# Patient Record
Sex: MALE | Race: WHITE | NOT HISPANIC OR LATINO | URBAN - METROPOLITAN AREA
[De-identification: names, ages, dates, MRNs, and addresses within clinical notes are randomized per-mention and may not be internally consistent; named-entity substitution may affect disease eponyms.]

---

## 2019-01-26 ENCOUNTER — INPATIENT (INPATIENT)
Facility: HOSPITAL | Age: 64
LOS: 5 days | Discharge: NURSING, REHAB OR LONG-TERM CARE | End: 2019-02-01
Attending: HOSPITALIST | Admitting: HOSPITALIST
Payer: MEDICAID

## 2019-01-26 VITALS
HEART RATE: 110 BPM | DIASTOLIC BLOOD PRESSURE: 123 MMHG | SYSTOLIC BLOOD PRESSURE: 247 MMHG | TEMPERATURE: 97 F | OXYGEN SATURATION: 97 % | RESPIRATION RATE: 24 BRPM

## 2019-01-26 DIAGNOSIS — I63.9 CEREBRAL INFARCTION, UNSPECIFIED: ICD-10-CM

## 2019-01-26 LAB
ALBUMIN SERPL ELPH-MCNC: 4.4 G/DL — SIGNIFICANT CHANGE UP (ref 3.3–5)
ALP SERPL-CCNC: 83 U/L — SIGNIFICANT CHANGE UP (ref 40–120)
ALT FLD-CCNC: 26 U/L — SIGNIFICANT CHANGE UP (ref 4–41)
ANION GAP SERPL CALC-SCNC: 16 MMO/L — HIGH (ref 7–14)
APTT BLD: 32.9 SEC — SIGNIFICANT CHANGE UP (ref 27.5–36.3)
AST SERPL-CCNC: 26 U/L — SIGNIFICANT CHANGE UP (ref 4–40)
BASE EXCESS BLDV CALC-SCNC: 3.2 MMOL/L — SIGNIFICANT CHANGE UP
BASOPHILS # BLD AUTO: 0.03 K/UL — SIGNIFICANT CHANGE UP (ref 0–0.2)
BASOPHILS NFR BLD AUTO: 0.5 % — SIGNIFICANT CHANGE UP (ref 0–2)
BILIRUB SERPL-MCNC: 0.8 MG/DL — SIGNIFICANT CHANGE UP (ref 0.2–1.2)
BLOOD GAS VENOUS - CREATININE: 0.86 MG/DL — SIGNIFICANT CHANGE UP (ref 0.5–1.3)
BUN SERPL-MCNC: 12 MG/DL — SIGNIFICANT CHANGE UP (ref 7–23)
CALCIUM SERPL-MCNC: 8.9 MG/DL — SIGNIFICANT CHANGE UP (ref 8.4–10.5)
CHLORIDE BLDV-SCNC: 108 MMOL/L — SIGNIFICANT CHANGE UP (ref 96–108)
CHLORIDE SERPL-SCNC: 99 MMOL/L — SIGNIFICANT CHANGE UP (ref 98–107)
CO2 SERPL-SCNC: 23 MMOL/L — SIGNIFICANT CHANGE UP (ref 22–31)
CREAT SERPL-MCNC: 1.13 MG/DL — SIGNIFICANT CHANGE UP (ref 0.5–1.3)
EOSINOPHIL # BLD AUTO: 0.07 K/UL — SIGNIFICANT CHANGE UP (ref 0–0.5)
EOSINOPHIL NFR BLD AUTO: 1.2 % — SIGNIFICANT CHANGE UP (ref 0–6)
GAS PNL BLDV: 134 MMOL/L — LOW (ref 136–146)
GLUCOSE BLDV-MCNC: 125 — HIGH (ref 70–99)
GLUCOSE SERPL-MCNC: 110 MG/DL — HIGH (ref 70–99)
HCO3 BLDV-SCNC: 26 MMOL/L — SIGNIFICANT CHANGE UP (ref 20–27)
HCT VFR BLD CALC: 48.7 % — SIGNIFICANT CHANGE UP (ref 39–50)
HCT VFR BLDV CALC: 48.7 % — SIGNIFICANT CHANGE UP (ref 39–51)
HGB BLD-MCNC: 16.6 G/DL — SIGNIFICANT CHANGE UP (ref 13–17)
HGB BLDV-MCNC: 15.9 G/DL — SIGNIFICANT CHANGE UP (ref 13–17)
IMM GRANULOCYTES NFR BLD AUTO: 0.5 % — SIGNIFICANT CHANGE UP (ref 0–1.5)
INR BLD: 1.11 — SIGNIFICANT CHANGE UP (ref 0.88–1.17)
LACTATE BLDV-MCNC: 2 MMOL/L — SIGNIFICANT CHANGE UP (ref 0.5–2)
LYMPHOCYTES # BLD AUTO: 1.84 K/UL — SIGNIFICANT CHANGE UP (ref 1–3.3)
LYMPHOCYTES # BLD AUTO: 30.7 % — SIGNIFICANT CHANGE UP (ref 13–44)
MCHC RBC-ENTMCNC: 29.1 PG — SIGNIFICANT CHANGE UP (ref 27–34)
MCHC RBC-ENTMCNC: 34.1 % — SIGNIFICANT CHANGE UP (ref 32–36)
MCV RBC AUTO: 85.4 FL — SIGNIFICANT CHANGE UP (ref 80–100)
MONOCYTES # BLD AUTO: 0.35 K/UL — SIGNIFICANT CHANGE UP (ref 0–0.9)
MONOCYTES NFR BLD AUTO: 5.8 % — SIGNIFICANT CHANGE UP (ref 2–14)
NEUTROPHILS # BLD AUTO: 3.68 K/UL — SIGNIFICANT CHANGE UP (ref 1.8–7.4)
NEUTROPHILS NFR BLD AUTO: 61.3 % — SIGNIFICANT CHANGE UP (ref 43–77)
NRBC # FLD: 0 K/UL — LOW (ref 25–125)
PCO2 BLDV: 43 MMHG — SIGNIFICANT CHANGE UP (ref 41–51)
PH BLDV: 7.42 PH — SIGNIFICANT CHANGE UP (ref 7.32–7.43)
PLATELET # BLD AUTO: 241 K/UL — SIGNIFICANT CHANGE UP (ref 150–400)
PMV BLD: 8.6 FL — SIGNIFICANT CHANGE UP (ref 7–13)
PO2 BLDV: 35 MMHG — SIGNIFICANT CHANGE UP (ref 35–40)
POTASSIUM BLDV-SCNC: 2.9 MMOL/L — CRITICAL LOW (ref 3.4–4.5)
POTASSIUM SERPL-MCNC: 3.1 MMOL/L — LOW (ref 3.5–5.3)
POTASSIUM SERPL-SCNC: 3.1 MMOL/L — LOW (ref 3.5–5.3)
PROT SERPL-MCNC: 7.9 G/DL — SIGNIFICANT CHANGE UP (ref 6–8.3)
PROTHROM AB SERPL-ACNC: 12.4 SEC — SIGNIFICANT CHANGE UP (ref 9.8–13.1)
RBC # BLD: 5.7 M/UL — SIGNIFICANT CHANGE UP (ref 4.2–5.8)
RBC # FLD: 13.2 % — SIGNIFICANT CHANGE UP (ref 10.3–14.5)
SAO2 % BLDV: 63 % — SIGNIFICANT CHANGE UP (ref 60–85)
SODIUM SERPL-SCNC: 138 MMOL/L — SIGNIFICANT CHANGE UP (ref 135–145)
WBC # BLD: 6 K/UL — SIGNIFICANT CHANGE UP (ref 3.8–10.5)
WBC # FLD AUTO: 6 K/UL — SIGNIFICANT CHANGE UP (ref 3.8–10.5)

## 2019-01-26 PROCEDURE — 70496 CT ANGIOGRAPHY HEAD: CPT | Mod: 26

## 2019-01-26 PROCEDURE — 99291 CRITICAL CARE FIRST HOUR: CPT

## 2019-01-26 PROCEDURE — 70450 CT HEAD/BRAIN W/O DYE: CPT | Mod: 26,59

## 2019-01-26 PROCEDURE — 70498 CT ANGIOGRAPHY NECK: CPT | Mod: 26

## 2019-01-26 RX ORDER — POTASSIUM CHLORIDE 20 MEQ
10 PACKET (EA) ORAL ONCE
Qty: 0 | Refills: 0 | Status: COMPLETED | OUTPATIENT
Start: 2019-01-26 | End: 2019-01-26

## 2019-01-26 RX ORDER — NICARDIPINE HYDROCHLORIDE 30 MG/1
2.5 CAPSULE, EXTENDED RELEASE ORAL
Qty: 40 | Refills: 0 | Status: DISCONTINUED | OUTPATIENT
Start: 2019-01-26 | End: 2019-01-27

## 2019-01-26 RX ORDER — LABETALOL HCL 100 MG
20 TABLET ORAL ONCE
Qty: 0 | Refills: 0 | Status: COMPLETED | OUTPATIENT
Start: 2019-01-26 | End: 2019-01-26

## 2019-01-26 RX ORDER — ASPIRIN/CALCIUM CARB/MAGNESIUM 324 MG
81 TABLET ORAL ONCE
Qty: 0 | Refills: 0 | Status: COMPLETED | OUTPATIENT
Start: 2019-01-26 | End: 2019-01-26

## 2019-01-26 RX ORDER — ALTEPLASE 100 MG
9 KIT INTRAVENOUS ONCE
Qty: 0 | Refills: 0 | Status: COMPLETED | OUTPATIENT
Start: 2019-01-26 | End: 2019-01-26

## 2019-01-26 RX ORDER — ALTEPLASE 100 MG
81 KIT INTRAVENOUS ONCE
Qty: 0 | Refills: 0 | Status: COMPLETED | OUTPATIENT
Start: 2019-01-26 | End: 2019-01-26

## 2019-01-26 RX ADMIN — Medication 100 MILLIEQUIVALENT(S): at 23:00

## 2019-01-26 RX ADMIN — Medication 20 MILLIGRAM(S): at 20:21

## 2019-01-26 RX ADMIN — Medication 20 MILLIGRAM(S): at 19:54

## 2019-01-26 RX ADMIN — ALTEPLASE 81 MILLIGRAM(S): KIT at 20:55

## 2019-01-26 RX ADMIN — NICARDIPINE HYDROCHLORIDE 12.5 MG/HR: 30 CAPSULE, EXTENDED RELEASE ORAL at 21:42

## 2019-01-26 RX ADMIN — ALTEPLASE 81 MILLIGRAM(S): KIT at 21:56

## 2019-01-26 RX ADMIN — Medication 20 MILLIGRAM(S): at 20:11

## 2019-01-26 RX ADMIN — NICARDIPINE HYDROCHLORIDE 25 MG/HR: 30 CAPSULE, EXTENDED RELEASE ORAL at 20:41

## 2019-01-26 RX ADMIN — ALTEPLASE 540 MILLIGRAM(S): KIT at 20:54

## 2019-01-26 RX ADMIN — Medication 20 MILLIGRAM(S): at 20:12

## 2019-01-26 RX ADMIN — Medication 81 MILLIGRAM(S): at 21:05

## 2019-01-26 NOTE — H&P ADULT - NSHPREVIEWOFSYSTEMS_GEN_ALL_CORE
Constitutional: denies fevers, chills, night sweats, weight loss  HEENT: denies visual changes, hearing changes, rhinitis, odynophagia, or dysphagia  Cardiovascular: denies palpitations, chest pain, edema  Respiratory: denies SOB, wheezing  Gastrointestinal: denies N/V/D, abdominal pain, hematochezia, melena  : denies dysuria, hematuria  MSK: denies joint pain  Skin: denies new rashes or masses  Heme: denies bleeding  Neuro: denies headache

## 2019-01-26 NOTE — STROKE CODE NOTE - NIH STROKE SCALE: 5A. MOTOR ARM, LEFT, QM
(0) No drift; limb holds 90 (or 45) degrees for full 10 secs (3) No effort against gravity; limb falls

## 2019-01-26 NOTE — ED PROVIDER NOTE - MEDICAL DECISION MAKING DETAILS
62 yo M presenting with sx concerning for TIA. CT-H no evidence of acute bleed, will obtain basic labs, CT angio H/N, tba for MRI. HTN emergency tx

## 2019-01-26 NOTE — H&P ADULT - HISTORY OF PRESENT ILLNESS
The patient is a 64 yo M with a PMH of HTN (not on meds) and HLD (not on meds) who presents to hospital with The patient is a 64 yo M with a PMH of HTN (not on meds), CAYDEN (on CPAP, compliant), obesity, and HLD (not on meds) who presents to hospital with acute onset of slurred speech and LUE paresis. Per chart reviwe: "Patient states he woke up on 1/26 at 5pm at his baseline and began to change his clothes utilizing both hands and was speaking normally at the time. After about 10 minutes of changing, patient fell over while tying his shoelaces. When patient got up, he tried to reach for a tissue with his left arm and was unable to lift it. Patient states that his left arm felt weak for a "few minutes" before returning back to normal however, he noticed that he was slurring his words."  Upon arrival, code stroke was called. The pts symptoms rapidly improved without intervention, however at approx 1945-800 the pts neuro status changed acutely and he developed acute LUE and LLE paresis as well as slurred speech and L sided facial droop. The patient The patient is a 62 yo M with a PMH of HTN (not on meds), CAYDEN (on CPAP, compliant), obesity, and HLD (not on meds) who presents to hospital with acute onset of slurred speech and LUE paresis. Per chart reviwe: "Patient states he woke up on 1/26 at 5pm at his baseline and began to change his clothes utilizing both hands and was speaking normally at the time. After about 10 minutes of changing, patient fell over while tying his shoelaces. When patient got up, he tried to reach for a tissue with his left arm and was unable to lift it. Patient states that his left arm felt weak for a "few minutes" before returning back to normal however, he noticed that he was slurring his words."  Upon arrival, code stroke was called. The pts symptoms rapidly improved without intervention, however at approx 1945-800 the pts neuro status changed acutely and he developed acute LUE and LLE paresis as well as slurred speech and L sided facial droop. The patient was re-evaluated by the neuro team and per chart review: "19:46pm: Interval examination demonstrates +Moderate dysarthria. LUE 1/5 strength. LLE 3/5 strength. New NIHSS 8. tPA discussed with patient and family as patient within extended window for tPA and patient amenable. Aggressive blood pressure control initiated. tPA given at 8:54pm. BP at time of tPA administration 177/95."    I arrived at the bedside at approximately 2100. The patient was fully alert and oriented. His wife and two sons were at the bedside. He had visible left sided facial droop and was slurring his speech. In addition, the was unable to move his LUE, and his LLE was 1/5-2/5. The patient expressed detailed understanding of what was going on, and I confirmed with him that he was full code in the event he required chest compressions and/or intubation. The patient had already been started on a nicardipine drip by the time I arrived and his BP was in the 160s.

## 2019-01-26 NOTE — STROKE CODE NOTE - NIH STROKE SCALE: 4. FACIAL PALSY, QM
Post-Care Instructions: I reviewed with the patient in detail post-care instructions. Patient is to wear sunprotection, and avoid picking at any of the treated lesions. Pt may apply Vaseline to crusted or scabbing areas. Number Of Freeze-Thaw Cycles: 1 freeze-thaw cycle Render Post-Care Instructions In Note?: no Duration Of Freeze Thaw-Cycle (Seconds): 3 Detail Level: Detailed Consent: The patient's consent was obtained including but not limited to risks of crusting, scabbing, blistering, scarring, darker or lighter pigmentary change, recurrence, incomplete removal and infection. (0) Normal symmetrical movements (2) Partial paralysis (total or near-total paralysis of lower face)

## 2019-01-26 NOTE — H&P ADULT - PMH
CAD (Coronary Artery Disease)    Hypertension    CAYDEN (Obstructive Sleep Apnea)    Peripheral Vascular Disease

## 2019-01-26 NOTE — H&P ADULT - NSHPLABSRESULTS_GEN_ALL_CORE
CBC Full  -  ( 26 Jan 2019 19:50 )  WBC Count : 6.00 K/uL  Hemoglobin : 16.6 g/dL  Hematocrit : 48.7 %  Platelet Count - Automated : 241 K/uL  Mean Cell Volume : 85.4 fL  Mean Cell Hemoglobin : 29.1 pg  Mean Cell Hemoglobin Concentration : 34.1 %  Auto Neutrophil # : 3.68 K/uL  Auto Lymphocyte # : 1.84 K/uL  Auto Monocyte # : 0.35 K/uL  Auto Eosinophil # : 0.07 K/uL  Auto Basophil # : 0.03 K/uL  Auto Neutrophil % : 61.3 %  Auto Lymphocyte % : 30.7 %  Auto Monocyte % : 5.8 %  Auto Eosinophil % : 1.2 %  Auto Basophil % : 0.5 %    01-26    138  |  99  |  12  ----------------------------<  110<H>  3.1<L>   |  23  |  1.13    Ca    8.9      26 Jan 2019 19:50    LIVER FUNCTIONS - ( 26 Jan 2019 19:50 )  Alb: 4.4 g/dL / Pro: 7.9 g/dL / ALK PHOS: 83 u/L / ALT: 26 u/L / AST: 26 u/L / GGT: x           PT/INR - ( 26 Jan 2019 19:50 )   PT: 12.4 SEC;   INR: 1.11          PTT - ( 26 Jan 2019 19:50 )  PTT:32.9 SEC  20:20 - VBG - pH: 7.42  | pCO2: 43    | pO2: 35    | Lactate: 2.0    CT brain protocol unremarkable    CT angio head and neck:  Areas of severe stenosis with poststenotic dilatation involving the   distal right and left vertebral arteries as well as the proximal basilar   artery. Differential considerations include vasculitis as well as   vertebral artery dissections. MRI/MRA suggested for further evaluation.    No significant stenosis or occlusion of the major proximal branches of   the [place].

## 2019-01-26 NOTE — CONSULT NOTE ADULT - SUBJECTIVE AND OBJECTIVE BOX
LION [id number] is a 63y old  Male who presents with a chief complaint of     HPI: 62 y/o obese Male with past medical history of HTN (non-compliant), HLD, CAYDEN on BIPAP presents to the ED with complaints of transient Left arm weakness and slurred speech. Code Stroke called in the ED for these symptoms. Patient states he woke up on 1/26 at 5pm at his baseline and began to change his clothes utilizing both hands and was speaking normally at the time. After about 10 minutes of changing, patient fell over while tying his shoelaces. When patient got up, he tried to reach for a tissue with his left arm and was unable to lift it. Patient states that his left arm felt weak for a "few minutes" before returning back to normal however, he noticed that he was slurring his words. Denies any changes in vision, weakness in unilateral legs, problems with coordination.   At the time of my interview, patient states he is back to baseline. No complaints at this time.     MEDICATIONS  (STANDING):    MEDICATIONS  (PRN):    PAST MEDICAL & SURGICAL HISTORY:  Peripheral Vascular Disease  CAYDEN (Obstructive Sleep Apnea)  CAD (Coronary Artery Disease)    FAMILY HISTORY:  Father with multiple TIA's and strokes  Grandfather with MI at age 40    Allergies    No Known Allergies    Intolerances        SHx - No smoking, No ETOH, No drug abuse      Review of Systems:  As per HPI, otherwise negative.     Vital Signs Last 24 Hrs  T(C): 36.1 (26 Jan 2019 18:36), Max: 36.1 (26 Jan 2019 18:36)  T(F): 97 (26 Jan 2019 18:36), Max: 97 (26 Jan 2019 18:36)  HR: 110 (26 Jan 2019 18:36) (110 - 110)  BP: 247/123 (26 Jan 2019 18:36) (247/123 - 247/123)  BP(mean): --  RR: 24 (26 Jan 2019 18:36) (24 - 24)  SpO2: 97% (26 Jan 2019 18:36) (97% - 97%)    General Exam:   General appearance: No acute distress                 Neurological Exam:  Mental Status: Orientated to self, date and place.  Attention intact.  No dysarthria. Speech fluent.  Cranial Nerves:   PERRL, EOMI, VFF, no nystagmus. CN V1-3 intact to light touch.  No facial asymmetry. Tongue, uvula and palate midline.  Sternocleidomastoid and Trapezius intact bilaterally.    Motor:   Tone: normal.                  Strength:     [] Upper extremity                                                                  R        No drift                                               L          No drift  [] Lower extremity                                                                      R       No drift                                               L        No drift  Pronator drift: none                 Dysmetria: None to finger-nose-finger    Tremor: No resting, postural or action tremor.  No myoclonus.    Sensation: intact to light touch.    Gait: Deferred    Other:                Radiology    CT:   MRI  EKG:  tele:  TTE:  EEG: LION [id number] is a 63y old  Male who presents with a chief complaint of     HPI: 64 y/o obese Male with past medical history of HTN (non-compliant), HLD, CAYDEN on BIPAP presents to the ED with complaints of transient Left arm weakness and slurred speech. Code Stroke called in the ED for these symptoms. Patient states he woke up on 1/26 at 5pm at his baseline and began to change his clothes utilizing both hands and was speaking normally at the time. After about 10 minutes of changing, patient fell over while tying his shoelaces. When patient got up, he tried to reach for a tissue with his left arm and was unable to lift it. Patient states that his left arm felt weak for a "few minutes" before returning back to normal however, he noticed that he was slurring his words. Denies any changes in vision, weakness in unilateral legs, problems with coordination. At the time of my interview, patient states he is back to baseline. No complaints at this time.     Notified by ED Team at 19:46pm that patient's neurologic status has changed. As per ED Team, patient now with slurred speech and left arm weakness.   Neurology resident at bedside.     Patient states his slurred speech is waxing and waning along with his left arm weakness.   Interval exam demonstrates initial vitals in the 240's systolics / 130's diastolic.       MEDICATIONS  (STANDING):    MEDICATIONS  (PRN):    PAST MEDICAL & SURGICAL HISTORY:  Peripheral Vascular Disease  CAYDEN (Obstructive Sleep Apnea)  CAD (Coronary Artery Disease)    FAMILY HISTORY:  Father with multiple TIA's and strokes  Grandfather with MI at age 40    Allergies    No Known Allergies    Intolerances        SHx - No smoking, No ETOH, No drug abuse      Review of Systems:  As per HPI, otherwise negative.     Vital Signs Last 24 Hrs  T(C): 36.1 (26 Jan 2019 18:36), Max: 36.1 (26 Jan 2019 18:36)  T(F): 97 (26 Jan 2019 18:36), Max: 97 (26 Jan 2019 18:36)  HR: 110 (26 Jan 2019 18:36) (110 - 110)  BP: 247/123 (26 Jan 2019 18:36) (247/123 - 247/123)  BP(mean): --  RR: 24 (26 Jan 2019 18:36) (24 - 24)  SpO2: 97% (26 Jan 2019 18:36) (97% - 97%)    General Exam:   General appearance: No acute distress                 Neurological Exam:  Mental Status: Orientated to self, date and place.  Attention intact.  No dysarthria. Speech fluent.  Cranial Nerves:   PERRL, EOMI, VFF, no nystagmus. CN V1-3 intact to light touch.  No facial asymmetry. Tongue, uvula and palate midline.  Sternocleidomastoid and Trapezius intact bilaterally.    Motor:   Tone: normal.                  Strength:     [] Upper extremity                                                                  R        No drift                                               L          No drift  [] Lower extremity                                                                      R       No drift                                               L        No drift  Pronator drift: none                 Dysmetria: None to finger-nose-finger    Tremor: No resting, postural or action tremor.  No myoclonus.    Sensation: intact to light touch.    Gait: Deferred    Other:      Radiology    CT: < from: CT Brain Stroke Protocol (01.26.19 @ 18:58) >  IMPRESSION:     No acute intracranial hemorrhage, brain edema, midline shift, mass   effect, or hydrocephalus.     < end of copied text >    MRI  EKG:  tele:  TTE:  EEG: LION [id number] is a 63y old  Male who presents with a chief complaint of     HPI: 64 y/o obese Male with past medical history of HTN (non-compliant), HLD, CAYDEN on BIPAP presents to the ED with complaints of transient Left arm weakness and slurred speech. Code Stroke called in the ED for these symptoms. Patient states he woke up on 1/26 at 5pm at his baseline and began to change his clothes utilizing both hands and was speaking normally at the time. After about 10 minutes of changing, patient fell over while tying his shoelaces. When patient got up, he tried to reach for a tissue with his left arm and was unable to lift it. Patient states that his left arm felt weak for a "few minutes" before returning back to normal however, he noticed that he was slurring his words. Denies any changes in vision, weakness in unilateral legs, problems with coordination. At the time of my interview, patient states he is back to baseline. No complaints at this time.     Notified by ED Team at 19:46pm that patient's neurologic status has changed. As per ED Team, patient now with slurred speech and left arm weakness.   Neurology resident at bedside.     Patient states his slurred speech is waxing and waning along with his left arm weakness.   Interval exam demonstrates initial vitals in the 240's systolics / 130's diastolic.       MEDICATIONS  (STANDING):    MEDICATIONS  (PRN):    PAST MEDICAL & SURGICAL HISTORY:  Peripheral Vascular Disease  CAYDEN (Obstructive Sleep Apnea)  CAD (Coronary Artery Disease)    FAMILY HISTORY:  Father with multiple TIA's and strokes  Grandfather with MI at age 40    Allergies    No Known Allergies    Intolerances        SHx - No smoking, No ETOH, No drug abuse      Review of Systems:  As per HPI, otherwise negative.     INITIAL NEUROLOGIC EXAM  Vital Signs Last 24 Hrs  T(C): 36.1 (26 Jan 2019 18:36), Max: 36.1 (26 Jan 2019 18:36)  T(F): 97 (26 Jan 2019 18:36), Max: 97 (26 Jan 2019 18:36)  HR: 110 (26 Jan 2019 18:36) (110 - 110)  BP: 247/123 (26 Jan 2019 18:36) (247/123 - 247/123)  BP(mean): --  RR: 24 (26 Jan 2019 18:36) (24 - 24)  SpO2: 97% (26 Jan 2019 18:36) (97% - 97%)    General Exam:   General appearance: No acute distress                 Neurological Exam:  Mental Status: Orientated to self, date and place.  Attention intact.  No dysarthria. Speech fluent.  Cranial Nerves:   PERRL, EOMI, VFF, no nystagmus. CN V1-3 intact to light touch.  No facial asymmetry. Tongue, uvula and palate midline.  Sternocleidomastoid and Trapezius intact bilaterally.    Motor:   Tone: normal.                  Strength:     [] Upper extremity                                                                  R        No drift                                               L          No drift  [] Lower extremity                                                                      R       No drift                                               L        No drift  Pronator drift: none                 Dysmetria: None to finger-nose-finger    Tremor: No resting, postural or action tremor.  No myoclonus.    Sensation: intact to light touch.    Gait: Deferred      INTERVAL NEUROLOGIC EXAM  Neurological Exam:  Mental Status: Orientated to self, date and place.  Attention intact.  +Moderate dysarthria. Speech fluent.  Cranial Nerves:   PERRL, EOMI, VFF, no nystagmus.    CN V1-3 intact to light touch . +Left Facial Droop.  Tongue, uvula and palate midline.      Motor:   Tone: LUE flaccid                 Strength:     [] Upper extremity                                                                     R         5/5                                               L         1/5 (No effort against gravity)  [] Lower extremity                                                                     R        5/5                                                 L        3/5    Dysmetria: None to FNF on the Right  Tremor: No resting, postural or action tremor.  No myoclonus.  Sensation: intact to light touch,  Gait: Deferred          Radiology  CT: < from: CT Brain Stroke Protocol (01.26.19 @ 18:58) >  IMPRESSION:     No acute intracranial hemorrhage, brain edema, midline shift, mass   effect, or hydrocephalus.     < end of copied text >    MRI  EKG:  tele:  TTE:  EEG:

## 2019-01-26 NOTE — STROKE CODE NOTE - NIH STROKE SCALE: 10. DYSARTHRIA, QM
(0) Normal (1) Mild-to-moderate dysarthria; patient slurs at least some words and, at worst, can be understood with some difficulty

## 2019-01-26 NOTE — ED ADULT NURSE NOTE - OBJECTIVE STATEMENT
Pt received in spot 27 as a code stroke from CT scan.  Pt A&OX4, NAD.  c/o 1 episode of L arm weakness approx 5PM with slurred speech, which has resolved now.  No facial droop noted.  No neuro deficits noted.  Denies any HA/dizziness/vision changes.  Denies any chest pain/SOB.  NSR on monitor.  Pt appears comfortable.  Labs sent.  MD at bedside.  Will continue to monitor.

## 2019-01-26 NOTE — ED PROVIDER NOTE - ATTENDING CONTRIBUTION TO CARE
Attending note:   After face to face evaluation of this patient, I concur with above noted hx, pe, and care plan for this patient.  64 y/o M with dary, htn, non-compliant with medication with minutes of left arm weakness and slurred speech after awakening today around 5:30pm.    All symptoms resolved;  BP significantly elevated; will treat with labetalol.

## 2019-01-26 NOTE — CHART NOTE - NSCHARTNOTEFT_GEN_A_CORE
Notified by ED Team at 19:46pm that patient's neurologic status has changed. As per ED Team, patient now with slurred speech and left arm weakness.   Neurology resident at bedside.     Patient states his slurred speech is waxing and waning along with his left arm weakness.   Interval exam demonstrates initial vitals in the 240's systolics / 130's diastolic.            Neurological Exam:  Mental Status: Orientated to self, date and place.  Attention intact.  +Moderate dysarthria. Speech fluent.  Cranial Nerves:   PERRL, EOMI, VFF, no nystagmus.    CN V1-3 intact to light touch . +Left Facial Droop.  Tongue, uvula and palate midline.      Motor:   Tone: LUE flaccid                 Strength:     [] Upper extremity                                                                     R         5/5                                               L         1/5 (No effort against gravity)  [] Lower extremity                                                                     R        5/5                                                 L        3/5    Dysmetria: None to FNF on the Right  Tremor: No resting, postural or action tremor.  No myoclonus.  Sensation: intact to light touch,  Gait: Deferred    62 y/o obese Male with past medical history of HTN (non-compliant), HLD, CAYDEN on BIPAP presented initially to [facility] ED as Code Stroke for Slurred speech and LUE paresis which rapidly improved. LKN: 5pm. Initial NIHSS 0. Interval examination demonstrates +Moderate dysarthria. LUE 1/5 strength. LLE 3/5 strength. New NIHSS 8. tPA discussed with patient and family as patient within extended window for tPA and patient amenable. Aggressive blood pressure control initiated. tPA given at 8:54pm. BP at time of tPA administration 177/95.    Impression: Left hemiparesis w/ dysarthria likely 2/2 R brain dysfunction; likely R MCA infarct (possibly involving R internal capsule) 2/2 unknown etiology.     Recommendations:   Urgent CTA head/neck with contrast to evaluate for possible vessel occlusion  Permissive HTN for 24 hours up to 180/105  Repeat CT head in 24 hours  If repeat CT head without hemorrhagic conversion, start on heparin/lovenox for DVT prophylaxis and ASA 81 mg QD  MRI brain without contrast  TTE with bubble study for possible valvular heart disease  NPO for 12 hours following tPA administration  Lipitor 80 mg PO QHS  HbA1c, LDL and continue with aggressive vascular risk factors modifications   Tele monitor  PT/OT/S&S eval Notified by ED Team at 19:46pm that patient's neurologic status has changed. As per ED Team, patient now with slurred speech and left arm weakness.   Neurology resident at bedside.     Patient states his slurred speech is waxing and waning along with his left arm weakness.   Interval exam demonstrates initial vitals in the 240's systolics / 130's diastolic.            Neurological Exam:  Mental Status: Orientated to self, date and place.  Attention intact.  +Moderate dysarthria. Speech fluent.  Cranial Nerves:   PERRL, EOMI, VFF, no nystagmus.    CN V1-3 intact to light touch . +Left Facial Droop.  Tongue, uvula and palate midline.      Motor:   Tone: LUE flaccid                 Strength:     [] Upper extremity                                                                     R         5/5                                               L         1/5 (No effort against gravity)  [] Lower extremity                                                                     R        5/5                                                 L        3/5    Dysmetria: None to FNF on the Right  Tremor: No resting, postural or action tremor.  No myoclonus.  Sensation: intact to light touch,  Gait: Deferred    64 y/o obese Male with past medical history of HTN (non-compliant), HLD, CAYDEN on BIPAP presented initially to [facility] ED as Code Stroke for Slurred speech and LUE paresis which rapidly improved. LKN: 5pm. Initial NIHSS 0. Interval examination demonstrates +Moderate dysarthria. LUE 1/5 strength. LLE 3/5 strength. New NIHSS 8. tPA discussed with patient and family as patient within extended window for tPA and patient amenable. Aggressive blood pressure control initiated. tPA given at 8:54pm. BP at time of tPA administration 177/95.    Impression: Left hemiparesis w/ dysarthria likely 2/2 R brain dysfunction; likely R MCA infarct (possibly involving R internal capsule) 2/2 unknown etiology.     Recommendations:   Urgent CTA head/neck with contrast to evaluate for possible vessel occlusion  MICU Consult  Permissive HTN for 24 hours up to 180/105  Repeat CT head in 24 hours  If repeat CT head without hemorrhagic conversion, start on heparin/lovenox for DVT prophylaxis and ASA 81 mg QD  MRI brain without contrast  TTE with bubble study for possible valvular heart disease  NPO for 12 hours following tPA administration  Lipitor 80 mg PO QHS  HbA1c, LDL and continue with aggressive vascular risk factors modifications   Tele monitor  PT/OT/S&S eval

## 2019-01-26 NOTE — ED PROVIDER NOTE - OBJECTIVE STATEMENT
62 yo morbidly obese M hx HTN, HLD, non compliant with meds, CAYDEN on CPAP, presenting with complaints of LUE weakness lasting 5 minutes around 5:15 pm while getting dressed, episode occurred about 15 minutes, and reported slurred speech on and off for 30 minutes. No numbness/tingling.  Patient states he woke up from a nap at 5 pm and got up to clean up with full ability to use bilateral arms before symptoms began. Family history of stroke. Denies chest pain. Reports chronic labored breathing.

## 2019-01-26 NOTE — CONSULT NOTE ADULT - ATTENDING COMMENTS
Seen and examined on rounds with housestaff and discussed with stroke team.  This is a 62 yo gentleman with a history of poorly controlled HTN, CAYDEN, obesity presenting with an acute onset of L hemiparesis.  Symptoms fluctuated in the ER to the extent of complete L hemiplegia in association with SBP ~250.  He is s/p IV tpa at 8:54 pm as advised by the stroke neurology team.  On exam today, there is a R end gaze horizontal nystagmus, L UMN facial weakness, he is unable to lift his left arm off the bed, though able to bend his elbow with support.  He is able to lift his left off the bed with mild drift.  CT angiogram concerning for proximal basilar stenosis and distal bilateral vertebral arteries.  If repeat CT head does not reveal a significant acute hemorrhage, would start ASA/Plavix.  Plan for MRI brain, MRA head and neck.  C/w permissive HTN.  Stroke education provided.

## 2019-01-26 NOTE — ED PROVIDER NOTE - PHYSICAL EXAMINATION
VITALS: reviewed  GEN: NAD, A & O x 4  HEAD/EYES: NCAT, PERRL, EOMI  ENT: mucus membranes moist, oropharynx WNL, trachea midline  RESP: lungs CTA with equal breath sounds bilaterally, chest wall nontender and atraumatic  CV: heart with reg rhythm S1, S2; distal pulses intact and symmetric bilaterally  ABDOMEN: normoactive bowel sounds, soft, nondistended, nontender, no palpable masses  : no CVAT  MSK: extremities atraumatic and nontender, no edema, no asymmetry.   SKIN: warm, dry, no rash, no bruising, no cyanosis. color appropriate for ethnicity  NEURO: alert, mentating appropriately, no facial asymmetry. gross sensation, motor, coordination are intact, no pronator drift.  PSYCH: Affect appropriate

## 2019-01-26 NOTE — H&P ADULT - ASSESSMENT
NEUROLOGIC:  Problem: ischemic stroke  Assessment: Ischemic stroke in setting of untreated HTN and HLD. Fam hx of stroke in father. No alteration of mental status present. However, LUE/LLE/, left facial droop, and slurred speech present. Likely due to structural ischemic stroke stroke. Initial CTH negative. CTA of head and neck showing severe stenosis with poststenotic dilatation involving the distal right and left vertebral arteries as well as the proximal basilar artery. Pt received TPA at 854PM on 1/26. He has had improvement in his motor function since then however waxes/wanes.   Plan:   Permissive HTN for 24 hours up to 180/105. Titrate nicardipine drip as indicated.  Neuro checks q1 hours  Repeat CT head ordered for 1900 on 1/27  MRI brain without contrast ordered  TTE with bubble study ordered   Atorvastatin 80 mg once passes dysphagia screen  HbA1c, LDL ordered for AM 1/27  He is eligible for early mobilization and immediate physical therapy/OT/SS. All are ordered    SKIN:  Lines: low risk PIVs  Decubiti: None    GI:   Diet: NPO for 12 hours following tPA administration. Bedside dysphagia screen at 9am. If passes can start diet  Bowel regiment: senna when passes dyphagia screen    Urinary tract:  UO: Will monitor  Rivera: Not indicated      METABOLIC:  BMP showing evidence of hypokalemia. Repleted w IV. F/u repeat BMP  Liver functions tests show no abnormalities  Endocrine: no abnormalities present  01-26    138  |  99  |  12  ----------------------------<  110<H>  3.1<L>   |  23  |  1.13    Ca    8.9      26 Jan 2019 19:50    TPro  7.9  /  Alb  4.4  /  TBili  0.8  /  DBili  x   /  AST  26  /  ALT  26  /  AlkPhos  83  01-26      VOLUME ASSESSMENT:  No peripheral edema  No evidence of disturbance of effective circulating volume    HEMATOLOGIC:  CBC results shows no abnormalities  Coag panel shows no abnormalities  DVT prophylaxis with SCDs. If repeat CTH at 1900 on 1/27 shows no ischemic conversion-->HSQ or enox                        16.6   6.00  )-----------( 241      ( 26 Jan 2019 19:50 )             48.7     PT/INR - ( 26 Jan 2019 19:50 )   PT: 12.4 SEC;   INR: 1.11          PTT - ( 26 Jan 2019 19:50 )  PTT:32.9 SEC    INFECTIOUS DISEASE:  Pt without strong objective or clinical evidence of infection. Will observe off antibiotics    HEMODYNAMICS:  Patient is not on pressors     CARDIOVASCULAR:    RESPIRATORY: The patient is a 64 yo M with a PMH of HTN (not on meds), CAYDEN (on CPAP, compliant), obesity, and HLD (not on meds) who presents to hospital with acute onset of slurred speech and LUE paresis found to have ischemic stroke s/p tPA now in MICU for further observation/management.     NEUROLOGIC:  Problem: ischemic stroke  Assessment: Ischemic stroke in setting of untreated HTN and HLD. Fam hx of stroke in father. No alteration of mental status present. However, LUE/LLE/, left facial droop, and slurred speech present. Likely due to structural ischemic stroke stroke. Initial CTH negative. CTA of head and neck showing severe stenosis with poststenotic dilatation involving the distal right and left vertebral arteries as well as the proximal basilar artery. Pt received tPA at 854PM on 1/26. He has had improvement in his motor function since then however waxes/wanes.   Plan:   Permissive HTN for 24 hours up to 180/105. Titrate nicardipine drip as indicated.  Neuro checks q1 hours  Repeat CT head ordered for 1900 on 1/27  MRI brain without contrast ordered  TTE with bubble study ordered   Atorvastatin 80 mg once passes dysphagia screen  HbA1c, LDL ordered for AM 1/27  He is eligible for early mobilization and immediate physical therapy/OT/SS. All are ordered    SKIN:  Lines: low risk PIVs  Decubiti: None    GI:   Diet: NPO for 12 hours following tPA administration. Bedside dysphagia screen at 9am. If passes can start diet  Bowel regiment: senna when passes dyphagia screen    Urinary tract:  UO: Will monitor  Rivera: Not indicated      METABOLIC:  BMP showing evidence of hypokalemia. Repleted w IV. F/u repeat BMP  Liver functions tests show no abnormalities  Endocrine: no abnormalities present  01-26    138  |  99  |  12  ----------------------------<  110<H>  3.1<L>   |  23  |  1.13    Ca    8.9      26 Jan 2019 19:50    TPro  7.9  /  Alb  4.4  /  TBili  0.8  /  DBili  x   /  AST  26  /  ALT  26  /  AlkPhos  83  01-26      VOLUME ASSESSMENT:  No peripheral edema  No evidence of disturbance of effective circulating volume    HEMATOLOGIC:  CBC results shows no abnormalities  Coag panel shows no abnormalities  DVT prophylaxis with SCDs. If repeat CTH at 1900 on 1/27 shows no ischemic conversion-->HSQ or enox                        16.6   6.00  )-----------( 241      ( 26 Jan 2019 19:50 )             48.7     PT/INR - ( 26 Jan 2019 19:50 )   PT: 12.4 SEC;   INR: 1.11          PTT - ( 26 Jan 2019 19:50 )  PTT:32.9 SEC    INFECTIOUS DISEASE:  Pt without strong objective or clinical evidence of infection. Will observe off antibiotics    HEMODYNAMICS:  Patient is not on pressors     CARDIOVASCULAR:  BP goal from 150-180 systolic / under 105 diastolic  Titrate nicardipine drip as indicated  Oral agent after 24 hours from tPA    RESPIRATORY:  CPAP QHS

## 2019-01-26 NOTE — STROKE CODE NOTE - NIH STROKE SCALE: 6A. MOTOR LEG, LEFT, QM
(0) No drift; leg holds 30 degree position for full 5 secs (2) Some effort against gravity; leg falls to bed by 5 secs, but has some effort against gravity

## 2019-01-26 NOTE — ED PROVIDER NOTE - NS ED ROS FT
CONST: no fevers, no chills, no trauma  EYES: no pain, no visual disturbances  ENT: no sore throat, no epistaxis, no rhinorrhea, no hearing changes  CV: no chest pain, no palpitations, no orthopnea, no extremity pain or swelling  RESP: no shortness of breath, no cough, no sputum, no pleurisy, no wheezing  ABD: no abdominal pain, no nausea, no vomiting, no diarrhea, no black or bloody stool  : no dysuria, no hematuria, no frequency, no urgency  MSK: no back pain, no neck pain, no extremity pain  NEURO: no headache, no sensory disturbances, + focal weakness, no dizziness  HEME: no easy bleeding or bruising  SKIN: no diaphoresis, no rash

## 2019-01-26 NOTE — H&P ADULT - ATTENDING COMMENTS
CAYDEN on CPAP, obesity, HTN, DM p/w HTN emergency and acute CVA s/p TPA  bp control on cardene   neuro checks q1h  bipap support  neuro f/u

## 2019-01-26 NOTE — ED ADULT NURSE NOTE - NSIMPLEMENTINTERV_GEN_ALL_ED
Implemented All Fall Risk Interventions:  [place] to call system. Call bell, personal items and telephone within reach. Instruct patient to call for assistance. Room bathroom lighting operational. Non-slip footwear when patient is off stretcher. Physically safe environment: no spills, clutter or unnecessary equipment. Stretcher in lowest position, wheels locked, appropriate side rails in place. Provide visual cue, wrist band, yellow gown, etc. Monitor gait and stability. Monitor for mental status changes and reorient to person, place, and time. Review medications for side effects contributing to fall risk. Reinforce activity limits and safety measures with patient and family.

## 2019-01-26 NOTE — ED PROVIDER NOTE - PROGRESS NOTE DETAILS
Uriel att; 64 yo obese M with HTN, HLP, CAYDEN on CPAP at night BIB family for episode of left hand weakness (pt was unable to raise his left arm fully) which has since resolved and slurred speech which has improved but has not completely resolved. No focal weakness-CNs, motor, sensory and cerebellar intact. Questionable slurred speech as I do not know the pt's baseline. Stroke code called and discussed with neuro resident. Signed out to Blue side. Dennis PGY2: called into room by nurse for patient c/o slurred speech. Family at bedside endorsing speech sounds different, neurology resident notified. Patient BP elevated 265, received 20 labetalol without much improvement, will give second dose. As per neurology resident, target  Dennis PGY2: called to patient bedside, SBP dropped to 140 , HR 47 on cardine drip @ 5, drip was stopped, palpable pulse, patient diaphoretic, bagged @ bedside for increased pressure. Started IV fluids to increase pressure, BP then jumped to 244 systolic, fluids stopped and restarted cardine @ 2.5 mg at which point pressure dropped again to systolic 130's. ICU was called to notify about change. Drip was paused, started bipap for sx relief of difficulty breathing, sbp continued to increase now 's fluids off, no cardine drip at time, on BIPAP with reported improved sx.

## 2019-01-26 NOTE — H&P ADULT - NSHPPHYSICALEXAM_GEN_ALL_CORE
PHYSICAL EXAM:   GEN: Age appropriate, resting comfortably in bed, no acute distress, non toxic appearing, speaking in complete sentences.   HEENT: Conjunctiva and sclera normal  PULM: Lungs CTAB, no wheezes, rales, rhonchi  CV: RRR, S1S2, no MRG  Abdominal: Soft, nontender to palpation, non-distended, +BS  Extremities: No edema or cyanosis  NEURO: AAOx4. 0/5 LUE. 1-2/5 strength LLL. RUE and RLE 5/5. L facial droop. Slurred speech.   Skin: No rashes, lesions

## 2019-01-26 NOTE — STROKE CODE NOTE - NS AS STROKE RELATED FACTORS
In-hospital time delay/Other/Patient initially with rapidly improving Neurologic deficits with progression of clinical symptoms occurring later in the ED course requiring re-evaluation for Thrombolytics.

## 2019-01-26 NOTE — ED ADULT TRIAGE NOTE - CHIEF COMPLAINT QUOTE
pt c/o waking up and feeling week and had episode of arm weakness/ and later had some slurred speech as per pt after putting on pants pt fell and realized he had some slurred speech and some weakness in the arm/  fit doc cold to eval uate for possible stroke pt suffers from sleep apnea  code stroke called

## 2019-01-26 NOTE — CONSULT NOTE ADULT - ASSESSMENT
62 y/o obese Male with past medical history of HTN (non-compliant), HLD, CAYDEN on BIPAP presents to the ED with complaints of transient Left arm weakness and slurred speech. Code Stroke called in the ED for these symptoms. Patient states he woke up on 1/26 at 5pm at his baseline and began to change his clothes utilizing both hands and was speaking normally at the time. After about 10 minutes of changing, patient fell over while tying his shoelaces. When patient got up, he tried to reach for a tissue with his left arm and was unable to lift it. Patient states that his left arm felt weak for a "few minutes" before returning back to normal however, he noticed that he was slurring his words. Denies any changes in vision, weakness in unilateral legs, problems with coordination.   At the time of my interview, patient states he is back to baseline. No complaints at this time. LKN: 5pm 1/26/2019. Neurologic exam non-focal. NIHSS 0. Pre-MRS 3. ABCD2 score 4. tPA not given as neurologic deficits rapidly improved. CT head demonstrates no acute pathology but possible chronic bilateral basal ganglia infarcts. CTA h/n pending.     Impression: Transient LUE hemiparesis a/w dysarthria possibly 2/2 R brain dysfunction; possibly R MCA distribution TIA 2/2 unknown etiology    Recommendations:  Permissive HTN up to 180/105 mmHg for first 24 hours after the symptom onset followed by gradual normotension  MRI brain without contrast  CTA h/n with contrast  Start ASA 81 mg PO QD once patient passes swallow evaluation, if not,  HI  Load with Plavix 300mg followed by 75mg PO QD for 3 weeks  Lipitor 80 mg PO QHS  Dysphagia screen 64 y/o obese Male with past medical history of HTN (non-compliant), HLD, CAYDEN on BIPAP presents to the ED with complaints of transient Left arm weakness and slurred speech. Code Stroke called in the ED for these symptoms. Patient states he woke up on 1/26 at 5pm at his baseline and began to change his clothes utilizing both hands and was speaking normally at the time. After about 10 minutes of changing, patient fell over while tying his shoelaces. When patient got up, he tried to reach for a tissue with his left arm and was unable to lift it. Patient states that his left arm felt weak for a "few minutes" before returning back to normal however, he noticed that he was slurring his words. Denies any changes in vision, weakness in unilateral legs, problems with coordination.   At the time of my interview, patient states he is back to baseline. No complaints at this time. LKN: 5pm 1/26/2019. Neurologic exam non-focal. NIHSS 0. Pre-MRS 3. ABCD2 score 4. tPA not given as neurologic deficits rapidly improved. CT head demonstrates no acute pathology but possible chronic bilateral basal ganglia infarcts.     19:46pm: Interval examination demonstrates +Moderate dysarthria. LUE 1/5 strength. LLE 3/5 strength. New NIHSS 8. tPA discussed with patient and family as patient within extended window for tPA and patient amenable. Aggressive blood pressure control initiated. tPA given at 8:54pm. BP at time of tPA administration 177/95.    Impression: Left hemiparesis w/ dysarthria likely 2/2 R brain dysfunction; likely R MCA infarct (possibly involving R internal capsule) 2/2 unknown etiology.     Recommendations:   Urgent CTA head/neck with contrast to evaluate for possible vessel occlusion  Permissive HTN for 24 hours up to 180/105  Repeat CT head in 24 hours  If repeat CT head without hemorrhagic conversion, start on heparin/lovenox for DVT prophylaxis and ASA 81 mg QD  MRI brain without contrast  TTE with bubble study for possible valvular heart disease  NPO for 12 hours following tPA administration  Lipitor 80 mg PO QHS  HbA1c, LDL and continue with aggressive vascular risk factors modifications   Tele monitor  PT/OT/S&S eval. 64 y/o obese Male with past medical history of HTN (non-compliant), HLD, CAYDEN on BIPAP presents to the ED with complaints of transient Left arm weakness and slurred speech. Code Stroke called in the ED for these symptoms. Patient states he woke up on 1/26 at 5pm at his baseline and began to change his clothes utilizing both hands and was speaking normally at the time. After about 10 minutes of changing, patient fell over while tying his shoelaces. When patient got up, he tried to reach for a tissue with his left arm and was unable to lift it. Patient states that his left arm felt weak for a "few minutes" before returning back to normal however, he noticed that he was slurring his words. Denies any changes in vision, weakness in unilateral legs, problems with coordination.   At the time of my interview, patient states he is back to baseline. No complaints at this time. LKN: 5pm 1/26/2019. Neurologic exam non-focal. NIHSS 0. Pre-MRS 3. ABCD2 score 4. tPA not given as neurologic deficits rapidly improved. CT head demonstrates no acute pathology but possible chronic bilateral basal ganglia infarcts.     19:46pm: Interval examination demonstrates +Moderate dysarthria. LUE 1/5 strength. LLE 3/5 strength. New NIHSS 8. tPA discussed with patient and family as patient within extended window for tPA and patient amenable. Aggressive blood pressure control initiated. tPA given at 8:54pm. BP at time of tPA administration 177/95.    Impression: Left hemiparesis w/ dysarthria likely 2/2 R brain dysfunction; likely R MCA infarct (possibly involving R internal capsule) 2/2 unknown etiology.     Recommendations:   Urgent CTA head/neck with contrast to evaluate for possible vessel occlusion  MICU Consult  Permissive HTN for 24 hours up to 180/105  Repeat CT head in 24 hours  If repeat CT head without hemorrhagic conversion, start on heparin/lovenox for DVT prophylaxis and ASA 81 mg QD  MRI brain without contrast  TTE with bubble study for possible valvular heart disease  NPO for 12 hours following tPA administration  Lipitor 80 mg PO QHS  HbA1c, LDL and continue with aggressive vascular risk factors modifications   Tele monitor  PT/OT/S&S eval.

## 2019-01-26 NOTE — ED PROVIDER NOTE - CHIEF COMPLAINT
The patient is a 63y Male complaining of The patient is a 63y Male complaining of slurred speech and weakness

## 2019-01-27 LAB
ALBUMIN SERPL ELPH-MCNC: 4.3 G/DL — SIGNIFICANT CHANGE UP (ref 3.3–5)
ALP SERPL-CCNC: 80 U/L — SIGNIFICANT CHANGE UP (ref 40–120)
ALT FLD-CCNC: 24 U/L — SIGNIFICANT CHANGE UP (ref 4–41)
ANION GAP SERPL CALC-SCNC: 16 MMO/L — HIGH (ref 7–14)
ANION GAP SERPL CALC-SCNC: 18 MMO/L — HIGH (ref 7–14)
AST SERPL-CCNC: 21 U/L — SIGNIFICANT CHANGE UP (ref 4–40)
BASOPHILS # BLD AUTO: 0.04 K/UL — SIGNIFICANT CHANGE UP (ref 0–0.2)
BASOPHILS NFR BLD AUTO: 0.5 % — SIGNIFICANT CHANGE UP (ref 0–2)
BILIRUB SERPL-MCNC: 0.9 MG/DL — SIGNIFICANT CHANGE UP (ref 0.2–1.2)
BUN SERPL-MCNC: 12 MG/DL — SIGNIFICANT CHANGE UP (ref 7–23)
BUN SERPL-MCNC: 12 MG/DL — SIGNIFICANT CHANGE UP (ref 7–23)
CALCIUM SERPL-MCNC: 8.6 MG/DL — SIGNIFICANT CHANGE UP (ref 8.4–10.5)
CALCIUM SERPL-MCNC: 8.7 MG/DL — SIGNIFICANT CHANGE UP (ref 8.4–10.5)
CHLORIDE SERPL-SCNC: 100 MMOL/L — SIGNIFICANT CHANGE UP (ref 98–107)
CHLORIDE SERPL-SCNC: 100 MMOL/L — SIGNIFICANT CHANGE UP (ref 98–107)
CHOLEST SERPL-MCNC: 305 MG/DL — HIGH (ref 120–199)
CO2 SERPL-SCNC: 21 MMOL/L — LOW (ref 22–31)
CO2 SERPL-SCNC: 23 MMOL/L — SIGNIFICANT CHANGE UP (ref 22–31)
CREAT SERPL-MCNC: 1.02 MG/DL — SIGNIFICANT CHANGE UP (ref 0.5–1.3)
CREAT SERPL-MCNC: 1.09 MG/DL — SIGNIFICANT CHANGE UP (ref 0.5–1.3)
EOSINOPHIL # BLD AUTO: 0.04 K/UL — SIGNIFICANT CHANGE UP (ref 0–0.5)
EOSINOPHIL NFR BLD AUTO: 0.5 % — SIGNIFICANT CHANGE UP (ref 0–6)
GLUCOSE SERPL-MCNC: 111 MG/DL — HIGH (ref 70–99)
GLUCOSE SERPL-MCNC: 120 MG/DL — HIGH (ref 70–99)
HBA1C BLD-MCNC: 5.6 % — SIGNIFICANT CHANGE UP (ref 4–5.6)
HCT VFR BLD CALC: 46.7 % — SIGNIFICANT CHANGE UP (ref 39–50)
HDLC SERPL-MCNC: 31 MG/DL — LOW (ref 35–55)
HGB BLD-MCNC: 16 G/DL — SIGNIFICANT CHANGE UP (ref 13–17)
IMM GRANULOCYTES NFR BLD AUTO: 0.5 % — SIGNIFICANT CHANGE UP (ref 0–1.5)
LIPID PNL WITH DIRECT LDL SERPL: 261 MG/DL — SIGNIFICANT CHANGE UP
LYMPHOCYTES # BLD AUTO: 2.24 K/UL — SIGNIFICANT CHANGE UP (ref 1–3.3)
LYMPHOCYTES # BLD AUTO: 25.5 % — SIGNIFICANT CHANGE UP (ref 13–44)
MAGNESIUM SERPL-MCNC: 1.7 MG/DL — SIGNIFICANT CHANGE UP (ref 1.6–2.6)
MCHC RBC-ENTMCNC: 29.4 PG — SIGNIFICANT CHANGE UP (ref 27–34)
MCHC RBC-ENTMCNC: 34.3 % — SIGNIFICANT CHANGE UP (ref 32–36)
MCV RBC AUTO: 85.8 FL — SIGNIFICANT CHANGE UP (ref 80–100)
MONOCYTES # BLD AUTO: 0.43 K/UL — SIGNIFICANT CHANGE UP (ref 0–0.9)
MONOCYTES NFR BLD AUTO: 4.9 % — SIGNIFICANT CHANGE UP (ref 2–14)
NEUTROPHILS # BLD AUTO: 6.01 K/UL — SIGNIFICANT CHANGE UP (ref 1.8–7.4)
NEUTROPHILS NFR BLD AUTO: 68.1 % — SIGNIFICANT CHANGE UP (ref 43–77)
NRBC # FLD: 0 K/UL — LOW (ref 25–125)
PHOSPHATE SERPL-MCNC: 2.8 MG/DL — SIGNIFICANT CHANGE UP (ref 2.5–4.5)
PLATELET # BLD AUTO: 239 K/UL — SIGNIFICANT CHANGE UP (ref 150–400)
PMV BLD: 8.8 FL — SIGNIFICANT CHANGE UP (ref 7–13)
POTASSIUM SERPL-MCNC: 3.1 MMOL/L — LOW (ref 3.5–5.3)
POTASSIUM SERPL-MCNC: 3.3 MMOL/L — LOW (ref 3.5–5.3)
POTASSIUM SERPL-SCNC: 3.1 MMOL/L — LOW (ref 3.5–5.3)
POTASSIUM SERPL-SCNC: 3.3 MMOL/L — LOW (ref 3.5–5.3)
PROT SERPL-MCNC: 7.5 G/DL — SIGNIFICANT CHANGE UP (ref 6–8.3)
RBC # BLD: 5.44 M/UL — SIGNIFICANT CHANGE UP (ref 4.2–5.8)
RBC # FLD: 13.2 % — SIGNIFICANT CHANGE UP (ref 10.3–14.5)
SODIUM SERPL-SCNC: 139 MMOL/L — SIGNIFICANT CHANGE UP (ref 135–145)
SODIUM SERPL-SCNC: 139 MMOL/L — SIGNIFICANT CHANGE UP (ref 135–145)
TRIGL SERPL-MCNC: 214 MG/DL — HIGH (ref 10–149)
WBC # BLD: 8.8 K/UL — SIGNIFICANT CHANGE UP (ref 3.8–10.5)
WBC # FLD AUTO: 8.8 K/UL — SIGNIFICANT CHANGE UP (ref 3.8–10.5)

## 2019-01-27 PROCEDURE — 70450 CT HEAD/BRAIN W/O DYE: CPT | Mod: 26

## 2019-01-27 PROCEDURE — 99291 CRITICAL CARE FIRST HOUR: CPT

## 2019-01-27 PROCEDURE — 93306 TTE W/DOPPLER COMPLETE: CPT | Mod: 26

## 2019-01-27 RX ORDER — INFLUENZA VIRUS VACCINE 15; 15; 15; 15 UG/.5ML; UG/.5ML; UG/.5ML; UG/.5ML
0.5 SUSPENSION INTRAMUSCULAR ONCE
Qty: 0 | Refills: 0 | Status: DISCONTINUED | OUTPATIENT
Start: 2019-01-27 | End: 2019-02-01

## 2019-01-27 RX ORDER — PANTOPRAZOLE SODIUM 20 MG/1
40 TABLET, DELAYED RELEASE ORAL ONCE
Qty: 0 | Refills: 0 | Status: COMPLETED | OUTPATIENT
Start: 2019-01-27 | End: 2019-01-27

## 2019-01-27 RX ORDER — POTASSIUM CHLORIDE 20 MEQ
10 PACKET (EA) ORAL
Qty: 0 | Refills: 0 | Status: DISCONTINUED | OUTPATIENT
Start: 2019-01-27 | End: 2019-01-27

## 2019-01-27 RX ORDER — POTASSIUM CHLORIDE 20 MEQ
10 PACKET (EA) ORAL
Qty: 0 | Refills: 0 | Status: COMPLETED | OUTPATIENT
Start: 2019-01-27 | End: 2019-01-27

## 2019-01-27 RX ORDER — CHLORHEXIDINE GLUCONATE 213 G/1000ML
1 SOLUTION TOPICAL
Qty: 0 | Refills: 0 | Status: DISCONTINUED | OUTPATIENT
Start: 2019-01-27 | End: 2019-01-28

## 2019-01-27 RX ORDER — ATORVASTATIN CALCIUM 80 MG/1
80 TABLET, FILM COATED ORAL AT BEDTIME
Qty: 0 | Refills: 0 | Status: DISCONTINUED | OUTPATIENT
Start: 2019-01-27 | End: 2019-02-01

## 2019-01-27 RX ORDER — FAMOTIDINE 10 MG/ML
20 INJECTION INTRAVENOUS ONCE
Qty: 0 | Refills: 0 | Status: COMPLETED | OUTPATIENT
Start: 2019-01-27 | End: 2019-01-27

## 2019-01-27 RX ORDER — HEPARIN SODIUM 5000 [USP'U]/ML
5000 INJECTION INTRAVENOUS; SUBCUTANEOUS EVERY 8 HOURS
Qty: 0 | Refills: 0 | Status: DISCONTINUED | OUTPATIENT
Start: 2019-01-27 | End: 2019-02-01

## 2019-01-27 RX ORDER — SODIUM CHLORIDE 9 MG/ML
1000 INJECTION, SOLUTION INTRAVENOUS
Qty: 0 | Refills: 0 | Status: DISCONTINUED | OUTPATIENT
Start: 2019-01-27 | End: 2019-01-28

## 2019-01-27 RX ORDER — NICARDIPINE HYDROCHLORIDE 30 MG/1
2.5 CAPSULE, EXTENDED RELEASE ORAL
Qty: 40 | Refills: 0 | Status: DISCONTINUED | OUTPATIENT
Start: 2019-01-27 | End: 2019-01-27

## 2019-01-27 RX ORDER — ASPIRIN/CALCIUM CARB/MAGNESIUM 324 MG
81 TABLET ORAL DAILY
Qty: 0 | Refills: 0 | Status: DISCONTINUED | OUTPATIENT
Start: 2019-01-27 | End: 2019-02-01

## 2019-01-27 RX ADMIN — Medication 100 MILLIEQUIVALENT(S): at 21:14

## 2019-01-27 RX ADMIN — Medication 100 MILLIEQUIVALENT(S): at 11:09

## 2019-01-27 RX ADMIN — PANTOPRAZOLE SODIUM 40 MILLIGRAM(S): 20 TABLET, DELAYED RELEASE ORAL at 03:12

## 2019-01-27 RX ADMIN — HEPARIN SODIUM 5000 UNIT(S): 5000 INJECTION INTRAVENOUS; SUBCUTANEOUS at 23:00

## 2019-01-27 RX ADMIN — SODIUM CHLORIDE 75 MILLILITER(S): 9 INJECTION, SOLUTION INTRAVENOUS at 03:15

## 2019-01-27 RX ADMIN — ATORVASTATIN CALCIUM 80 MILLIGRAM(S): 80 TABLET, FILM COATED ORAL at 23:00

## 2019-01-27 RX ADMIN — Medication 100 MILLIEQUIVALENT(S): at 19:04

## 2019-01-27 RX ADMIN — Medication 81 MILLIGRAM(S): at 23:00

## 2019-01-27 RX ADMIN — CHLORHEXIDINE GLUCONATE 1 APPLICATION(S): 213 SOLUTION TOPICAL at 17:44

## 2019-01-27 RX ADMIN — Medication 100 MILLIEQUIVALENT(S): at 17:43

## 2019-01-27 RX ADMIN — Medication 100 MILLIEQUIVALENT(S): at 13:48

## 2019-01-27 RX ADMIN — NICARDIPINE HYDROCHLORIDE 12.5 MG/HR: 30 CAPSULE, EXTENDED RELEASE ORAL at 03:16

## 2019-01-27 RX ADMIN — Medication 100 MILLIEQUIVALENT(S): at 12:15

## 2019-01-27 RX ADMIN — FAMOTIDINE 20 MILLIGRAM(S): 10 INJECTION INTRAVENOUS at 05:42

## 2019-01-27 NOTE — PROGRESS NOTE ADULT - SUBJECTIVE AND OBJECTIVE BOX
Makeda Frey MD  Internal Medicine PGY1  Pager: [phone]    SUBJECTIVE: Patient seen and examined at bedside.     Interval Events: No acute events overnight. Pt still dysarthric. Otherwise, without further complaints.    OBJECTIVE:    VITAL SIGNS:  ICU Vital Signs Last 24 Hrs  T(C): 36.3 (27 Jan 2019 02:26), Max: 36.4 (26 Jan 2019 20:53)  T(F): 97.4 (27 Jan 2019 02:26), Max: 97.6 (26 Jan 2019 21:20)  HR: 71 (27 Jan 2019 06:00) (45 - 110)  BP: 156/74 (27 Jan 2019 06:00) (100/80 - 254/212)  BP(mean): 92 (27 Jan 2019 06:00) (81 - 222)  ABP: --  ABP(mean): --  RR: 19 (27 Jan 2019 06:00) (13 - 24)  SpO2: 98% (27 Jan 2019 06:00) (93% - 100%)        01-26 @ 07:01  -  01-27 @ 07:00  --------------------------------------------------------  IN: 425 mL / OUT: 525 mL / NET: -100 mL      CAPILLARY BLOOD GLUCOSE      POCT Blood Glucose.: 110 mg/dL (26 Jan 2019 19:47)      PHYSICAL EXAM:    General: NAD  HEENT: PERRL, anicteric sclera  Respiratory: CTA; no wheezes, rales, or crackles  Cardiovascular: distant heart sounds  Abdomen: soft, non-tender, BS (+)  Extremities: no edema, cyanosis, or clubbing  Skin: no rashes or lesions  Neurological: AAOx3; pt lying in bed so could not appreciate facial droop - otherwise, rest of CN intact; 1/5 LUE, 3/5 LLE; sensation grossly intact    MEDICATIONS:  MEDICATIONS  (STANDING):  influenza   Vaccine 0.5 milliLiter(s) IntraMuscular once  lactated ringers. 1000 milliLiter(s) (75 mL/Hr) IV Continuous <Continuous>  niCARdipine Infusion 2.5 mG/Hr (12.5 mL/Hr) IV Continuous <Continuous>    MEDICATIONS  (PRN):      ALLERGIES:  Allergies    No Known Allergies    Intolerances        LABS:                        16.6   6.00  )-----------( 241      ( 26 Jan 2019 19:50 )             48.7     Hemoglobin: 16.6 g/dL (01-26 @ 19:50)    CBC Full  -  ( 26 Jan 2019 19:50 )  WBC Count : 6.00 K/uL  Hemoglobin : 16.6 g/dL  Hematocrit : 48.7 %  Platelet Count - Automated : 241 K/uL  Mean Cell Volume : 85.4 fL  Mean Cell Hemoglobin : 29.1 pg  Mean Cell Hemoglobin Concentration : 34.1 %  Auto Neutrophil # : 3.68 K/uL  Auto Lymphocyte # : 1.84 K/uL  Auto Monocyte # : 0.35 K/uL  Auto Eosinophil # : 0.07 K/uL  Auto Basophil # : 0.03 K/uL  Auto Neutrophil % : 61.3 %  Auto Lymphocyte % : 30.7 %  Auto Monocyte % : 5.8 %  Auto Eosinophil % : 1.2 %  Auto Basophil % : 0.5 %    01-26    138  |  99  |  12  ----------------------------<  110<H>  3.1<L>   |  23  |  1.13    Ca    8.9      26 Jan 2019 19:50    TPro  7.9  /  Alb  4.4  /  TBili  0.8  /  DBili  x   /  AST  26  /  ALT  26  /  AlkPhos  83  01-26    Creatinine Trend: 1.13<--  LIVER FUNCTIONS - ( 26 Jan 2019 19:50 )  Alb: 4.4 g/dL / Pro: 7.9 g/dL / ALK PHOS: 83 u/L / ALT: 26 u/L / AST: 26 u/L / GGT: x           PT/INR - ( 26 Jan 2019 19:50 )   PT: 12.4 SEC;   INR: 1.11          PTT - ( 26 Jan 2019 19:50 )  PTT:32.9 SEC          20:20 - VBG - pH: 7.42  | pCO2: 43    | pO2: 35    | Lactate: 2.0            EKG:   MICROBIOLOGY:    IMAGING:    RADIOLOGY & ADDITIONAL TESTS: Reviewed.

## 2019-01-27 NOTE — PHYSICAL THERAPY INITIAL EVALUATION ADULT - GENERAL OBSERVATIONS, REHAB EVAL
Pt encountered in MICU in semisupine position, no distress, AxOx4, with +IV, +tele, +O2 through BiPAP and wife @bedside

## 2019-01-27 NOTE — PHYSICAL THERAPY INITIAL EVALUATION ADULT - ACTIVE RANGE OF MOTION EXAMINATION, REHAB EVAL
Right UE Active ROM was WFL (within functional limits)/Right UE Active ROM was WNL (within normal limits)

## 2019-01-27 NOTE — CHART NOTE - NSCHARTNOTEFT_GEN_A_CORE
MICU Transfer Note    Transfer from: MICU    Transfer to: (  ) Medicine    (  ) Telemetry     (   ) RCU        (    ) Palliative         (  x ) Stroke Unit          (   ) __________________    Accepting Physician:  Signout given to:     MICU COURSE:  63M with a PMH of HTN (not on meds), CAYDEN (on CPAP, compliant), obesity, and HLD (not on meds) who presents to hospital with acute onset of slurred speech and LUE paresis found to have ischemic stroke s/p tPA now in MICU for further observation/management.  Ischemic stroke in setting of untreated HTN and HLD. Fam hx of stroke in father. No alteration of mental status present. However, LUE/LLE/, left facial droop, and slurred speech present. Likely due to structural ischemic stroke. Initial CTH negative. CTA of head and neck showing severe stenosis with poststenotic dilatation involving the distal right and left vertebral arteries as well as the proximal basilar artery. Pt received tPA at 854PM on 1/26. He has had improvement in his motor function since then however waxes/wanes. Repeat CTH performed 24 hours after initial which showed        ASSESSMENT & PLAN:     NEUROLOGIC:  Problem: ischemic stroke  Assessment: Ischemic stroke in setting of untreated HTN and HLD. Fam hx of stroke in father. No alteration of mental status present. However, LUE/LLE/, left facial droop, and slurred speech present. Likely due to structural ischemic stroke stroke. Initial CTH negative. CTA of head and neck showing severe stenosis with poststenotic dilatation involving the distal right and left vertebral arteries as well as the proximal basilar artery. Pt received tPA at 854PM on 1/26. He has had improvement in his motor function since then however waxes/wanes.  Repeat CTH performed 24 hours after initial which showed  Plan:   Neuro checks q4 hours  MRI brain without contrast ordered  TTE with bubble study ordered   Atorvastatin 80 mg once passes dysphagia screen (LDL elevated)  He is eligible for early mobilization and immediate physical therapy/OT/SS. All are ordered    SKIN:  Lines: low risk PIVs  Decubiti: None    GI:   Diet: Needs bedside dysphagia screen . If passes can start diet  Bowel regiment: senna when passes dyphagia screen    Urinary tract:  UO: Will monitor  Rivera: Not indicated      METABOLIC:  BMP showing evidence of hypokalemia. Repleted w IV.  BMP daily  Liver functions tests show no abnormalities  Endocrine: no abnormalities present. Hba1c 5.6  01-26    138  |  99  |  12  ----------------------------<  110<H>  3.1<L>   |  23  |  1.13    Ca    8.9      26 Jan 2019 19:50    TPro  7.9  /  Alb  4.4  /  TBili  0.8  /  DBili  x   /  AST  26  /  ALT  26  /  AlkPhos  83  01-26      VOLUME ASSESSMENT:  No peripheral edema  No evidence of disturbance of effective circulating volume    HEMATOLOGIC:  CBC results shows no abnormalities  Coag panel shows no abnormalities  DVT prophylaxis with SCDs. If repeat CTH at 1900 on 1/27 shows no ischemic conversion-->HSQ or enox             INFECTIOUS DISEASE:  Pt without strong objective or clinical evidence of infection. Will observe off antibiotics    HEMODYNAMICS:  Patient is not on pressors     CARDIOVASCULAR:  Off of nicardipine drip  Start amlodipine 5 after passes dysphagia screen    RESPIRATORY:  CPAP QHS          FOR FOLLOW UP:

## 2019-01-27 NOTE — PROGRESS NOTE ADULT - ASSESSMENT
63M with a PMH of HTN (not on meds), CAYDEN (on CPAP, compliant), obesity, and HLD (not on meds) who presents to hospital with acute onset of slurred speech and LUE paresis found to have ischemic stroke s/p tPA now in MICU for further observation/management.       # Neuro  -Ischemic stroke in setting of untreated HTN and HLD. Fam hx of stroke in father. No alteration of mental status present. However, LUE/LLE/, left facial droop, and slurred speech present. Likely due to structural ischemic stroke stroke. Initial CTH negative. CTA of head and neck showing severe stenosis with poststenotic dilatation involving the distal right and left vertebral arteries as well as the proximal basilar artery. Pt received tPA at 854PM on 1/26. He has had improvement in his motor function since then however waxes/wanes.  -Permissive HTN for 24 hours up to 180/105. Titrate nicardipine drip as indicated.  -Neuro checks q1 hours  -Repeat CT head ordered for 1900 on 1/27  -MRI brain without contrast ordered      #CV  -Permissive HTN for 24 hours up to 180/105. Titrate nicardipine drip as indicated.  -TTE with bubble study ordered   -Atorvastatin 80 mg once passes dysphagia screen  -HbA1c, LDL ordered for AM     #Pulm  -CPAP at bed for CAYDEN    #Renal  -BMP showed evidence of hypokalemia. Repleted w IV. F/u repeat BMP.    #GI  -NPO for 12 hours following tPA administration.   -Bedside dysphagia screen at 9am. If passes can start diet.  -S/S eval    #Endo  -f/u HbA1c    #Heme  -No active issues    #ID  -No active issues    #PPx  -SubQ hep or lovenox if repeat CT head (-)  -PT/OT 63M with a PMH of HTN (not on meds), CAYDEN (on CPAP, compliant), obesity, and HLD (not on meds) who presents to hospital with acute onset of slurred speech and LUE paresis found to have ischemic stroke s/p tPA now in MICU for further observation/management.       # Neuro  -Ischemic stroke in setting of untreated HTN and HLD. Fam hx of stroke in father. No alteration of mental status present. However, LUE/LLE/, left facial droop, and slurred speech present. Likely due to structural ischemic stroke. Initial CTH negative. CTA of head and neck showing severe stenosis with poststenotic dilatation involving the distal right and left vertebral arteries as well as the proximal basilar artery. Pt received tPA at 854PM on 1/26. He has had improvement in his motor function since then however waxes/wanes.  -Permissive HTN for 24 hours up to 180/105. Titrate nicardipine drip as indicated.  -Neuro checks q1 hours  -Repeat CT head ordered for 1900 on 1/27  -MRI brain without contrast ordered      #CV  -Permissive HTN for 24 hours up to 180/105. Titrate nicardipine drip as indicated.  -TTE with bubble study ordered   -Atorvastatin 80 mg once passes dysphagia screen  -HbA1c, LDL ordered for AM     #Pulm  -CPAP at bed for CAYDEN    #Renal  -BMP showed evidence of hypokalemia. Repleted w IV. F/u repeat BMP.    #GI  -NPO for 12 hours following tPA administration.   -Bedside dysphagia screen at 9am. If passes can start diet.  -S/S eval    #Endo  -f/u HbA1c    #Heme  -No active issues    #ID  -No active issues    #PPx  -SubQ hep or lovenox if repeat CT head (-)  -PT/OT 63M with a PMH of HTN (not on meds), CAYDEN (on CPAP, compliant), obesity, and HLD (not on meds) who presents to hospital with acute onset of slurred speech and LUE paresis found to have ischemic stroke s/p tPA now in MICU for further observation/management.       # Neuro  -Ischemic stroke in setting of untreated HTN and HLD. Fam hx of stroke in father. No alteration of mental status present. However, LUE/LLE/, left facial droop, and slurred speech present. Likely due to structural ischemic stroke. Initial CTH negative. CTA of head and neck showing severe stenosis with poststenotic dilatation involving the distal right and left vertebral arteries as well as the proximal basilar artery. Pt received tPA at 854PM on 1/26. He has had improvement in his motor function since then however waxes/wanes.  -Permissive HTN for 24 hours up to 180/105. Titrate nicardipine drip as indicated.  -Neuro checks q1 hours  -Repeat CT head ordered for 1900 on 1/27  -MRI brain without contrast ordered      #CV  -Permissive HTN for 24 hours up to 180/105. Titrate nicardipine drip as indicated.  -TTE with bubble study ordered   -Atorvastatin 80 mg once passes dysphagia screen  -ASA 81 if repeat CT head (-)  -HbA1c, LDL ordered for AM     #Pulm  -CPAP at bed for CAYDEN    #Renal  -BMP showed evidence of hypokalemia. Repleted w IV. F/u repeat BMP.    #GI  -NPO for 12 hours following tPA administration.   -Bedside dysphagia screen at 9am. If passes can start diet.  -S/S eval    #Endo  -f/u HbA1c    #Heme  -No active issues    #ID  -No active issues    #PPx  -SubQ hep or lovenox if repeat CT head (-)  -PT/OT 63M with a PMH of HTN (not on meds), CAYDEN (on CPAP, compliant), obesity, and HLD (not on meds) who presents to hospital with acute onset of slurred speech and LUE paresis found to have ischemic stroke s/p tPA now in MICU for further observation/management.       # Neuro  -Ischemic stroke in setting of untreated HTN and HLD. Fam hx of stroke in father. No alteration of mental status present. However, LUE/LLE/, left facial droop, and slurred speech present. Likely due to structural ischemic stroke. Initial CTH negative. CTA of head and neck showing severe stenosis with poststenotic dilatation involving the distal right and left vertebral arteries as well as the proximal basilar artery. Pt received tPA at 854PM on 1/26. He has had improvement in his motor function since then however waxes/wanes.  -Permissive HTN for 24 hours up to 180/105. Titrate nicardipine drip as indicated.  -Neuro checks q1 hours  -Repeat CT head ordered for 1900 on 1/27  -MRI brain without contrast ordered      #CV  -Permissive HTN for 24 hours up to 180/105. Titrate nicardipine drip as indicated.  -TTE with bubble study ordered   -Atorvastatin 80 mg once passes dysphagia screen  -ASA 81 if repeat CT head (-)  -HbA1c, lipid panel ordered for AM     #Pulm  -CPAP at bed for CAYDEN    #Renal  -BMP showed evidence of hypokalemia. Repleted w IV. F/u repeat BMP.    #GI  -NPO for 12 hours following tPA administration.   -Bedside dysphagia screen at 9am. If passes can start diet.  -S/S eval    #Endo  -f/u HbA1c    #Heme  -No active issues    #ID  -No active issues    #PPx  -SubQ hep or lovenox if repeat CT head (-)  -PT/OT

## 2019-01-27 NOTE — PATIENT PROFILE ADULT - DO YOU WANT TO COMPLETE THE HCP AND NAME A HEALTH CARE AGENT
Date: 12/22/2017   Matt Wilder  35 year old male  [id number]  Dangelo Simpson MD  FACILITY: [facility]    Plans:  Follow-up testosterone   Follow iron level    Reason for the visit:  -Suspected right hip infection  -Patient not cooperative, not accepting advice about constipation      Patient is a 35 year old male presents [facility] from facility is [facility] for continuing wound and nephrostomy cares.    Isolation:     Subjective: no complaints     Nursing concerns: none      Family concerns: none     ISOLATION:      CODE STATUS: Full code    Past Medical History:   Diagnosis Date   • Allergic rhinitis    • Anxiety    • Autonomic dysreflexia    • Depression    • Diabetes mellitus (CMS/[facility])    • Essential (primary) hypertension    • Frequent UTI    • Gastroesophageal reflux disease    • Head ache    • High cholesterol    • Muscle spasms of both lower extremities    • Neurogenic bladder    • Osteomyelitis of hip (CMS/[facility])     left   • Quadriplegia following spinal cord injury (CMS/[facility]) 2002   • Sleep apnea    • Suprapubic catheter (CMS/[facility])       Past Surgical History:   Procedure Laterality Date   • CERVICAL FUSION N/A     C5&6   • IVC FILTER PLACEMENT     • SKIN GRAFT     • TENDON TRANSFER     • TONSILLECTOMY          (Not in a hospital admission)  ALLERGIES:   Allergen Reactions   • Amoxicillin Other (See Comments)     Leg redness, \"I have had a medication with amoxicillin mixed in it and I did ok.\"   • Cefepime RASH   • Cephalexin ERYTHEMA   • Daptomycin PRURITUS   • Ketorolac DIARRHEA   • Vancomycin Other (See Comments)     \"red man syndrome\", can tolerate if infused slowly      Social History   Substance Use Topics   • Smoking status: Current Some Day Smoker   • Smokeless tobacco: Current User     Types: Chew      Comment: occasional chew   • Alcohol use No    Disabled with quadriplegia.  Long rehabilitation facility  resident    No family history on file.  hypertension    Review of Systems  NEGATIVE EXCEPT MENTIONED ABOVE    Objective:     VS REVIEWED, wnl    General Appearance:    Alert, cooperative, no distress, appears stated age, Overweight   Head:    Normocephalic, without obvious abnormality, atraumatic   Ears:    Normal external both ears,  mastoids not tender     Nose:   Nares normal, septum midline, mucosa normal, no drainage    or sinus tenderness   Throat:   Lips, mucosa, and tongue normal; teeth and gums normal   Neck:   Supple, symmetrical, trachea midline, no adenopathy;        thyroid:  No enlargement/tenderness/nodules; no carotid    bruit or JVD   Back:     Symmetric, no curvature, ROM normal, no CVA tenderness   Lungs:     Clear to auscultation bilaterally, respirations unlabored   Chest wall:    No tenderness or deformity   Heart:    Regular rate and rhythm, S1 and S2 normal, no murmur, rub   or gallop   Abdomen:     Soft, non-tender, bowel sounds active all four quadrants,     no masses, no organomegaly    Suprapubic catheter and nephrostomy tube sites okay   Extremities:   Extremities normal, atraumatic, no cyanosis or edema   Pulses:   2+ and symmetric DP and PT   Skin:   Skin color, texture, turgor normal, no rashes    Lymph nodes:   Cervical, supraclavicular, and axillary nodes normal   Neurologic:   CNII-XII intact. Quadriplegic     LABS reviewed    Imaging:   No new    Assessment and Plan:     12/22 right gluteal decubitus suspected to be connected to the hip joint.  Follow smelling drainage.  CBC pro-calcitonin sedimentation rate and CRP as well as MRI ordered  Noncompliance with regimen is a recurrent problem with this patient.  It is his anxiety and desire to control the situation that leads to paranoia on his side, avoidance of advice which showed reliably put him in trouble.  This time it is infection that led to need for digital stimulation manual disimpaction.  Suppository prescribed  Weakness:  PTOT  Suprapubic catheter and nephrostomy tube care per urology  Aspirin 81 mg for cardiovascular risk reduction well tolerated, continue  Lipitor 40 mg orally daily well tolerated, continue  Overactive bladder in remission with oxybutynin 5 mg daily  Insomnia on topiramate 25 mg at bedtime, intermission.  Continue  Chronic neuropathic pain well controlled with Lyrica 100 mg orally twice a day, oxycodone 5 mg every 4 hours as needed for breakthrough pain as well as gabapentin 600 mg orally 3 times daily, Tylenol 1 g every 6 hours as needed  Musculoskeletal pain on topical Voltaren 2 g every 6 hours as needed to affected areas  Anxiety in remission clonazepam 2 mg every 8 hours as needed standing by  Chronic depression in remission on double profile 150 twice a day and duloxetine 90 mg daily  Insomnia well controlled with Ambien 5 mg at night as needed.  Sleep study, taper if possible  Wound care per wound service, initially Dakin's solution  UTI prophylaxis with cranberry 3 times a day  C. difficile prophylaxis with probiotic twice a day  Abdominal distention well controlled with simethicone 88 CHS  Itching in remission Benadryl 25 mg capsules 2 capsules every 8 hours as needed standing by  Moderate protein malnutrition.  Multivitamin, 3 times daily with meals  Seasonal allergies in remission on loratidine 10 mg orally daily or substitute but formulary  Diabetes well controlled with Glucotrol XL 5 mg daily and metformin 1 g twice a day  DVT prophylaxis with heparin 5000 subcutaneous twice a day  Spasticity well controlled with baclofen 10 mg every 6 hours as needed  Constipation, narcotic related well controlled with Senokot 2 tablets daily, Dulcolax 10 mg suppository daily as needed, MiraLAX 17 g twice a day, Miracil 1 packet daily, milk of magnesia 30 mL daily as needed, Fleet enema daily as needed  GERD intermission on Protonix 40 mg orally daily, Maalox 30 mL every 4 hours as needed standing by  Iron deficiency  anemia on sulfate 325 twice a day  Systemic hypertension well controlled with metoprolol 50 mg twice a day  Chronic rhinitis in remission ipratropium nasal spray 2 sprays into each nostril every 4 hours as needed standing by  Coughing remission OMFS in 600 twice a day as needed standing by  Eczema in remission triamcinolone twice a day standing by  Hypogonadism supplemented with testosterone  mg every 14 days  Hypokalemia well supplemented with 10 mEq orally daily  Bone health on calcium with vitamin D 1 tablet twice a day     Patient is participating actively with the Rehabilitation Team if ordered and is making progress.   Rehab team's documentation reviewed with current status noted above. See team conference reports for specific discharge plans.    I have considered how current medical status and co-morbidities are impacting progress towards goals. Presently, the patient's medical co-morbidities are treated maximally and are not inhibiting therapy progress with the medical plan as noted. The patient has continued functional deficits requiring facility based rehabilitation. Continue intensive rehab, medical supervision , nursing cares and comprehensive discharge planning.    Joon Faye MD    Answering Service     Please disregard medication list below as medication history/medication reconciliation might have been done in different facilities in the meantime.   no

## 2019-01-27 NOTE — PHYSICAL THERAPY INITIAL EVALUATION ADULT - ADDITIONAL COMMENTS
Pt reports that he lives alone in a private house with ~4STE; (+) bilateral handrails; bedroom/bathroom on first floor. Prior to hospital admission pt was completely independent where he ambulated with no assistive device. Pt was dressing himself; however has not been showering 2/2 to issues with the furnish (does not have hot water) Pt denies any recent falls.    Pt left comfortable in bed, NAD, all lines intact, all precautions maintained with call bell in reach, wife @bedside, and RN aware of PT evaluation.

## 2019-01-27 NOTE — PHYSICAL THERAPY INITIAL EVALUATION ADULT - DIAGNOSIS, PT EVAL
Pt admitted for slurred speech and left sided weakness, s/p tPA on 01/26/2019 @8:54PM; pt presents with left sided weakness Pt admitted for slurred speech and left sided weakness, s/p tPA on 01/26/2019 @8:54PM; CT of Head showed No acute intracranial hemorrhage, brain edema, midline shift, mass effect or hydrocephalus; pt presents with left sided weakness

## 2019-01-27 NOTE — PHYSICAL THERAPY INITIAL EVALUATION ADULT - PATIENT PROFILE REVIEW, REHAB EVAL
ACTIVITY: BEDREST/ Increase as Tolerated/ OOB with Assistance after 24 hrs s/p TPA; Spoke with RN Mary Ann Paige prior to PT evaluation--> Pt OK for PT consult/ROM/MMT testing; OOB activity held 2/2 to not 24 hours post tPA/yes

## 2019-01-27 NOTE — PHYSICAL THERAPY INITIAL EVALUATION ADULT - CRITERIA FOR SKILLED THERAPEUTIC INTERVENTIONS
impairments found/rehab potential/risk reduction/prevention/functional limitations in following categories

## 2019-01-27 NOTE — PHYSICAL THERAPY INITIAL EVALUATION ADULT - PERTINENT HX OF CURRENT PROBLEM, REHAB EVAL
The patient is a 64 yo M with a PMH of HTN (not on meds), CAYDEN (on CPAP, compliant), obesity, and HLD (not on meds) who presents to hospital with acute onset of slurred speech and LUE paresis found to have ischemic stroke s/p tPA now in MICU for further observation/management

## 2019-01-27 NOTE — PHYSICAL THERAPY INITIAL EVALUATION ADULT - MANUAL MUSCLE TESTING RESULTS, REHAB EVAL
Right UE/LE 5/5, Left shoulder 2/5, Left bicep 3-/5, left tricep 3+/5, left wrist 3/5,Left hand 3-/5, Left LE 3-/5

## 2019-01-28 LAB
ALBUMIN SERPL ELPH-MCNC: 4.2 G/DL — SIGNIFICANT CHANGE UP (ref 3.3–5)
ALP SERPL-CCNC: 71 U/L — SIGNIFICANT CHANGE UP (ref 40–120)
ALT FLD-CCNC: 18 U/L — SIGNIFICANT CHANGE UP (ref 4–41)
ANION GAP SERPL CALC-SCNC: 15 MMO/L — HIGH (ref 7–14)
APTT BLD: 30.7 SEC — SIGNIFICANT CHANGE UP (ref 27.5–36.3)
AST SERPL-CCNC: 20 U/L — SIGNIFICANT CHANGE UP (ref 4–40)
BILIRUB SERPL-MCNC: 0.9 MG/DL — SIGNIFICANT CHANGE UP (ref 0.2–1.2)
BUN SERPL-MCNC: 12 MG/DL — SIGNIFICANT CHANGE UP (ref 7–23)
CALCIUM SERPL-MCNC: 8.9 MG/DL — SIGNIFICANT CHANGE UP (ref 8.4–10.5)
CHLORIDE SERPL-SCNC: 99 MMOL/L — SIGNIFICANT CHANGE UP (ref 98–107)
CO2 SERPL-SCNC: 22 MMOL/L — SIGNIFICANT CHANGE UP (ref 22–31)
CREAT SERPL-MCNC: 1.03 MG/DL — SIGNIFICANT CHANGE UP (ref 0.5–1.3)
GLUCOSE SERPL-MCNC: 96 MG/DL — SIGNIFICANT CHANGE UP (ref 70–99)
HCT VFR BLD CALC: 46 % — SIGNIFICANT CHANGE UP (ref 39–50)
HGB BLD-MCNC: 15.7 G/DL — SIGNIFICANT CHANGE UP (ref 13–17)
INR BLD: 1.26 — HIGH (ref 0.88–1.17)
MAGNESIUM SERPL-MCNC: 1.9 MG/DL — SIGNIFICANT CHANGE UP (ref 1.6–2.6)
MCHC RBC-ENTMCNC: 29.4 PG — SIGNIFICANT CHANGE UP (ref 27–34)
MCHC RBC-ENTMCNC: 34.1 % — SIGNIFICANT CHANGE UP (ref 32–36)
MCV RBC AUTO: 86.1 FL — SIGNIFICANT CHANGE UP (ref 80–100)
NRBC # FLD: 0 K/UL — LOW (ref 25–125)
PHOSPHATE SERPL-MCNC: 2.1 MG/DL — LOW (ref 2.5–4.5)
PLATELET # BLD AUTO: 223 K/UL — SIGNIFICANT CHANGE UP (ref 150–400)
PMV BLD: 8.7 FL — SIGNIFICANT CHANGE UP (ref 7–13)
POTASSIUM SERPL-MCNC: 3.5 MMOL/L — SIGNIFICANT CHANGE UP (ref 3.5–5.3)
POTASSIUM SERPL-SCNC: 3.5 MMOL/L — SIGNIFICANT CHANGE UP (ref 3.5–5.3)
PROT SERPL-MCNC: 7.2 G/DL — SIGNIFICANT CHANGE UP (ref 6–8.3)
PROTHROM AB SERPL-ACNC: 14.1 SEC — HIGH (ref 9.8–13.1)
RBC # BLD: 5.34 M/UL — SIGNIFICANT CHANGE UP (ref 4.2–5.8)
RBC # FLD: 13.4 % — SIGNIFICANT CHANGE UP (ref 10.3–14.5)
SODIUM SERPL-SCNC: 136 MMOL/L — SIGNIFICANT CHANGE UP (ref 135–145)
WBC # BLD: 7.13 K/UL — SIGNIFICANT CHANGE UP (ref 3.8–10.5)
WBC # FLD AUTO: 7.13 K/UL — SIGNIFICANT CHANGE UP (ref 3.8–10.5)

## 2019-01-28 PROCEDURE — 99233 SBSQ HOSP IP/OBS HIGH 50: CPT

## 2019-01-28 RX ORDER — AMLODIPINE BESYLATE 2.5 MG/1
5 TABLET ORAL ONCE
Qty: 0 | Refills: 0 | Status: COMPLETED | OUTPATIENT
Start: 2019-01-28 | End: 2019-01-28

## 2019-01-28 RX ORDER — AMLODIPINE BESYLATE 2.5 MG/1
5 TABLET ORAL DAILY
Qty: 0 | Refills: 0 | Status: DISCONTINUED | OUTPATIENT
Start: 2019-01-28 | End: 2019-02-01

## 2019-01-28 RX ORDER — SODIUM CHLORIDE 0.65 %
1 AEROSOL, SPRAY (ML) NASAL EVERY 6 HOURS
Qty: 0 | Refills: 0 | Status: DISCONTINUED | OUTPATIENT
Start: 2019-01-28 | End: 2019-01-30

## 2019-01-28 RX ORDER — SODIUM,POTASSIUM PHOSPHATES 278-250MG
1 POWDER IN PACKET (EA) ORAL
Qty: 0 | Refills: 0 | Status: COMPLETED | OUTPATIENT
Start: 2019-01-28 | End: 2019-01-28

## 2019-01-28 RX ADMIN — Medication 1 TABLET(S): at 11:13

## 2019-01-28 RX ADMIN — Medication 1 TABLET(S): at 21:44

## 2019-01-28 RX ADMIN — ATORVASTATIN CALCIUM 80 MILLIGRAM(S): 80 TABLET, FILM COATED ORAL at 21:44

## 2019-01-28 RX ADMIN — AMLODIPINE BESYLATE 5 MILLIGRAM(S): 2.5 TABLET ORAL at 21:43

## 2019-01-28 RX ADMIN — HEPARIN SODIUM 5000 UNIT(S): 5000 INJECTION INTRAVENOUS; SUBCUTANEOUS at 14:38

## 2019-01-28 RX ADMIN — Medication 1 TABLET(S): at 17:39

## 2019-01-28 RX ADMIN — Medication 81 MILLIGRAM(S): at 11:13

## 2019-01-28 RX ADMIN — Medication 1 TABLET(S): at 09:26

## 2019-01-28 RX ADMIN — HEPARIN SODIUM 5000 UNIT(S): 5000 INJECTION INTRAVENOUS; SUBCUTANEOUS at 21:44

## 2019-01-28 NOTE — OCCUPATIONAL THERAPY INITIAL EVALUATION ADULT - DIAGNOSIS, OT EVAL
r/o CVA; Decreased Left UE Active ROM; Left UE weakness; Decreased functional mobility; Decreased ADL management

## 2019-01-28 NOTE — OCCUPATIONAL THERAPY INITIAL EVALUATION ADULT - PERTINENT HX OF CURRENT PROBLEM, REHAB EVAL
Pt is a 63 year old male with hx of HTN, CAYDEN (on CPAP, compliant), obesity, and HLD, who presented to [facility] on 1/26/19 with acute onset of slurred speech and Left UE paresis. Upon arrival, code stroke was called and tPA given. CT Brain Scan on 1/26/19 displayed no acute intracranial hemorrhage, brain edema, midline shift, mass Pt is a 63 year old male with hx of HTN, CAYDEN (on CPAP, compliant), obesity, and HLD, who presented to [facility] on 1/26/19 with acute onset of slurred speech and Left UE paresis. Upon arrival, code stroke was called and tPA given. CT Brain Scan on 1/26/19 displayed no acute intracranial hemorrhage, brain edema, midline shift, mass.

## 2019-01-28 NOTE — PROGRESS NOTE ADULT - SUBJECTIVE AND OBJECTIVE BOX
Neurology  Progress Note  01-28-19    Name:  LION AUSTIN; 63y    Interval History:  Patient reports improvement of left arm strength.     HPI:  The patient is a 64 yo M with a PMH of HTN (not on meds), CAYDEN (on CPAP, compliant), obesity, and HLD (not on meds) who presents to hospital with acute onset of slurred speech and LUE paresis. Per chart reviwe: "Patient states he woke up on 1/26 at 5pm at his baseline and began to change his clothes utilizing both hands and was speaking normally at the time. After about 10 minutes of changing, patient fell over while tying his shoelaces. When patient got up, he tried to reach for a tissue with his left arm and was unable to lift it. Patient states that his left arm felt weak for a "few minutes" before returning back to normal however, he noticed that he was slurring his words."  Upon arrival, code stroke was called. The pts symptoms rapidly improved without intervention, however at approx 1945-800 the pts neuro status changed acutely and he developed acute LUE and LLE paresis as well as slurred speech and L sided facial droop. The patient was re-evaluated by the neuro team and per chart review: "19:46pm: Interval examination demonstrates +Moderate dysarthria. LUE 1/5 strength. LLE 3/5 strength. New NIHSS 8. tPA discussed with patient and family as patient within extended window for tPA and patient amenable. Aggressive blood pressure control initiated. tPA given at 8:54pm. BP at time of tPA administration 177/95."    I arrived at the bedside at approximately 2100. The patient was fully alert and oriented. His wife and two sons were at the bedside. He had visible left sided facial droop and was slurring his speech. In addition, the was unable to move his LUE, and his LLE was 1/5-2/5. The patient expressed detailed understanding of what was going on, and I confirmed with him that he was full code in the event he required chest compressions and/or intubation. The patient had already been started on a nicardipine drip by the time I arrived and his BP was in the 160s. (26 Jan 2019 22:14)    REVIEW OF SYSTEMS:  As states in HPI.    Medications:  alteplase    Bolus 9 milliGRAM(s) IV Bolus once  alteplase    IVPB 81 milliGRAM(s) IV Intermittent once  aluminum hydroxide/magnesium hydroxide/simethicone Suspension 30 milliLiter(s) Oral once  amLODIPine   Tablet 5 milliGRAM(s) Oral daily  aspirin  chewable 81 milliGRAM(s) Oral daily  aspirin  chewable 81 milliGRAM(s) Oral once  atorvastatin 80 milliGRAM(s) Oral at bedtime  famotidine Injectable 20 milliGRAM(s) IV Push once  heparin  Injectable 5000 Unit(s) SubCutaneous every 8 hours  influenza   Vaccine 0.5 milliLiter(s) IntraMuscular once  labetalol Injectable 20 milliGRAM(s) IV Push Once  labetalol Injectable 20 milliGRAM(s) IV Push once  labetalol Injectable 20 milliGRAM(s) IV Push Once  labetalol Injectable 20 milliGRAM(s) IV Push Once  pantoprazole  Injectable 40 milliGRAM(s) IV Push once  potassium acid phosphate/sodium acid phosphate tablet (K-PHOS No. 2) 1 Tablet(s) Oral four times a day with meals  potassium chloride  10 mEq/100 mL IVPB 10 milliEquivalent(s) IV Intermittent every 1 hour  potassium chloride  10 mEq/100 mL IVPB 10 milliEquivalent(s) IV Intermittent every 1 hour  potassium chloride  10 mEq/100 mL IVPB 10 milliEquivalent(s) IV Intermittent once    Vitals:  T(C): 36.4 (01-28-19 @ 14:00), Max: 37.2 (01-28-19 @ 04:00)  HR: 77 (01-28-19 @ 15:56) (60 - 87)  BP: 116/53 (01-28-19 @ 14:00) (116/53 - 164/90)  RR: 21 (01-28-19 @ 14:00) (17 - 22)  SpO2: 98% (01-28-19 @ 15:56) (98% - 100%)    Labs:                        15.7   7.13  )-----------( 223      ( 28 Jan 2019 03:20 )             46.0     01-28    136  |  99  |  12  ----------------------------<  96  3.5   |  22  |  1.03    Ca    8.9      28 Jan 2019 03:20  Phos  2.1     01-28  Mg     1.9     01-28    TPro  7.2  /  Alb  4.2  /  TBili  0.9  /  DBili  x   /  AST  20  /  ALT  18  /  AlkPhos  71  01-28    CAPILLARY BLOOD GLUCOSE      POCT Blood Glucose.: 110 mg/dL (26 Jan 2019 19:47)    LIVER FUNCTIONS - ( 28 Jan 2019 03:20 )  Alb: 4.2 g/dL / Pro: 7.2 g/dL / ALK PHOS: 71 u/L / ALT: 18 u/L / AST: 20 u/L / GGT: x             PT/INR - ( 28 Jan 2019 03:20 )   PT: 14.1 SEC;   INR: 1.26          PTT - ( 28 Jan 2019 03:20 )  PTT:30.7 SEC  CSF:             Neurological Exam:  Mental Status: Awake, talking, aware of deficits. Orientated to self, date and place.  Attention intact.  +Mild dysarthria. Speech fluent.  Cranial Nerves:   PERRL, EOMI, VFF, no nystagmus.    CN V1-3 intact to light touch . Very mild Left Facial Droop.  Tongue, uvula and palate midline.      Motor:   Tone: LUE flaccid                 Strength:     [] Upper extremity                                                                     R         5/5                                               L         Elbow Flexion Biceps 2/5, 3/5 , 1/5 deltoid  [] Lower extremity                                                                     R        5/5                                                 L        Iliopsoas slight movement not against gravity, Anterior tibialis 4+/5.      Dysmetria: None to FNF on the Right  Tremor: No resting, postural or action tremor.  No myoclonus.  Sensation: intact to light touch,  Gait: Deferred      Radiology:  < from: CT Head No Cont (01.27.19 @ 20:57) >  Impression: Old lacunar infarcts are again seen as described above.    VIVIAN HOWARD M.D., ATTENDING RADIOLOGIST  This document has been electronically signed. Jan 28 2019  8:17AM    < end of copied text > Neurology  Progress Note  01-28-19    Name:  LION AUSTIN; 63y    Interval History:  Patient reports improvement of left arm strength.     HPI:  The patient is a 64 yo M with a PMH of HTN (not on meds), CAYDEN (on CPAP, compliant), obesity, and HLD (not on meds) who presents to hospital with acute onset of slurred speech and LUE paresis. Per chart reviwe: "Patient states he woke up on 1/26 at 5pm at his baseline and began to change his clothes utilizing both hands and was speaking normally at the time. After about 10 minutes of changing, patient fell over while tying his shoelaces. When patient got up, he tried to reach for a tissue with his left arm and was unable to lift it. Patient states that his left arm felt weak for a "few minutes" before returning back to normal however, he noticed that he was slurring his words."  Upon arrival, code stroke was called. The pts symptoms rapidly improved without intervention, however at approx 1945-800 the pts neuro status changed acutely and he developed acute LUE and LLE paresis as well as slurred speech and L sided facial droop. The patient was re-evaluated by the neuro team and per chart review: "19:46pm: Interval examination demonstrates +Moderate dysarthria. LUE 1/5 strength. LLE 3/5 strength. New NIHSS 8. tPA discussed with patient and family as patient within extended window for tPA and patient amenable. Aggressive blood pressure control initiated. tPA given at 8:54pm. BP at time of tPA administration 177/95."     At approximately 2100. The patient was fully alert and oriented. His wife and two sons were at the bedside. He had visible left sided facial droop and was slurring his speech. In addition, he was unable to move his LUE, and his LLE was 1/5-2/5. The patient expressed detailed understanding of what was going on, and he confirmed that he was full code in the event he required chest compressions and/or intubation. The patient was started on a nicardipine drip and his BP was in the 160s. (26 Jan 2019 22:14)    REVIEW OF SYSTEMS:  As states in HPI.    Medications:  alteplase    Bolus 9 milliGRAM(s) IV Bolus once  alteplase    IVPB 81 milliGRAM(s) IV Intermittent once  aluminum hydroxide/magnesium hydroxide/simethicone Suspension 30 milliLiter(s) Oral once  amLODIPine   Tablet 5 milliGRAM(s) Oral daily  aspirin  chewable 81 milliGRAM(s) Oral daily  aspirin  chewable 81 milliGRAM(s) Oral once  atorvastatin 80 milliGRAM(s) Oral at bedtime  famotidine Injectable 20 milliGRAM(s) IV Push once  heparin  Injectable 5000 Unit(s) SubCutaneous every 8 hours  influenza   Vaccine 0.5 milliLiter(s) IntraMuscular once  labetalol Injectable 20 milliGRAM(s) IV Push Once  labetalol Injectable 20 milliGRAM(s) IV Push once  labetalol Injectable 20 milliGRAM(s) IV Push Once  labetalol Injectable 20 milliGRAM(s) IV Push Once  pantoprazole  Injectable 40 milliGRAM(s) IV Push once  potassium acid phosphate/sodium acid phosphate tablet (K-PHOS No. 2) 1 Tablet(s) Oral four times a day with meals  potassium chloride  10 mEq/100 mL IVPB 10 milliEquivalent(s) IV Intermittent every 1 hour  potassium chloride  10 mEq/100 mL IVPB 10 milliEquivalent(s) IV Intermittent every 1 hour  potassium chloride  10 mEq/100 mL IVPB 10 milliEquivalent(s) IV Intermittent once    Vitals:  T(C): 36.4 (01-28-19 @ 14:00), Max: 37.2 (01-28-19 @ 04:00)  HR: 77 (01-28-19 @ 15:56) (60 - 87)  BP: 116/53 (01-28-19 @ 14:00) (116/53 - 164/90)  RR: 21 (01-28-19 @ 14:00) (17 - 22)  SpO2: 98% (01-28-19 @ 15:56) (98% - 100%)    Labs:                        15.7   7.13  )-----------( 223      ( 28 Jan 2019 03:20 )             46.0     01-28    136  |  99  |  12  ----------------------------<  96  3.5   |  22  |  1.03    Ca    8.9      28 Jan 2019 03:20  Phos  2.1     01-28  Mg     1.9     01-28    TPro  7.2  /  Alb  4.2  /  TBili  0.9  /  DBili  x   /  AST  20  /  ALT  18  /  AlkPhos  71  01-28    CAPILLARY BLOOD GLUCOSE      POCT Blood Glucose.: 110 mg/dL (26 Jan 2019 19:47)    LIVER FUNCTIONS - ( 28 Jan 2019 03:20 )  Alb: 4.2 g/dL / Pro: 7.2 g/dL / ALK PHOS: 71 u/L / ALT: 18 u/L / AST: 20 u/L / GGT: x             PT/INR - ( 28 Jan 2019 03:20 )   PT: 14.1 SEC;   INR: 1.26          PTT - ( 28 Jan 2019 03:20 )  PTT:30.7 SEC  CSF:             Neurological Exam:  Mental Status: Awake, talking, aware of deficits. Orientated to self, date and place.  Attention intact.  +Mild dysarthria. Speech fluent.  Cranial Nerves:   PERRL, EOMI, VFF, no nystagmus.    CN V1-3 intact to light touch . Very mild Left Facial Droop.  Tongue, uvula and palate midline.      Motor:   Tone: LUE flaccid                 Strength:     [] Upper extremity                                                                     R         5/5                                               L         Elbow Flexion Biceps 2/5, 3/5 , 1/5 deltoid  [] Lower extremity                                                                     R        5/5                                                 L        Iliopsoas slight movement not against gravity, Anterior tibialis 4+/5.      Dysmetria: None to FNF on the Right  Tremor: No resting, postural or action tremor.  No myoclonus.  Sensation: intact to light touch,  Gait: Deferred      Radiology:  < from: CT Head No Cont (01.27.19 @ 20:57) >  Impression: Old lacunar infarcts are again seen as described above.    VIVIAN HOWARD M.D., ATTENDING RADIOLOGIST  This document has been electronically signed. Jan 28 2019  8:17AM    < end of copied text >

## 2019-01-28 NOTE — OCCUPATIONAL THERAPY INITIAL EVALUATION ADULT - RANGE OF MOTION EXAMINATION, UPPER EXTREMITY
Left UE: Shoulder Flexion Active Assistive ROM 0-90 degrees, Elbow/Wrist/Hand Flexion/Extension Active Assistive ROM WFL/Right UE Active ROM was WFL (within functional limits)

## 2019-01-28 NOTE — OCCUPATIONAL THERAPY INITIAL EVALUATION ADULT - LEVEL OF INDEPENDENCE: SUPINE/SIT, REHAB EVAL
Not assessed. Pt deferred at this time secondary to fatigue. Will continue to follow pt while inpatient and perform at later date/time if and when safe/appropriate and pt. willing.

## 2019-01-28 NOTE — OCCUPATIONAL THERAPY INITIAL EVALUATION ADULT - LUE MMT, REHAB EVAL
Shoulder Flexion Grossly 1/5, Elbow Flexion/Extension Grossly 2+/5, Wrist/Hand Flexion/Extension Grossly 2/5

## 2019-01-28 NOTE — CHART NOTE - NSCHARTNOTEFT_GEN_A_CORE
Called by nurse for /90    Repeat BP: 180/110  T(C): 36.8 (28 Jan 2019 20:17), Max: 37.6 (28 Jan 2019 18:00)  T(F): 98.3 (28 Jan 2019 20:17), Max: 99.7 (28 Jan 2019 18:00)  HR: 73 (28 Jan 2019 20:17) (60 - 77)  BP: 180/110 (28 Jan 2019 20:52) (116/53 - 180/110)  BP(mean): 70 (28 Jan 2019 18:00) (68 - 101)  RR: 20 (28 Jan 2019 20:17) (17 - 22)  SpO2: 100% (28 Jan 2019 20:17) (98% - 100%)    Pt is asymptomatic,   Norvasc 5 mg PO X 1 Ordered  Will Repeat BP in one hour   Will continue to monitor

## 2019-01-28 NOTE — PROGRESS NOTE ADULT - ASSESSMENT
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 Likely asymptomatic.     Left hemiparesis w/ dysarthria likely 2/2 right corona radiata infarct seen on repeat cth, 2/2     Recommendations:   MRI brain without contrast  Continue permissive HTN for 180/105  Lipitor 80 mg PO QHS  Continue with aggressive vascular risk factors modifications   Tele monitor  PT/OT/S&S eval. Left hemiparesis w/ dysarthria likely 2/2 right corona radiata infarct seen on repeat cth, 2/2 small vessel disease; Vertebrobasilar high grade stenosis (intracranial large artery stenosis) is asymptomatic, and unrelated to his acute stroke.     Recommendations:   MRI brain without contrast  TTE with bubble  Continue permissive HTN for now, with gradual normotension over several days starting 1/29/19  Lipitor 80 mg PO QHS  Continue with aggressive vascular risk factors modification   Tele monitoring  PT/OT/S&S eval.  PM&R consultation

## 2019-01-28 NOTE — SWALLOW BEDSIDE ASSESSMENT ADULT - ORAL PREPARATORY PHASE
Within functional limits slow chewing for solid with preference on the right side of the oral cavity

## 2019-01-28 NOTE — SWALLOW BEDSIDE ASSESSMENT ADULT - MODE OF PRESENTATION
spoon/fed by clinician fed by clinician straw/fed by clinician/Patient stated preference for straw use

## 2019-01-28 NOTE — OCCUPATIONAL THERAPY INITIAL EVALUATION ADULT - PLANNED THERAPY INTERVENTIONS, OT EVAL
ADL retraining/bed mobility training/fine motor coordination training/strengthening/balance training/ROM/neuromuscular re-education/transfer training

## 2019-01-28 NOTE — OCCUPATIONAL THERAPY INITIAL EVALUATION ADULT - MD ORDER
Occupational Therapy (OT) to evaluate and treat. Occupational Therapy (OT) to evaluate and treat. Increase as Tolerated. Out of bed with assistance.

## 2019-01-28 NOTE — CHART NOTE - NSCHARTNOTEFT_GEN_A_CORE
MAR ACCEPT NOTE:    pending patient getting a bed    Morales Becerra, PGY-3  MAR, pager [phone] or [phone] MAR ACCEPT NOTE:    Acceptance pending bed availability.    Pt is a 63yoM with a PMH of HTN (not on meds), CAYDEN (on CPAP, compliant), obesity, and HLD (not on meds) who presents to hospital with acute onset of slurred speech and LUE paresis. On arrival to hospital, code stroke was called. The pts symptoms rapidly improved without intervention, however at approx 1945-800 the pts neuro status changed acutely and he developed acute LUE and LLE paresis as well as slurred speech and L sided facial droop. The patient was re-evaluated by the neuro team and per chart review: "19:46pm: Interval examination demonstrates +Moderate dysarthria. LUE 1/5 strength. LLE 3/5 strength. New NIHSS 8. tPA discussed with patient and family as patient within extended window for tPA and patient amenable. Aggressive blood pressure control initiated. tPA given at 8:54pm. BP at time of tPA administration 177/95."    Patient transferred to MICU and started on Nicardipine gtt and weaned off evening of 1/27. Tolerated tPA without complication. Interval CTH scans were stable. Passed dysphagia screen. Continues to have residual LUE/LLE weakness; although improved from initial presentation.     Initially recommended by PT for Rehabilitation facility.     For Follow up:  [ ] MR Head & MRA Head/Neck   [ ] PT/OT  [ ] Titrate anti-hypertensives as necessary  [ ] Statin/ASA  [ ] Official S/S evaluation.     Morales Becerra, PGY-3   MAR, pager [phone] or [phone]

## 2019-01-28 NOTE — SWALLOW BEDSIDE ASSESSMENT ADULT - ADDITIONAL RECOMMENDATIONS
Monitor PO tolerance/intake. Monitor for any evolving neurological/mental status changes that may impact on oral intake.

## 2019-01-28 NOTE — OCCUPATIONAL THERAPY INITIAL EVALUATION ADULT - NS ASR OT EQUIP NEEDS DISCH
To be determined upon functional out of bed assessment; Please continue to follow progress notes closely

## 2019-01-28 NOTE — SWALLOW BEDSIDE ASSESSMENT ADULT - COMMENTS
63M with a PMH of HTN (not on meds), CAYDEN (on CPAP, compliant), obesity, and HLD (not on meds) who presents to hospital with acute onset of slurred speech and LUE paresis found to have ischemic stroke s/p tPA now in MICU for further observation/management.   # Neuro  -Ischemic stroke in setting of untreated HTN and HLD. Fam hx of stroke in father. No alteration of mental status present. However, LUE/LLE/, left facial droop, and slurred speech present. Likely due to structural ischemic stroke. Initial CTH negative. CTA of head and neck showing severe stenosis with poststenotic dilatation involving the distal right and left vertebral arteries as well as the proximal basilar artery. Pt received tPA at 854PM on 1/26. He has had improvement in his motor function since then however waxes/wanes.    Patient transferred from MICU to CTI for medical management.  Patient was on CPAP for CAYDEN. It was removed for the CLinical Swallow Evaluation and placed back on after the swallow evaluation. Baseline SPO2 99% prior to PO trials and remained ~95 to 99% during the swallow evaluation.     Patient seen at bedside, awaken to alert and oriented state. Patient is able to follow simple commands and express needs; dysarthric speech output. Patient's sister arrived and came into the room during the Clinical Swallow Evaluation.

## 2019-01-28 NOTE — OCCUPATIONAL THERAPY INITIAL EVALUATION ADULT - LIVES WITH, PROFILE
Pt. reports he lives alone in a house with 4 steps to enter. Once inside, pt. reports bedroom and bathroom are located on the main level. Per pt., he has a bathtub in his bathroom (however, pt. explains it is "not working").

## 2019-01-28 NOTE — PROGRESS NOTE ADULT - SUBJECTIVE AND OBJECTIVE BOX
Progress Note Adult-MICU Resident/Attending [Charted Location: [place] [address]] [Authored: 27-Jan-2019 07:26]- for Visit: [id number], Complete, Revised, Signed in Full, General    Progress Note:   · Provider Specialty	MICU    Reason for Admission:    Reason for Admission:  · Reason for Admission	left sided weakness      · Subjective and Objective:   Makeda Frey MD  Internal Medicine PGY1  Pager: [phone]    SUBJECTIVE: Patient seen and examined at bedside.     Interval Events: No acute events overnight. Pt still dysarthric. Otherwise, without further complaints.    OBJECTIVE:    VITAL SIGNS:  ICU Vital Signs Last 24 Hrs  T(C): 36.3 (27 Jan 2019 02:26), Max: 36.4 (26 Jan 2019 20:53)  T(F): 97.4 (27 Jan 2019 02:26), Max: 97.6 (26 Jan 2019 21:20)  HR: 71 (27 Jan 2019 06:00) (45 - 110)  BP: 156/74 (27 Jan 2019 06:00) (100/80 - 254/212)  BP(mean): 92 (27 Jan 2019 06:00) (81 - 222)  ABP: --  ABP(mean): --  RR: 19 (27 Jan 2019 06:00) (13 - 24)  SpO2: 98% (27 Jan 2019 06:00) (93% - 100%)        01-26 @ 07:01  -  01-27 @ 07:00  --------------------------------------------------------  IN: 425 mL / OUT: 525 mL / NET: -100 mL      CAPILLARY BLOOD GLUCOSE      POCT Blood Glucose.: 110 mg/dL (26 Jan 2019 19:47)      PHYSICAL EXAM:    General: NAD  HEENT: PERRL, anicteric sclera  Respiratory: CTA; no wheezes, rales, or crackles  Cardiovascular: distant heart sounds  Abdomen: soft, non-tender, BS (+)  Extremities: no edema, cyanosis, or clubbing  Skin: no rashes or lesions  Neurological: AAOx3; pt lying in bed so could not appreciate facial droop - otherwise, rest of CN intact; 1/5 LUE, 3/5 LLE; sensation grossly intact    MEDICATIONS:  MEDICATIONS  (STANDING):  influenza   Vaccine 0.5 milliLiter(s) IntraMuscular once  lactated ringers. 1000 milliLiter(s) (75 mL/Hr) IV Continuous <Continuous>  niCARdipine Infusion 2.5 mG/Hr (12.5 mL/Hr) IV Continuous <Continuous>    MEDICATIONS  (PRN):      ALLERGIES:  Allergies    No Known Allergies    Intolerances        LABS:                        16.6   6.00  )-----------( 241      ( 26 Jan 2019 19:50 )             48.7     Hemoglobin: 16.6 g/dL (01-26 @ 19:50)    CBC Full  -  ( 26 Jan 2019 19:50 )  WBC Count : 6.00 K/uL  Hemoglobin : 16.6 g/dL  Hematocrit : 48.7 %  Platelet Count - Automated : 241 K/uL  Mean Cell Volume : 85.4 fL  Mean Cell Hemoglobin : 29.1 pg  Mean Cell Hemoglobin Concentration : 34.1 %  Auto Neutrophil # : 3.68 K/uL  Auto Lymphocyte # : 1.84 K/uL  Auto Monocyte # : 0.35 K/uL  Auto Eosinophil # : 0.07 K/uL  Auto Basophil # : 0.03 K/uL  Auto Neutrophil % : 61.3 %  Auto Lymphocyte % : 30.7 %  Auto Monocyte % : 5.8 %  Auto Eosinophil % : 1.2 %  Auto Basophil % : 0.5 %    01-26    138  |  99  |  12  ----------------------------<  110<H>  3.1<L>   |  23  |  1.13    Ca    8.9      26 Jan 2019 19:50    TPro  7.9  /  Alb  4.4  /  TBili  0.8  /  DBili  x   /  AST  26  /  ALT  26  /  AlkPhos  83  01-26    Creatinine Trend: 1.13<--  LIVER FUNCTIONS - ( 26 Jan 2019 19:50 )  Alb: 4.4 g/dL / Pro: 7.9 g/dL / ALK PHOS: 83 u/L / ALT: 26 u/L / AST: 26 u/L / GGT: x           PT/INR - ( 26 Jan 2019 19:50 )   PT: 12.4 SEC;   INR: 1.11          PTT - ( 26 Jan 2019 19:50 )  PTT:32.9 SEC          20:20 - VBG - pH: 7.42  | pCO2: 43    | pO2: 35    | Lactate: 2.0            EKG:   MICROBIOLOGY:    IMAGING:    RADIOLOGY & ADDITIONAL TESTS: Reviewed.    Assessment and Plan:   · Assessment	  63M with a PMH of HTN (not on meds), CAYDEN (on CPAP, compliant), obesity, and HLD (not on meds) who presents to hospital with acute onset of slurred speech and LUE paresis found to have ischemic stroke s/p tPA now in MICU for further observation/management.       # Neuro  -Ischemic stroke in setting of untreated HTN and HLD. Fam hx of stroke in father. No alteration of mental status present. However, LUE/LLE/, left facial droop, and slurred speech present. Likely due to structural ischemic stroke. Initial CTH negative. CTA of head and neck showing severe stenosis with poststenotic dilatation involving the distal right and left vertebral arteries as well as the proximal basilar artery. Pt received tPA at 854PM on 1/26. He has had improvement in his motor function since then however waxes/wanes.  -Permissive HTN for 24 hours up to 180/105. Titrate nicardipine drip as indicated.  -Neuro checks q1 hours  -Repeat CT head ordered for 1900 on 1/27  -MRI brain without contrast ordered      #CV  -Permissive HTN for 24 hours up to 180/105. Titrate nicardipine drip as indicated.  -TTE with bubble study ordered   -c/w statin & high dose ASA    #Pulm  -CPAP at bed for CAYDEN    #Renal  -Replete electrolytes PRN    #GI  -Passed bedside dysphagia screen; soft diet as tolerated.   -S/S eval    #Endo  -A1c wnl    #Heme  -No active issues    #ID  -No active issues    #PPx  -SubQ hep or lovenox if repeat CT head (-)  -PT/OT MICU Progress NOte    SUBJECTIVE: Patient seen and examined at bedside.     Interval Events: No acute events overnight. Pt still dysarthric. Otherwise, without further complaints.    OBJECTIVE:    VITAL SIGNS:  ICU Vital Signs Last 24 Hrs  T(C): 36.3 (27 Jan 2019 02:26), Max: 36.4 (26 Jan 2019 20:53)  T(F): 97.4 (27 Jan 2019 02:26), Max: 97.6 (26 Jan 2019 21:20)  HR: 71 (27 Jan 2019 06:00) (45 - 110)  BP: 156/74 (27 Jan 2019 06:00) (100/80 - 254/212)  BP(mean): 92 (27 Jan 2019 06:00) (81 - 222)  ABP: --  ABP(mean): --  RR: 19 (27 Jan 2019 06:00) (13 - 24)  SpO2: 98% (27 Jan 2019 06:00) (93% - 100%)        01-26 @ 07:01  -  01-27 @ 07:00  --------------------------------------------------------  IN: 425 mL / OUT: 525 mL / NET: -100 mL      CAPILLARY BLOOD GLUCOSE      POCT Blood Glucose.: 110 mg/dL (26 Jan 2019 19:47)      PHYSICAL EXAM:    General: NAD  HEENT: PERRL, anicteric sclera  Respiratory: CTA; no wheezes, rales, or crackles  Cardiovascular: distant heart sounds  Abdomen: soft, non-tender, BS (+)  Extremities: no edema, cyanosis, or clubbing  Skin: no rashes or lesions  Neurological: AAOx3; pt lying in bed so could not appreciate facial droop - otherwise, rest of CN intact; 1/5 LUE, 3/5 LLE; sensation grossly intact    MEDICATIONS:  MEDICATIONS  (STANDING):  influenza   Vaccine 0.5 milliLiter(s) IntraMuscular once  lactated ringers. 1000 milliLiter(s) (75 mL/Hr) IV Continuous <Continuous>  niCARdipine Infusion 2.5 mG/Hr (12.5 mL/Hr) IV Continuous <Continuous>    MEDICATIONS  (PRN):      ALLERGIES:  Allergies    No Known Allergies    Intolerances        LABS:                        16.6   6.00  )-----------( 241      ( 26 Jan 2019 19:50 )             48.7     Hemoglobin: 16.6 g/dL (01-26 @ 19:50)    CBC Full  -  ( 26 Jan 2019 19:50 )  WBC Count : 6.00 K/uL  Hemoglobin : 16.6 g/dL  Hematocrit : 48.7 %  Platelet Count - Automated : 241 K/uL  Mean Cell Volume : 85.4 fL  Mean Cell Hemoglobin : 29.1 pg  Mean Cell Hemoglobin Concentration : 34.1 %  Auto Neutrophil # : 3.68 K/uL  Auto Lymphocyte # : 1.84 K/uL  Auto Monocyte # : 0.35 K/uL  Auto Eosinophil # : 0.07 K/uL  Auto Basophil # : 0.03 K/uL  Auto Neutrophil % : 61.3 %  Auto Lymphocyte % : 30.7 %  Auto Monocyte % : 5.8 %  Auto Eosinophil % : 1.2 %  Auto Basophil % : 0.5 %    01-26    138  |  99  |  12  ----------------------------<  110<H>  3.1<L>   |  23  |  1.13    Ca    8.9      26 Jan 2019 19:50    TPro  7.9  /  Alb  4.4  /  TBili  0.8  /  DBili  x   /  AST  26  /  ALT  26  /  AlkPhos  83  01-26    Creatinine Trend: 1.13<--  LIVER FUNCTIONS - ( 26 Jan 2019 19:50 )  Alb: 4.4 g/dL / Pro: 7.9 g/dL / ALK PHOS: 83 u/L / ALT: 26 u/L / AST: 26 u/L / GGT: x           PT/INR - ( 26 Jan 2019 19:50 )   PT: 12.4 SEC;   INR: 1.11          PTT - ( 26 Jan 2019 19:50 )  PTT:32.9 SEC          20:20 - VBG - pH: 7.42  | pCO2: 43    | pO2: 35    | Lactate: 2.0            EKG:   MICROBIOLOGY:    IMAGING:    RADIOLOGY & ADDITIONAL TESTS: Reviewed.    Assessment and Plan:   · Assessment	  63M with a PMH of HTN (not on meds), CAYDEN (on CPAP, compliant), obesity, and HLD (not on meds) who presents to hospital with acute onset of slurred speech and LUE paresis found to have ischemic stroke s/p tPA now in MICU for further observation/management.       # Neuro  -Ischemic stroke in setting of untreated HTN and HLD. Fam hx of stroke in father. No alteration of mental status present. However, LUE/LLE/, left facial droop, and slurred speech present. Likely due to structural ischemic stroke. Initial CTH negative. CTA of head and neck showing severe stenosis with poststenotic dilatation involving the distal right and left vertebral arteries as well as the proximal basilar artery. Pt received tPA at 854PM on 1/26. He has had improvement in his motor function since then however waxes/wanes.  -Permissive HTN for 24 hours up to 180/105; start Norvasc as weaned of nicardipine gtt.   -Neuro checks q1 hours  -Repeat CTH stable.   -MR Head & MRA Head/Neck  -c/w ASA & Statin  -PT/OT    #CV  -Permissive HTN for 24 hours up to 180/105. Titrate nicardipine drip as indicated.  -TTE with bubble study ordered   -c/w statin & high dose ASA    #Pulm  -CPAP at bed for CAYDEN    #Renal  -Replete electrolytes PRN    #GI  -Passed bedside dysphagia screen; soft diet as tolerated.   -S/S eval    #Endo  -A1c wnl    #Heme  -No active issues    #ID  -No active issues    #PPx  -SubQ hep or lovenox if repeat CT head (-)  -PT/OT MICU Progress NOte    SUBJECTIVE: Patient seen and examined at bedside.     Interval Events: No acute events overnight. Pt still dysarthric. Otherwise, without further complaints.    OBJECTIVE:    VITAL SIGNS:  ICU Vital Signs Last 24 Hrs  T(C): 36.3 (27 Jan 2019 02:26), Max: 36.4 (26 Jan 2019 20:53)  T(F): 97.4 (27 Jan 2019 02:26), Max: 97.6 (26 Jan 2019 21:20)  HR: 71 (27 Jan 2019 06:00) (45 - 110)  BP: 156/74 (27 Jan 2019 06:00) (100/80 - 254/212)  BP(mean): 92 (27 Jan 2019 06:00) (81 - 222)  ABP: --  ABP(mean): --  RR: 19 (27 Jan 2019 06:00) (13 - 24)  SpO2: 98% (27 Jan 2019 06:00) (93% - 100%)        01-26 @ 07:01  -  01-27 @ 07:00  --------------------------------------------------------  IN: 425 mL / OUT: 525 mL / NET: -100 mL      CAPILLARY BLOOD GLUCOSE      POCT Blood Glucose.: 110 mg/dL (26 Jan 2019 19:47)      PHYSICAL EXAM:    General: NAD  HEENT: PERRL, anicteric sclera  Respiratory: CTA; no wheezes, rales, or crackles  Cardiovascular: distant heart sounds  Abdomen: soft, non-tender, BS (+)  Extremities: no edema, cyanosis, or clubbing  Skin: no rashes or lesions  Neurological: AAOx3; pt lying in bed so could not appreciate facial droop - otherwise, rest of CN intact; 1/5 LUE, 3/5 LLE; sensation grossly intact    MEDICATIONS:  MEDICATIONS  (STANDING):  influenza   Vaccine 0.5 milliLiter(s) IntraMuscular once  lactated ringers. 1000 milliLiter(s) (75 mL/Hr) IV Continuous <Continuous>  niCARdipine Infusion 2.5 mG/Hr (12.5 mL/Hr) IV Continuous <Continuous>    MEDICATIONS  (PRN):      ALLERGIES:  Allergies    No Known Allergies    Intolerances        LABS:                        16.6   6.00  )-----------( 241      ( 26 Jan 2019 19:50 )             48.7     Hemoglobin: 16.6 g/dL (01-26 @ 19:50)    CBC Full  -  ( 26 Jan 2019 19:50 )  WBC Count : 6.00 K/uL  Hemoglobin : 16.6 g/dL  Hematocrit : 48.7 %  Platelet Count - Automated : 241 K/uL  Mean Cell Volume : 85.4 fL  Mean Cell Hemoglobin : 29.1 pg  Mean Cell Hemoglobin Concentration : 34.1 %  Auto Neutrophil # : 3.68 K/uL  Auto Lymphocyte # : 1.84 K/uL  Auto Monocyte # : 0.35 K/uL  Auto Eosinophil # : 0.07 K/uL  Auto Basophil # : 0.03 K/uL  Auto Neutrophil % : 61.3 %  Auto Lymphocyte % : 30.7 %  Auto Monocyte % : 5.8 %  Auto Eosinophil % : 1.2 %  Auto Basophil % : 0.5 %    01-26    138  |  99  |  12  ----------------------------<  110<H>  3.1<L>   |  23  |  1.13    Ca    8.9      26 Jan 2019 19:50    TPro  7.9  /  Alb  4.4  /  TBili  0.8  /  DBili  x   /  AST  26  /  ALT  26  /  AlkPhos  83  01-26    Creatinine Trend: 1.13<--  LIVER FUNCTIONS - ( 26 Jan 2019 19:50 )  Alb: 4.4 g/dL / Pro: 7.9 g/dL / ALK PHOS: 83 u/L / ALT: 26 u/L / AST: 26 u/L / GGT: x           PT/INR - ( 26 Jan 2019 19:50 )   PT: 12.4 SEC;   INR: 1.11          PTT - ( 26 Jan 2019 19:50 )  PTT:32.9 SEC          20:20 - VBG - pH: 7.42  | pCO2: 43    | pO2: 35    | Lactate: 2.0            EKG:   MICROBIOLOGY:    IMAGING:    RADIOLOGY & ADDITIONAL TESTS: Reviewed.    Assessment and Plan:   · Assessment	  63M with a PMH of HTN (not on meds), CAYDEN (on CPAP, compliant), obesity, and HLD (not on meds) who presents to hospital with acute onset of slurred speech and LUE paresis found to have ischemic stroke s/p tPA now in MICU for further observation/management.       # Neuro  -Ischemic stroke in setting of untreated HTN and HLD. Fam hx of stroke in father. No alteration of mental status present. However, LUE/LLE/, left facial droop, and slurred speech present. Likely due to structural ischemic stroke. Initial CTH negative. CTA of head and neck showing severe stenosis with poststenotic dilatation involving the distal right and left vertebral arteries as well as the proximal basilar artery. Pt received tPA at 854PM on 1/26. He has had improvement in his motor function since then however waxes/wanes.  -Permissive HTN for 24 hours up to 180/105; start Norvasc as weaned of nicardipine gtt.   -Neuro checks q1 hours  -Repeat CTH stable.   -MR Head & MRA Head/Neck  -c/w ASA & Statin  -PT/OT    #CV  -Permissive HTN for 24 hours up to 180/105. Titrate nicardipine drip as indicated.  -TTE with bubble study ordered   -c/w statin & high dose ASA    #Pulm  -CPAP at bed for CAYDEN    #Renal  -Replete electrolytes PRN    #GI  -Passed bedside dysphagia screen; soft diet as tolerated.   -S/S eval    #Endo  -A1c wnl    #Heme  -No active issues    #ID  -No active issues    #PPx  -HSQ  -PT/OT

## 2019-01-28 NOTE — CHART NOTE - NSCHARTNOTEFT_GEN_A_CORE
MICU Transfer Note    62 yo M with a PMH of HTN (not on meds), CAYDEN (on CPAP, compliant), obesity, and HLD (not on meds) who presents to hospital with acute onset of slurred speech and LUE paresis. Patient states he woke up on 1/26 at 5P at his baseline and began to change his clothes utilizing both hands and was speaking normally at the time. After about 10 minutes of changing, patient fell over while tying his shoelaces. When patient got up, he tried to reach for a tissue with his left arm and was unable to lift it. Patient states that his left arm felt weak for a "few minutes" before returning back to normal however, he noticed that he was slurring his words."  Upon arrival, code stroke was called. The pts symptoms rapidly improved without intervention, however at approx 1945-800 the pts neuro status changed acutely and he developed acute LUE and LLE paresis as well as slurred speech and L sided facial droop. The patient was re-evaluated by the neuro team and per chart review: "19:46pm: Interval examination demonstrates +Moderate dysarthria. LUE 1/5 strength. LLE 3/5 strength. New NIHSS 8. tPA discussed with patient and family as patient within extended window for tPA and patient amenable. Aggressive blood pressure control initiated. tPA given at 8:54pm. BP at time of tPA administration 177/95."    Patient transferred to MICU and started on Nicardipine gtt. Tolerated tPA without complication. Interval CTH scans were stable. Patient father calling, He states that he would like to know  opinion if his son should have a worm testing done because he still continues to complain about lots of stomach pain.   MICU Transfer Note    62 yo M with a PMH of HTN (not on meds), CAYDEN (on CPAP, compliant), obesity, and HLD (not on meds) who presents to hospital with acute onset of slurred speech and LUE paresis. Patient states he woke up on 1/26 at 5P at his baseline and began to change his clothes utilizing both hands and was speaking normally at the time. After about 10 minutes of changing, patient fell over while tying his shoelaces. When patient got up, he tried to reach for a tissue with his left arm and was unable to lift it. Patient states that his left arm felt weak for a "few minutes" before returning back to normal however, he noticed that he was slurring his words."  Upon arrival, code stroke was called. The pts symptoms rapidly improved without intervention, however at approx 1945-800 the pts neuro status changed acutely and he developed acute LUE and LLE paresis as well as slurred speech and L sided facial droop. The patient was re-evaluated by the neuro team and per chart review: "19:46pm: Interval examination demonstrates +Moderate dysarthria. LUE 1/5 strength. LLE 3/5 strength. New NIHSS 8. tPA discussed with patient and family as patient within extended window for tPA and patient amenable. Aggressive blood pressure control initiated. tPA given at 8:54pm. BP at time of tPA administration 177/95."    Patient transferred to MICU and started on Nicardipine gtt. Tolerated tPA without complication. Interval CTH scans were stable. Passed dysphagia screen. Continues to have residual LUE/LLE weakness.     Initially recommended by PT for Rehabilitation facility.     For Follow up:  [ ] MR Head  [ ] PT/OT  [ ] Titrate anti-hypertensives as necessary  [ ] Statin/ASA  [ ] Official S/S evaluation. MICU Transfer Note    64 yo M with a PMH of HTN (not on meds), CAYDEN (on CPAP, compliant), obesity, and HLD (not on meds) who presents to hospital with acute onset of slurred speech and LUE paresis. Patient states he woke up on 1/26 at 5P at his baseline and began to change his clothes utilizing both hands and was speaking normally at the time. After about 10 minutes of changing, patient fell over while tying his shoelaces. When patient got up, he tried to reach for a tissue with his left arm and was unable to lift it. Patient states that his left arm felt weak for a "few minutes" before returning back to normal however, he noticed that he was slurring his words."  Upon arrival, code stroke was called. The pts symptoms rapidly improved without intervention, however at approx 1945-800 the pts neuro status changed acutely and he developed acute LUE and LLE paresis as well as slurred speech and L sided facial droop. The patient was re-evaluated by the neuro team and per chart review: "19:46pm: Interval examination demonstrates +Moderate dysarthria. LUE 1/5 strength. LLE 3/5 strength. New NIHSS 8. tPA discussed with patient and family as patient within extended window for tPA and patient amenable. Aggressive blood pressure control initiated. tPA given at 8:54pm. BP at time of tPA administration 177/95."    Patient transferred to MICU and started on Nicardipine gtt. Tolerated tPA without complication. Interval CTH scans were stable. Passed dysphagia screen. Continues to have residual LUE/LLE weakness; although improved from initial presentation.     Initially recommended by PT for Rehabilitation facility.     For Follow up:  [ ] MR Head  [ ] PT/OT  [ ] Titrate anti-hypertensives as necessary  [ ] Statin/ASA  [ ] Official S/S evaluation. MICU Transfer Note    64 yo M with a PMH of HTN (not on meds), CAYDEN (on CPAP, compliant), obesity, and HLD (not on meds) who presents to hospital with acute onset of slurred speech and LUE paresis. Patient states he woke up on 1/26 at 5P at his baseline and began to change his clothes utilizing both hands and was speaking normally at the time. After about 10 minutes of changing, patient fell over while tying his shoelaces. When patient got up, he tried to reach for a tissue with his left arm and was unable to lift it. Patient states that his left arm felt weak for a "few minutes" before returning back to normal however, he noticed that he was slurring his words."  Upon arrival, code stroke was called. The pts symptoms rapidly improved without intervention, however at approx 1945-800 the pts neuro status changed acutely and he developed acute LUE and LLE paresis as well as slurred speech and L sided facial droop. The patient was re-evaluated by the neuro team and per chart review: "19:46pm: Interval examination demonstrates +Moderate dysarthria. LUE 1/5 strength. LLE 3/5 strength. New NIHSS 8. tPA discussed with patient and family as patient within extended window for tPA and patient amenable. Aggressive blood pressure control initiated. tPA given at 8:54pm. BP at time of tPA administration 177/95."    Patient transferred to MICU and started on Nicardipine gtt and weaned off evening of 1/27. Tolerated tPA without complication. Interval CTH scans were stable. Passed dysphagia screen. Continues to have residual LUE/LLE weakness; although improved from initial presentation.     Initially recommended by PT for Rehabilitation facility.     For Follow up:  [ ] MR Head & MRA Head/Neck   [ ] PT/OT  [ ] Titrate anti-hypertensives as necessary  [ ] Statin/ASA  [ ] Official S/S evaluation.

## 2019-01-28 NOTE — OCCUPATIONAL THERAPY INITIAL EVALUATION ADULT - GENERAL OBSERVATIONS, REHAB EVAL
Pt. received semisupine in bed. No acute distress. Patient agreed to evaluation from Occupational Therapist. +Heplock, +Tele, +CPAP/BIPAP, +Bilateral Venodynes. Sister bedside.

## 2019-01-29 DIAGNOSIS — I10 ESSENTIAL (PRIMARY) HYPERTENSION: ICD-10-CM

## 2019-01-29 DIAGNOSIS — Z29.9 ENCOUNTER FOR PROPHYLACTIC MEASURES, UNSPECIFIED: ICD-10-CM

## 2019-01-29 DIAGNOSIS — I63.9 CEREBRAL INFARCTION, UNSPECIFIED: ICD-10-CM

## 2019-01-29 DIAGNOSIS — E78.5 HYPERLIPIDEMIA, UNSPECIFIED: ICD-10-CM

## 2019-01-29 DIAGNOSIS — E87.6 HYPOKALEMIA: ICD-10-CM

## 2019-01-29 DIAGNOSIS — G47.33 OBSTRUCTIVE SLEEP APNEA (ADULT) (PEDIATRIC): ICD-10-CM

## 2019-01-29 LAB
ANION GAP SERPL CALC-SCNC: 15 MMO/L — HIGH (ref 7–14)
BUN SERPL-MCNC: 15 MG/DL — SIGNIFICANT CHANGE UP (ref 7–23)
CALCIUM SERPL-MCNC: 8.9 MG/DL — SIGNIFICANT CHANGE UP (ref 8.4–10.5)
CHLORIDE SERPL-SCNC: 100 MMOL/L — SIGNIFICANT CHANGE UP (ref 98–107)
CO2 SERPL-SCNC: 24 MMOL/L — SIGNIFICANT CHANGE UP (ref 22–31)
CREAT SERPL-MCNC: 1.13 MG/DL — SIGNIFICANT CHANGE UP (ref 0.5–1.3)
GLUCOSE SERPL-MCNC: 99 MG/DL — SIGNIFICANT CHANGE UP (ref 70–99)
POTASSIUM SERPL-MCNC: 3.2 MMOL/L — LOW (ref 3.5–5.3)
POTASSIUM SERPL-SCNC: 3.2 MMOL/L — LOW (ref 3.5–5.3)
SODIUM SERPL-SCNC: 139 MMOL/L — SIGNIFICANT CHANGE UP (ref 135–145)

## 2019-01-29 PROCEDURE — 99233 SBSQ HOSP IP/OBS HIGH 50: CPT

## 2019-01-29 PROCEDURE — 99222 1ST HOSP IP/OBS MODERATE 55: CPT | Mod: GC

## 2019-01-29 RX ORDER — ACETAMINOPHEN 500 MG
650 TABLET ORAL ONCE
Qty: 0 | Refills: 0 | Status: COMPLETED | OUTPATIENT
Start: 2019-01-29 | End: 2019-01-29

## 2019-01-29 RX ORDER — POTASSIUM CHLORIDE 20 MEQ
40 PACKET (EA) ORAL ONCE
Qty: 0 | Refills: 0 | Status: COMPLETED | OUTPATIENT
Start: 2019-01-29 | End: 2019-01-29

## 2019-01-29 RX ADMIN — Medication 40 MILLIEQUIVALENT(S): at 12:13

## 2019-01-29 RX ADMIN — HEPARIN SODIUM 5000 UNIT(S): 5000 INJECTION INTRAVENOUS; SUBCUTANEOUS at 06:00

## 2019-01-29 RX ADMIN — Medication 1 SPRAY(S): at 00:14

## 2019-01-29 RX ADMIN — ATORVASTATIN CALCIUM 80 MILLIGRAM(S): 80 TABLET, FILM COATED ORAL at 22:41

## 2019-01-29 RX ADMIN — Medication 650 MILLIGRAM(S): at 13:00

## 2019-01-29 RX ADMIN — HEPARIN SODIUM 5000 UNIT(S): 5000 INJECTION INTRAVENOUS; SUBCUTANEOUS at 22:41

## 2019-01-29 RX ADMIN — Medication 81 MILLIGRAM(S): at 12:13

## 2019-01-29 RX ADMIN — AMLODIPINE BESYLATE 5 MILLIGRAM(S): 2.5 TABLET ORAL at 06:00

## 2019-01-29 RX ADMIN — Medication 650 MILLIGRAM(S): at 12:13

## 2019-01-29 RX ADMIN — HEPARIN SODIUM 5000 UNIT(S): 5000 INJECTION INTRAVENOUS; SUBCUTANEOUS at 14:38

## 2019-01-29 NOTE — PROGRESS NOTE ADULT - PROBLEM SELECTOR PLAN 1
Concern for acute CVA presenting with Left sided hemiparesis, dysarthria  s/p tPA on 1/26  CT head on 1/27-Old lacunar infarcts involving the right basal ganglia, left caudate head /anterior corona radiata region.  Appreciate Neuro -  likely 2/2 right corona radiata infarct seen on repeat cth, 2/2 small vessel disease; Vertebrobasilar high grade stenosis (intracranial large artery stenosis) is asymptomatic, and unrelated to his acute stroke.   Continue permissive HTN for now, with gradual normotension over several days starting 1/29/19  On ASA/Lipitor 80 mg PO QHS  Fu MRI MRI brain without contrast   TTE with bubble- grossly normal LV  systolic function

## 2019-01-29 NOTE — PROGRESS NOTE ADULT - PROBLEM SELECTOR PLAN 4
SBP in 140-160s  On Norvasc 5 mg  Continue permissive HTN for now, with gradual normotension over several days starting 1/29/19

## 2019-01-29 NOTE — PROGRESS NOTE ADULT - SUBJECTIVE AND OBJECTIVE BOX
Neurology  Progress Note  01-28-19    Name:  LION AUSTIN; 63y    Interval History:  Patient reports no improvement of symptoms overnight. No events.     HPI:  The patient is a 62 yo M with a PMH of HTN (not on meds), CAYDEN (on CPAP, compliant), obesity, and HLD (not on meds) who presents to hospital with acute onset of slurred speech and LUE paresis. Per chart reviwe: "Patient states he woke up on 1/26 at 5pm at his baseline and began to change his clothes utilizing both hands and was speaking normally at the time. After about 10 minutes of changing, patient fell over while tying his shoelaces. When patient got up, he tried to reach for a tissue with his left arm and was unable to lift it. Patient states that his left arm felt weak for a "few minutes" before returning back to normal however, he noticed that he was slurring his words."  Upon arrival, code stroke was called. The pts symptoms rapidly improved without intervention, however at approx 1945-800 the pts neuro status changed acutely and he developed acute LUE and LLE paresis as well as slurred speech and L sided facial droop. The patient was re-evaluated by the neuro team and per chart review: "19:46pm: Interval examination demonstrates +Moderate dysarthria. LUE 1/5 strength. LLE 3/5 strength. New NIHSS 8. tPA discussed with patient and family as patient within extended window for tPA and patient amenable. Aggressive blood pressure control initiated. tPA given at 8:54pm. BP at time of tPA administration 177/95."     At approximately 2100. The patient was fully alert and oriented. His wife and two sons were at the bedside. He had visible left sided facial droop and was slurring his speech. In addition, he was unable to move his LUE, and his LLE was 1/5-2/5. The patient expressed detailed understanding of what was going on, and he confirmed that he was full code in the event he required chest compressions and/or intubation. The patient was started on a nicardipine drip and his BP was in the 160s. (26 Jan 2019 22:14)    REVIEW OF SYSTEMS:  As states in HPI.      MEDICATIONS  (STANDING):  amLODIPine   Tablet 5 milliGRAM(s) Oral daily  aspirin  chewable 81 milliGRAM(s) Oral daily  atorvastatin 80 milliGRAM(s) Oral at bedtime  heparin  Injectable 5000 Unit(s) SubCutaneous every 8 hours  influenza   Vaccine 0.5 milliLiter(s) IntraMuscular once    MEDICATIONS  (PRN):  sodium chloride 0.65% Nasal 1 Spray(s) Both Nostrils every 6 hours PRN Nasal Congestion    ICU Vital Signs Last 24 Hrs  T(C): 37.1 (29 Jan 2019 15:04), Max: 37.6 (28 Jan 2019 18:00)  T(F): 98.7 (29 Jan 2019 15:04), Max: 99.7 (28 Jan 2019 18:00)  HR: 73 (29 Jan 2019 15:04) (67 - 82)  BP: 168/100 (29 Jan 2019 06:00) (120/60 - 180/110)  BP(mean): 70 (28 Jan 2019 18:00) (70 - 70)  ABP: --  ABP(mean): --  RR: 18 (29 Jan 2019 15:04) (16 - 20)  SpO2: 100% (29 Jan 2019 15:04) (98% - 100%)    LABS:    29 Jan 2019 06:20    139    |  100    |  15     ----------------------------<  99     3.2     |  24     |  1.13     Ca    8.9        29 Jan 2019 06:20      PT/INR - ( 28 Jan 2019 03:20 )   PT: 14.1 SEC;   INR: 1.26          PTT - ( 28 Jan 2019 03:20 )  PTT:30.7 SEC         Neurological Exam:  Mental Status: Awake, talking, aware of deficits. Orientated to self, date and place.  Attention intact.  +Mild dysarthria. Speech fluent.  Cranial Nerves:   PERRL, EOMI, VFF, no nystagmus.    CN V1-3 intact to light touch . Very mild Left Facial Droop.  Tongue, uvula and palate midline.      Motor:   Tone: LUE flaccid                 Strength:     [] Upper extremity                                                                     R         5/5                                               L         Elbow Flexion Biceps 2/5, 3/5 , 1/5 deltoid  [] Lower extremity                                                                     R        5/5                                                 L        Iliopsoas 4/5, Anterior tibialis 5/5.      Dysmetria: None to FNF on the Right  Tremor: No resting, postural or action tremor.  No myoclonus.  Sensation: intact to light touch, no neglect  Gait: Deferred      Radiology:  < from: CT Head No Cont (01.27.19 @ 20:57) >  Impression: Old lacunar infarcts are again seen as described above.    VIVIAN HOWARD M.D., ATTENDING RADIOLOGIST  This document has been electronically signed. Jan 28 2019  8:17AM    < end of copied text > Neurology  Progress Note  01-28-19    Name:  LION AUSTIN; 63y    Interval History:  Patient reports no improvement of symptoms overnight. No events.     HPI:  The patient is a 62 yo M with a PMH of HTN (not on meds), CAYDEN (on CPAP, compliant), obesity, and HLD (not on meds) who presents to hospital with acute onset of slurred speech and LUE paresis. Per chart reviwe: "Patient states he woke up on 1/26 at 5pm at his baseline and began to change his clothes utilizing both hands and was speaking normally at the time. After about 10 minutes of changing, patient fell over while tying his shoelaces. When patient got up, he tried to reach for a tissue with his left arm and was unable to lift it. Patient states that his left arm felt weak for a "few minutes" before returning back to normal however, he noticed that he was slurring his words."  Upon arrival, code stroke was called. The pts symptoms rapidly improved without intervention, however at approx 1945-800 the pts neuro status changed acutely and he developed acute LUE and LLE paresis as well as slurred speech and L sided facial droop. The patient was re-evaluated by the neuro team and per chart review: "19:46pm: Interval examination demonstrates +Moderate dysarthria. LUE 1/5 strength. LLE 3/5 strength. New NIHSS 8. tPA discussed with patient and family as patient within extended window for tPA and patient amenable. Aggressive blood pressure control initiated. tPA given at 8:54pm. BP at time of tPA administration 177/95."     At approximately 2100. The patient was fully alert and oriented. His wife and two sons were at the bedside. He had visible left sided facial droop and was slurring his speech. In addition, he was unable to move his LUE, and his LLE was 1/5-2/5. The patient expressed detailed understanding of what was going on, and he confirmed that he was full code in the event he required chest compressions and/or intubation. The patient was started on a nicardipine drip and his BP was in the 160s. (26 Jan 2019 22:14)    REVIEW OF SYSTEMS:  As states in HPI.      MEDICATIONS  (STANDING):  amLODIPine   Tablet 5 milliGRAM(s) Oral daily  aspirin  chewable 81 milliGRAM(s) Oral daily  atorvastatin 80 milliGRAM(s) Oral at bedtime  heparin  Injectable 5000 Unit(s) SubCutaneous every 8 hours  influenza   Vaccine 0.5 milliLiter(s) IntraMuscular once    MEDICATIONS  (PRN):  sodium chloride 0.65% Nasal 1 Spray(s) Both Nostrils every 6 hours PRN Nasal Congestion    ICU Vital Signs Last 24 Hrs  T(C): 37.1 (29 Jan 2019 15:04), Max: 37.6 (28 Jan 2019 18:00)  T(F): 98.7 (29 Jan 2019 15:04), Max: 99.7 (28 Jan 2019 18:00)  HR: 73 (29 Jan 2019 15:04) (67 - 82)  BP: 168/100 (29 Jan 2019 06:00) (120/60 - 180/110)  BP(mean): 70 (28 Jan 2019 18:00) (70 - 70)  ABP: --  ABP(mean): --  RR: 18 (29 Jan 2019 15:04) (16 - 20)  SpO2: 100% (29 Jan 2019 15:04) (98% - 100%)    LABS:    29 Jan 2019 06:20    139    |  100    |  15     ----------------------------<  99     3.2     |  24     |  1.13     Ca    8.9        29 Jan 2019 06:20      PT/INR - ( 28 Jan 2019 03:20 )   PT: 14.1 SEC;   INR: 1.26          PTT - ( 28 Jan 2019 03:20 )  PTT:30.7 SEC         Neurological Exam:  Mental Status: Awake, talking, aware of deficits. Orientated to self, date and place.  Attention intact.  +Mild dysarthria. Speech fluent.  Cranial Nerves:   PERRL, EOMI, VFF, no nystagmus.    CN V1-3 intact to light touch . Very mild Left Facial Droop.  Tongue, uvula and palate midline.      Motor:   Tone: LUE flaccid                 Strength:     [] Upper extremity                                                                     R         5/5                                               L         Elbow Flexion Biceps 2/5, trace , 1/5 deltoid  [] Lower extremity                                                                     R        5/5                                                 L        Iliopsoas 4/5, Anterior tibialis 5/5.      Dysmetria: None to FNF on the Right  Tremor: No resting, postural or action tremor.  No myoclonus.  Sensation: intact to light touch, no neglect  Gait: Deferred      Radiology:  < from: CT Head No Cont (01.27.19 @ 20:57) >  Impression: Old lacunar infarcts are again seen as described above.    VIVIAN HOWARD M.D., ATTENDING RADIOLOGIST  This document has been electronically signed. Jan 28 2019  8:17AM    < end of copied text >

## 2019-01-29 NOTE — PROGRESS NOTE ADULT - ASSESSMENT
Left hemiparesis w/ dysarthria likely 2/2 right corona radiata infarct seen on repeat cth, 2/2 small vessel disease; Vertebrobasilar high grade stenosis (intracranial large artery stenosis) is asymptomatic, and unrelated to his acute stroke.     Recommendations:   MRI brain without contrast pending.   Continue with aggressive vascular risk factors modification   Tele monitoring  PT/OT/S&S/PM&R f/u    Candidate for STROKE-AF Trial. Impression. Left arm slightly weaker and left leg slightly stronger than prior exam.   Left hemiparesis w/ dysarthria likely 2/2 right corona radiata infarct seen on repeat cth, 2/2 small vessel disease; Vertebrobasilar high grade stenosis (intracranial large artery stenosis) is asymptomatic, and unrelated to his acute stroke.     Recommendations:   MRI brain without contrast pending.   Continue with aggressive vascular risk factors modification   Tele monitoring  PT/OT/S&S/PM&R f/u    Candidate for STROKE-AF Trial (randomized trial of loop recorder vs no loop recorder for strokes of known cause).

## 2019-01-29 NOTE — PROGRESS NOTE ADULT - SUBJECTIVE AND OBJECTIVE BOX
CC- CVA    SUBJECTIVE: Patient seen and examined at bedside. Sister at bedside  Pt reports moderate improvement in LE weakness- Pt states he was not able to lift LLE at onset but now is able to lift the leg off the ground  Speech improving   No new neurological deficits       Vital Signs Last 24 Hrs  T(C): 36.7 (29 Jan 2019 06:00), Max: 37.6 (28 Jan 2019 18:00)  T(F): 98 (29 Jan 2019 06:00), Max: 99.7 (28 Jan 2019 18:00)  HR: 82 (29 Jan 2019 06:00) (63 - 82)  BP: 168/100 (29 Jan 2019 06:00) (116/53 - 180/110)  BP(mean): 70 (28 Jan 2019 18:00) (68 - 70)  RR: 16 (29 Jan 2019 06:00) (16 - 21)  SpO2: 99% (29 Jan 2019 06:00) (98% - 100%)      PHYSICAL EXAM:    General: NAD, obese  HEENT: PERRL, anicteric sclera  Respiratory: CTA; no wheezes, rales, or crackles  Cardiovascular: distant heart sounds  Abdomen: soft, non-tender, BS (+)  Extremities: no edema, cyanosis, or clubbing  Skin: no rashes or lesions  Neurological: AAOx3; Mild left facial droop. Strength  1/5 LUE, 3/5 LLE; sensation grossly intact    MEDICATIONS  (STANDING):  acetaminophen   Tablet .. 650 milliGRAM(s) Oral once  amLODIPine   Tablet 5 milliGRAM(s) Oral daily  aspirin  chewable 81 milliGRAM(s) Oral daily  atorvastatin 80 milliGRAM(s) Oral at bedtime  heparin  Injectable 5000 Unit(s) SubCutaneous every 8 hours  influenza   Vaccine 0.5 milliLiter(s) IntraMuscular once  potassium chloride   Powder 40 milliEquivalent(s) Oral once    MEDICATIONS  (PRN):  sodium chloride 0.65% Nasal 1 Spray(s) Both Nostrils every 6 hours PRN Nasal Congestion      LABS:                                           15.7   7.13  )-----------( 223      ( 28 Jan 2019 03:20 )             46.0   01-29    139  |  100  |  15  ----------------------------<  99  3.2<L>   |  24  |  1.13    Ca    8.9      29 Jan 2019 06:20  Phos  2.1     01-28  Mg     1.9     01-28    TPro  7.2  /  Alb  4.2  /  TBili  0.9  /  DBili  x   /  AST  20  /  ALT  18  /  AlkPhos  71  01-28          Assessment and Plan:   · Assessment	      # Neuro  -Ischemic stroke in setting of untreated HTN and HLD. Fam hx of stroke in father. No alteration of mental status present. However, LUE/LLE/, left facial droop, and slurred speech present. Likely due to structural ischemic stroke. Initial CTH negative. CTA of head and neck showing severe stenosis with poststenotic dilatation involving the distal right and left vertebral arteries as well as the proximal basilar artery. Pt received tPA at 854PM on 1/26. He has had improvement in his motor function since then however waxes/wanes.  -Permissive HTN for 24 hours up to 180/105; start Norvasc as weaned of nicardipine gtt.   -Neuro checks q1 hours  -Repeat CTH stable.   -MR Head & MRA Head/Neck  -c/w ASA & Statin  -PT/OT    #CV  -Permissive HTN for 24 hours up to 180/105. Titrate nicardipine drip as indicated.  -TTE with bubble study ordered   -c/w statin & high dose ASA    #Pulm  -CPAP at bed for CAYDEN    #Renal  -Replete electrolytes PRN    #GI  -Passed bedside dysphagia screen; soft diet as tolerated.   -S/S eval    #Endo  -A1c wnl    #Heme  -No active issues    #ID  -No active issues    #PPx  -HSQ  -PT/OT CC- CVA    SUBJECTIVE: Patient seen and examined at bedside. Sister at bedside  Pt reports moderate improvement in LE weakness- Pt states he was not able to lift LLE at onset but now is able to lift the leg off the ground  Speech improving   No new neurological deficits       Vital Signs Last 24 Hrs  T(C): 36.7 (29 Jan 2019 06:00), Max: 37.6 (28 Jan 2019 18:00)  T(F): 98 (29 Jan 2019 06:00), Max: 99.7 (28 Jan 2019 18:00)  HR: 82 (29 Jan 2019 06:00) (63 - 82)  BP: 168/100 (29 Jan 2019 06:00) (116/53 - 180/110)  BP(mean): 70 (28 Jan 2019 18:00) (68 - 70)  RR: 16 (29 Jan 2019 06:00) (16 - 21)  SpO2: 99% (29 Jan 2019 06:00) (98% - 100%)      PHYSICAL EXAM:    General: NAD, obese  HEENT: PERRL, anicteric sclera  Respiratory: CTA; no wheezes, rales, or crackles  Cardiovascular: distant heart sounds  Abdomen: soft, non-tender, BS (+)  Extremities: no edema, cyanosis, or clubbing  Skin: skin debris on toes likely from poor hygene  Neurological: AAOx3; Mild left facial droop. Strength  1/5 LUE, 3/5 LLE; sensation grossly intact    MEDICATIONS  (STANDING):  acetaminophen   Tablet .. 650 milliGRAM(s) Oral once  amLODIPine   Tablet 5 milliGRAM(s) Oral daily  aspirin  chewable 81 milliGRAM(s) Oral daily  atorvastatin 80 milliGRAM(s) Oral at bedtime  heparin  Injectable 5000 Unit(s) SubCutaneous every 8 hours  influenza   Vaccine 0.5 milliLiter(s) IntraMuscular once  potassium chloride   Powder 40 milliEquivalent(s) Oral once    MEDICATIONS  (PRN):  sodium chloride 0.65% Nasal 1 Spray(s) Both Nostrils every 6 hours PRN Nasal Congestion      LABS:                                           15.7   7.13  )-----------( 223      ( 28 Jan 2019 03:20 )             46.0   01-29    139  |  100  |  15  ----------------------------<  99  3.2<L>   |  24  |  1.13    Ca    8.9      29 Jan 2019 06:20  Phos  2.1     01-28  Mg     1.9     01-28    TPro  7.2  /  Alb  4.2  /  TBili  0.9  /  DBili  x   /  AST  20  /  ALT  18  /  AlkPhos  71  01-28          Discussed with provider- Neuro

## 2019-01-29 NOTE — PROGRESS NOTE ADULT - ASSESSMENT
63M with a PMH of HTN (not on meds), CAYDEN (on CPAP, compliant), obesity, and HLD (not on meds) who presents to hospital with acute onset of slurred speech and LUE paresis found to have ischemic stroke s/p tPA now in MICU for further observation/management.

## 2019-01-29 NOTE — CONSULT NOTE ADULT - SUBJECTIVE AND OBJECTIVE BOX
CC: PM&R was consulted to evaluate rehab needs  HPI:  64 yo M with a PMH of HTN (not on meds), CAYDEN (on CPAP, compliant), obesity, and HLD (not on meds) who presents to hospital with acute onset of slurred speech and LUE paresis. Per chart reviwe: "Patient states he woke up on 1/26 at 5pm at his baseline and began to change his clothes utilizing both hands and was speaking normally at the time. After about 10 minutes of changing, patient fell over while tying his shoelaces. When patient got up, he tried to reach for a tissue with his left arm and was unable to lift it. Patient states that his left arm felt weak for a "few minutes" before returning back to normal however, he noticed that he was slurring his words."  Upon arrival, code stroke was called. The pts symptoms rapidly improved without intervention, however at approx 1945-800 the pts neuro status changed acutely and he developed acute LUE and LLE paresis as well as slurred speech and L sided facial droop. The patient was re-evaluated by the neuro team and per chart review: "19:46pm: Interval examination demonstrates +Moderate dysarthria. LUE 1/5 strength. LLE 3/5 strength. New NIHSS 8. tPA discussed with patient and family as patient within extended window for tPA and patient amenable. Aggressive blood pressure control initiated. tPA given at 8:54pm. BP at time of tPA administration 177/95." Patient transferred to MICU and started on Nicardipine gtt and weaned off evening of 1/27. Tolerated tPA without complication. Interval CTH scans were stable. Passed dysphagia screen.  (28 Jan 2019) PM&R was consulted to evaluate rehab needs.    REVIEW OF SYSTEMS  Constitutional - No fever, No fatigue  HEENT - No visual disturbances, No difficulty hearing,  No neck pain  Respiratory - No cough, No wheezing, No shortness of breath  Cardiovascular - No chest pain, No palpitations  Gastrointestinal - No abdominal pain, No nausea, No vomiting, No diarrhea, No constipation  Genitourinary - No dysuria, No frequency, No hematuria, No incontinence  Neurological - No headaches, + left sided loss of strength, No numbness  Musculoskeletal - No joint pain, No joint swelling, No muscle pain  Psychiatric - No depression, No anxiety  All other review of systems negative    PAST MEDICAL & SURGICAL HISTORY  Cerebral infarction  Hypertension  Peripheral Vascular Disease  CAYDEN (Obstructive Sleep Apnea)  CAD (Coronary Artery Disease)  Hyperlipidemia  Hypertension      SOCIAL HISTORY  Smoking - Denied  EtOH - Denied   Drugs - Denied    FUNCTIONAL HISTORY  Lives alone in a house with 4 steps to enter. Once inside, pt. reports bedroom and bathroom are located on the main level.  Independent in ambulation, ADL's, transfers prior to hospitalization    CURRENT FUNCTIONAL STATUS  ADL's - UBD mod assist, LBD max assist, grooming mod assist    FAMILY HISTORY   Family history of cerebrovascular accident (CVA) in father (Father)    ALLERGIES  No Known Allergies    VITALS  T(C): 36.7 (01-29-19 @ 06:00)  T(F): 98 (01-29-19 @ 06:00), Max: 99.7 (01-28-19 @ 18:00)  HR: 82 (01-29-19 @ 06:00) (63 - 82)  BP: 168/100 (01-29-19 @ 06:00) (116/53 - 180/110)  RR:  (16 - 21)  SpO2:  (98% - 100%)  Wt(kg): --    PHYSICAL EXAM  Constitutional - NAD, Comfortable  HEENT - NCAT  Chest - good chest expansion, good resp effort  Cardio - warm and well perfused  Abdomen - Soft, NTND  Extremities - No C/C/E, No calf tenderness   Neurologic Exam -                    Cognitive - Awake, Alert, AAO to self, place, date, year, situation     Communication - Fluent, mild dysarthria, comprehension intact     Cranial Nerves - CN V1-3 intact to LT, mild left facial droop, tongue midline     Motor -                     LEFT    UE - ShAB 1/5, EF 2/5, EE 2/5,  severely impaired                    RIGHT UE - ShAB 5/5, EF 5/5, EE 5/5, WE 5/5,  5/5                    LEFT    LE - HF 1-2/5, KE 5/5, DF 4/5, PF 5/5                    RIGHT LE - HF 5/5, KE 5/5, DF 5/5, PF 5/5        Sensory - Intact to light touch     Reflexes - DTR Intact, No primitive reflexive     Coordination - FTN intact on R  Psychiatric - Affect WNL    RECENT LABS/IMAGING                        15.7   7.13  )-----------( 223      ( 28 Jan 2019 03:20 )             46.0     01-29    139  |  100  |  15  ----------------------------<  99  3.2<L>   |  24  |  1.13    Ca    8.9      29 Jan 2019 06:20  Phos  2.1     01-28  Mg     1.9     01-28    TPro  7.2  /  Alb  4.2  /  TBili  0.9  /  DBili  x   /  AST  20  /  ALT  18  /  AlkPhos  71  01-28    PT/INR - ( 28 Jan 2019 03:20 )   PT: 14.1 SEC;   INR: 1.26       PTT - ( 28 Jan 2019 03:20 )  PTT:30.7 SEC    < from: CT Head No Cont (01.27.19 @ 20:57) >  EXAM:  CT BRAIN        PROCEDURE DATE:  Jan 27 2019         INTERPRETATION:  History: Confusion left arm weakness.    Multiple axial sections were performed from base of skull to vertex   without contrast enhancement.    This exam is compared with prior noncontrast head CT performed on January 26, 2019.    The lateral ventricles have normal configuration.    Old lacunar infarcts involving the right basal ganglia, left caudate head   /anterior corona radiata region.    There is no evidence of acute hemorrhage in posterior fossa or   supratentorial    Evaluation of the osseous structures with the appropriate window   demonstrates hyperostosis frontalis interna. This finding is likely of no   clinical significance.    The visualized paranasal sinuses mastoid and middle ear regions appear   clear.    Impression: Old lacunar infarcts are again seen as described above.      MEDICATIONS   MEDICATIONS  (STANDING):  amLODIPine   Tablet 5 milliGRAM(s) Oral daily  aspirin  chewable 81 milliGRAM(s) Oral daily  atorvastatin 80 milliGRAM(s) Oral at bedtime  heparin  Injectable 5000 Unit(s) SubCutaneous every 8 hours  influenza   Vaccine 0.5 milliLiter(s) IntraMuscular once    MEDICATIONS  (PRN):  sodium chloride 0.65% Nasal 1 Spray(s) Both Nostrils every 6 hours PRN Nasal Congestion    ASSESSMENT/PLAN  64 y/o M with right corona radiata CVA now with left hemiparesis, functional, gait, and ADL impairments.    Disposition - Recommend acute inpatient rehabilitation once deemed medically and surgically stable as per the primary treating team(s).  The goal of acute inpatient rehabilitation would be to restore independence for household level mobility and self-care so he can return to the community at the level of using an assisted device. The pt has significant functional impairments versus baseline, which is independent in the community.  Needs acute inpatient rehabilitation for intensive therapies (PT, OT, SLP) to restore his function towards independence, while being closely monitored for his ongoing medical issues, which can quickly destabilize if not closely monitored and managed.  Expected to tolerate and benefit from 3 hrs/day, >=5 days/wk of multidisciplinary therapies.  PT - ROM, Bed Mob, Transfers, Amb with AD   OT - ADLs, ROM  SLP - Dysphagia eval and treat  Precautions - Falls, Cardiac, Aspiration  DVT Prophylaxis - Heparin SQ  Diet - Dysphagia 3 Soft/Thin  Thank you for allowing us to participate in this patient's care CC: PM&R was consulted to evaluate rehab needs  HPI:  62 yo M with a PMH of HTN (not on meds), CAYDEN (on CPAP, compliant), obesity, and HLD (not on meds) who presents to hospital with acute onset of slurred speech and LUE paresis. Per chart reviwe: "Patient states he woke up on 1/26 at 5pm at his baseline and began to change his clothes utilizing both hands and was speaking normally at the time. After about 10 minutes of changing, patient fell over while tying his shoelaces. When patient got up, he tried to reach for a tissue with his left arm and was unable to lift it. Patient states that his left arm felt weak for a "few minutes" before returning back to normal however, he noticed that he was slurring his words."  Upon arrival, code stroke was called. The pts symptoms rapidly improved without intervention, however at approx 1945-800 the pts neuro status changed acutely and he developed acute LUE and LLE paresis as well as slurred speech and L sided facial droop. The patient was re-evaluated by the neuro team and per chart review: "19:46pm: Interval examination demonstrates +Moderate dysarthria. LUE 1/5 strength. LLE 3/5 strength. New NIHSS 8. tPA discussed with patient and family as patient within extended window for tPA and patient amenable. Aggressive blood pressure control initiated. tPA given at 8:54pm. BP at time of tPA administration 177/95." Patient transferred to MICU and started on Nicardipine gtt and weaned off evening of 1/27. Tolerated tPA without complication. Interval CTH scans were stable. Passed dysphagia screen.  (28 Jan 2019) PM&R was consulted to evaluate rehab needs.    REVIEW OF SYSTEMS  Constitutional - No fever, + fatigue (got around 2 hours of sleep last night 2/2 noise)  HEENT - No visual disturbances, No difficulty hearing,  No neck pain  Respiratory - No cough, No wheezing, No shortness of breath  Cardiovascular - No chest pain, No palpitations  Neurological - No headaches, + left sided loss of strength, No numbness  Musculoskeletal - No joint pain, No joint swelling, No muscle pain  Psychiatric - No depression, No anxiety  All other review of systems negative    PAST MEDICAL & SURGICAL HISTORY  Cerebral infarction  Hypertension  Peripheral Vascular Disease  CAYDEN (Obstructive Sleep Apnea)  CAD (Coronary Artery Disease)  Hyperlipidemia  Hypertension      SOCIAL HISTORY  Smoking - Denied  EtOH - Denied   Drugs - Denied    FUNCTIONAL HISTORY  Lives alone in a house with 4 steps to enter (ex wife and children live close by). Once inside, pt. reports bedroom and bathroom are located on the main level.  Independent in ambulation, ADL's, transfers prior to hospitalization  Retired     CURRENT FUNCTIONAL STATUS  ADL's - UBD mod assist, LBD max assist, grooming mod assist    FAMILY HISTORY   Family history of cerebrovascular accident (CVA) in father (Father)    ALLERGIES  No Known Allergies    VITALS  T(C): 36.7 (01-29-19 @ 06:00)  T(F): 98 (01-29-19 @ 06:00), Max: 99.7 (01-28-19 @ 18:00)  HR: 82 (01-29-19 @ 06:00) (63 - 82)  BP: 168/100 (01-29-19 @ 06:00) (116/53 - 180/110)  RR:  (16 - 21)  SpO2:  (98% - 100%)  Wt(kg): --    PHYSICAL EXAM  Constitutional - NAD, Comfortable  HEENT - NCAT  Chest - good chest expansion, good resp effort  Cardio - warm and well perfused  Extremities - No C/C/E, No calf tenderness   Neurologic Exam -                    Cognitive - Awake, Alert, AAO to self, place, date, year, situation     Communication - Fluent, mild dysarthria, comprehension intact     Cranial Nerves - CN V1-3 intact to LT, mild left facial droop, tongue midline, shoulder shrug intact b/l     Motor -                     LEFT    UE - Shoulder 1/5, EF 0/5, EE 0/5,  trace                    RIGHT UE - ShAB 5/5, EF 5/5, EE 5/5, WE 5/5,  5/5                    LEFT    LE - HF 3/5, KE 4/5, DF 5/5, PF 5/5                    RIGHT LE - HF 5/5, KE 5/5, DF 5/5, PF 5/5        Sensory - Intact to light touch     Reflexes - DTR Intact, No primitive reflexive     Coordination - FTN intact on R  Psychiatric - Affect WNL    RECENT LABS/IMAGING                        15.7   7.13  )-----------( 223      ( 28 Jan 2019 03:20 )             46.0     01-29    139  |  100  |  15  ----------------------------<  99  3.2<L>   |  24  |  1.13    Ca    8.9      29 Jan 2019 06:20  Phos  2.1     01-28  Mg     1.9     01-28    TPro  7.2  /  Alb  4.2  /  TBili  0.9  /  DBili  x   /  AST  20  /  ALT  18  /  AlkPhos  71  01-28    PT/INR - ( 28 Jan 2019 03:20 )   PT: 14.1 SEC;   INR: 1.26       PTT - ( 28 Jan 2019 03:20 )  PTT:30.7 SEC    < from: CT Head No Cont (01.27.19 @ 20:57) >  EXAM:  CT BRAIN        PROCEDURE DATE:  Jan 27 2019         INTERPRETATION:  History: Confusion left arm weakness.    Multiple axial sections were performed from base of skull to vertex   without contrast enhancement.    This exam is compared with prior noncontrast head CT performed on January 26, 2019.    The lateral ventricles have normal configuration.    Old lacunar infarcts involving the right basal ganglia, left caudate head   /anterior corona radiata region.    There is no evidence of acute hemorrhage in posterior fossa or   supratentorial    Evaluation of the osseous structures with the appropriate window   demonstrates hyperostosis frontalis interna. This finding is likely of no   clinical significance.    The visualized paranasal sinuses mastoid and middle ear regions appear   clear.    Impression: Old lacunar infarcts are again seen as described above.      MEDICATIONS   MEDICATIONS  (STANDING):  amLODIPine   Tablet 5 milliGRAM(s) Oral daily  aspirin  chewable 81 milliGRAM(s) Oral daily  atorvastatin 80 milliGRAM(s) Oral at bedtime  heparin  Injectable 5000 Unit(s) SubCutaneous every 8 hours  influenza   Vaccine 0.5 milliLiter(s) IntraMuscular once    MEDICATIONS  (PRN):  sodium chloride 0.65% Nasal 1 Spray(s) Both Nostrils every 6 hours PRN Nasal Congestion    ASSESSMENT/PLAN  62 y/o M with right corona radiata CVA now with left hemiparesis, functional, gait, and ADL impairments.    Disposition - Recommend acute inpatient rehabilitation once deemed medically and surgically stable as per the primary treating team(s).  The goal of acute inpatient rehabilitation would be to restore independence for household level mobility and self-care so he can return to the community at the level of using an assisted device. The pt has significant functional impairments versus baseline, which is independent in the community.  Needs acute inpatient rehabilitation for intensive therapies (PT, OT, SLP) to restore his function towards independence, while being closely monitored for his ongoing medical issues, which can quickly destabilize if not closely monitored and managed.  Expected to tolerate and benefit from 3 hrs/day, >=5 days/wk of multidisciplinary therapies.  PT - ROM, Bed Mob, Transfers, Amb with AD   OT - ADLs, ROM  SLP - Dysphagia eval and treat  Precautions - Falls, Cardiac, Aspiration  DVT Prophylaxis - Heparin SQ  Diet - Dysphagia 3 Soft/Thin  Thank you for allowing us to participate in this patient's care CC: PM&R was consulted to evaluate rehab needs  HPI:  64 yo M with a PMH of HTN (not on meds), CAYDEN (on CPAP, compliant), obesity, and HLD (not on meds) who presents to hospital with acute onset of slurred speech and LUE paresis. Per chart reviwe: "Patient states he woke up on 1/26 at 5pm at his baseline and began to change his clothes utilizing both hands and was speaking normally at the time. After about 10 minutes of changing, patient fell over while tying his shoelaces. When patient got up, he tried to reach for a tissue with his left arm and was unable to lift it. Patient states that his left arm felt weak for a "few minutes" before returning back to normal however, he noticed that he was slurring his words."  Upon arrival, code stroke was called. The pts symptoms rapidly improved without intervention, however at approx 1945-800 the pts neuro status changed acutely and he developed acute LUE and LLE paresis as well as slurred speech and L sided facial droop. The patient was re-evaluated by the neuro team and per chart review: "19:46pm: Interval examination demonstrates +Moderate dysarthria. LUE 1/5 strength. LLE 3/5 strength. New NIHSS 8. tPA discussed with patient and family as patient within extended window for tPA and patient amenable. Aggressive blood pressure control initiated. tPA given at 8:54pm. BP at time of tPA administration 177/95." Patient transferred to MICU and started on Nicardipine gtt and weaned off evening of 1/27. Tolerated tPA without complication. Interval CTH scans were stable. Passed dysphagia screen.  (28 Jan 2019) PM&R was consulted to evaluate rehab needs.    REVIEW OF SYSTEMS  Constitutional - No fever, + fatigue (got around 2 hours of sleep last night 2/2 noise)  HEENT - No visual disturbances, No difficulty hearing,  No neck pain  Respiratory - No cough, No wheezing, No shortness of breath  Cardiovascular - No chest pain, No palpitations  Neurological - No headaches, + left sided loss of strength, No numbness  Musculoskeletal - No joint pain, No joint swelling, No muscle pain  Psychiatric - No depression, No anxiety  All other review of systems negative    PAST MEDICAL & SURGICAL HISTORY  Cerebral infarction  Hypertension  Peripheral Vascular Disease  CAYDEN (Obstructive Sleep Apnea)  CAD (Coronary Artery Disease)  Hyperlipidemia  Hypertension      SOCIAL HISTORY  Smoking - Denied  EtOH - Denied   Drugs - Denied    FUNCTIONAL HISTORY  Lives alone in a house with 4 steps to enter (ex wife and children live close by). Once inside, pt. reports bedroom and bathroom are located on the main level.  Independent in ambulation, ADL's, transfers prior to hospitalization  Retired     CURRENT FUNCTIONAL STATUS  ADL's - UBD mod assist, LBD max assist, grooming mod assist    FAMILY HISTORY   Family history of cerebrovascular accident (CVA) in father (Father)    ALLERGIES  No Known Allergies    VITALS  T(C): 36.7 (01-29-19 @ 06:00)  T(F): 98 (01-29-19 @ 06:00), Max: 99.7 (01-28-19 @ 18:00)  HR: 82 (01-29-19 @ 06:00) (63 - 82)  BP: 168/100 (01-29-19 @ 06:00) (116/53 - 180/110)  RR:  (16 - 21)  SpO2:  (98% - 100%)  Wt(kg): --    PHYSICAL EXAM  Constitutional - NAD, Comfortable  HEENT - NCAT  Chest - good chest expansion, good resp effort  Cardio - warm and well perfused  Extremities - No C/C/E, No calf tenderness   Neurologic Exam -                    Cognitive - Awake, Alert, AAO to self, place, date, year, situation     Communication - Fluent, mild dysarthria, comprehension intact     Cranial Nerves - CN V1-3 intact to LT, mild left facial droop, tongue midline, shoulder shrug intact b/l     Motor -                     LEFT    UE - Shoulder 1/5, EF 0/5, EE 0/5,  trace                    RIGHT UE - ShAB 5/5, EF 5/5, EE 5/5, WE 5/5,  5/5                    LEFT    LE - HF 3/5, KE 4/5, DF 5/5, PF 5/5                    RIGHT LE - HF 5/5, KE 5/5, DF 5/5, PF 5/5        Sensory - Intact to light touch     Reflexes - DTR Intact, No primitive reflexive     Coordination - FTN intact on R  Psychiatric - Affect WNL    RECENT LABS/IMAGING                        15.7   7.13  )-----------( 223      ( 28 Jan 2019 03:20 )             46.0     01-29    139  |  100  |  15  ----------------------------<  99  3.2<L>   |  24  |  1.13    Ca    8.9      29 Jan 2019 06:20  Phos  2.1     01-28  Mg     1.9     01-28    TPro  7.2  /  Alb  4.2  /  TBili  0.9  /  DBili  x   /  AST  20  /  ALT  18  /  AlkPhos  71  01-28    PT/INR - ( 28 Jan 2019 03:20 )   PT: 14.1 SEC;   INR: 1.26       PTT - ( 28 Jan 2019 03:20 )  PTT:30.7 SEC    < from: CT Head No Cont (01.27.19 @ 20:57) >  EXAM:  CT BRAIN        PROCEDURE DATE:  Jan 27 2019         INTERPRETATION:  History: Confusion left arm weakness.    Multiple axial sections were performed from base of skull to vertex   without contrast enhancement.    This exam is compared with prior noncontrast head CT performed on January 26, 2019.    The lateral ventricles have normal configuration.    Old lacunar infarcts involving the right basal ganglia, left caudate head   /anterior corona radiata region.    There is no evidence of acute hemorrhage in posterior fossa or   supratentorial    Evaluation of the osseous structures with the appropriate window   demonstrates hyperostosis frontalis interna. This finding is likely of no   clinical significance.    The visualized paranasal sinuses mastoid and middle ear regions appear   clear.    Impression: Old lacunar infarcts are again seen as described above.      MEDICATIONS   MEDICATIONS  (STANDING):  amLODIPine   Tablet 5 milliGRAM(s) Oral daily  aspirin  chewable 81 milliGRAM(s) Oral daily  atorvastatin 80 milliGRAM(s) Oral at bedtime  heparin  Injectable 5000 Unit(s) SubCutaneous every 8 hours  influenza   Vaccine 0.5 milliLiter(s) IntraMuscular once    MEDICATIONS  (PRN):  sodium chloride 0.65% Nasal 1 Spray(s) Both Nostrils every 6 hours PRN Nasal Congestion    ASSESSMENT/PLAN  62 y/o M with right corona radiata CVA now with left hemiparesis, functional, gait, and ADL impairments.    Disposition - Recommend acute inpatient rehabilitation once deemed medically and surgically stable as per the primary treating team(s).  The goal of acute inpatient rehabilitation would be to restore independence for household level mobility and self-care so he can return to the community at the level of using an assisted device. The pt has significant functional impairments versus baseline, which is independent in the community.  Needs acute inpatient rehabilitation for intensive therapies (PT, OT, SLP) to restore his function towards independence, while being closely monitored for his ongoing medical issues, which can quickly destabilize if not closely monitored and managed.  Expected to tolerate and benefit from 3 hrs/day, >=5 days/wk of multidisciplinary therapies.  Insomnia - encourage good sleep hygiene, consider melatonin 3mg qhs prn   PT - ROM, Bed Mob, Transfers, Amb with AD   OT - ADLs, ROM  SLP - Dysphagia eval and treat  Precautions - Falls, Cardiac, Aspiration  DVT Prophylaxis - Heparin SQ  Diet - Dysphagia 3 Soft/Thin  Thank you for allowing us to participate in this patient's care

## 2019-01-30 PROBLEM — I10 ESSENTIAL (PRIMARY) HYPERTENSION: Chronic | Status: ACTIVE | Noted: 2019-01-27

## 2019-01-30 LAB
ANION GAP SERPL CALC-SCNC: 15 MMO/L — HIGH (ref 7–14)
BUN SERPL-MCNC: 16 MG/DL — SIGNIFICANT CHANGE UP (ref 7–23)
CALCIUM SERPL-MCNC: 9 MG/DL — SIGNIFICANT CHANGE UP (ref 8.4–10.5)
CHLORIDE SERPL-SCNC: 99 MMOL/L — SIGNIFICANT CHANGE UP (ref 98–107)
CO2 SERPL-SCNC: 25 MMOL/L — SIGNIFICANT CHANGE UP (ref 22–31)
CREAT SERPL-MCNC: 1.17 MG/DL — SIGNIFICANT CHANGE UP (ref 0.5–1.3)
GLUCOSE SERPL-MCNC: 90 MG/DL — SIGNIFICANT CHANGE UP (ref 70–99)
HCT VFR BLD CALC: 47 % — SIGNIFICANT CHANGE UP (ref 39–50)
HGB BLD-MCNC: 15.5 G/DL — SIGNIFICANT CHANGE UP (ref 13–17)
INR BLD: 1.14 — SIGNIFICANT CHANGE UP (ref 0.88–1.17)
MCHC RBC-ENTMCNC: 28.8 PG — SIGNIFICANT CHANGE UP (ref 27–34)
MCHC RBC-ENTMCNC: 33 % — SIGNIFICANT CHANGE UP (ref 32–36)
MCV RBC AUTO: 87.2 FL — SIGNIFICANT CHANGE UP (ref 80–100)
NRBC # FLD: 0 K/UL — LOW (ref 25–125)
PLATELET # BLD AUTO: 230 K/UL — SIGNIFICANT CHANGE UP (ref 150–400)
PMV BLD: 9.5 FL — SIGNIFICANT CHANGE UP (ref 7–13)
POTASSIUM SERPL-MCNC: 3.1 MMOL/L — LOW (ref 3.5–5.3)
POTASSIUM SERPL-SCNC: 3.1 MMOL/L — LOW (ref 3.5–5.3)
PROTHROM AB SERPL-ACNC: 12.7 SEC — SIGNIFICANT CHANGE UP (ref 9.8–13.1)
RBC # BLD: 5.39 M/UL — SIGNIFICANT CHANGE UP (ref 4.2–5.8)
RBC # FLD: 13.2 % — SIGNIFICANT CHANGE UP (ref 10.3–14.5)
SODIUM SERPL-SCNC: 139 MMOL/L — SIGNIFICANT CHANGE UP (ref 135–145)
WBC # BLD: 7.8 K/UL — SIGNIFICANT CHANGE UP (ref 3.8–10.5)
WBC # FLD AUTO: 7.8 K/UL — SIGNIFICANT CHANGE UP (ref 3.8–10.5)

## 2019-01-30 PROCEDURE — 99233 SBSQ HOSP IP/OBS HIGH 50: CPT

## 2019-01-30 RX ORDER — POTASSIUM CHLORIDE 20 MEQ
40 PACKET (EA) ORAL EVERY 4 HOURS
Qty: 0 | Refills: 0 | Status: COMPLETED | OUTPATIENT
Start: 2019-01-30 | End: 2019-01-30

## 2019-01-30 RX ORDER — FLUTICASONE PROPIONATE 50 MCG
1 SPRAY, SUSPENSION NASAL
Qty: 0 | Refills: 0 | Status: DISCONTINUED | OUTPATIENT
Start: 2019-01-30 | End: 2019-02-01

## 2019-01-30 RX ORDER — ACETAMINOPHEN 500 MG
975 TABLET ORAL ONCE
Qty: 0 | Refills: 0 | Status: COMPLETED | OUTPATIENT
Start: 2019-01-30 | End: 2019-01-30

## 2019-01-30 RX ADMIN — Medication 40 MILLIEQUIVALENT(S): at 16:32

## 2019-01-30 RX ADMIN — HEPARIN SODIUM 5000 UNIT(S): 5000 INJECTION INTRAVENOUS; SUBCUTANEOUS at 16:32

## 2019-01-30 RX ADMIN — Medication 1 SPRAY(S): at 23:46

## 2019-01-30 RX ADMIN — HEPARIN SODIUM 5000 UNIT(S): 5000 INJECTION INTRAVENOUS; SUBCUTANEOUS at 06:04

## 2019-01-30 RX ADMIN — Medication 975 MILLIGRAM(S): at 17:00

## 2019-01-30 RX ADMIN — ATORVASTATIN CALCIUM 80 MILLIGRAM(S): 80 TABLET, FILM COATED ORAL at 22:20

## 2019-01-30 RX ADMIN — HEPARIN SODIUM 5000 UNIT(S): 5000 INJECTION INTRAVENOUS; SUBCUTANEOUS at 22:20

## 2019-01-30 RX ADMIN — Medication 40 MILLIEQUIVALENT(S): at 11:51

## 2019-01-30 RX ADMIN — Medication 975 MILLIGRAM(S): at 16:32

## 2019-01-30 RX ADMIN — AMLODIPINE BESYLATE 5 MILLIGRAM(S): 2.5 TABLET ORAL at 06:04

## 2019-01-30 RX ADMIN — Medication 81 MILLIGRAM(S): at 11:51

## 2019-01-30 NOTE — PROGRESS NOTE ADULT - PROBLEM SELECTOR PLAN 4
SBP in 140-160s  On Norvasc 5 mg  Continue permissive HTN for now, with gradual normotension over several days starting 1/29/19 SBP in 170s  On Norvasc 5 mg  Continue permissive HTN for now, with gradual normotension over several days starting 1/29/19

## 2019-01-30 NOTE — PROGRESS NOTE ADULT - ASSESSMENT
Left hemiparesis w/ dysarthria likely 2/2 right corona radiata infarct seen on repeat cth, 2/2 small vessel disease; Vertebrobasilar high grade stenosis (intracranial large artery stenosis) is asymptomatic, and unrelated to his acute stroke.     Gradual, mild improvement noted:  Left Elbow Flexion Biceps 2/5, 1/5  (very mild improvement, still trace), 1/5 deltoid (trace effort against gravity, still falls to bed. Improving).  Mild facial droop and dysarthria improving.     Recommendations:   MRI brain without contrast (Please expedite)   Gradual normotension over several days starting 1/29/19  Lipitor 80 mg PO QHS  Continue with aggressive vascular risk factors modification   Tele monitoring  PT/OT/S&S/PMnR Tx and Dispo. Left hemiparesis w/ dysarthria likely 2/2 right corona radiata infarct seen on repeat cth, 2/2 small vessel disease; Vertebrobasilar high grade stenosis (intracranial large artery stenosis) is asymptomatic, and unrelated to his acute stroke.     Gradual, mild improvement noted:    Recommendations:   MRI brain without contrast (Please expedite)   Gradual normotension over several days starting 1/29/19  Lipitor 80 mg PO QHS  Continue with aggressive vascular risk factors modification   Tele monitoring  PT/OT/S&S/PMnR Tx and Dispo.

## 2019-01-30 NOTE — PROGRESS NOTE ADULT - PROBLEM SELECTOR PLAN 1
Concern for acute CVA presenting with Left sided hemiparesis, dysarthria  s/p tPA on 1/26  CT head on 1/27-Old lacunar infarcts involving the right basal ganglia, left caudate head /anterior corona radiata region.  Appreciate Neuro -  likely 2/2 right corona radiata infarct seen on repeat cth, 2/2 small vessel disease; Vertebrobasilar high grade stenosis (intracranial large artery stenosis) is asymptomatic, and unrelated to his acute stroke.   Continue permissive HTN for now, with gradual normotension over several days starting 1/29/19  On ASA/Lipitor 80 mg PO QHS  Fu MRI MRI brain without contrast   TTE with bubble- grossly normal LV  systolic function Concern for acute CVA presenting with Left sided hemiparesis, dysarthria  s/p tPA on 1/26  CT head on 1/27-Old lacunar infarcts involving the right basal ganglia, left caudate head /anterior corona radiata region.  Appreciate Neuro -  likely 2/2 right corona radiata infarct seen on repeat cth, 2/2 small vessel disease; Vertebrobasilar high grade stenosis (intracranial large artery stenosis) is asymptomatic, and unrelated to his acute stroke.   Continue permissive HTN for now, with gradual normotension over several days starting 1/29/19  On ASA/Lipitor 80 mg PO QHS  Fu MRI MRI brain without contrast  Called MRI - pt on the list for late evening today    TTE with bubble- grossly normal LV  systolic function

## 2019-01-30 NOTE — PROGRESS NOTE ADULT - SUBJECTIVE AND OBJECTIVE BOX
Neurology  Progress Note  01-28-19    Name:  LION AUSTIN; 63y    Interval History:  Patient reports improvement of left leg. Left arm stable.     HPI:  The patient is a 64 yo M with a PMH of HTN (not on meds), CAYDEN (on CPAP, compliant), obesity, and HLD (not on meds) who presents to hospital with acute onset of slurred speech and LUE paresis. Per chart reviwe: "Patient states he woke up on 1/26 at 5pm at his baseline and began to change his clothes utilizing both hands and was speaking normally at the time. After about 10 minutes of changing, patient fell over while tying his shoelaces. When patient got up, he tried to reach for a tissue with his left arm and was unable to lift it. Patient states that his left arm felt weak for a "few minutes" before returning back to normal however, he noticed that he was slurring his words."  Upon arrival, code stroke was called. The pts symptoms rapidly improved without intervention, however at approx 1945-800 the pts neuro status changed acutely and he developed acute LUE and LLE paresis as well as slurred speech and L sided facial droop. The patient was re-evaluated by the neuro team and per chart review: "19:46pm: Interval examination demonstrates +Moderate dysarthria. LUE 1/5 strength. LLE 3/5 strength. New NIHSS 8. tPA discussed with patient and family as patient within extended window for tPA and patient amenable. Aggressive blood pressure control initiated. tPA given at 8:54pm. BP at time of tPA administration 177/95."     At approximately 2100. The patient was fully alert and oriented. His wife and two sons were at the bedside. He had visible left sided facial droop and was slurring his speech. In addition, he was unable to move his LUE, and his LLE was 1/5-2/5. The patient expressed detailed understanding of what was going on, and he confirmed that he was full code in the event he required chest compressions and/or intubation. The patient was started on a nicardipine drip and his BP was in the 160s. (26 Jan 2019 22:14)    REVIEW OF SYSTEMS:  As states in HPI.      MEDICATIONS  (STANDING):  amLODIPine   Tablet 5 milliGRAM(s) Oral daily  aspirin  chewable 81 milliGRAM(s) Oral daily  atorvastatin 80 milliGRAM(s) Oral at bedtime  heparin  Injectable 5000 Unit(s) SubCutaneous every 8 hours  influenza   Vaccine 0.5 milliLiter(s) IntraMuscular once  potassium chloride   Powder 40 milliEquivalent(s) Oral every 4 hours    MEDICATIONS  (PRN):  sodium chloride 0.65% Nasal 1 Spray(s) Both Nostrils every 6 hours PRN Nasal Congestion    Vital Signs Last 24 Hrs  T(C): 36.8 (30 Jan 2019 06:01), Max: 37.1 (29 Jan 2019 15:04)  T(F): 98.2 (30 Jan 2019 06:01), Max: 98.7 (29 Jan 2019 15:04)  HR: 77 (30 Jan 2019 10:49) (67 - 77)  BP: 174/85 (30 Jan 2019 06:01) (170/110 - 189/92)  BP(mean): --  RR: 19 (30 Jan 2019 06:01) (18 - 19)  SpO2: 95% (30 Jan 2019 10:49) (95% - 100%)    LABS:                        15.5   7.80  )-----------( 230      ( 30 Jan 2019 05:20 )             47.0     30 Jan 2019 05:20    139    |  99     |  16     ----------------------------<  90     3.1     |  25     |  1.17     Ca    9.0        30 Jan 2019 05:20      PT/INR - ( 30 Jan 2019 05:20 )   PT: 12.7 SEC;   INR: 1.14              Neurological Exam:  Mental Status: Awake, talking, aware of deficits. Orientated to self, date and place.  Attention intact.  +Mild dysarthria. Speech fluent.  Cranial Nerves:   PERRL, EOMI, VFF, no nystagmus.    CN V1-3 intact to light touch . Very mild Left Facial Droop.  Tongue, uvula and palate midline.      Motor:   Tone: LUE flaccid                 Strength:     [] Upper extremity                                                                     R         5/5                                               L         Elbow Flexion Biceps 2/5, 1/5  (very mild improvement, still trace), 1/5 deltoid (trace effort against gravity, still falls to bed. Improving).  [] Lower extremity                                                                     R        5/5                                                 L        Iliopsoas 4/5, Anterior tibialis 4+/5.      Dysmetria: None to FNF on the Right  Tremor: No resting, postural or action tremor.  No myoclonus.  Sensation: intact to light touch,  Gait: Deferred      Radiology:  < from: CT Head No Cont (01.27.19 @ 20:57) >  Impression: Old lacunar infarcts are again seen as described above.    VIVIAN HOWARD M.D., ATTENDING RADIOLOGIST  This document has been electronically signed. Jan 28 2019  8:17AM    < end of copied text >    < from: TTE with Doppler (w/Cont) (01.27.19 @ 11:45) >  CONCLUSIONS:  1. Endocardium not well visualized; grossly normal left  ventricular systolic function.   Endocardial visualization  enhanced with intravenous injection of echo contrast  (Definity).  2. The right ventricle is not visualized.  ------------------------------------------------------------------------  Confirmed on  1/27/2019- 14:47:23 by Randy Eden M.D.  ------------------------------------------------------------------------    < end of copied text > Neurology  Progress Note  01-28-19    Name:  LION AUSTIN; 63y    Interval History:  Patient reports improvement of left leg. Left arm stable.     HPI:  The patient is a 64 yo M with a PMH of HTN (not on meds), CAYDEN (on CPAP, compliant), obesity, and HLD (not on meds) who presents to hospital with acute onset of slurred speech and LUE paresis. Per chart reviwe: "Patient states he woke up on 1/26 at 5pm at his baseline and began to change his clothes utilizing both hands and was speaking normally at the time. After about 10 minutes of changing, patient fell over while tying his shoelaces. When patient got up, he tried to reach for a tissue with his left arm and was unable to lift it. Patient states that his left arm felt weak for a "few minutes" before returning back to normal however, he noticed that he was slurring his words."  Upon arrival, code stroke was called. The pts symptoms rapidly improved without intervention, however at approx 1945-800 the pts neuro status changed acutely and he developed acute LUE and LLE paresis as well as slurred speech and L sided facial droop. The patient was re-evaluated by the neuro team and per chart review: "19:46pm: Interval examination demonstrates +Moderate dysarthria. LUE 1/5 strength. LLE 3/5 strength. New NIHSS 8. tPA discussed with patient and family as patient within extended window for tPA and patient amenable. Aggressive blood pressure control initiated. tPA given at 8:54pm. BP at time of tPA administration 177/95."     At approximately 2100. The patient was fully alert and oriented. His wife and two sons were at the bedside. He had visible left sided facial droop and was slurring his speech. In addition, he was unable to move his LUE, and his LLE was 1/5-2/5. The patient expressed detailed understanding of what was going on, and he confirmed that he was full code in the event he required chest compressions and/or intubation. The patient was started on a nicardipine drip and his BP was in the 160s. (26 Jan 2019 22:14)    REVIEW OF SYSTEMS:  As states in HPI.      MEDICATIONS  (STANDING):  amLODIPine   Tablet 5 milliGRAM(s) Oral daily  aspirin  chewable 81 milliGRAM(s) Oral daily  atorvastatin 80 milliGRAM(s) Oral at bedtime  heparin  Injectable 5000 Unit(s) SubCutaneous every 8 hours  influenza   Vaccine 0.5 milliLiter(s) IntraMuscular once  potassium chloride   Powder 40 milliEquivalent(s) Oral every 4 hours    MEDICATIONS  (PRN):  sodium chloride 0.65% Nasal 1 Spray(s) Both Nostrils every 6 hours PRN Nasal Congestion    Vital Signs Last 24 Hrs  T(C): 36.8 (30 Jan 2019 06:01), Max: 37.1 (29 Jan 2019 15:04)  T(F): 98.2 (30 Jan 2019 06:01), Max: 98.7 (29 Jan 2019 15:04)  HR: 77 (30 Jan 2019 10:49) (67 - 77)  BP: 174/85 (30 Jan 2019 06:01) (170/110 - 189/92)  BP(mean): --  RR: 19 (30 Jan 2019 06:01) (18 - 19)  SpO2: 95% (30 Jan 2019 10:49) (95% - 100%)    LABS:                        15.5   7.80  )-----------( 230      ( 30 Jan 2019 05:20 )             47.0     30 Jan 2019 05:20    139    |  99     |  16     ----------------------------<  90     3.1     |  25     |  1.17     Ca    9.0        30 Jan 2019 05:20      PT/INR - ( 30 Jan 2019 05:20 )   PT: 12.7 SEC;   INR: 1.14              Neurological Exam:  Mental Status: Awake, talking, aware of deficits. Orientated to self, date and place.  Attention intact.  +Mild dysarthria. Speech fluent.  Cranial Nerves:   PERRL, EOMI, VFF, no nystagmus.    CN V1-3 intact to light touch . Very mild Left Facial Droop.  Tongue, uvula and palate midline.      Motor:   Tone: LUE flaccid                 Strength:     [] Upper extremity                                                                     R         5/5                                               L         Elbow Flexion Biceps 2/5, 1/5 ;   deltoid trace effort against gravity, still falls to bed..  [] Lower extremity                                                                     R        5/5                                                 L        Iliopsoas 4/5, Anterior tibialis 5/5.      Dysmetria: None to FNF on the Right  Tremor: No resting, postural or action tremor.  No myoclonus.  Sensation: intact to light touch,  Gait: Deferred      Radiology:  < from: CT Head No Cont (01.27.19 @ 20:57) >  Impression: Old lacunar infarcts are again seen as described above.    VIVIAN HOWARD M.D., ATTENDING RADIOLOGIST  This document has been electronically signed. Jan 28 2019  8:17AM    < end of copied text >    < from: TTE with Doppler (w/Cont) (01.27.19 @ 11:45) >  CONCLUSIONS:  1. Endocardium not well visualized; grossly normal left  ventricular systolic function.   Endocardial visualization  enhanced with intravenous injection of echo contrast  (Definity).  2. The right ventricle is not visualized.  ------------------------------------------------------------------------  Confirmed on  1/27/2019- 14:47:23 by Randy Eden M.D.  ------------------------------------------------------------------------    < end of copied text >

## 2019-01-30 NOTE — PROGRESS NOTE ADULT - PROBLEM SELECTOR PLAN 6
HSQ  Dispo - FU MRI  FU Neuro   FU PMR   Discussed with pt and sister at bedside HSQ  Dispo - FU MRI  FU Neuro   FU PMR   Email sent to [facility] for appointment   Discussed with pt and sister at bedside

## 2019-01-30 NOTE — PROGRESS NOTE ADULT - SUBJECTIVE AND OBJECTIVE BOX
CC- CVA    SUBJECTIVE: Patient seen and examined at bedside.       Vital Signs Last 24 Hrs  T(C): 36.8 (30 Jan 2019 06:01), Max: 37.1 (29 Jan 2019 15:04)  T(F): 98.2 (30 Jan 2019 06:01), Max: 98.7 (29 Jan 2019 15:04)  HR: 76 (30 Jan 2019 06:01) (67 - 76)  BP: 174/85 (30 Jan 2019 06:01) (170/110 - 189/92)  BP(mean): --  RR: 19 (30 Jan 2019 06:01) (18 - 19)  SpO2: 100% (30 Jan 2019 06:01) (97% - 100%)    PHYSICAL EXAM:    General: NAD, obese  HEENT: PERRL, anicteric sclera  Respiratory: CTA; no wheezes, rales, or crackles  Cardiovascular: distant heart sounds  Abdomen: soft, non-tender, BS (+)  Extremities: no edema, cyanosis, or clubbing  Skin: skin debris on toes likely from poor hygene  Neurological: AAOx3; Mild left facial droop. Strength  1/5 LUE, 3/5 LLE; sensation grossly intact    MEDICATIONS  (STANDING):  amLODIPine   Tablet 5 milliGRAM(s) Oral daily  aspirin  chewable 81 milliGRAM(s) Oral daily  atorvastatin 80 milliGRAM(s) Oral at bedtime  heparin  Injectable 5000 Unit(s) SubCutaneous every 8 hours  influenza   Vaccine 0.5 milliLiter(s) IntraMuscular once    MEDICATIONS  (PRN):  sodium chloride 0.65% Nasal 1 Spray(s) Both Nostrils every 6 hours PRN Nasal Congestion    LABS:                                                                 15.5   7.80  )-----------( 230      ( 30 Jan 2019 05:20 )             47.0     01-30    139  |  99  |  16  ----------------------------<  90  3.1<L>   |  25  |  1.17    Ca    9.0      30 Jan 2019 05:20          Discussed with provider- Neuro CC- CVA    SUBJECTIVE: Patient seen and examined at bedside. Sister at bedside   No new complaints  Sister upset as MRI not done yet and pt has note been moved OOB     Vital Signs Last 24 Hrs  T(C): 36.8 (30 Jan 2019 06:01), Max: 37.1 (29 Jan 2019 15:04)  T(F): 98.2 (30 Jan 2019 06:01), Max: 98.7 (29 Jan 2019 15:04)  HR: 76 (30 Jan 2019 06:01) (67 - 76)  BP: 174/85 (30 Jan 2019 06:01) (170/110 - 189/92)  BP(mean): --  RR: 19 (30 Jan 2019 06:01) (18 - 19)  SpO2: 100% (30 Jan 2019 06:01) (97% - 100%)    PHYSICAL EXAM:    General: NAD, obese  HEENT: PERRL, anicteric sclera  Respiratory: CTA; no wheezes, rales, or crackles  Cardiovascular: distant heart sounds  Abdomen: soft, non-tender, BS (+)  Extremities: no edema, cyanosis, or clubbing  Skin: skin debris on toes likely from poor hygene  Neurological: AAOx3; Mild left facial droop. Strength  1/5 LUE, 3/5 LLE; sensation grossly intact    MEDICATIONS  (STANDING):  amLODIPine   Tablet 5 milliGRAM(s) Oral daily  aspirin  chewable 81 milliGRAM(s) Oral daily  atorvastatin 80 milliGRAM(s) Oral at bedtime  heparin  Injectable 5000 Unit(s) SubCutaneous every 8 hours  influenza   Vaccine 0.5 milliLiter(s) IntraMuscular once    MEDICATIONS  (PRN):  sodium chloride 0.65% Nasal 1 Spray(s) Both Nostrils every 6 hours PRN Nasal Congestion    LABS:                                                                 15.5   7.80  )-----------( 230      ( 30 Jan 2019 05:20 )             47.0     01-30    139  |  99  |  16  ----------------------------<  90  3.1<L>   |  25  |  1.17    Ca    9.0      30 Jan 2019 05:20          Discussed with provider- Neuro

## 2019-01-31 DIAGNOSIS — R13.12 DYSPHAGIA, OROPHARYNGEAL PHASE: ICD-10-CM

## 2019-01-31 LAB
ANION GAP SERPL CALC-SCNC: 14 MMO/L — SIGNIFICANT CHANGE UP (ref 7–14)
BASOPHILS # BLD AUTO: 0.04 K/UL — SIGNIFICANT CHANGE UP (ref 0–0.2)
BASOPHILS NFR BLD AUTO: 0.6 % — SIGNIFICANT CHANGE UP (ref 0–2)
BUN SERPL-MCNC: 18 MG/DL — SIGNIFICANT CHANGE UP (ref 7–23)
CALCIUM SERPL-MCNC: 9.3 MG/DL — SIGNIFICANT CHANGE UP (ref 8.4–10.5)
CHLORIDE SERPL-SCNC: 100 MMOL/L — SIGNIFICANT CHANGE UP (ref 98–107)
CO2 SERPL-SCNC: 23 MMOL/L — SIGNIFICANT CHANGE UP (ref 22–31)
CREAT SERPL-MCNC: 1.14 MG/DL — SIGNIFICANT CHANGE UP (ref 0.5–1.3)
EOSINOPHIL # BLD AUTO: 0.2 K/UL — SIGNIFICANT CHANGE UP (ref 0–0.5)
EOSINOPHIL NFR BLD AUTO: 3 % — SIGNIFICANT CHANGE UP (ref 0–6)
GLUCOSE SERPL-MCNC: 102 MG/DL — HIGH (ref 70–99)
HCT VFR BLD CALC: 48.5 % — SIGNIFICANT CHANGE UP (ref 39–50)
HGB BLD-MCNC: 16.4 G/DL — SIGNIFICANT CHANGE UP (ref 13–17)
IMM GRANULOCYTES NFR BLD AUTO: 0.5 % — SIGNIFICANT CHANGE UP (ref 0–1.5)
LYMPHOCYTES # BLD AUTO: 1.99 K/UL — SIGNIFICANT CHANGE UP (ref 1–3.3)
LYMPHOCYTES # BLD AUTO: 29.9 % — SIGNIFICANT CHANGE UP (ref 13–44)
MAGNESIUM SERPL-MCNC: 1.9 MG/DL — SIGNIFICANT CHANGE UP (ref 1.6–2.6)
MCHC RBC-ENTMCNC: 29.5 PG — SIGNIFICANT CHANGE UP (ref 27–34)
MCHC RBC-ENTMCNC: 33.8 % — SIGNIFICANT CHANGE UP (ref 32–36)
MCV RBC AUTO: 87.2 FL — SIGNIFICANT CHANGE UP (ref 80–100)
MONOCYTES # BLD AUTO: 0.35 K/UL — SIGNIFICANT CHANGE UP (ref 0–0.9)
MONOCYTES NFR BLD AUTO: 5.3 % — SIGNIFICANT CHANGE UP (ref 2–14)
NEUTROPHILS # BLD AUTO: 4.04 K/UL — SIGNIFICANT CHANGE UP (ref 1.8–7.4)
NEUTROPHILS NFR BLD AUTO: 60.7 % — SIGNIFICANT CHANGE UP (ref 43–77)
NRBC # FLD: 0 K/UL — LOW (ref 25–125)
PLATELET # BLD AUTO: 233 K/UL — SIGNIFICANT CHANGE UP (ref 150–400)
PMV BLD: 9.3 FL — SIGNIFICANT CHANGE UP (ref 7–13)
POTASSIUM SERPL-MCNC: 3.7 MMOL/L — SIGNIFICANT CHANGE UP (ref 3.5–5.3)
POTASSIUM SERPL-SCNC: 3.7 MMOL/L — SIGNIFICANT CHANGE UP (ref 3.5–5.3)
RBC # BLD: 5.56 M/UL — SIGNIFICANT CHANGE UP (ref 4.2–5.8)
RBC # FLD: 13.2 % — SIGNIFICANT CHANGE UP (ref 10.3–14.5)
SODIUM SERPL-SCNC: 137 MMOL/L — SIGNIFICANT CHANGE UP (ref 135–145)
WBC # BLD: 6.65 K/UL — SIGNIFICANT CHANGE UP (ref 3.8–10.5)
WBC # FLD AUTO: 6.65 K/UL — SIGNIFICANT CHANGE UP (ref 3.8–10.5)

## 2019-01-31 PROCEDURE — 99232 SBSQ HOSP IP/OBS MODERATE 35: CPT

## 2019-01-31 PROCEDURE — 70547 MR ANGIOGRAPHY NECK W/O DYE: CPT | Mod: 26

## 2019-01-31 PROCEDURE — 99232 SBSQ HOSP IP/OBS MODERATE 35: CPT | Mod: GC

## 2019-01-31 PROCEDURE — 70551 MRI BRAIN STEM W/O DYE: CPT | Mod: 26

## 2019-01-31 RX ADMIN — ATORVASTATIN CALCIUM 80 MILLIGRAM(S): 80 TABLET, FILM COATED ORAL at 22:19

## 2019-01-31 RX ADMIN — HEPARIN SODIUM 5000 UNIT(S): 5000 INJECTION INTRAVENOUS; SUBCUTANEOUS at 22:19

## 2019-01-31 RX ADMIN — HEPARIN SODIUM 5000 UNIT(S): 5000 INJECTION INTRAVENOUS; SUBCUTANEOUS at 06:42

## 2019-01-31 RX ADMIN — AMLODIPINE BESYLATE 5 MILLIGRAM(S): 2.5 TABLET ORAL at 06:42

## 2019-01-31 RX ADMIN — Medication 81 MILLIGRAM(S): at 17:14

## 2019-01-31 NOTE — PROGRESS NOTE ADULT - PROBLEM SELECTOR PLAN 1
Concern for acute CVA presenting with Left sided hemiparesis, dysarthria  s/p tPA on 1/26  CT head on 1/27-Old lacunar infarcts involving the right basal ganglia, left caudate head /anterior corona radiata region.  Appreciate Neuro -  likely 2/2 right corona radiata infarct seen on repeat cth, 2/2 small vessel disease; Vertebrobasilar high grade stenosis (intracranial large artery stenosis) is asymptomatic, and unrelated to his acute stroke.   Continue permissive HTN for now, with gradual normotension over several days starting 1/29/19  On ASA/Lipitor 80 mg PO QHS  Fu MRI MRI brain without contrast  Team dw MRI tech - Plan for MRI this AM    TTE with bubble- grossly normal LV  systolic function

## 2019-01-31 NOTE — PROGRESS NOTE ADULT - PROBLEM SELECTOR PLAN 7
HSQ  Dispo - FU MRI  FU Neuro  re- AF trial   FU PMR   Email sent to [facility] for appointment - Appointment made   Discussed with pt and sister at bedside HSQ  Dispo - FU MRI  FU Neuro  re- AF trial   PMR - Acute rehab   Email sent to [facility] for appointment - Appointment made   Discussed with pt and sister at bedside HSQ  Dispo - FU MRI  FU Neuro  re- AF trial   PMR - Acute rehab   Email sent to [facility] for appointment - Appointment made for  2/12 at 1pm  Discussed with pt and sister at bedside

## 2019-01-31 NOTE — PROGRESS NOTE ADULT - SUBJECTIVE AND OBJECTIVE BOX
CC: PM&R was consulted to evaluate rehab needs  HPI:  62 yo M with a PMH of HTN (not on meds), CAYDEN (on CPAP, compliant), obesity, and HLD (not on meds) who presents to hospital with acute onset of slurred speech and LUE paresis. Per chart reviwe: "Patient states he woke up on 1/26 at 5pm at his baseline and began to change his clothes utilizing both hands and was speaking normally at the time. After about 10 minutes of changing, patient fell over while tying his shoelaces. When patient got up, he tried to reach for a tissue with his left arm and was unable to lift it. Patient states that his left arm felt weak for a "few minutes" before returning back to normal however, he noticed that he was slurring his words."  Upon arrival, code stroke was called. The pts symptoms rapidly improved without intervention, however at approx 1945-800 the pts neuro status changed acutely and he developed acute LUE and LLE paresis as well as slurred speech and L sided facial droop. The patient was re-evaluated by the neuro team and per chart review: "19:46pm: Interval examination demonstrates +Moderate dysarthria. LUE 1/5 strength. LLE 3/5 strength. New NIHSS 8. tPA discussed with patient and family as patient within extended window for tPA and patient amenable. Aggressive blood pressure control initiated. tPA given at 8:54pm. BP at time of tPA administration 177/95." Patient transferred to MICU and started on Nicardipine gtt and weaned off evening of 1/27. Tolerated tPA without complication. Interval CTH scans were stable. Passed dysphagia screen.  (28 Jan 2019) PM&R was consulted to evaluate rehab needs.    Interval:  TTE with bubble- grossly normal LV  systolic function.   Brain MRI: Acute right basal ganglia infarct. Chronic bilateral lacunar   infarcts.     Brain/Neck MRA: Severe stenoses of the distal bilateral vertebral arteries   and proximal basilar artery with poststenotic dilatation.         REVIEW OF SYSTEMS  Constitutional - No fever, + fatigue (got around 2 hours of sleep last night 2/2 noise)  HEENT - No visual disturbances, No difficulty hearing,  No neck pain  Respiratory - No cough, No wheezing, No shortness of breath  Cardiovascular - No chest pain, No palpitations  Neurological - No headaches, + left sided loss of strength, No numbness  Musculoskeletal - No joint pain, No joint swelling, No muscle pain  Psychiatric - No depression, No anxiety  All other review of systems negative    CURRENT FUNCTIONAL STATUS  max a     ICU Vital Signs Last 24 Hrs  T(C): 36.3 (31 Jan 2019 08:00), Max: 36.7 (30 Jan 2019 18:41)  T(F): 97.3 (31 Jan 2019 08:00), Max: 98 (30 Jan 2019 18:41)  HR: 68 (31 Jan 2019 16:37) (64 - 77)  BP: 158/112 (31 Jan 2019 08:00) (140/90 - 168/110)  BP(mean): --  ABP: --  ABP(mean): --  RR: 18 (31 Jan 2019 08:00) (17 - 18)  SpO2: 97% (31 Jan 2019 16:37) (96% - 100%)      PHYSICAL EXAM  Constitutional - NAD, Comfortable  HEENT - NCAT  Chest - good chest expansion, good resp effort  Cardio - warm and well perfused  Extremities - No C/C/E, No calf tenderness   Neurologic Exam -                    Cognitive - Awake, Alert, AAO to self, place, date, year, situation     Communication - Fluent, mild dysarthria, comprehension intact     Cranial Nerves - CN V1-3 intact to LT, mild left facial droop, tongue midline, shoulder shrug intact b/l     Motor -                     LEFT    UE - Shoulder 1/5, EF 0/5, EE 0/5,  trace                    RIGHT UE - ShAB 5/5, EF 5/5, EE 5/5, WE 5/5,  5/5                    LEFT    LE - HF 3/5, KE 4/5, DF 5/5, PF 5/5                    RIGHT LE - HF 5/5, KE 5/5, DF 5/5, PF 5/5       Psychiatric - Affect WNL                            16.4   6.65  )-----------( 233      ( 31 Jan 2019 07:24 )             48.5       ASSESSMENT/PLAN  62 y/o M with  CVA now with left hemiparesis, functional, gait, and ADL impairments.    Disposition - Recommend acute inpatient rehabilitation once deemed medically and surgically stable as per the primary treating team(s).  The goal of acute inpatient rehabilitation would be to restore independence for household level mobility and self-care so he can return to the community at the level of using an assisted device. The pt has significant functional impairments versus baseline, which is independent in the community.  Needs acute inpatient rehabilitation for intensive therapies (PT, OT, SLP) to restore his function towards independence, while being closely monitored for his ongoing medical issues, which can quickly destabilize if not closely monitored and managed.  Expected to tolerate and benefit from 3 hrs/day, >=5 days/wk of multidisciplinary therapies.  Insomnia - encourage good sleep hygiene, consider melatonin 3mg qhs prn   PT - ROM, Bed Mob, Transfers, Amb with AD   OT - ADLs, ROM  SLP - Dysphagia eval and treat  Precautions - Falls, Cardiac, Aspiration  DVT Prophylaxis - Heparin SQ  Diet - Dysphagia 3 Soft/Thin  Thank you for allowing us to participate in this patient's care

## 2019-01-31 NOTE — PROGRESS NOTE ADULT - SUBJECTIVE AND OBJECTIVE BOX
CC- CVA    SUBJECTIVE: Patient seen and examined at bedside.       Vital Signs Last 24 Hrs  T(C): 36.3 (31 Jan 2019 08:00), Max: 36.7 (30 Jan 2019 18:41)  T(F): 97.3 (31 Jan 2019 08:00), Max: 98 (30 Jan 2019 18:41)  HR: 74 (31 Jan 2019 08:00) (64 - 77)  BP: 158/112 (31 Jan 2019 08:00) (140/90 - 168/110)  BP(mean): --  RR: 18 (31 Jan 2019 08:00) (17 - 18)  SpO2: 99% (31 Jan 2019 08:00) (95% - 100%)PHYSICAL EXAM:    General: NAD, obese  HEENT: PERRL, anicteric sclera  Respiratory: CTA; no wheezes, rales, or crackles  Cardiovascular: distant heart sounds  Abdomen: soft, non-tender, BS (+)  Extremities: no edema, cyanosis, or clubbing  Skin: skin debris on toes likely from poor hygene  Neurological: AAOx3; Mild left facial droop. Strength  1/5 LUE, 3/5 LLE; sensation grossly intact    MEDICATIONS  (STANDING):  amLODIPine   Tablet 5 milliGRAM(s) Oral daily  aspirin  chewable 81 milliGRAM(s) Oral daily  atorvastatin 80 milliGRAM(s) Oral at bedtime  heparin  Injectable 5000 Unit(s) SubCutaneous every 8 hours  influenza   Vaccine 0.5 milliLiter(s) IntraMuscular once    MEDICATIONS  (PRN):  fluticasone propionate 50 MICROgram(s)/spray Nasal Spray 1 Spray(s) Both Nostrils two times a day PRN nasal congestion      LABS:                                                             16.4   6.65  )-----------( 233      ( 31 Jan 2019 07:24 )             48.5   01-31    137  |  100  |  18  ----------------------------<  102<H>  3.7   |  23  |  1.14    Ca    9.3      31 Jan 2019 07:24  Mg     1.9     01-31      Discussed with provider- Neuro CC- CVA    SUBJECTIVE: Patient seen and examined at bedside. Sister at bedside  Pt reports significant improvement in LLE weakness  Also able to move left 5 th finger which he was not able to do previously       Vital Signs Last 24 Hrs  T(C): 36.3 (31 Jan 2019 08:00), Max: 36.7 (30 Jan 2019 18:41)  T(F): 97.3 (31 Jan 2019 08:00), Max: 98 (30 Jan 2019 18:41)  HR: 74 (31 Jan 2019 08:00) (64 - 77)  BP: 158/112 (31 Jan 2019 08:00) (140/90 - 168/110)  BP(mean): --  RR: 18 (31 Jan 2019 08:00) (17 - 18)  SpO2: 99% (31 Jan 2019 08:00) (95% - 100%)PHYSICAL EXAM:    General: NAD, obese  HEENT: PERRL, anicteric sclera  Respiratory: CTA; no wheezes, rales, or crackles  Cardiovascular: distant heart sounds  Abdomen: soft, non-tender, BS (+)  Extremities: no edema, cyanosis, or clubbing  Skin: skin debris on toes likely from poor hygene  Neurological: AAOx3; Mild left facial droop. Strength  1/5 LUE, 3/5 LLE; 4/5 sensation grossly intact    MEDICATIONS  (STANDING):  amLODIPine   Tablet 5 milliGRAM(s) Oral daily  aspirin  chewable 81 milliGRAM(s) Oral daily  atorvastatin 80 milliGRAM(s) Oral at bedtime  heparin  Injectable 5000 Unit(s) SubCutaneous every 8 hours  influenza   Vaccine 0.5 milliLiter(s) IntraMuscular once    MEDICATIONS  (PRN):  fluticasone propionate 50 MICROgram(s)/spray Nasal Spray 1 Spray(s) Both Nostrils two times a day PRN nasal congestion      LABS:                                                             16.4   6.65  )-----------( 233      ( 31 Jan 2019 07:24 )             48.5   01-31    137  |  100  |  18  ----------------------------<  102<H>  3.7   |  23  |  1.14    Ca    9.3      31 Jan 2019 07:24  Mg     1.9     01-31      Discussed with provider- Neuro

## 2019-02-01 ENCOUNTER — OUTPATIENT (OUTPATIENT)
Dept: OUTPATIENT SERVICES | Facility: HOSPITAL | Age: 64
LOS: 1 days | End: 2019-02-01
Payer: MEDICAID

## 2019-02-01 ENCOUNTER — TRANSCRIPTION ENCOUNTER (OUTPATIENT)
Age: 64
End: 2019-02-01

## 2019-02-01 ENCOUNTER — INPATIENT (INPATIENT)
Facility: HOSPITAL | Age: 64
LOS: 23 days | Discharge: NURSING, REHAB OR LONG-TERM CARE | DRG: 949 | End: 2019-02-25
Attending: PHYSICAL MEDICINE & REHABILITATION | Admitting: PHYSICAL MEDICINE & REHABILITATION
Payer: MEDICAID

## 2019-02-01 VITALS
HEART RATE: 75 BPM | OXYGEN SATURATION: 94 % | SYSTOLIC BLOOD PRESSURE: 164 MMHG | DIASTOLIC BLOOD PRESSURE: 94 MMHG | TEMPERATURE: 98 F | RESPIRATION RATE: 18 BRPM

## 2019-02-01 VITALS
DIASTOLIC BLOOD PRESSURE: 94 MMHG | TEMPERATURE: 98 F | HEIGHT: 71 IN | HEART RATE: 74 BPM | RESPIRATION RATE: 14 BRPM | WEIGHT: 315 LBS | OXYGEN SATURATION: 96 % | SYSTOLIC BLOOD PRESSURE: 168 MMHG

## 2019-02-01 DIAGNOSIS — I63.9 CEREBRAL INFARCTION, UNSPECIFIED: ICD-10-CM

## 2019-02-01 DIAGNOSIS — Z98.890 OTHER SPECIFIED POSTPROCEDURAL STATES: Chronic | ICD-10-CM

## 2019-02-01 DIAGNOSIS — Z90.89 ACQUIRED ABSENCE OF OTHER ORGANS: Chronic | ICD-10-CM

## 2019-02-01 DIAGNOSIS — Z90.49 ACQUIRED ABSENCE OF OTHER SPECIFIED PARTS OF DIGESTIVE TRACT: Chronic | ICD-10-CM

## 2019-02-01 PROCEDURE — 99233 SBSQ HOSP IP/OBS HIGH 50: CPT

## 2019-02-01 PROCEDURE — 99239 HOSP IP/OBS DSCHRG MGMT >30: CPT

## 2019-02-01 PROCEDURE — 99232 SBSQ HOSP IP/OBS MODERATE 35: CPT | Mod: GC

## 2019-02-01 PROCEDURE — G9001: CPT

## 2019-02-01 RX ORDER — ATORVASTATIN CALCIUM 80 MG/1
1 TABLET, FILM COATED ORAL
Qty: 0 | Refills: 0 | COMMUNITY
Start: 2019-02-01

## 2019-02-01 RX ORDER — ASPIRIN/CALCIUM CARB/MAGNESIUM 324 MG
81 TABLET ORAL DAILY
Qty: 0 | Refills: 0 | Status: DISCONTINUED | OUTPATIENT
Start: 2019-02-01 | End: 2019-02-25

## 2019-02-01 RX ORDER — ASPIRIN/CALCIUM CARB/MAGNESIUM 324 MG
81 TABLET ORAL DAILY
Qty: 0 | Refills: 0 | Status: DISCONTINUED | OUTPATIENT
Start: 2019-02-01 | End: 2019-02-01

## 2019-02-01 RX ORDER — FLUOXETINE HCL 10 MG
10 CAPSULE ORAL DAILY
Qty: 0 | Refills: 0 | Status: DISCONTINUED | OUTPATIENT
Start: 2019-02-01 | End: 2019-02-25

## 2019-02-01 RX ORDER — ACETAMINOPHEN 500 MG
650 TABLET ORAL EVERY 6 HOURS
Qty: 0 | Refills: 0 | Status: DISCONTINUED | OUTPATIENT
Start: 2019-02-01 | End: 2019-02-25

## 2019-02-01 RX ORDER — ACETAMINOPHEN 500 MG
650 TABLET ORAL ONCE
Qty: 0 | Refills: 0 | Status: COMPLETED | OUTPATIENT
Start: 2019-02-01 | End: 2019-02-01

## 2019-02-01 RX ORDER — AMLODIPINE BESYLATE 2.5 MG/1
5 TABLET ORAL DAILY
Qty: 0 | Refills: 0 | Status: DISCONTINUED | OUTPATIENT
Start: 2019-02-01 | End: 2019-02-03

## 2019-02-01 RX ORDER — HEPARIN SODIUM 5000 [USP'U]/ML
5000 INJECTION INTRAVENOUS; SUBCUTANEOUS EVERY 8 HOURS
Qty: 0 | Refills: 0 | Status: DISCONTINUED | OUTPATIENT
Start: 2019-02-01 | End: 2019-02-25

## 2019-02-01 RX ORDER — AMLODIPINE BESYLATE 2.5 MG/1
1 TABLET ORAL
Qty: 0 | Refills: 0 | COMMUNITY
Start: 2019-02-01

## 2019-02-01 RX ORDER — FLUTICASONE PROPIONATE 50 MCG
1 SPRAY, SUSPENSION NASAL
Qty: 0 | Refills: 0 | Status: DISCONTINUED | OUTPATIENT
Start: 2019-02-01 | End: 2019-02-25

## 2019-02-01 RX ORDER — ASPIRIN/CALCIUM CARB/MAGNESIUM 324 MG
1 TABLET ORAL
Qty: 0 | Refills: 0 | COMMUNITY
Start: 2019-02-01

## 2019-02-01 RX ORDER — FLUTICASONE PROPIONATE 50 MCG
1 SPRAY, SUSPENSION NASAL
Qty: 0 | Refills: 0 | COMMUNITY
Start: 2019-02-01

## 2019-02-01 RX ORDER — ATORVASTATIN CALCIUM 80 MG/1
80 TABLET, FILM COATED ORAL AT BEDTIME
Qty: 0 | Refills: 0 | Status: DISCONTINUED | OUTPATIENT
Start: 2019-02-01 | End: 2019-02-14

## 2019-02-01 RX ADMIN — HEPARIN SODIUM 5000 UNIT(S): 5000 INJECTION INTRAVENOUS; SUBCUTANEOUS at 06:35

## 2019-02-01 RX ADMIN — Medication 650 MILLIGRAM(S): at 14:30

## 2019-02-01 RX ADMIN — Medication 650 MILLIGRAM(S): at 13:35

## 2019-02-01 RX ADMIN — AMLODIPINE BESYLATE 5 MILLIGRAM(S): 2.5 TABLET ORAL at 06:35

## 2019-02-01 RX ADMIN — ATORVASTATIN CALCIUM 80 MILLIGRAM(S): 80 TABLET, FILM COATED ORAL at 22:04

## 2019-02-01 RX ADMIN — Medication 81 MILLIGRAM(S): at 11:48

## 2019-02-01 RX ADMIN — Medication 650 MILLIGRAM(S): at 23:10

## 2019-02-01 RX ADMIN — Medication 650 MILLIGRAM(S): at 22:10

## 2019-02-01 RX ADMIN — HEPARIN SODIUM 5000 UNIT(S): 5000 INJECTION INTRAVENOUS; SUBCUTANEOUS at 22:04

## 2019-02-01 NOTE — DISCHARGE NOTE ADULT - PLAN OF CARE
To help recover or improve as much as possible your sensory and motor abilities, to become more independent, and prevent future strokes. Continue physical therapy and skills learned for rehabilitation.  Follow up with your neurologist in 1-2 weeks for further medical management.  Continue to take your medications as prescribed, low salt, low fat, and diabetic diet.  Consider joining a support group for stroke survivors for emotional support to prevent depression. Compliance with C Pap Likely related to recent CVA Dysphagia 3 Soft Diet To maintain a normal blood pressure to prevent heart attack, stroke and renal failure. Low sodium and fat diet, continue anti-hypertensive medications, and follow up with primary care physician. To maintain normal cholesterol levels to prevent stroke, coronary artery disease, peripheral vascular disease and heart attacks. Low fat diet, exercise daily and continue current medications. Follow up with primary care physician and cardiologist for management. Continue physical therapy and skills learned for rehabilitation.  Follow up with your neurologist in 1-2 weeks for further medical management.  Continue to take your medications as prescribed, low salt, low fat, and diabetic diet.  Consider joining a support group for stroke survivors for emotional support to prevent depression.  Email sent to [facility] for appointment - Appointment made for  2/12 at 1pm with gradual normotension over several days starting 1/29/19.       Low sodium and fat diet, continue anti-hypertensive medications, and follow up with primary care physician.

## 2019-02-01 NOTE — PROGRESS NOTE ADULT - SUBJECTIVE AND OBJECTIVE BOX
CC- CVA    SUBJECTIVE:  Patient seen and examined at bedside.         Vital Signs Last 24 Hrs  T(C): 36.3 (01 Feb 2019 06:29), Max: 36.7 (31 Jan 2019 22:15)  T(F): 97.4 (01 Feb 2019 06:29), Max: 98.1 (31 Jan 2019 22:15)  HR: 65 (01 Feb 2019 07:42) (65 - 77)  BP: 162/90 (01 Feb 2019 06:29) (138/96 - 162/90)  BP(mean): --  RR: 18 (01 Feb 2019 06:29) (18 - 18)  SpO2: 99% (01 Feb 2019 07:42) (97% - 100%)    PHYSICAL EXAM:    General: NAD, obese  HEENT: PERRL, anicteric sclera  Respiratory: CTA; no wheezes, rales, or crackles  Cardiovascular: distant heart sounds  Abdomen: soft, non-tender, BS (+)  Extremities: no edema, cyanosis, or clubbing  Skin: skin debris on toes likely from poor hygene  Neurological: AAOx3; Mild left facial droop. Strength  1/5 LUE, 3/5 LLE; 4/5 sensation grossly intact    MEDICATIONS  (STANDING):  amLODIPine   Tablet 5 milliGRAM(s) Oral daily  aspirin  chewable 81 milliGRAM(s) Oral daily  atorvastatin 80 milliGRAM(s) Oral at bedtime  heparin  Injectable 5000 Unit(s) SubCutaneous every 8 hours  influenza   Vaccine 0.5 milliLiter(s) IntraMuscular once    MEDICATIONS  (PRN):  fluticasone propionate 50 MICROgram(s)/spray Nasal Spray 1 Spray(s) Both Nostrils two times a day PRN nasal congestion    LABS:                                      16.4   6.65  )-----------( 233      ( 31 Jan 2019 07:24 )             48.5     01-31    137  |  100  |  18  ----------------------------<  102<H>  3.7   |  23  |  1.14    Ca    9.3      31 Jan 2019 07:24  Mg     1.9     01-31      Discussed with provider- Neuro CC- CVA    SUBJECTIVE:  Patient seen and examined at bedside. Sister at bedside  Pt with ongoing improvement in LLE weakness with some improvement in LUE   Able to lift 5th finger  Pt overwhelmed with situation but is motivated for therapy         Vital Signs Last 24 Hrs  T(C): 36.3 (01 Feb 2019 06:29), Max: 36.7 (31 Jan 2019 22:15)  T(F): 97.4 (01 Feb 2019 06:29), Max: 98.1 (31 Jan 2019 22:15)  HR: 65 (01 Feb 2019 07:42) (65 - 77)  BP: 162/90 (01 Feb 2019 06:29) (138/96 - 162/90)  BP(mean): --  RR: 18 (01 Feb 2019 06:29) (18 - 18)  SpO2: 99% (01 Feb 2019 07:42) (97% - 100%)    PHYSICAL EXAM:    General: NAD, obese  HEENT: PERRL, anicteric sclera  Respiratory: CTA; no wheezes, rales, or crackles  Cardiovascular: distant heart sounds  Abdomen: soft, non-tender, BS (+)  Extremities: no edema, cyanosis, or clubbing  Skin: skin debris on toes likely from poor hygene  Neurological: AAOx3; Mild left facial droop. Strength  1/5 LUE, 3/5 LLE; 4/5 sensation grossly intact    MEDICATIONS  (STANDING):  amLODIPine   Tablet 5 milliGRAM(s) Oral daily  aspirin  chewable 81 milliGRAM(s) Oral daily  atorvastatin 80 milliGRAM(s) Oral at bedtime  heparin  Injectable 5000 Unit(s) SubCutaneous every 8 hours  influenza   Vaccine 0.5 milliLiter(s) IntraMuscular once    MEDICATIONS  (PRN):  fluticasone propionate 50 MICROgram(s)/spray Nasal Spray 1 Spray(s) Both Nostrils two times a day PRN nasal congestion    LABS:                                      16.4   6.65  )-----------( 233      ( 31 Jan 2019 07:24 )             48.5     01-31    137  |  100  |  18  ----------------------------<  102<H>  3.7   |  23  |  1.14    Ca    9.3      31 Jan 2019 07:24  Mg     1.9     01-31      Discussed with provider- Neuro

## 2019-02-01 NOTE — H&P ADULT - PMH
CAD (Coronary Artery Disease)    High cholesterol    Hypertension    CAYDEN (Obstructive Sleep Apnea)    Peripheral Vascular Disease    Sciatica

## 2019-02-01 NOTE — PROGRESS NOTE ADULT - SUBJECTIVE AND OBJECTIVE BOX
CC: PM&R was consulted to evaluate rehab needs  HPI:  62 yo M with a PMH of HTN (not on meds), CAYDEN (on CPAP, compliant), obesity, and HLD (not on meds) who presents to hospital with acute onset of slurred speech and LUE paresis. Per chart reviwe: "Patient states he woke up on 1/26 at 5pm at his baseline and began to change his clothes utilizing both hands and was speaking normally at the time. After about 10 minutes of changing, patient fell over while tying his shoelaces. When patient got up, he tried to reach for a tissue with his left arm and was unable to lift it. Patient states that his left arm felt weak for a "few minutes" before returning back to normal however, he noticed that he was slurring his words."  Upon arrival, code stroke was called. The pts symptoms rapidly improved without intervention, however at approx 1945-800 the pts neuro status changed acutely and he developed acute LUE and LLE paresis as well as slurred speech and L sided facial droop. The patient was re-evaluated by the neuro team and per chart review: "19:46pm: Interval examination demonstrates +Moderate dysarthria. LUE 1/5 strength. LLE 3/5 strength. New NIHSS 8. tPA discussed with patient and family as patient within extended window for tPA and patient amenable. Aggressive blood pressure control initiated. tPA given at 8:54pm. BP at time of tPA administration 177/95." Patient transferred to MICU and started on Nicardipine gtt and weaned off evening of 1/27. Tolerated tPA without complication. Interval CTH scans were stable. Passed dysphagia screen.  (28 Jan 2019) PM&R was consulted to evaluate rehab needs.     TTE with bubble- grossly normal LV  systolic function.   Brain MRI: Acute right basal ganglia infarct. Chronic bilateral lacunar   infarcts.     Brain/Neck MRA: Severe stenoses of the distal bilateral vertebral arteries   and proximal basilar artery with poststenotic dilatation.   Interval: doing well, participated with PT this am         REVIEW OF SYSTEMS  No chest pain, SOB, nausea, vomiting.   CURRENT FUNCTIONAL STATUS  mod/ max a     Vital Signs Last 24 Hrs  T(C): 36.3 (01 Feb 2019 06:29), Max: 36.7 (31 Jan 2019 22:15)  T(F): 97.4 (01 Feb 2019 06:29), Max: 98.1 (31 Jan 2019 22:15)  HR: 65 (01 Feb 2019 07:42) (65 - 77)  BP: 162/90 (01 Feb 2019 06:29) (138/96 - 162/90)  BP(mean): --  RR: 18 (01 Feb 2019 06:29) (18 - 18)  SpO2: 99% (01 Feb 2019 07:42) (97% - 100%)    PHYSICAL EXAM  Constitutional - NAD, Comfortable  HEENT - NCAT  Chest - good chest expansion, good resp effort  Cardio - warm and well perfused  Extremities - No C/C/E, No calf tenderness   Neurologic Exam -                    Cognitive - Awake, Alert, AAO to self, place, date, year, situation     Communication - Fluent, mild dysarthria, comprehension intact     Cranial Nerves - CN V1-3 intact to LT, mild left facial droop, tongue midline, shoulder shrug intact b/l     Motor -                     LEFT    UE - Shoulder 1/5, EF 0/5, EE 0/5,  trace                    RIGHT UE - ShAB 5/5, EF 5/5, EE 5/5, WE 5/5,  5/5                    LEFT    LE - HF 3/5, KE 4/5, DF 5/5, PF 5/5                    RIGHT LE - HF 5/5, KE 5/5, DF 5/5, PF 5/5       Psychiatric - Affect WNL                   ASSESSMENT/PLAN  64 y/o M with  CVA now with left hemiparesis, functional, gait, and ADL impairments.    Disposition - Recommend acute inpatient rehabilitation once deemed medically and surgically stable as per the primary treating team(s).  The goal of acute inpatient rehabilitation would be to restore independence for household level mobility and self-care so he can return to the community at the level of using an assisted device. The pt has significant functional impairments versus baseline, which is independent in the community.  Needs acute inpatient rehabilitation for intensive therapies (PT, OT, SLP) to restore his function towards independence, while being closely monitored for his ongoing medical issues, which can quickly destabilize if not closely monitored and managed.  Expected to tolerate and benefit from 3 hrs/day, >=5 days/wk of multidisciplinary therapies.    Thank you for allowing us to participate in this patient's care

## 2019-02-01 NOTE — H&P ADULT - ATTENDING COMMENTS
Patient seen and examined on 2/2. Agree with recommendations.   Medically stable.   Begin program of 3 hours per day.

## 2019-02-01 NOTE — PROGRESS NOTE ADULT - PROBLEM SELECTOR PLAN 5
Lipid panel reviewed   continue Lipitor  pt advised to FU outpt with PMD
On Norvasc 5 mg  Continue permissive HTN for now, with gradual normotension over several days starting 1/29/19
Lipid panel reviewed   continue Lipitor  pt advised to FU outpt with PMD
SBP in 130-160s   On Norvasc 5 mg  Continue permissive HTN for now, with gradual normotension over several days starting 1/29/19

## 2019-02-01 NOTE — PROGRESS NOTE ADULT - PROBLEM SELECTOR PLAN 3
Replete
likely secondary to CVA  On Dysphagia 3 diet
Replete
likely secondary to CVA  On Dysphagia 3 diet

## 2019-02-01 NOTE — DISCHARGE NOTE ADULT - INSTRUCTIONS
Spoke to [employer] at [employer] ([phone]) to request that the labs ordered on 10/19/2018 to be tested on the blood that was drawn earlier that same day. Bharati Dukes states that she will see if they have enough blood and will call back. Dysphagia 3 soft with Thin Liquid

## 2019-02-01 NOTE — H&P ADULT - NSHPREVIEWOFSYSTEMS_GEN_ALL_CORE
REVIEW OF SYSTEMS  Constitutional: No fever, No Chills  HEENT:  No Dysphagia, No headache  Pulm: No SOB, No cough  Cardio: No chest pain, No palpitations  GI:  No abdominal pain, , No incontinence   : No dysuria, No retention, No incontinence  Neuro:  +loss of strength, No sensation deficits, No neuropathic pain  Skin: No itching, No rashes, No lesions   Endo: No diabetic symptoms, No thyroid symptoms   MSK: No joint pain, No joint swelling, No muscle pain, No Neck or back pain  Psych:  No depression, No anxiety

## 2019-02-01 NOTE — H&P ADULT - NSHPPHYSICALEXAM_GEN_ALL_CORE
PHYSICAL EXAMINATION     General: NAD, Resting Comfortable,                                  HEENT: NC/AT, EOM I, PERRLA, Normal Conjunctivae  Cardio: RRR, Normal S1-S2, No M/G/R, no peripheral edema                              Pulm: No Respiratory Distress,  Lungs CTAB                        Abdomen: obese - ND/NT, Soft, BS+                                                Breast/Pelvic: not necessary  MSK: No joint swelling, Full ROM                                         Ext: No C/C/E, Pulses 2+ throughout, No calf tenderness    Skin:  all skin intact                                                                 Wounds: none  Decubitus Ulcers: None Present     Neurological Examination      Cognitive: AAO x 3 -- Attention: Intact  -- Communication:  Fluent,  No dysarthria    Naming: intact (can name object and describe it) -- Repetition: Intact   Comprehension: (able to follow commands + able to read a sentence) -- Writing: Intact   Abstract: Intact -- Judgment: Good evidence of judgement                               Memory: Recall 3 objects immediate and 5 min later        CN II - XII  intact -- Swallow: intact  Coordination: FTN/HTS impaired                                                                     Sensory: Intact to light touch                                                                                        Tone: normal Throughout     Mood/Affect: wnl     Motor    LEFT    UE: SF [1/5], EF [1/5], EE [2/5], WE [2/5], Hand intrinsics 2/5,  diminished  RIGHT UE: SF [5/5], EF [5/5], EE [5/5], WE [5/5], Hand intrinsics 5/5,  [wnl]  LEFT    LE:  HF [4/5], KE [4/5], DF [4/5], EHL [4/5],  PF [4/5]  RIGHT LE:  HF [5/5], KE [5/5], DF [5/5], EHL [5/5],  PF [5/5]      - Bilateral feet with fungal infection

## 2019-02-01 NOTE — DISCHARGE NOTE ADULT - CARE PLAN
Principal Discharge DX:	CVA (cerebral vascular accident)  Goal:	To help recover or improve as much as possible your sensory and motor abilities, to become more independent, and prevent future strokes.  Assessment and plan of treatment:	Continue physical therapy and skills learned for rehabilitation.  Follow up with your neurologist in 1-2 weeks for further medical management.  Continue to take your medications as prescribed, low salt, low fat, and diabetic diet.  Consider joining a support group for stroke survivors for emotional support to prevent depression.  Secondary Diagnosis:	CAYDEN (Obstructive Sleep Apnea)  Goal:	Compliance with C Pap  Secondary Diagnosis:	Dysphagia  Goal:	Likely related to recent CVA  Assessment and plan of treatment:	Dysphagia 3 Soft Diet  Secondary Diagnosis:	Hypertension  Goal:	To maintain a normal blood pressure to prevent heart attack, stroke and renal failure.  Assessment and plan of treatment:	Low sodium and fat diet, continue anti-hypertensive medications, and follow up with primary care physician.  Secondary Diagnosis:	Hyperlipidemia  Goal:	To maintain normal cholesterol levels to prevent stroke, coronary artery disease, peripheral vascular disease and heart attacks.  Assessment and plan of treatment:	Low fat diet, exercise daily and continue current medications. Follow up with primary care physician and cardiologist for management. Principal Discharge DX:	CVA (cerebral vascular accident)  Goal:	To help recover or improve as much as possible your sensory and motor abilities, to become more independent, and prevent future strokes.  Assessment and plan of treatment:	Continue physical therapy and skills learned for rehabilitation.  Follow up with your neurologist in 1-2 weeks for further medical management.  Continue to take your medications as prescribed, low salt, low fat, and diabetic diet.  Consider joining a support group for stroke survivors for emotional support to prevent depression.  Email sent to [facility] for appointment - Appointment made for  2/12 at 1pm  Secondary Diagnosis:	CAYDEN (Obstructive Sleep Apnea)  Goal:	Compliance with C Pap  Secondary Diagnosis:	Dysphagia  Goal:	Likely related to recent CVA  Assessment and plan of treatment:	Dysphagia 3 Soft Diet  Secondary Diagnosis:	Hypertension  Goal:	To maintain a normal blood pressure to prevent heart attack, stroke and renal failure.  Assessment and plan of treatment:	with gradual normotension over several days starting 1/29/19.       Low sodium and fat diet, continue anti-hypertensive medications, and follow up with primary care physician.  Secondary Diagnosis:	Hyperlipidemia  Goal:	To maintain normal cholesterol levels to prevent stroke, coronary artery disease, peripheral vascular disease and heart attacks.  Assessment and plan of treatment:	Low fat diet, exercise daily and continue current medications. Follow up with primary care physician and cardiologist for management.

## 2019-02-01 NOTE — DISCHARGE NOTE ADULT - CARE PROVIDERS DIRECT ADDRESSES
[email or web address]

## 2019-02-01 NOTE — PROGRESS NOTE ADULT - PROBLEM SELECTOR PLAN 1
Concern for acute CVA presenting with Left sided hemiparesis, dysarthria  s/p tPA on 1/26  LLE weakness improving, Modest improvement in ULE- improvement in 1st and 5th finger  CT head on 1/27-Old lacunar infarcts involving the right basal ganglia, left caudate head /anterior corona radiata region.  MRI Brain MRI: Acute right basal ganglia infarct. Chronic bilateral lacunar infarcts.  Brain/Neck MRA: Severe stenoses of the distal bilateral vertebral   arteries and proximal basilar artery with poststenotic dilatation  Appreciate Neuro -  likely 2/2 right corona radiata infarct seen on repeat cth, 2/2 small vessel disease; Vertebrobasilar high grade stenosis (intracranial large artery stenosis) is asymptomatic, and unrelated to his acute stroke.   Plan for  gradual normotension over several days starting 1/29/19   On ASA/Lipitor 80 mg PO QHS  Await Neuro - re AF trial decision   Team dw MRI tech - Plan for MRI this AM    TTE with bubble- grossly normal LV  systolic function

## 2019-02-01 NOTE — DISCHARGE NOTE ADULT - HOSPITAL COURSE
Problem: CVA (cerebral vascular accident).  Plan: Concern for acute CVA presenting with Left sided hemiparesis, dysarthria  s/p tPA on 1/26  LLE weakness improving, Modest improvement in ULE- improvement in 1st and 5th finger  CT head on 1/27-Old lacunar infarcts involving the right basal ganglia, left caudate head /anterior corona radiata region.  MRI Brain MRI: Acute right basal ganglia infarct. Chronic bilateral lacunar infarcts.  Brain/Neck MRA: Severe stenoses of the distal bilateral vertebral   arteries and proximal basilar artery with poststenotic dilatation  Appreciate Neuro -  likely 2/2 right corona radiata infarct seen on repeat cth, 2/2 small vessel disease; Vertebrobasilar high grade stenosis (intracranial large artery stenosis) is asymptomatic, and unrelated to his acute stroke.   Plan for  gradual normotension over several days starting 1/29/19   On ASA/Lipitor 80 mg PO QHS  Await Neuro - re AF trial decision     TTE with bubble- grossly normal LV  systolic function.      Problem/Plan - 2:  ·  Problem: CAYDEN (Obstructive Sleep Apnea).  Plan: pt reports he is very compliant with CPAP at home   continue Nocturnal CPAP  F/u Pul as outpt.      Problem/Plan - 3:  ·  Problem: Oropharyngeal dysphagia.  Plan: likely secondary to CVA  On Dysphagia 3 diet.      Problem/Plan - 4:  ·  Problem: Hypokalemia.  Plan: Resolved s/p Repletion.      Problem/Plan - 5:  ·  Problem: Hypertension.  Plan: SBP in 130-160s   On Norvasc 5 mg  Continue permissive HTN for now, with gradual normotension over several days starting 1/29/19.      Problem/Plan - 6:  Problem: Hyperlipidemia. Plan: Lipid panel reviewed   continue Lipitor  pt advised to FU outpt with PMD.      PMR - Acute rehab   [facility] - App[facility]ment made for  2/12 at 1pm  Discussed with pt and sister at bedside.

## 2019-02-01 NOTE — PROGRESS NOTE ADULT - PROBLEM SELECTOR PLAN 7
HSQ  FU Neuro  re- AF trial   PMR - Acute rehab   Email sent to [facility] for appointment - Appointment made for  2/12 at 1pm  Discussed with pt and sister at bedside HSQ  PMR - Acute rehab   Email sent to [facility] for appointment - Appointment made for  2/12 at 1pm  Discussed with pt and sister at bedside

## 2019-02-01 NOTE — PROGRESS NOTE ADULT - PROBLEM SELECTOR PLAN 2
pt reports he is very compliant with CPAP at home   continue Nocturnal CPAP  Fu Pul as outpt pt reports he is very compliant with CPAP at home   continue Nocturnal CPAP  Fu Pul as outpt- pt follows up Dr Bakari Haas

## 2019-02-01 NOTE — DISCHARGE NOTE ADULT - CARE PROVIDER_API CALL
Chemo Beebe (MD)  Internal Medicine  [address] [place] [address]  Phone: [phone]  Fax: [phone]    Walod Villegas (MD)  Critical Care Medicine; Internal Medicine; Pulmonary Disease  [address], [address]  [place] [address]  Phone: [phone]  Fax: [phone]    Libman, Richard B (MD)  Neurology; Vascular Neurology  [address]  [place] [address]  Phone: [phone]  Fax: [phone]

## 2019-02-01 NOTE — DISCHARGE NOTE ADULT - MEDICATION SUMMARY - MEDICATIONS TO TAKE
I will START or STAY ON the medications listed below when I get home from the hospital:    aspirin 81 mg oral tablet, chewable  -- 1 tab(s) by mouth once a day  -- Indication: For CVA (cerebral vascular accident)    atorvastatin 80 mg oral tablet  -- 1 tab(s) by mouth once a day (at bedtime)  -- Indication: For Cholesterol    amLODIPine 5 mg oral tablet  -- 1 tab(s) by mouth once a day  -- Indication: For HTN    fluticasone 50 mcg/inh nasal spray  -- 1 spray(s) into nose 2 times a day, As needed, into nose congestion  -- Indication: For Nasal Congestion

## 2019-02-01 NOTE — PROGRESS NOTE ADULT - PROVIDER SPECIALTY LIST ADULT
Hospitalist
Hospitalist
MICU
MICU
Neurology
Rehab Medicine
Rehab Medicine
Hospitalist
Hospitalist

## 2019-02-01 NOTE — H&P ADULT - ASSESSMENT
ASSESSMENT/PLAN  ___ year-old ___ with a PMH of ___ who was found to have ____ and is now with Gait Instability, ADL impairments and Functional impairments.    COMORBIDITIES / ACTIVE MEDICAL ISSUES     Gait Instability, ADL impairments and Functional impairments: start Comprehensive Rehab Program of PT/OT     Pain Mgmt - Tylenol PRN, Oxycodone PRN    GI/Bowel Mgmt -  Colace, Senna,  Dulcolax PRN, Miralax PRN,    /Bladder Mgmt -  PVR    FEN   - Diet - Regular + Thins  ([facility], DASH/TL)  - Dysphagia  SLP - evaluation and treatment    Precautions / PROPHYLAXIS:   - Falls, Cardiac, Sternal, Spinal, Seizure, aspiration  - ortho: Weight bearing status: WBAT   - Lungs: Aspiration, Incentive Spirometer (patient instructed at the bedside)  - Pressure injury/Skin: Turn Q2hrs while in bed, OOB to Chair, PT/OT    - DVT: Lovenox, SCDs, TEDs     MEDICAL PROGNOSIS: GOOD            REHAB POTENTIAL: GOOD             ESTIMATED DISPOSITION: HOME            ELOS: 10-14 Days   EXPECTED THERAPY:     P.T. 1hr/day  and   O.T. 1hr/day and if necessary  S.L.P. 1hr/day  and if necessary  P&O    EXP FREQUENCY: 5 days per 7 day period     PRESCREEN COMPARISON:   I have reviewed the prescreen information and I have found no relevant changes between the preadmission screening and my post admission evaluation     RATIONALE FOR INPATIENT ADMISSION - Patient demonstrates the following: (check all that apply)  [X] Medically appropriate for rehabilitation admission  [X] Has attainable rehab goals with an appropriate initial discharge plan  [X] Has rehabilitation potential (expected to make a significant improvement within a reasonable period of time)   [X] Requires close medical management by a rehab physician, rehab nursing care, Hospitalist and comprehensive interdisciplinary team (including PT, OT, & or SLP, Prosthetics and Orthotics)          acute CVA presenting with Left sided hemiparesis, dysarthria  - s/p tPA on 1/26  - LLE weakness improving, Modest improvement in ULE- improvement in 1st and 5th finger  -   - Brain/Neck MRA: Severe stenoses of the distal bilateral vertebral  arteries and proximal basilar artery with poststenotic dilatation  Appreciate Neuro -  likely 2/2 right corona radiata infarct seen on repeat cth, 2/2 small vessel disease; Vertebrobasilar high grade stenosis (intracranial large artery stenosis) is asymptomatic, and unrelated to his acute stroke.   Plan  On ASA/Lipitor 80 mg PO QHS  Await Neuro - re AF trial decision   Team dw MRI tech - Plan for MRI this AM    TTE with bubble- grossly normal LV  systolic function.       CAYDEN (Obstructive Sleep Apnea).    Plan: pt reports he is very compliant with CPAP at home   continue Nocturnal CPAP  Fu Pul as outpt- pt follows up Dr Bakari Haas.       HTN/HLD .    - norvasc 5 mg ASSESSMENT/PLAN  63 year-old man with a PMH of HTN who was found to have acute CVA with left side hemiparesis and given TPA and is now with Gait Instability, ADL impairments and Functional impairments.    COMORBIDITIES / ACTIVE MEDICAL ISSUES     Gait Instability, ADL impairments and Functional impairments: start Comprehensive Rehab Program of PT/OT   acute CVA presenting with Left sided hemiparesis, dysarthria  - s/p tPA on 1/26  - LLE weakness improving, Modest improvement in ULE  Plan   - On ASA/Lipitor 80 mg PO QHS  - started prozac for motor recovery     CAYDEN (Obstructive Sleep Apnea).    - compliant with CPAP at home   Plan   - continue Nocturnal CPAP  - Fu Pulm as outpt- pt follows up Dr Bakari Haas.     HTN/HLD .    - norvasc 5 mg    Pain Mgmt - Tylenol PRN, Oxycodone PRN    GI/Bowel Mgmt -  Colace, Senna,  Dulcolax PRN, Miralax PRN,    /Bladder Mgmt -  PVR    FEN   - Dysphagia modified diet -   SLP - evaluation and treatment    Precautions / PROPHYLAXIS:   - Falls -  aspiration  - ortho: Weight bearing status: WBAT   - Lungs: Aspiration, Incentive Spirometer (patient instructed at the bedside)  - Pressure injury/Skin: Turn Q2hrs while in bed, OOB to Chair, PT/OT    - DVT: Lovenox, SCDs, TEDs     MEDICAL PROGNOSIS: GOOD            REHAB POTENTIAL: GOOD             ESTIMATED DISPOSITION: HOME            ELOS: 10-14 Days   EXPECTED THERAPY:     P.T. 1hr/day  and   O.T. 1hr/day and if necessary  S.L.P. 1hr/day  and if necessary  P&O    EXP FREQUENCY: 5 days per 7 day period     PRESCREEN COMPARISON:   I have reviewed the prescreen information and I have found no relevant changes between the preadmission screening and my post admission evaluation     RATIONALE FOR INPATIENT ADMISSION - Patient demonstrates the following: (check all that apply)  [X] Medically appropriate for rehabilitation admission  [X] Has attainable rehab goals with an appropriate initial discharge plan  [X] Has rehabilitation potential (expected to make a significant improvement within a reasonable period of time)   [X] Requires close medical management by a rehab physician, rehab nursing care, Hospitalist and comprehensive interdisciplinary team (including PT, OT, & or SLP, Prosthetics and Orthotics)

## 2019-02-01 NOTE — PROGRESS NOTE ADULT - NSHPATTENDINGPLANDISCUSS_GEN_ALL_CORE
neurology resident Dr. Godinez
neurology resident Dr. Henderson
team, nurse

## 2019-02-01 NOTE — H&P ADULT - HISTORY OF PRESENT ILLNESS
63M with a PMH of HTN (not on meds), CAYDEN (on CPAP, compliant), obesity, and HLD (not on meds) who presents to hospital with acute onset of slurred speech and LUE paresis found to have ischemic stroke  s/p tPA now in MICU for further observation/management.  CT head on 1/27-Old lacunar infarcts involving the right basal ganglia, left caudate head /anterior corona radiata region. MRI Brain MRI: Acute right basal ganglia infarct. Chronic bilateral lacunar infarcts. Found to have Oropharyngeal dysphagia place on Dysphagia 3 diet. 63 year-old Man with a PMH of HTN (not on meds), CAYDEN (on CPAP, compliant), obesity, and HLD (not on meds) who presents to hospital with acute onset of slurred speech and Left UE paresis found to have ischemic stroke  s/p tPA now in MICU for further observation  and management.  CT head on 1/27-Old lacunar infarcts involving the right basal ganglia, left caudate head /anterior corona radiata region. MRI Brain MRI: Acute right basal ganglia infarct. Chronic bilateral lacunar infarcts. Found to have Oropharyngeal dysphagia place on Dysphagia 3 diet. Patient transferred to Acute Rehabilitation for functional recovery.

## 2019-02-01 NOTE — PROGRESS NOTE ADULT - SUBJECTIVE AND OBJECTIVE BOX
Neurology  Progress Note      Name:  LION AUSTIN; 63y    Subjective:  Reports continued improvement in weakness LLE> LUE ;     MEDICATIONS  (STANDING):  amLODIPine   Tablet 5 milliGRAM(s) Oral daily  aspirin  chewable 81 milliGRAM(s) Oral daily  atorvastatin 80 milliGRAM(s) Oral at bedtime  heparin  Injectable 5000 Unit(s) SubCutaneous every 8 hours  influenza   Vaccine 0.5 milliLiter(s) IntraMuscular once    Vital Signs Last 24 Hrs  T(C): 36.3 (01 Feb 2019 06:29), Max: 36.7 (31 Jan 2019 22:15)  T(F): 97.4 (01 Feb 2019 06:29), Max: 98.1 (31 Jan 2019 22:15)  HR: 65 (01 Feb 2019 07:42) (65 - 77)  BP: 162/90 (01 Feb 2019 06:29) (138/96 - 162/90)  BP(mean): --  RR: 18 (01 Feb 2019 06:29) (18 - 18)  SpO2: 99% (01 Feb 2019 07:42) (97% - 100%)    LABS:                                   16.4   6.65  )-----------( 233      ( 31 Jan 2019 07:24 )             48.5   01-31    137  |  100  |  18  ----------------------------<  102<H>  3.7   |  23  |  1.14    Ca    9.3      31 Jan 2019 07:24  Mg     1.9     01-31               Neurological Exam:  Mental Status: Awake, talking, aware of deficits. Orientated to self, date and place.  Attention intact.  +Mild dysarthria. Speech fluent.  Cranial Nerves:   PERRL, EOMI, VFF, no nystagmus.    CN V1-3 intact to light touch . Very mild Left Facial Droop.  Tongue, uvula and palate midline.      Motor:   Tone: LUE flaccid                 Strength:     [] Upper extremity                                                                     R         5/5                                               L         Elbow Flexion Biceps 2/5, 2/5 ;   deltoid trace effort against gravity, still falls to bed..  [] Lower extremity                                                                     R        5/5                                                 L        Iliopsoas 4+/5, Anterior tibialis 5/5.      Dysmetria: None to FNF on the Right  Tremor: No resting, postural or action tremor.  No myoclonus.  Sensation: intact to light touch,  Gait: Deferred      Radiology:  < from: MR Head No Cont (01.31.19 @ 13:56) >  IMPRESSION:    Brain MRI: Acute right basal ganglia infarct. Chronic bilateral lacunar   infarcts.    Brain/Neck MRA: Severe stenoses of the distal bilateral vertebral   arteries and proximal basilar artery with poststenotic dilatation.    < end of copied text >      < from: CT Head No Cont (01.27.19 @ 20:57) >  Impression: Old lacunar infarcts are again seen as described above.    VIVIAN HOWARD M.D., ATTENDING RADIOLOGIST  This document has been electronically signed. Jan 28 2019  8:17AM    < end of copied text >    < from: TTE with Doppler (w/Cont) (01.27.19 @ 11:45) >  CONCLUSIONS:  1. Endocardium not well visualized; grossly normal left  ventricular systolic function.   Endocardial visualization  enhanced with intravenous injection of echo contrast  (Definity).  2. The right ventricle is not visualized.  ------------------------------------------------------------------------  Confirmed on  1/27/2019- 14:47:23 by Randy Eden M.D.  ------------------------------------------------------------------------    < end of copied text > Neurology  Progress Note      Name:  LION AUSTIN; 63y    Subjective:  Reports continued improvement in weakness LLE> LUE ;     MEDICATIONS  (STANDING):  amLODIPine   Tablet 5 milliGRAM(s) Oral daily  aspirin  chewable 81 milliGRAM(s) Oral daily  atorvastatin 80 milliGRAM(s) Oral at bedtime  heparin  Injectable 5000 Unit(s) SubCutaneous every 8 hours  influenza   Vaccine 0.5 milliLiter(s) IntraMuscular once    Vital Signs Last 24 Hrs  T(C): 36.3 (01 Feb 2019 06:29), Max: 36.7 (31 Jan 2019 22:15)  T(F): 97.4 (01 Feb 2019 06:29), Max: 98.1 (31 Jan 2019 22:15)  HR: 65 (01 Feb 2019 07:42) (65 - 77)  BP: 162/90 (01 Feb 2019 06:29) (138/96 - 162/90)  BP(mean): --  RR: 18 (01 Feb 2019 06:29) (18 - 18)  SpO2: 99% (01 Feb 2019 07:42) (97% - 100%)    LABS:                                   16.4   6.65  )-----------( 233      ( 31 Jan 2019 07:24 )             48.5   01-31    137  |  100  |  18  ----------------------------<  102<H>  3.7   |  23  |  1.14    Ca    9.3      31 Jan 2019 07:24  Mg     1.9     01-31               Neurological Exam:  Mental Status: Awake, talking, aware of deficits. Orientated to self, date and place.  Attention intact.  +Mild dysarthria. Speech fluent.  Cranial Nerves:   PERRL, EOMI, VFF, no nystagmus.    CN V1-3 intact to light touch . Very mild Left Facial Droop.  Tongue, uvula and palate midline.      Motor:   Tone: LUE flaccid                 Strength:     [] Upper extremity                                                                     R         5/5                                               L         Elbow Flexion Biceps 2/5, 3/5 ;  deltoid trace effort against gravity, still falls to bed..  [] Lower extremity                                                                     R        5/5                                                 L        Iliopsoas 4+/5, Anterior tibialis 5/5.      Dysmetria: None to FNF on the Right  Tremor: No resting, postural or action tremor.  No myoclonus.  Sensation: intact to light touch,  Gait: Deferred      Radiology:  < from: MR Head No Cont (01.31.19 @ 13:56) >  IMPRESSION:    Brain MRI: Acute right basal ganglia infarct. Chronic bilateral lacunar   infarcts.    Brain/Neck MRA: Severe stenoses of the distal bilateral vertebral   arteries and proximal basilar artery with poststenotic dilatation.    < end of copied text >      < from: CT Head No Cont (01.27.19 @ 20:57) >  Impression: Old lacunar infarcts are again seen as described above.    VIVIAN HOWARD M.D., ATTENDING RADIOLOGIST  This document has been electronically signed. Jan 28 2019  8:17AM    < end of copied text >    < from: TTE with Doppler (w/Cont) (01.27.19 @ 11:45) >  CONCLUSIONS:  1. Endocardium not well visualized; grossly normal left  ventricular systolic function.   Endocardial visualization  enhanced with intravenous injection of echo contrast  (Definity).  2. The right ventricle is not visualized.  ------------------------------------------------------------------------  Confirmed on  1/27/2019- 14:47:23 by Randy Eden M.D.  ------------------------------------------------------------------------    < end of copied text >

## 2019-02-01 NOTE — PROGRESS NOTE ADULT - PROBLEM SELECTOR PROBLEM 2
CAYDEN (Obstructive Sleep Apnea)

## 2019-02-01 NOTE — DISCHARGE NOTE ADULT - COMMUNITY RESOURCES
[facility]: [address] [address] ([phone])  5:00pm  via [facility] ([phone])

## 2019-02-01 NOTE — PROGRESS NOTE ADULT - ASSESSMENT
3M with a PMH of HTN (not on meds), CAYDEN (on CPAP, compliant), obesity, and HLD (not on meds) ; Left hemiparesis w/ dysarthria likely 2/2 right basal ganglia infarct seen on repeat CTH, 2/2 small vessel disease s/p tPA;     Vertebrobasilar high grade stenosis (intracranial large artery stenosis) is asymptomatic, and unrelated to his acute stroke.     Gradual, mild improvement noted:    Recommendations:   [x] MRI brain - R basal ganglia infarcts  [x] A1c 5.6;   [x] Lipitor 80 mg PO QHS  [x] ASA 81  [x] TTE - normal EF, inconclusive for PFO  [x] Continue with aggressive vascular risk factors modification   [x] Tele monitoring  [x] PT/OT/S&S/PMnR Tx and Dispo.     STROKE-AF Trial Enrollment pending - pt would inform us 2/4 63M with a PMH of HTN (not on meds), CAYDEN (on CPAP, compliant), obesity, and HLD (not on meds) ; Left hemiparesis w/ dysarthria likely 2/2 right basal ganglia infarct seen on repeat CTH, 2/2 small vessel disease s/p tPA;     Vertebrobasilar high grade stenosis (intracranial large artery stenosis) is asymptomatic, and unrelated to his acute stroke.     Gradual, mild improvement noted:    Recommendations:   [x] MRI brain - R basal ganglia infarcts  [x] A1c 5.6;   [x] Lipitor 80 mg PO QHS  [x] ASA 81  [x] TTE - normal EF, inconclusive for PFO  [x] Continue with aggressive vascular risk factors modification   [x] Tele monitoring  [x] PT/OT/S&S/PMnR Tx and Dispo.     STROKE-AF Trial Enrollment pending - pt would inform us 2/4 63M with a PMH of HTN (not on meds), CAYDEN (on CPAP, compliant), obesity, and HLD (not on meds) ; Left hemiparesis w/ dysarthria 2/2 right basal ganglia/corona radiata lacunar  infarct, 2/2 small vessel disease (s/p tPA0;     Vertebrobasilar high grade stenosis (intracranial large artery stenosis) is asymptomatic, and unrelated to his acute stroke.     Gradual, mild improvement noted:    Recommendations:   [x] MRI brain - R basal ganglia/corona radiata lacunar infarct  [x] A1c 5.6;   [x] Lipitor 80 mg PO QHS  [x] ASA 81  [x] TTE - normal EF, inconclusive for PFO  [x] Continue with aggressive vascular risk factors modification   [x] Tele monitoring  [x] PT/OT/S&S/PMnR Tx and Dispo.     STROKE-AF Trial Enrollment postponed because stroke mechanism is small vessel disease. Cannot be enrolled before 2/4/19. Would not delay discharge if he is otherwise ready to go to rehabilitation.

## 2019-02-01 NOTE — PROGRESS NOTE ADULT - ATTENDING COMMENTS
agree with above; ROS otherwise negative
Agree with above. pt with left sided paresis, s/p tpa. Repeat CT head pending tonight.
agree with above; ROS otherwise negative

## 2019-02-01 NOTE — PROGRESS NOTE ADULT - REASON FOR ADMISSION
left sided weakness

## 2019-02-01 NOTE — H&P ADULT - NSHPSOCIALHISTORY_GEN_ALL_CORE
FUNCTIONAL HISTORY  Pt reports that he lives alone in a private house with ~4STE; (+) bilateral handrails; bedroom/bathroom on first floor. Prior to hospital admission pt was completely independent where he ambulated with no assistive device. Pt was dressing himself; however has not been showering 2/2 to issues with the furnish (does not have hot water) Pt denies any recent falls.    Prior Level of Function:  Independent prior with Ambulation and ADLs.     CURRENT FUNCTIONAL STATUS  - Bed Mobility: mod a   - Transfers: mod a   - Gait: mod a  - ADLs: mod a

## 2019-02-01 NOTE — H&P ADULT - NSHPLABSRESULTS_GEN_ALL_CORE
16.4   6.65  )-----------( 233      ( 31 Jan 2019 07:24 )             48.5     01-31    137  |  100  |  18  ----------------------------<  102<H>  3.7   |  23  |  1.14    Ca    9.3      31 Jan 2019 07:24  Mg     1.9     01-31

## 2019-02-02 LAB
ALBUMIN SERPL ELPH-MCNC: 3.4 G/DL — SIGNIFICANT CHANGE UP (ref 3.3–5)
ALP SERPL-CCNC: 81 U/L — SIGNIFICANT CHANGE UP (ref 40–120)
ALT FLD-CCNC: 36 U/L DA — SIGNIFICANT CHANGE UP (ref 10–45)
ANION GAP SERPL CALC-SCNC: 11 MMOL/L — SIGNIFICANT CHANGE UP (ref 5–17)
AST SERPL-CCNC: 37 U/L — SIGNIFICANT CHANGE UP (ref 10–40)
BASOPHILS # BLD AUTO: 0.1 K/UL — SIGNIFICANT CHANGE UP (ref 0–0.2)
BASOPHILS NFR BLD AUTO: 1 % — SIGNIFICANT CHANGE UP (ref 0–2)
BILIRUB SERPL-MCNC: 1 MG/DL — SIGNIFICANT CHANGE UP (ref 0.2–1.2)
BUN SERPL-MCNC: 25 MG/DL — HIGH (ref 7–23)
CALCIUM SERPL-MCNC: 9.3 MG/DL — SIGNIFICANT CHANGE UP (ref 8.4–10.5)
CHLORIDE SERPL-SCNC: 104 MMOL/L — SIGNIFICANT CHANGE UP (ref 96–108)
CO2 SERPL-SCNC: 27 MMOL/L — SIGNIFICANT CHANGE UP (ref 22–31)
CREAT SERPL-MCNC: 1.13 MG/DL — SIGNIFICANT CHANGE UP (ref 0.5–1.3)
EOSINOPHIL # BLD AUTO: 0.2 K/UL — SIGNIFICANT CHANGE UP (ref 0–0.5)
EOSINOPHIL NFR BLD AUTO: 2.6 % — SIGNIFICANT CHANGE UP (ref 0–6)
GLUCOSE SERPL-MCNC: 96 MG/DL — SIGNIFICANT CHANGE UP (ref 70–99)
HCT VFR BLD CALC: 48.2 % — SIGNIFICANT CHANGE UP (ref 39–50)
HCV AB S/CO SERPL IA: 0.14 S/CO — SIGNIFICANT CHANGE UP
HCV AB SERPL-IMP: SIGNIFICANT CHANGE UP
HGB BLD-MCNC: 16.1 G/DL — SIGNIFICANT CHANGE UP (ref 13–17)
LYMPHOCYTES # BLD AUTO: 2.1 K/UL — SIGNIFICANT CHANGE UP (ref 1–3.3)
LYMPHOCYTES # BLD AUTO: 31.1 % — SIGNIFICANT CHANGE UP (ref 13–44)
MAGNESIUM SERPL-MCNC: 2.2 MG/DL — SIGNIFICANT CHANGE UP (ref 1.6–2.6)
MCHC RBC-ENTMCNC: 29.4 PG — SIGNIFICANT CHANGE UP (ref 27–34)
MCHC RBC-ENTMCNC: 33.4 GM/DL — SIGNIFICANT CHANGE UP (ref 32–36)
MCV RBC AUTO: 88.1 FL — SIGNIFICANT CHANGE UP (ref 80–100)
MONOCYTES # BLD AUTO: 0.4 K/UL — SIGNIFICANT CHANGE UP (ref 0–0.9)
MONOCYTES NFR BLD AUTO: 6.1 % — SIGNIFICANT CHANGE UP (ref 1–9)
NEUTROPHILS # BLD AUTO: 4.1 K/UL — SIGNIFICANT CHANGE UP (ref 1.8–7.4)
NEUTROPHILS NFR BLD AUTO: 59.3 % — SIGNIFICANT CHANGE UP (ref 43–77)
PHOSPHATE SERPL-MCNC: 3.4 MG/DL — SIGNIFICANT CHANGE UP (ref 2.5–4.5)
PLATELET # BLD AUTO: 213 K/UL — SIGNIFICANT CHANGE UP (ref 150–400)
POTASSIUM SERPL-MCNC: 3.8 MMOL/L — SIGNIFICANT CHANGE UP (ref 3.5–5.3)
POTASSIUM SERPL-SCNC: 3.8 MMOL/L — SIGNIFICANT CHANGE UP (ref 3.5–5.3)
PROT SERPL-MCNC: 7.9 G/DL — SIGNIFICANT CHANGE UP (ref 6–8.3)
RBC # BLD: 5.47 M/UL — SIGNIFICANT CHANGE UP (ref 4.2–5.8)
RBC # FLD: 12.4 % — SIGNIFICANT CHANGE UP (ref 10.3–14.5)
SODIUM SERPL-SCNC: 142 MMOL/L — SIGNIFICANT CHANGE UP (ref 135–145)
WBC # BLD: 6.8 K/UL — SIGNIFICANT CHANGE UP (ref 3.8–10.5)
WBC # FLD AUTO: 6.8 K/UL — SIGNIFICANT CHANGE UP (ref 3.8–10.5)

## 2019-02-02 PROCEDURE — 99223 1ST HOSP IP/OBS HIGH 75: CPT | Mod: GC,AI

## 2019-02-02 PROCEDURE — 99223 1ST HOSP IP/OBS HIGH 75: CPT

## 2019-02-02 RX ADMIN — Medication 650 MILLIGRAM(S): at 22:45

## 2019-02-02 RX ADMIN — Medication 81 MILLIGRAM(S): at 12:16

## 2019-02-02 RX ADMIN — ATORVASTATIN CALCIUM 80 MILLIGRAM(S): 80 TABLET, FILM COATED ORAL at 21:46

## 2019-02-02 RX ADMIN — Medication 650 MILLIGRAM(S): at 21:46

## 2019-02-02 RX ADMIN — HEPARIN SODIUM 5000 UNIT(S): 5000 INJECTION INTRAVENOUS; SUBCUTANEOUS at 12:40

## 2019-02-02 RX ADMIN — AMLODIPINE BESYLATE 5 MILLIGRAM(S): 2.5 TABLET ORAL at 06:31

## 2019-02-02 RX ADMIN — Medication 10 MILLIGRAM(S): at 12:16

## 2019-02-02 RX ADMIN — HEPARIN SODIUM 5000 UNIT(S): 5000 INJECTION INTRAVENOUS; SUBCUTANEOUS at 06:30

## 2019-02-02 RX ADMIN — HEPARIN SODIUM 5000 UNIT(S): 5000 INJECTION INTRAVENOUS; SUBCUTANEOUS at 21:47

## 2019-02-02 NOTE — CONSULT NOTE ADULT - ASSESSMENT
Pt is a 62yo M w PMH of HTN, CAYDEN on CPAP, HLD [facility] [place] with c/o slurred speech and left side paresis. Initial CT showed old lacunar infact, s/p tPA. MRI showed acute right basal ganglia infarct and chronic bilateral lacunar infarct right basal ganglia and left caudate head/ant corona radiata region. Pt have oropharyngeal dysphagia and placed on Dysphagia 3 diet.     *CVA  Cont acute rehab  PT/OT/SLP  ASA, Lipitor  Prozac  Fall precaution     *CAYDEN  CPAP     *HLD  cont lipitor     *HTN  Norvasc 5mg  will monitor today, if still elevated, increased to 10mg tomorrow     *Dysphagia   SLP  Dysphagia diet   aspiration precaution     *DVT ppx   Cont Lovenox

## 2019-02-02 NOTE — DIETITIAN INITIAL EVALUATION ADULT. - OTHER INFO
63yr Old Male - Dx: CVA - Initial Assessment - Soft (Dysphagia 3) Diet w/ Thin Liquids (Declines DASH-TLC Diet Despite Education) , Denies Food Allergy, Tolerates Diet, No Chewing/Swallowing Complications (Per Pt), Consumes 100% of Meals, No Pressure Ulcers, No Edema, No Recent N/V/D/C

## 2019-02-02 NOTE — PROGRESS NOTE ADULT - ASSESSMENT
ASSESSMENT/PLAN  63 year-old man with a PMH of HTN who was found to have acute CVA with left side hemiparesis and given TPA and is now with Gait Instability, ADL impairments and Functional impairments.    COMORBIDITIES / ACTIVE MEDICAL ISSUES     Gait Instability, ADL impairments and Functional impairments: start Comprehensive Rehab Program of PT/OT   acute CVA presenting with Left sided hemiparesis, dysarthria  - s/p tPA on 1/26  - LLE weakness improving, Modest improvement in ULE  Plan   - On ASA/Lipitor 80 mg PO QHS  - started prozac for motor recovery     CAYDEN (Obstructive Sleep Apnea).    - compliant with CPAP at home   Plan   - continue Nocturnal CPAP  - Fu Pulm as outpt- pt follows up Dr Bakari Haas.     HTN/HLD .    - norvasc 5 mg- consider increasing to 10 if BP remains elevated    Pain Mgmt - Tylenol PRN

## 2019-02-02 NOTE — DIETITIAN INITIAL EVALUATION ADULT. - NUTRITION INTERVENTION
Collaboration and Referral of Nutrition Care/Meals and Snack/Nutrition Education Nutrition Education/Meals and Snack/Collaboration and Referral of Nutrition Care

## 2019-02-02 NOTE — CONSULT NOTE ADULT - SUBJECTIVE AND OBJECTIVE BOX
HPI  Pt is a 62yo M w PMH of HTN, CAYDEN on CPAP, HLD admitted to [facility] with c/o slurred speech and left side paresis. Initial CT showed old lacunar infact, s/p tPA. MRI showed acute right basal ganglia infarct and chronic bilateral lacunar infarct right basal ganglia and left caudate head/ant corona radiata region. Pt have oropharyngeal dysphagia and placed on Dysphagia 3 diet.   Pt was seen and examined at bedside. Pt states his left lower ext is improving and getting stronger, only able to move minimally left upper ext. States slept well over night. Denies of any SOB, palpitation, chest pain. Pt states he is normally have HTN but not on meds. Asymptomatic.     Allergies    No Known Allergies    Intolerances        REVIEW OF SYSTEMS:    CONSTITUTIONAL: left side weakness, no fevers or chills  EYES/ENT: No visual changes;  No vertigo or throat pain   NECK: No pain or stiffness  RESPIRATORY: No cough, wheezing, hemoptysis; No shortness of breath  CARDIOVASCULAR: No chest pain or palpitations  GASTROINTESTINAL: No abdominal or epigastric pain. No nausea, vomiting, or hematemesis; No diarrhea or constipation. No melena or hematochezia.  GENITOURINARY: No dysuria, frequency or hematuria  NEUROLOGICAL: left side weakness   SKIN: No itching, burning, rashes, or lesions   All other review of systems is negative unless indicated above    Vital Signs Last 24 Hrs  T(C): 36.6 (02 Feb 2019 08:11), Max: 36.6 (02 Feb 2019 08:11)  T(F): 97.9 (02 Feb 2019 08:11), Max: 97.9 (02 Feb 2019 08:11)  HR: 79 (02 Feb 2019 08:11) (72 - 79)  BP: 160/104 (02 Feb 2019 08:11) (152/98 - 168/94)  BP(mean): --  RR: 15 (02 Feb 2019 08:11) (14 - 18)  SpO2: 97% (02 Feb 2019 08:11) (94% - 99%)    I&O's Summary      CAPILLARY BLOOD GLUCOSE          PHYSICAL EXAM:    Constitutional: NAD, awake and alert, well-developed  HEENT: PERR, EOMI, Normal Hearing, MMM  Neck: Soft and supple, No LAD, No JVD  Respiratory: Breath sounds are clear bilaterally, No wheezing, rales or rhonchi  Cardiovascular: S1 and S2, regular rate and rhythm, no Murmurs, gallops or rubs  Gastrointestinal: Bowel Sounds present, soft, nontender, nondistended, no guarding, no rebound  Extremities: No peripheral edema  Vascular: 2+ peripheral pulses  Neurological: A/O x 3, no focal deficits  Musculoskeletal: 3/5 LUE, 4/5 LLE motor, 5/5 RUE, 5/5 RLE   Skin: No rashes    MEDICATIONS:  MEDICATIONS  (STANDING):  amLODIPine   Tablet 5 milliGRAM(s) Oral daily  aspirin  chewable 81 milliGRAM(s) Oral daily  atorvastatin 80 milliGRAM(s) Oral at bedtime  FLUoxetine 10 milliGRAM(s) Oral daily  heparin  Injectable 5000 Unit(s) SubCutaneous every 8 hours      LABS: All Labs Reviewed:                        16.1   6.8   )-----------( 213      ( 02 Feb 2019 06:18 )             48.2     02-02    142  |  104  |  25<H>  ----------------------------<  96  3.8   |  27  |  1.13    Ca    9.3      02 Feb 2019 06:18  Phos  3.4     02-02  Mg     2.2     02-02    TPro  7.9  /  Alb  3.4  /  TBili  1.0  /  DBili  x   /  AST  37  /  ALT  36  /  AlkPhos  81  02-02          Blood Culture:     RADIOLOGY/EKG:    DVT PPX:    ASSESSMENT AND PLAN:

## 2019-02-02 NOTE — PROGRESS NOTE ADULT - SUBJECTIVE AND OBJECTIVE BOX
Patient is a 63y old  Male who presents with a chief complaint of stroke (02 Feb 2019 09:39)      HPI:  63 year-old Man with a PMH of HTN (not on meds), CAYDEN (on CPAP, compliant), obesity, and HLD (not on meds) who presents to hospital with acute onset of slurred speech and Left UE paresis found to have ischemic stroke  s/p tPA now in MICU for further observation  and management.  CT head on 1/27-Old lacunar infarcts involving the right basal ganglia, left caudate head /anterior corona radiata region. MRI Brain MRI: Acute right basal ganglia infarct. Chronic bilateral lacunar infarcts. Found to have Oropharyngeal dysphagia place on Dysphagia 3 diet. Patient transferred to Acute Rehabilitation for functional recovery. (01 Feb 2019 19:12)      PAST MEDICAL & SURGICAL HISTORY:  Sciatica  High cholesterol  Hypertension  Peripheral Vascular Disease  CAYDEN (Obstructive Sleep Apnea)  CAD (Coronary Artery Disease)  History of cholecystectomy  History of tonsillectomy  S/P ACL repair      MEDICATIONS  (STANDING):  amLODIPine   Tablet 5 milliGRAM(s) Oral daily  aspirin  chewable 81 milliGRAM(s) Oral daily  atorvastatin 80 milliGRAM(s) Oral at bedtime  FLUoxetine 10 milliGRAM(s) Oral daily  heparin  Injectable 5000 Unit(s) SubCutaneous every 8 hours    MEDICATIONS  (PRN):  acetaminophen   Tablet .. 650 milliGRAM(s) Oral every 6 hours PRN Mild Pain (1 - 3), Moderate Pain (4 - 6)  fluticasone propionate 50 MICROgram(s)/spray Nasal Spray 1 Spray(s) Both Nostrils two times a day PRN nasal congestion      Allergies    No Known Allergies    Intolerances        TODAY'S SUBJECTIVE & REVIEW OF SYMPTOMS:  Patient was seen and examined today, there were no acute events overnight. Patient states he has Circadian Rhythm disorder but was able to sleep from midnight to 6:30am which is very good for him he states. He does not complain of shortness of breath while he is sitting up, he is able to speak in full sentences, O2 sat stable. The CPAP mask in hospital does not fit him well and they are looking for another mask at this time. No other complaints at this time, feels that his strength and speech are improving.    PHYSICAL EXAM  63y  Vital Signs Last 24 Hrs  T(C): 36.6 (02 Feb 2019 08:11), Max: 36.6 (02 Feb 2019 08:11)  T(F): 97.9 (02 Feb 2019 08:11), Max: 97.9 (02 Feb 2019 08:11)  HR: 79 (02 Feb 2019 08:11) (72 - 79)  BP: 160/104 (02 Feb 2019 08:11) (152/98 - 168/94)  BP(mean): --  RR: 15 (02 Feb 2019 08:11) (14 - 18)  SpO2: 97% (02 Feb 2019 08:11) (94% - 99%)  Daily Height in cm: 180.34 (01 Feb 2019 19:50)    Daily     General: NAD    Resp: CTAB  Cardio: +S1, S2  Abdomen: soft, NT, ND, normoactive bowel sounds  Extremities: no edema or calf tenderness  Neuro: awake and alert,   Skin:      RECENT LABS:                          16.1   6.8   )-----------( 213      ( 02 Feb 2019 06:18 )             48.2     02-02    142  |  104  |  25<H>  ----------------------------<  96  3.8   |  27  |  1.13    Ca    9.3      02 Feb 2019 06:18  Phos  3.4     02-02  Mg     2.2     02-02    TPro  7.9  /  Alb  3.4  /  TBili  1.0  /  DBili  x   /  AST  37  /  ALT  36  /  AlkPhos  81  02-02    LIVER FUNCTIONS - ( 02 Feb 2019 06:18 )  Alb: 3.4 g/dL / Pro: 7.9 g/dL / ALK PHOS: 81 U/L / ALT: 36 U/L DA / AST: 37 U/L / GGT: x                   CAPILLARY BLOOD GLUCOSE        IMPRESSION AND PLAN:

## 2019-02-02 NOTE — DIETITIAN INITIAL EVALUATION ADULT. - NS AS NUTRI INTERV COLLABORAT
Patient went to [facility] ER on either Tuesday 6/26/18 OR Thursday 6/28/18, patient was having constant nose bleeds. Patient still is having nose bleeds, they are not as bad, as last week.  Patient said shes not sure if she should come here o Obtain Weights Weekly

## 2019-02-03 PROCEDURE — 99232 SBSQ HOSP IP/OBS MODERATE 35: CPT

## 2019-02-03 PROCEDURE — 99232 SBSQ HOSP IP/OBS MODERATE 35: CPT | Mod: GC

## 2019-02-03 RX ORDER — AMLODIPINE BESYLATE 2.5 MG/1
5 TABLET ORAL ONCE
Qty: 0 | Refills: 0 | Status: COMPLETED | OUTPATIENT
Start: 2019-02-03 | End: 2019-02-03

## 2019-02-03 RX ORDER — POLYETHYLENE GLYCOL 3350 17 G/17G
17 POWDER, FOR SOLUTION ORAL DAILY
Qty: 0 | Refills: 0 | Status: DISCONTINUED | OUTPATIENT
Start: 2019-02-03 | End: 2019-02-06

## 2019-02-03 RX ORDER — HYDRALAZINE HCL 50 MG
10 TABLET ORAL ONCE
Qty: 0 | Refills: 0 | Status: COMPLETED | OUTPATIENT
Start: 2019-02-03 | End: 2019-02-03

## 2019-02-03 RX ORDER — AMLODIPINE BESYLATE 2.5 MG/1
10 TABLET ORAL DAILY
Qty: 0 | Refills: 0 | Status: DISCONTINUED | OUTPATIENT
Start: 2019-02-03 | End: 2019-02-25

## 2019-02-03 RX ADMIN — Medication 10 MILLIGRAM(S): at 17:24

## 2019-02-03 RX ADMIN — HEPARIN SODIUM 5000 UNIT(S): 5000 INJECTION INTRAVENOUS; SUBCUTANEOUS at 21:53

## 2019-02-03 RX ADMIN — HEPARIN SODIUM 5000 UNIT(S): 5000 INJECTION INTRAVENOUS; SUBCUTANEOUS at 06:17

## 2019-02-03 RX ADMIN — POLYETHYLENE GLYCOL 3350 17 GRAM(S): 17 POWDER, FOR SOLUTION ORAL at 10:12

## 2019-02-03 RX ADMIN — ATORVASTATIN CALCIUM 80 MILLIGRAM(S): 80 TABLET, FILM COATED ORAL at 21:53

## 2019-02-03 RX ADMIN — AMLODIPINE BESYLATE 5 MILLIGRAM(S): 2.5 TABLET ORAL at 06:17

## 2019-02-03 RX ADMIN — HEPARIN SODIUM 5000 UNIT(S): 5000 INJECTION INTRAVENOUS; SUBCUTANEOUS at 13:14

## 2019-02-03 RX ADMIN — Medication 81 MILLIGRAM(S): at 13:12

## 2019-02-03 RX ADMIN — Medication 10 MILLIGRAM(S): at 13:12

## 2019-02-03 RX ADMIN — AMLODIPINE BESYLATE 5 MILLIGRAM(S): 2.5 TABLET ORAL at 10:24

## 2019-02-03 NOTE — PROGRESS NOTE ADULT - ASSESSMENT
Pt is a 64yo M w PMH of HTN, CAYDEN on CPAP, HLD admitted to [facility] with c/o slurred speech and left side paresis. Initial CT showed old lacunar infact, s/p tPA. MRI showed acute right basal ganglia infarct and chronic bilateral lacunar infarct right basal ganglia and left caudate head/ant corona radiata region. Pt have oropharyngeal dysphagia and placed on Dysphagia 3 diet.     *CVA  Cont acute rehab  PT/OT/SLP  ASA, Lipitor  Prozac  Fall precaution     *CAYDEN  CPAP     *HLD  cont lipitor     *HTN  not controlled  Will increase to Norvasc 10mg today   Cont monitor     *Dysphagia   SLP  Dysphagia diet   aspiration precaution     *DVT ppx   Cont Lovenox

## 2019-02-03 NOTE — PROGRESS NOTE ADULT - SUBJECTIVE AND OBJECTIVE BOX
HPI  Pt is a 62yo M w PMH of HTN, CAYDEN on CPAP, HLD admitted to [facility] with c/o slurred speech and left side paresis. Initial CT showed old lacunar infact, s/p tPA. MRI showed acute right basal ganglia infarct and chronic bilateral lacunar infarct right basal ganglia and left caudate head/ant corona radiata region. Pt have oropharyngeal dysphagia and placed on Dysphagia 3 diet.     Pt was seen and examined at bedside with wife by the side. Pt started using own CPAP mask and states he slept very well. Explained to pt his cholesterol panel result. Pt denies of any SOB, palpitation, dizziness, chest pain.     Vital Signs Last 24 Hrs  T(C): 36.4 (02 Feb 2019 22:19), Max: 36.4 (02 Feb 2019 22:19)  T(F): 97.6 (02 Feb 2019 22:19), Max: 97.6 (02 Feb 2019 22:19)  HR: 80 (03 Feb 2019 09:27) (77 - 82)  BP: 170/100 (03 Feb 2019 09:27) (158/98 - 170/100)  BP(mean): --  RR: 14 (03 Feb 2019 09:27) (14 - 14)  SpO2: 98% (03 Feb 2019 09:27) (98% - 99%)    I&O's Summary      CAPILLARY BLOOD GLUCOSE          PHYSICAL EXAM:  Constitutional: NAD, awake and alert, well-developed  HEENT: PERR, EOMI, Normal Hearing, MMM  Neck: Soft and supple, No LAD, No JVD  Respiratory: Breath sounds are clear bilaterally, No wheezing, rales or rhonchi  Cardiovascular: S1 and S2, regular rate and rhythm, no Murmurs, gallops or rubs  Gastrointestinal: Bowel Sounds present, soft, nontender, nondistended, no guarding, no rebound  Extremities: No peripheral edema  Vascular: 2+ peripheral pulses  Neurological: A/O x 3, no focal deficits  Musculoskeletal: 3/5 LUE, 4/5 LLE motor, 5/5 RUE, 5/5 RLE   Skin: No rashes    MEDICATIONS:  MEDICATIONS  (STANDING):  amLODIPine   Tablet 10 milliGRAM(s) Oral daily  aspirin  chewable 81 milliGRAM(s) Oral daily  atorvastatin 80 milliGRAM(s) Oral at bedtime  FLUoxetine 10 milliGRAM(s) Oral daily  heparin  Injectable 5000 Unit(s) SubCutaneous every 8 hours      LABS: All Labs Reviewed:                        16.1   6.8   )-----------( 213      ( 02 Feb 2019 06:18 )             48.2     02-02    142  |  104  |  25<H>  ----------------------------<  96  3.8   |  27  |  1.13    Ca    9.3      02 Feb 2019 06:18  Phos  3.4     02-02  Mg     2.2     02-02    TPro  7.9  /  Alb  3.4  /  TBili  1.0  /  DBili  x   /  AST  37  /  ALT  36  /  AlkPhos  81  02-02          Blood Culture:     RADIOLOGY/EKG:    DVT PPX:    ADVANCED DIRECTIVE:    DISPOSITION:

## 2019-02-03 NOTE — PROGRESS NOTE ADULT - SUBJECTIVE AND OBJECTIVE BOX
No overnight events.  Patient received a different mask for his CPAP.  Able to sleep without difficulty. Now also able to place on with one hand more easily.  Reports no pain.     REVIEW OF SYSTEMS  Constitutional - No fever,  +fatigue  HEENT - No vertigo, No neck pain  Neurological - No headaches, +loss of strength  Musculoskeletal - No joint pain, No joint swelling, No muscle pain    VITALS  T(C): 36.4 (02-02-19 @ 22:19), Max: 36.4 (02-02-19 @ 22:19)  HR: 82 (02-03-19 @ 06:25) (77 - 82)  BP: 163/103 (02-03-19 @ 06:25) (158/98 - 163/103)  RR: 14 (02-03-19 @ 06:25) (14 - 14)  SpO2: 99% (02-03-19 @ 06:25) (98% - 99%)  Wt(kg): --       MEDICATIONS   acetaminophen   Tablet .. 650 milliGRAM(s) every 6 hours PRN  amLODIPine   Tablet 5 milliGRAM(s) daily  aspirin  chewable 81 milliGRAM(s) daily  atorvastatin 80 milliGRAM(s) at bedtime  FLUoxetine 10 milliGRAM(s) daily  fluticasone propionate 50 MICROgram(s)/spray Nasal Spray 1 Spray(s) two times a day PRN  heparin  Injectable 5000 Unit(s) every 8 hours      RECENT LABS/IMAGING  CBC Full  -  ( 02 Feb 2019 06:18 )  WBC Count : 6.8 K/uL  Hemoglobin : 16.1 g/dL  Hematocrit : 48.2 %  Platelet Count - Automated : 213 K/uL  Mean Cell Volume : 88.1 fl  Mean Cell Hemoglobin : 29.4 pg  Mean Cell Hemoglobin Concentration : 33.4 gm/dL  Auto Neutrophil # : 4.1 K/uL  Auto Lymphocyte # : 2.1 K/uL  Auto Monocyte # : 0.4 K/uL  Auto Eosinophil # : 0.2 K/uL  Auto Basophil # : 0.1 K/uL  Auto Neutrophil % : 59.3 %  Auto Lymphocyte % : 31.1 %  Auto Monocyte % : 6.1 %  Auto Eosinophil % : 2.6 %  Auto Basophil % : 1.0 %    02-02    142  |  104  |  25<H>  ----------------------------<  96  3.8   |  27  |  1.13    Ca    9.3      02 Feb 2019 06:18  Phos  3.4     02-02  Mg     2.2     02-02    TPro  7.9  /  Alb  3.4  /  TBili  1.0  /  DBili  x   /  AST  37  /  ALT  36  /  AlkPhos  81  02-02          ---------  PHYSICAL EXAM  Constitutional - NAD, Comfortable	  Pulm - Breathing comfortably, No wheezing  Abd - Soft, NTND, Obese  Extremities - No edema, No calf tenderness  Neurologic Exam -                    Cognitive - Awake, Alert     Communication - Fluent     Motor - left hemiparesis     Sensory - Intact to LT  Psychiatric - Mood WNL, Affect WNL    ASSESSMENT/PLAN  63y Male with functional deficits after acute right CVA with left hemiparesis  CVA - ASA, Lipitor  Mood/Motor Recovery - Prozac	  Pain - Tylenol PRN  DVT PPX - Heparin     Continue 3hrs a day of comprehensive rehab program.

## 2019-02-04 PROCEDURE — 99233 SBSQ HOSP IP/OBS HIGH 50: CPT

## 2019-02-04 PROCEDURE — 99232 SBSQ HOSP IP/OBS MODERATE 35: CPT

## 2019-02-04 RX ORDER — HYDRALAZINE HCL 50 MG
10 TABLET ORAL EVERY 12 HOURS
Qty: 0 | Refills: 0 | Status: DISCONTINUED | OUTPATIENT
Start: 2019-02-04 | End: 2019-02-04

## 2019-02-04 RX ORDER — HYDRALAZINE HCL 50 MG
10 TABLET ORAL EVERY 12 HOURS
Qty: 0 | Refills: 0 | Status: DISCONTINUED | OUTPATIENT
Start: 2019-02-04 | End: 2019-02-05

## 2019-02-04 RX ADMIN — Medication 10 MILLIGRAM(S): at 15:02

## 2019-02-04 RX ADMIN — Medication 10 MILLIGRAM(S): at 18:56

## 2019-02-04 RX ADMIN — HEPARIN SODIUM 5000 UNIT(S): 5000 INJECTION INTRAVENOUS; SUBCUTANEOUS at 05:59

## 2019-02-04 RX ADMIN — HEPARIN SODIUM 5000 UNIT(S): 5000 INJECTION INTRAVENOUS; SUBCUTANEOUS at 21:12

## 2019-02-04 RX ADMIN — Medication 81 MILLIGRAM(S): at 11:57

## 2019-02-04 RX ADMIN — Medication 10 MILLIGRAM(S): at 11:57

## 2019-02-04 RX ADMIN — HEPARIN SODIUM 5000 UNIT(S): 5000 INJECTION INTRAVENOUS; SUBCUTANEOUS at 15:08

## 2019-02-04 RX ADMIN — ATORVASTATIN CALCIUM 80 MILLIGRAM(S): 80 TABLET, FILM COATED ORAL at 21:12

## 2019-02-04 RX ADMIN — AMLODIPINE BESYLATE 10 MILLIGRAM(S): 2.5 TABLET ORAL at 06:00

## 2019-02-04 NOTE — PROGRESS NOTE ADULT - SUBJECTIVE AND OBJECTIVE BOX
Patient is a 63y old  Male who presents with a chief complaint of stroke (04 Feb 2019 13:11)      Patient seen and examined at bedside.    ALLERGIES:  No Known Allergies    MEDICATIONS:  acetaminophen   Tablet .. 650 milliGRAM(s) Oral every 6 hours PRN  amLODIPine   Tablet 10 milliGRAM(s) Oral daily  aspirin  chewable 81 milliGRAM(s) Oral daily  atorvastatin 80 milliGRAM(s) Oral at bedtime  FLUoxetine 10 milliGRAM(s) Oral daily  fluticasone propionate 50 MICROgram(s)/spray Nasal Spray 1 Spray(s) Both Nostrils two times a day PRN  heparin  Injectable 5000 Unit(s) SubCutaneous every 8 hours  hydrALAZINE 10 milliGRAM(s) Oral every 12 hours  polyethylene glycol 3350 17 Gram(s) Oral daily PRN    Vital Signs Last 24 Hrs  T(F): 98.7 (04 Feb 2019 08:04), Max: 98.7 (04 Feb 2019 08:04)  HR: 80 (04 Feb 2019 08:04) (80 - 80)  BP: 170/100 (04 Feb 2019 11:03) (163/100 - 207/127)  RR: 14 (04 Feb 2019 08:04) (14 - 14)  SpO2: 100% (04 Feb 2019 08:04) (99% - 100%)  I&O's Summary      PHYSICAL EXAM:  General: NAD,    Neck: Supple, No JVD  Lungs: Clear to auscultation bilaterally  Cardio: RRR, S1/S2, No murmurs  Abdomen: Soft, Nontender, Nondistended; Bowel sounds present  Extremities: No cyanosis, No edema  neuro: left sided weakness  LABS:                        16.1   6.8   )-----------( 213      ( 02 Feb 2019 06:18 )             48.2     02-02    142  |  104  |  25  ----------------------------<  96  3.8   |  27  |  1.13    Ca    9.3      02 Feb 2019 06:18  Phos  3.4     02-02  Mg     2.2     02-02    TPro  7.9  /  Alb  3.4  /  TBili  1.0  /  DBili  x   /  AST  37  /  ALT  36  /  AlkPhos  81  02-02    eGFR if Non African American: 70 mL/min/1.73M2 (02-02-19 @ 06:18)  eGFR if African American: 81 mL/min/1.73M2 (02-02-19 @ 06:18)            01-27 Chol 305 mg/dL  mg/dL HDL 31 mg/dL Trig 214 mg/dL        CAPILLARY BLOOD GLUCOSE        01-27 [id number] 5.6          RADIOLOGY & ADDITIONAL TESTS:    Care Discussed with Consultants/Other Providers:

## 2019-02-04 NOTE — PROGRESS NOTE ADULT - ASSESSMENT
ASSESSMENT/PLAN  63 year-old man with a PMH of HTN who was found to have acute CVA with left side hemiparesis and given TPA and is now with Gait Instability, ADL impairments and Functional impairments.    COMORBIDITIES / ACTIVE MEDICAL ISSUES     Gait Instability, ADL impairments and Functional impairments: start Comprehensive Rehab Program of PT/OT   acute CVA presenting with Left sided hemiparesis, dysarthria  - s/p tPA on 1/26  - LLE weakness improving, Modest improvement in ULE  Plan   - On ASA/Lipitor 80 mg PO QHS  - started prozac for motor recovery     CAYDEN (Obstructive Sleep Apnea).    - compliant with CPAP at home   Plan   - continue Nocturnal CPAP  - Fu Pulm as outpt- pt follows up Dr Bakari Haas.     HTN/HLD .    - norvasc 5 mg    Pain Mgmt - Tylenol PRN, Oxycodone PRN    GI/Bowel Mgmt -  Colace, Senna,  Dulcolax PRN, Miralax PRN,    /Bladder Mgmt -  PVR    FEN   - Dysphagia modified diet -   SLP - evaluation and treatment    Precautions / PROPHYLAXIS:   - Falls -  aspiration  - ortho: Weight bearing status: WBAT   - Lungs: Aspiration, Incentive Spirometer (patient instructed at the bedside)  - Pressure injury/Skin: Turn Q2hrs while in bed, OOB to Chair, PT/OT    - DVT: Lovenox, SCDs, TEDs     MEDICAL PROGNOSIS: GOOD            REHAB POTENTIAL: GOOD             ESTIMATED DISPOSITION: HOME            ELOS: 10-14 Days   EXPECTED THERAPY:     P.T. 1hr/day  and   O.T. 1hr/day and if necessary  S.L.P. 1hr/day  and if necessary  P&O    EXP FREQUENCY: 5 days per 7 day period     PRESCREEN COMPARISON:   I have reviewed the prescreen information and I have found no relevant changes between the preadmission screening and my post admission evaluation     RATIONALE FOR INPATIENT ADMISSION - Patient demonstrates the following: (check all that apply)  [X] Medically appropriate for rehabilitation admission  [X] Has attainable rehab goals with an appropriate initial discharge plan  [X] Has rehabilitation potential (expected to make a significant improvement within a reasonable period of time)   [X] Requires close medical management by a rehab physician, rehab nursing care, Hospitalist and comprehensive interdisciplinary team (including PT, OT, & or SLP, Prosthetics and Orthotics) ASSESSMENT/PLAN  63 year-old man with a PMH of HTN who was found to have acute CVA with left side hemiparesis and given TPA and is now with Gait Instability, ADL impairments and Functional impairments.    COMORBIDITIES / ACTIVE MEDICAL ISSUES     Gait Instability, ADL impairments and Functional impairments: start Comprehensive Rehab Program of PT/OT     #Acute CVA presenting with Left sided hemiparesis, dysarthria  - s/p tPA on 1/26  - LLE weakness improving, Modest improvement in ULE  - On ASA/Lipitor 80 mg PO QHS  - on prozac for motor recovery     #CAYDEN (Obstructive Sleep Apnea).    - compliant with CPAP at home   Plan   - continue Nocturnal CPAP  - Fu Pulm as outpt- pt follows up Dr Bakari Haas.     HTN/HLD .    - norvasc increased to 10 mg 2/3, patient /100  -hospitalist consult    Pain Mgmt - Tylenol PRN, Oxycodone PRN    GI/Bowel Mgmt -  Colace, Senna,  Dulcolax PRN, Miralax PRN,    /Bladder Mgmt -  PVR    FEN   - Dysphagia modified diet -   SLP - evaluation and treatment      - DVT: Lovenox, SCDs, TEDs       Labs  [  ] HTN ASSESSMENT/PLAN  63 year-old man with a PMH of HTN, morbid obesity who was found to have acute CVA with left side hemiparesis and given TPA and is now with Gait Instability, ADL impairments and Functional impairments.    COMORBIDITIES / ACTIVE MEDICAL ISSUES     Gait Instability, ADL impairments and Functional impairments: start Comprehensive Rehab Program of PT/OT     #Acute CVA presenting with Left sided hemiparesis, dysarthria  - s/p tPA on 1/26  - LLE weakness improving, Modest improvement in ULE  - On ASA/Lipitor 80 mg PO QHS  - on prozac for motor recovery , discussed with patient  -importance of BP management discussed with patient    #CAYDEN (Obstructive Sleep Apnea).    - compliant with CPAP at home ; continue Nocturnal CPAP  - Fu Pulm as outpt- pt follows up Dr Bakari Haas.     #HTN/HLD .    - norvasc increased to 10 mg 2/3, patient /100  -hospitalist consult: recommends starting hydralazine  -monitor vitals. make sure appropriate size cuff is used, manual better read      #GI/Bowel Mgmt   -  Colace, Senna,  Dulcolax PRN, Miralax PRN,    #/Bladder Mgmt   -  PVR      -#DVT:  - Lovenox, SCDs, TEDs       Labs  [  ] HTN

## 2019-02-04 NOTE — PROGRESS NOTE ADULT - ASSESSMENT
64 yo male admitted 2-1-19 with pmh essential htn, CAYDEN on CPAP, dyslipidemia presents with acute cva s/p tpa with left sided weakness    1. acute cva: c/w asa and statin   2. left sided weakness secondary to acute cva being actively treated w/ pt and ot  3. uncontrolled htn: start hydralazine 25mg q8 and c/w norvasc 10mg daily  4. depression: stable c/w prozac  5. dysphagia: c/w soft thing liquids  6. dvt ppx: heparin sub q

## 2019-02-04 NOTE — PROGRESS NOTE ADULT - SUBJECTIVE AND OBJECTIVE BOX
Patient is a 63y old  Male who presents with a chief complaint of stroke (03 Feb 2019 10:34)      HPI:  63 year-old Man with a PMH of HTN (not on meds), CAYDEN (on CPAP, compliant), obesity, and HLD (not on meds) who presents to hospital with acute onset of slurred speech and Left UE paresis found to have ischemic stroke  s/p tPA now in MICU for further observation  and management.  CT head on 1/27-Old lacunar infarcts involving the right basal ganglia, left caudate head /anterior corona radiata region. MRI Brain MRI: Acute right basal ganglia infarct. Chronic bilateral lacunar infarcts. Found to have Oropharyngeal dysphagia place on Dysphagia 3 diet. Patient transferred to Acute Rehabilitation for functional recovery. (01 Feb 2019 19:12)      PAST MEDICAL & SURGICAL HISTORY:  Sciatica  High cholesterol  Hypertension  Peripheral Vascular Disease  CAYDEN (Obstructive Sleep Apnea)  CAD (Coronary Artery Disease)  History of cholecystectomy  History of tonsillectomy  S/P ACL repair      MEDICATIONS  (STANDING):  amLODIPine   Tablet 10 milliGRAM(s) Oral daily  aspirin  chewable 81 milliGRAM(s) Oral daily  atorvastatin 80 milliGRAM(s) Oral at bedtime  FLUoxetine 10 milliGRAM(s) Oral daily  heparin  Injectable 5000 Unit(s) SubCutaneous every 8 hours    MEDICATIONS  (PRN):  acetaminophen   Tablet .. 650 milliGRAM(s) Oral every 6 hours PRN Mild Pain (1 - 3), Moderate Pain (4 - 6)  fluticasone propionate 50 MICROgram(s)/spray Nasal Spray 1 Spray(s) Both Nostrils two times a day PRN nasal congestion  polyethylene glycol 3350 17 Gram(s) Oral daily PRN Constipation      Allergies    No Known Allergies    Intolerances    PHYSICAL EXAM  63y  Vital Signs Last 24 Hrs  T(C): 37.1 (04 Feb 2019 08:04), Max: 37.1 (04 Feb 2019 08:04)  T(F): 98.7 (04 Feb 2019 08:04), Max: 98.7 (04 Feb 2019 08:04)  HR: 80 (04 Feb 2019 08:04) (80 - 80)  BP: 170/100 (04 Feb 2019 11:03) (163/100 - 207/127)  BP(mean): --  RR: 14 (04 Feb 2019 08:04) (14 - 14)  SpO2: 100% (04 Feb 2019 08:04) (99% - 100%)  Daily     Daily     RECENT LABS:                      CAPILLARY BLOOD GLUCOSE        IMPRESSION AND PLAN: Patient is a 63y old  Male who presents with a chief complaint of stroke (03 Feb 2019 10:34)      HPI:  63 year-old Man with a PMH of HTN (not on meds), CAYDEN (on CPAP, compliant), morbid obesity, and HLD (not on meds) who presents to hospital with acute onset of slurred speech and Left UE paresis found to have ischemic stroke  s/p tPA now in MICU for further observation  and management.  CT head on 1/27-Old lacunar infarcts involving the right basal ganglia, left caudate head /anterior corona radiata region. MRI Brain MRI: Acute right basal ganglia infarct. Chronic bilateral lacunar infarcts. Found to have Oropharyngeal dysphagia place on Dysphagia 3 diet. Patient transferred to Acute Rehabilitation for functional recovery. (01 Feb 2019 19:12)      PAST MEDICAL & SURGICAL HISTORY:  Sciatica  High cholesterol  Hypertension  Peripheral Vascular Disease  CAYDEN (Obstructive Sleep Apnea)  CAD (Coronary Artery Disease)  History of cholecystectomy  History of tonsillectomy  S/P ACL repair      MEDICATIONS  (STANDING):  amLODIPine   Tablet 10 milliGRAM(s) Oral daily  aspirin  chewable 81 milliGRAM(s) Oral daily  atorvastatin 80 milliGRAM(s) Oral at bedtime  FLUoxetine 10 milliGRAM(s) Oral daily  heparin  Injectable 5000 Unit(s) SubCutaneous every 8 hours    MEDICATIONS  (PRN):  acetaminophen   Tablet .. 650 milliGRAM(s) Oral every 6 hours PRN Mild Pain (1 - 3), Moderate Pain (4 - 6)  fluticasone propionate 50 MICROgram(s)/spray Nasal Spray 1 Spray(s) Both Nostrils two times a day PRN nasal congestion  polyethylene glycol 3350 17 Gram(s) Oral daily PRN Constipation      Allergies    No Known Allergies    Intolerances    PHYSICAL EXAM  63y  Vital Signs Last 24 Hrs  T(C): 37.1 (04 Feb 2019 08:04), Max: 37.1 (04 Feb 2019 08:04)  T(F): 98.7 (04 Feb 2019 08:04), Max: 98.7 (04 Feb 2019 08:04)  HR: 80 (04 Feb 2019 08:04) (80 - 80)  BP: 170/100 (04 Feb 2019 11:03) (163/100 - 207/127)  BP(mean): --  RR: 14 (04 Feb 2019 08:04) (14 - 14)  SpO2: 100% (04 Feb 2019 08:04) (99% - 100%)  Daily     Daily     RECENT LABS:                      CAPILLARY BLOOD GLUCOSE        IMPRESSION AND PLAN:

## 2019-02-04 NOTE — PROGRESS NOTE ADULT - GENERAL COMMENTS
Patients Bp elevated yesterday and patient recienved 1 time dose hydralizine 10 mg Patients Bp elevated yesterday and patient received 1 time dose hydralazine 10 mg with improvement. Patient seen and examine in the AM. Denies any headache/ pressure. He states he used to take BP medication at home but stopped. No other complaints at this time.

## 2019-02-04 NOTE — PROGRESS NOTE ADULT - NEUROLOGICAL COMMENTS
Left shoulder 4-, Elbow flexion/extension, wrist extension, finger flexion 3/5 Left shoulder 4-, Elbow flexion/extension, wrist extension,3/5 finger flexion 3-/5. synergistic pattern

## 2019-02-05 PROCEDURE — 99232 SBSQ HOSP IP/OBS MODERATE 35: CPT

## 2019-02-05 PROCEDURE — 96116 NUBHVL XM PHYS/QHP 1ST HR: CPT

## 2019-02-05 RX ORDER — HYDRALAZINE HCL 50 MG
10 TABLET ORAL ONCE
Qty: 0 | Refills: 0 | Status: COMPLETED | OUTPATIENT
Start: 2019-02-05 | End: 2019-02-05

## 2019-02-05 RX ORDER — HYDRALAZINE HCL 50 MG
25 TABLET ORAL EVERY 12 HOURS
Qty: 0 | Refills: 0 | Status: DISCONTINUED | OUTPATIENT
Start: 2019-02-06 | End: 2019-02-12

## 2019-02-05 RX ORDER — LANOLIN ALCOHOL/MO/W.PET/CERES
9 CREAM (GRAM) TOPICAL AT BEDTIME
Qty: 0 | Refills: 0 | Status: DISCONTINUED | OUTPATIENT
Start: 2019-02-05 | End: 2019-02-07

## 2019-02-05 RX ADMIN — HEPARIN SODIUM 5000 UNIT(S): 5000 INJECTION INTRAVENOUS; SUBCUTANEOUS at 14:11

## 2019-02-05 RX ADMIN — ATORVASTATIN CALCIUM 80 MILLIGRAM(S): 80 TABLET, FILM COATED ORAL at 21:18

## 2019-02-05 RX ADMIN — Medication 10 MILLIGRAM(S): at 17:42

## 2019-02-05 RX ADMIN — Medication 10 MILLIGRAM(S): at 06:08

## 2019-02-05 RX ADMIN — HEPARIN SODIUM 5000 UNIT(S): 5000 INJECTION INTRAVENOUS; SUBCUTANEOUS at 06:09

## 2019-02-05 RX ADMIN — AMLODIPINE BESYLATE 10 MILLIGRAM(S): 2.5 TABLET ORAL at 06:06

## 2019-02-05 RX ADMIN — Medication 10 MILLIGRAM(S): at 11:35

## 2019-02-05 RX ADMIN — Medication 9 MILLIGRAM(S): at 21:18

## 2019-02-05 RX ADMIN — Medication 81 MILLIGRAM(S): at 11:36

## 2019-02-05 RX ADMIN — Medication 10 MILLIGRAM(S): at 17:06

## 2019-02-05 RX ADMIN — HEPARIN SODIUM 5000 UNIT(S): 5000 INJECTION INTRAVENOUS; SUBCUTANEOUS at 21:18

## 2019-02-05 NOTE — PROGRESS NOTE ADULT - ASSESSMENT
ASSESSMENT/PLAN  63 year-old man with a PMH of HTN, morbid obesity who was found to have acute CVA with left side hemiparesis and given TPA and is now with Gait Instability, ADL impairments and Functional impairments.    COMORBIDITIES / ACTIVE MEDICAL ISSUES     Gait Instability, ADL impairments and Functional impairments: start Comprehensive Rehab Program of PT/OT     #Acute CVA presenting with Left sided hemiparesis, dysarthria s/p tpa 1/26  - continue ASA/Lipitor 80 mg PO QHS  - continue prozac for motor recovery    #CAYDEN (Obstructive Sleep Apnea).    - compliant with CPAP at home ; continue Nocturnal CPAP  O2 via NC PRN SOB during day  - Fu Pulm as outpt- pt follows up Dr Bakari Haas.    #dizziness:  -may be multifactorial, including orthostasis, uncontrolled HTn, BG CVA, discussed in detail with patient and caregiver  -BMP also shows sl inc BUn/Cr will repeat and monitor  -hospitalist consult for evaluation anti-HTN medications     #HTN/HLD .    - norvasc 10 mg daily. hydralazine 10 mg q12  -hospitalist f/u    #GI/Bowel Mgmt   -  Colace, Senna,  Dulcolax PRN, Miralax PRN,    #insomnia  -melatonin 9 mg qhs      -#DVT:  - Lovenox, SCDs, TEDs       Labs  [  ] HTN  -BMp 2/6

## 2019-02-05 NOTE — PROGRESS NOTE ADULT - SUBJECTIVE AND OBJECTIVE BOX
Patient is a 63y old  Male who presents with a chief complaint of stroke (04 Feb 2019 14:15)      HPI:  63 year-old Man with a PMH of HTN (not on meds), CAYDEN (on CPAP, compliant), obesity, and HLD (not on meds) who presents to hospital with acute onset of slurred speech and Left UE paresis found to have ischemic stroke  s/p tPA now in MICU for further observation  and management.  CT head on 1/27-Old lacunar infarcts involving the right basal ganglia, left caudate head /anterior corona radiata region. MRI Brain MRI: Acute right basal ganglia infarct. Chronic bilateral lacunar infarcts. Found to have Oropharyngeal dysphagia place on Dysphagia 3 diet. Patient transferred to Acute Rehabilitation for functional recovery. (01 Feb 2019 19:12)      PAST MEDICAL & SURGICAL HISTORY:  Sciatica  High cholesterol  Hypertension  Peripheral Vascular Disease  CAYDEN (Obstructive Sleep Apnea)  CAD (Coronary Artery Disease)  History of cholecystectomy  History of tonsillectomy  S/P ACL repair      MEDICATIONS  (STANDING):  amLODIPine   Tablet 10 milliGRAM(s) Oral daily  aspirin  chewable 81 milliGRAM(s) Oral daily  atorvastatin 80 milliGRAM(s) Oral at bedtime  FLUoxetine 10 milliGRAM(s) Oral daily  heparin  Injectable 5000 Unit(s) SubCutaneous every 8 hours  hydrALAZINE 10 milliGRAM(s) Oral every 12 hours  melatonin 9 milliGRAM(s) Oral at bedtime    MEDICATIONS  (PRN):  acetaminophen   Tablet .. 650 milliGRAM(s) Oral every 6 hours PRN Mild Pain (1 - 3), Moderate Pain (4 - 6)  fluticasone propionate 50 MICROgram(s)/spray Nasal Spray 1 Spray(s) Both Nostrils two times a day PRN nasal congestion  polyethylene glycol 3350 17 Gram(s) Oral daily PRN Constipation      Allergies    No Known Allergies    Intolerances          VITALS  63y  Vital Signs Last 24 Hrs  T(C): 36.8 (05 Feb 2019 08:05), Max: 37 (04 Feb 2019 21:15)  T(F): 98.2 (05 Feb 2019 08:05), Max: 98.6 (04 Feb 2019 21:15)  HR: 94 (05 Feb 2019 08:05) (78 - 115)  BP: 175/90 (05 Feb 2019 08:05) (170/95 - 190/90)  BP(mean): --  RR: 14 (05 Feb 2019 08:05) (14 - 16)  SpO2: 100% (05 Feb 2019 08:05) (100% - 100%)  Daily     Daily         RECENT LABS:                      CAPILLARY BLOOD GLUCOSE

## 2019-02-05 NOTE — PROGRESS NOTE ADULT - GENERAL COMMENTS
Patient seen with careiver at bedside. Reports dizziness, mostly with head turns, transfers OOB to sitting position. No N/V, no diaphoresis, no palpitations associated with it. Concerned it might be his anti-HTN medications. Requests melatonin 9mg qhs for sleep, which is what he took previously

## 2019-02-06 LAB
ANION GAP SERPL CALC-SCNC: 12 MMOL/L — SIGNIFICANT CHANGE UP (ref 5–17)
BUN SERPL-MCNC: 26 MG/DL — HIGH (ref 7–23)
CALCIUM SERPL-MCNC: 9.1 MG/DL — SIGNIFICANT CHANGE UP (ref 8.4–10.5)
CHLORIDE SERPL-SCNC: 101 MMOL/L — SIGNIFICANT CHANGE UP (ref 96–108)
CO2 SERPL-SCNC: 25 MMOL/L — SIGNIFICANT CHANGE UP (ref 22–31)
CREAT SERPL-MCNC: 1.1 MG/DL — SIGNIFICANT CHANGE UP (ref 0.5–1.3)
GLUCOSE SERPL-MCNC: 113 MG/DL — HIGH (ref 70–99)
POTASSIUM SERPL-MCNC: 3.5 MMOL/L — SIGNIFICANT CHANGE UP (ref 3.5–5.3)
POTASSIUM SERPL-SCNC: 3.5 MMOL/L — SIGNIFICANT CHANGE UP (ref 3.5–5.3)
SODIUM SERPL-SCNC: 138 MMOL/L — SIGNIFICANT CHANGE UP (ref 135–145)
T3 SERPL-MCNC: 102 NG/DL — SIGNIFICANT CHANGE UP (ref 80–200)
T4 AB SER-ACNC: 6.9 UG/DL — SIGNIFICANT CHANGE UP (ref 4.6–12)
TSH SERPL-MCNC: 3.53 UIU/ML — SIGNIFICANT CHANGE UP (ref 0.27–4.2)

## 2019-02-06 PROCEDURE — 99233 SBSQ HOSP IP/OBS HIGH 50: CPT

## 2019-02-06 PROCEDURE — 99232 SBSQ HOSP IP/OBS MODERATE 35: CPT

## 2019-02-06 RX ORDER — POLYETHYLENE GLYCOL 3350 17 G/17G
17 POWDER, FOR SOLUTION ORAL DAILY
Qty: 0 | Refills: 0 | Status: DISCONTINUED | OUTPATIENT
Start: 2019-02-06 | End: 2019-02-25

## 2019-02-06 RX ORDER — DOCUSATE SODIUM 100 MG
100 CAPSULE ORAL
Qty: 0 | Refills: 0 | Status: DISCONTINUED | OUTPATIENT
Start: 2019-02-06 | End: 2019-02-25

## 2019-02-06 RX ADMIN — AMLODIPINE BESYLATE 10 MILLIGRAM(S): 2.5 TABLET ORAL at 06:29

## 2019-02-06 RX ADMIN — HEPARIN SODIUM 5000 UNIT(S): 5000 INJECTION INTRAVENOUS; SUBCUTANEOUS at 06:29

## 2019-02-06 RX ADMIN — Medication 10 MILLIGRAM(S): at 11:55

## 2019-02-06 RX ADMIN — Medication 81 MILLIGRAM(S): at 11:55

## 2019-02-06 RX ADMIN — Medication 25 MILLIGRAM(S): at 06:29

## 2019-02-06 RX ADMIN — HEPARIN SODIUM 5000 UNIT(S): 5000 INJECTION INTRAVENOUS; SUBCUTANEOUS at 21:03

## 2019-02-06 RX ADMIN — HEPARIN SODIUM 5000 UNIT(S): 5000 INJECTION INTRAVENOUS; SUBCUTANEOUS at 13:58

## 2019-02-06 RX ADMIN — POLYETHYLENE GLYCOL 3350 17 GRAM(S): 17 POWDER, FOR SOLUTION ORAL at 11:55

## 2019-02-06 RX ADMIN — ATORVASTATIN CALCIUM 80 MILLIGRAM(S): 80 TABLET, FILM COATED ORAL at 21:03

## 2019-02-06 RX ADMIN — Medication 25 MILLIGRAM(S): at 17:05

## 2019-02-06 RX ADMIN — Medication 100 MILLIGRAM(S): at 17:05

## 2019-02-06 RX ADMIN — Medication 9 MILLIGRAM(S): at 21:03

## 2019-02-06 NOTE — PROGRESS NOTE ADULT - ASSESSMENT
62 yo male admitted 2-1-19 with pmh essential htn, CAYDEN on CPAP, dyslipidemia presents with acute cva s/p tpa with left sided weakness    1. acute cva: c/w asa and statin   2. left sided weakness secondary to acute cva being actively treated w/ pt and ot  3. uncontrolled htn: increased to hydralazine 25mg BID and c/w norvasc 10mg daily  4. depression: stable c/w prozac  5. dysphagia: c/w soft thin liquids  6. dvt ppx: heparin sub q

## 2019-02-06 NOTE — PROGRESS NOTE ADULT - EXTREMITIES COMMENTS
no calf tenderness +soft reduced tone LLE  no pain with ROm left UE. patient notes improved overall movement

## 2019-02-06 NOTE — PROGRESS NOTE ADULT - ASSESSMENT
ASSESSMENT/PLAN  63 year-old man with a PMH of HTN, morbid obesity who was found to have acute CVA with left side hemiparesis and given TPA and is now with Gait Instability, ADL impairments and Functional impairments.    COMORBIDITIES / ACTIVE MEDICAL ISSUES     Gait Instability, ADL impairments and Functional impairments: start Comprehensive Rehab Program of PT/OT     #Acute CVA presenting with Left sided hemiparesis, dysarthria s/p tpa 1/26  - continue ASA/Lipitor 80 mg PO QHS  - continue prozac for motor recovery  -psychological support for adjustment symptoms, was seen yesterday. Feels beneficial    #CAYDEN (Obstructive Sleep Apnea).    - compliant with CPAP at home ; continue Nocturnal CPAP  O2 via NC PRN SOB during day  - Fu Pulm as outpt- pt follows up Dr Bakari Haas.    #dizziness:  -may be multifactorial, including orthostasis, uncontrolled HTn, CAYDEN, BG CVA  -BMP also shows sl inc BUn/Cr will repeat and monitor  -hydralazine inc 25 mg q12. monitor. Sl improved  -hospitalist f/u today read and appreciated     #HTN/HLD .    - norvasc 10 mg daily. hydralazine 10 mg q12      #GI/Bowel Mgmt   -  Colace, Senna,  Dulcolax PRN, Miralax PRN,  change miralax to daily 2/6    #insomnia  -melatonin 9 mg qhs      -#DVT:  - Lovenox, SCDs, TEDs       Labs  [  ] HTN

## 2019-02-06 NOTE — PROGRESS NOTE ADULT - SUBJECTIVE AND OBJECTIVE BOX
Patient is a 63y old  Male who presents with a chief complaint of stroke (06 Feb 2019 10:28)      HPI:  63 year-old Man with a PMH of HTN (not on meds), CAYDEN (on CPAP, compliant), obesity, and HLD (not on meds) who presents to hospital with acute onset of slurred speech and Left UE paresis found to have ischemic stroke  s/p tPA now in MICU for further observation  and management.  CT head on 1/27-Old lacunar infarcts involving the right basal ganglia, left caudate head /anterior corona radiata region. MRI Brain MRI: Acute right basal ganglia infarct. Chronic bilateral lacunar infarcts. Found to have Oropharyngeal dysphagia place on Dysphagia 3 diet. Patient transferred to Acute Rehabilitation for functional recovery. (01 Feb 2019 19:12)      PAST MEDICAL & SURGICAL HISTORY:  Sciatica  High cholesterol  Hypertension  Peripheral Vascular Disease  CAYDEN (Obstructive Sleep Apnea)  CAD (Coronary Artery Disease)  History of cholecystectomy  History of tonsillectomy  S/P ACL repair      MEDICATIONS  (STANDING):  amLODIPine   Tablet 10 milliGRAM(s) Oral daily  aspirin  chewable 81 milliGRAM(s) Oral daily  atorvastatin 80 milliGRAM(s) Oral at bedtime  docusate sodium 100 milliGRAM(s) Oral two times a day  FLUoxetine 10 milliGRAM(s) Oral daily  heparin  Injectable 5000 Unit(s) SubCutaneous every 8 hours  hydrALAZINE 25 milliGRAM(s) Oral every 12 hours  melatonin 9 milliGRAM(s) Oral at bedtime    MEDICATIONS  (PRN):  acetaminophen   Tablet .. 650 milliGRAM(s) Oral every 6 hours PRN Mild Pain (1 - 3), Moderate Pain (4 - 6)  fluticasone propionate 50 MICROgram(s)/spray Nasal Spray 1 Spray(s) Both Nostrils two times a day PRN nasal congestion  polyethylene glycol 3350 17 Gram(s) Oral daily PRN Constipation      Allergies    No Known Allergies    Intolerances          VITALS  63y  Vital Signs Last 24 Hrs  T(C): 36.7 (06 Feb 2019 08:59), Max: 36.7 (06 Feb 2019 08:59)  T(F): 98 (06 Feb 2019 08:59), Max: 98 (06 Feb 2019 08:59)  HR: 82 (06 Feb 2019 08:59) (80 - 96)  BP: 168/90 (06 Feb 2019 08:59) (166/102 - 170/90)  BP(mean): --  RR: 14 (06 Feb 2019 08:59) (14 - 18)  SpO2: 100% (06 Feb 2019 08:59) (98% - 100%)  Daily     Daily         RECENT LABS:      02-06    138  |  101  |  26<H>  ----------------------------<  113<H>  3.5   |  25  |  1.10    Ca    9.1      06 Feb 2019 05:25                CAPILLARY BLOOD GLUCOSE

## 2019-02-06 NOTE — PROGRESS NOTE ADULT - SUBJECTIVE AND OBJECTIVE BOX
Patient is a 63y old  Male who presents with a chief complaint of stroke (05 Feb 2019 12:23)      Patient seen and examined at bedside. feels well, states his dizziness was better yesterday than day before. today he hasn't felt dizzy yet but usually occurs in therapy     ALLERGIES:  No Known Allergies    MEDICATIONS:  acetaminophen   Tablet .. 650 milliGRAM(s) Oral every 6 hours PRN  amLODIPine   Tablet 10 milliGRAM(s) Oral daily  aspirin  chewable 81 milliGRAM(s) Oral daily  atorvastatin 80 milliGRAM(s) Oral at bedtime  docusate sodium 100 milliGRAM(s) Oral two times a day  FLUoxetine 10 milliGRAM(s) Oral daily  fluticasone propionate 50 MICROgram(s)/spray Nasal Spray 1 Spray(s) Both Nostrils two times a day PRN  heparin  Injectable 5000 Unit(s) SubCutaneous every 8 hours  hydrALAZINE 25 milliGRAM(s) Oral every 12 hours  melatonin 9 milliGRAM(s) Oral at bedtime  polyethylene glycol 3350 17 Gram(s) Oral daily PRN    Vital Signs Last 24 Hrs  T(F): 98 (06 Feb 2019 08:59), Max: 98 (06 Feb 2019 08:59)  HR: 82 (06 Feb 2019 08:59) (80 - 96)  BP: 168/90 (06 Feb 2019 08:59) (166/102 - 170/90)  RR: 14 (06 Feb 2019 08:59) (14 - 18)  SpO2: 100% (06 Feb 2019 08:59) (98% - 100%)  I&O's Summary      PHYSICAL EXAM:  General: NAD, alert oriented x 3  Neck: Supple, No JVD  Lungs: Clear to auscultation bilaterally  Cardio: RRR, S1/S2, No murmurs  Abdomen: Soft, Nontender, Nondistended; Bowel sounds present  Extremities: No clubbing, cyanosis, or edema    LABS:    02-06    138  |  101  |  26  ----------------------------<  113  3.5   |  25  |  1.10    Ca    9.1      06 Feb 2019 05:25      eGFR if Non African American: 72 mL/min/1.73M2 (02-06-19 @ 05:25)  eGFR if African American: 84 mL/min/1.73M2 (02-06-19 @ 05:25)    01-27 Chol 305 mg/dL  mg/dL HDL 31 mg/dL Trig 214 mg/dL  TSH 3.53   TSH with FT4 reflex --  Total T3 102    CAPILLARY BLOOD GLUCOSE    01-27 [id number] 5.6    RADIOLOGY & ADDITIONAL TESTS:    Care Discussed with Consultants/Other Providers:

## 2019-02-06 NOTE — CHART NOTE - NSCHARTNOTEFT_GEN_A_CORE
Nutrition Follow Up Note  Hospital Course (Per Electronic Medical Record):   Source: Medical Record [X] Patient [X] Family [ ] Nursing Staff [ ]     Diet: Soft (Dysphagia 3) Diet w/ Thin Liquids   Educated Patient on Current Diet Consistency   Tolerates Diet Well  No Chewing/Swallowing Difficulties  No Recent Nausea, Vomiting, Diarrhea or Constipation   Consumes 100% of Meals (as Per Documentation) - States Good PO Intake/Appetite   Obtained Food Preferences from Patient     Enteral/Parenteral Nutrition: N/A    Current Weight: 350.7 lb on 2/1  Obtain Weights Weekly  Obtain New Weight     Pertinent Medications: MEDICATIONS  (STANDING):  amLODIPine   Tablet 10 milliGRAM(s) Oral daily  aspirin  chewable 81 milliGRAM(s) Oral daily  atorvastatin 80 milliGRAM(s) Oral at bedtime  docusate sodium 100 milliGRAM(s) Oral two times a day  FLUoxetine 10 milliGRAM(s) Oral daily  heparin  Injectable 5000 Unit(s) SubCutaneous every 8 hours  hydrALAZINE 25 milliGRAM(s) Oral every 12 hours  melatonin 9 milliGRAM(s) Oral at bedtime  polyethylene glycol 3350 17 Gram(s) Oral daily    MEDICATIONS  (PRN):  acetaminophen   Tablet .. 650 milliGRAM(s) Oral every 6 hours PRN Mild Pain (1 - 3), Moderate Pain (4 - 6)  fluticasone propionate 50 MICROgram(s)/spray Nasal Spray 1 Spray(s) Both Nostrils two times a day PRN nasal congestion    Pertinent Labs:  02-06 Na138 mmol/L Glu 113 mg/dL<H> K+ 3.5 mmol/L Cr  1.10 mg/dL BUN 26 mg/dL<H> 02-02 Phos 3.4 mg/dL 02-02 Alb 3.4 g/dL 01-27 [id number] 5.6 % 01-27 Chol 305 mg/dL<H>  mg/dL HDL 31 mg/dL<L> Trig 214 mg/dL<H>    Skin: No Pressure Ulcers     Edema: None Noted     Last BM: on 2/5    Estimated Needs:   [X] No Change since Previous Assessment    Previous Nutrition Diagnosis:   Morbidly Obese   Chewing Difficulties     Nutrition Diagnosis is [X] Ongoing - Educated Patient on Current Diet Consistency      New Nutrition Diagnosis: [X] Not Applicable    Interventions:   1. Recommend Continue Nutrition Plan of Care   2. Educated Patient on Current Diet Consistency    Monitoring & Evaluation:   [X] Weights   [X] PO Intake   [X] Follow Up (Per Protocol)  [X] Tolerance to Diet Prescription   [X] Other: Labs     RD Remains Available.  Binh Wei RD

## 2019-02-06 NOTE — PROGRESS NOTE ADULT - GENERAL COMMENTS
Patient reports some mild dizziness today 2/10 but overall better than yesterday. Had CPAP on. Less c/o dyspneic. Seems less anxious overall. Was on valium in past for anxiety, last use 3 months ago

## 2019-02-07 PROCEDURE — 99232 SBSQ HOSP IP/OBS MODERATE 35: CPT

## 2019-02-07 RX ORDER — LANOLIN ALCOHOL/MO/W.PET/CERES
9 CREAM (GRAM) TOPICAL
Qty: 0 | Refills: 0 | Status: DISCONTINUED | OUTPATIENT
Start: 2019-02-07 | End: 2019-02-25

## 2019-02-07 RX ADMIN — Medication 650 MILLIGRAM(S): at 10:14

## 2019-02-07 RX ADMIN — AMLODIPINE BESYLATE 10 MILLIGRAM(S): 2.5 TABLET ORAL at 07:06

## 2019-02-07 RX ADMIN — POLYETHYLENE GLYCOL 3350 17 GRAM(S): 17 POWDER, FOR SOLUTION ORAL at 12:19

## 2019-02-07 RX ADMIN — HEPARIN SODIUM 5000 UNIT(S): 5000 INJECTION INTRAVENOUS; SUBCUTANEOUS at 07:06

## 2019-02-07 RX ADMIN — Medication 9 MILLIGRAM(S): at 21:03

## 2019-02-07 RX ADMIN — Medication 10 MILLIGRAM(S): at 12:19

## 2019-02-07 RX ADMIN — Medication 100 MILLIGRAM(S): at 18:09

## 2019-02-07 RX ADMIN — Medication 81 MILLIGRAM(S): at 12:19

## 2019-02-07 RX ADMIN — Medication 650 MILLIGRAM(S): at 08:37

## 2019-02-07 RX ADMIN — HEPARIN SODIUM 5000 UNIT(S): 5000 INJECTION INTRAVENOUS; SUBCUTANEOUS at 21:03

## 2019-02-07 RX ADMIN — ATORVASTATIN CALCIUM 80 MILLIGRAM(S): 80 TABLET, FILM COATED ORAL at 21:03

## 2019-02-07 RX ADMIN — Medication 25 MILLIGRAM(S): at 07:06

## 2019-02-07 RX ADMIN — Medication 100 MILLIGRAM(S): at 07:06

## 2019-02-07 RX ADMIN — HEPARIN SODIUM 5000 UNIT(S): 5000 INJECTION INTRAVENOUS; SUBCUTANEOUS at 14:46

## 2019-02-07 RX ADMIN — Medication 25 MILLIGRAM(S): at 18:09

## 2019-02-07 NOTE — PROGRESS NOTE ADULT - ASSESSMENT
63 year-old man with a PMH of HTN, morbid obesity who was found to have acute CVA with left side hemiparesis and given TPA and is now with Gait Instability, ADL impairments and Functional impairments.    Acute CVA presenting with Left sided hemiparesis, dysarthria s/p tpa 1/26- pt/ot/dvt ppx, ASA/Lipitor    CAYDEN- cpap    HTN- norvasc,  hydralazine     insomnia -melatonin    DVT ppx Lovenox

## 2019-02-07 NOTE — PROGRESS NOTE ADULT - GENERAL COMMENTS
Stable. No overnight events. patient States that he feels better and only had brief episode of dizziness today am that self resolved. He feels as though he is able to do well in therapy sessions. Patient with quesitons regarding bp medications. Importance, risks/benefits of medications were discussed with him. Simón any other complaints.

## 2019-02-07 NOTE — PROGRESS NOTE ADULT - EXTREMITIES COMMENTS
calves soft, Nt  left UE proximal 3-/5 unable to form good grasp left hand ofr functional use, trace swelling

## 2019-02-07 NOTE — PROGRESS NOTE ADULT - SUBJECTIVE AND OBJECTIVE BOX
63 year-old Man with a PMH of HTN (not on meds), CAYDEN (on CPAP, compliant), obesity, and HLD (not on meds) who presents to hospital with acute onset of slurred speech and Left UE paresis found to have ischemic stroke  s/p tPA now in MICU for further observation  and management.  CT head on 1/27-Old lacunar infarcts involving the right basal ganglia, left caudate head /anterior corona radiata region. MRI Brain MRI: Acute right basal ganglia infarct. Chronic bilateral lacunar infarcts. Found to have Oropharyngeal dysphagia place on Dysphagia 3 diet. Patient transferred to Acute Rehabilitation for functional recovery.     seen at he bedside, no new complaints, no n/v, no sob.    Vital Signs Last 24 Hrs  T(C): 36.3 (07 Feb 2019 10:29), Max: 37 (06 Feb 2019 20:01)  T(F): 97.3 (07 Feb 2019 10:29), Max: 98.6 (06 Feb 2019 20:01)  HR: 86 (07 Feb 2019 10:29) (82 - 88)  BP: 160/80 (07 Feb 2019 10:29) (144/80 - 160/80)  BP(mean): --  RR: 14 (07 Feb 2019 10:29) (14 - 14)  SpO2: 97% (07 Feb 2019 10:29) (97% - 97%)    nad, ivet, mmm, ncat  supple  clear  s1s2  soft nt bs present  LE edema  aao x 3   left upper extremity weakness                x                    138  | 25   | 26           x     >-----------< x       ------------------------< 113                   x                    3.5  | 101  | 1.10                                         Ca 9.1   Mg x     Ph x

## 2019-02-07 NOTE — PROGRESS NOTE ADULT - SUBJECTIVE AND OBJECTIVE BOX
Patient is a 63y old  Male who presents with a chief complaint of stroke (07 Feb 2019 10:36)      HPI:  63 year-old Man with a PMH of HTN (not on meds), CAYDEN (on CPAP, compliant), obesity, and HLD (not on meds) who presents to hospital with acute onset of slurred speech and Left UE paresis found to have ischemic stroke  s/p tPA now in MICU for further observation  and management.  CT head on 1/27-Old lacunar infarcts involving the right basal ganglia, left caudate head /anterior corona radiata region. MRI Brain MRI: Acute right basal ganglia infarct. Chronic bilateral lacunar infarcts. Found to have Oropharyngeal dysphagia place on Dysphagia 3 diet. Patient transferred to Acute Rehabilitation for functional recovery. (01 Feb 2019 19:12)      PAST MEDICAL & SURGICAL HISTORY:  Sciatica  High cholesterol  Hypertension  Peripheral Vascular Disease  CAYDEN (Obstructive Sleep Apnea)  CAD (Coronary Artery Disease)  History of cholecystectomy  History of tonsillectomy  S/P ACL repair      MEDICATIONS  (STANDING):  amLODIPine   Tablet 10 milliGRAM(s) Oral daily  aspirin  chewable 81 milliGRAM(s) Oral daily  atorvastatin 80 milliGRAM(s) Oral at bedtime  docusate sodium 100 milliGRAM(s) Oral two times a day  FLUoxetine 10 milliGRAM(s) Oral daily  heparin  Injectable 5000 Unit(s) SubCutaneous every 8 hours  hydrALAZINE 25 milliGRAM(s) Oral every 12 hours  melatonin 9 milliGRAM(s) Oral <User Schedule>  polyethylene glycol 3350 17 Gram(s) Oral daily    MEDICATIONS  (PRN):  acetaminophen   Tablet .. 650 milliGRAM(s) Oral every 6 hours PRN Mild Pain (1 - 3), Moderate Pain (4 - 6)  fluticasone propionate 50 MICROgram(s)/spray Nasal Spray 1 Spray(s) Both Nostrils two times a day PRN nasal congestion      Allergies    No Known Allergies    Intolerances    PHYSICAL EXAM  63y  Vital Signs Last 24 Hrs  T(C): 36.3 (07 Feb 2019 10:29), Max: 37 (06 Feb 2019 20:01)  T(F): 97.3 (07 Feb 2019 10:29), Max: 98.6 (06 Feb 2019 20:01)  HR: 86 (07 Feb 2019 10:29) (82 - 88)  BP: 160/80 (07 Feb 2019 10:29) (144/80 - 160/80)  BP(mean): --  RR: 14 (07 Feb 2019 10:29) (14 - 14)  SpO2: 97% (07 Feb 2019 10:29) (97% - 97%)  Daily     Daily         RECENT LABS:      02-06    138  |  101  |  26<H>  ----------------------------<  113<H>  3.5   |  25  |  1.10    Ca    9.1      06 Feb 2019 05:25                CAPILLARY BLOOD GLUCOSE        IMPRESSION AND PLAN:

## 2019-02-07 NOTE — PROGRESS NOTE ADULT - ASSESSMENT
ASSESSMENT/PLAN  63 year-old man with a PMH of HTN, morbid obesity who was found to have acute CVA with left side hemiparesis and given TPA and is now with Gait Instability, ADL impairments and Functional impairments.    COMORBIDITIES / ACTIVE MEDICAL ISSUES     Gait Instability, ADL impairments and Functional impairments: start Comprehensive Rehab Program of PT/OT     #Acute CVA presenting with Left sided hemiparesis, dysarthria s/p tpa 1/26  - continue ASA/Lipitor 80 mg PO QHS  - continue prozac for motor recovery  -psychological support for adjustment symptoms, was seen yesterday. Feels beneficial    #CAYDEN (Obstructive Sleep Apnea).    - compliant with CPAP at home ; continue Nocturnal CPAP  O2 via NC PRN SOB during day  - Fu Pulm as outpt- pt follows up Dr Bakari Haas.    #dizziness:  -may be multifactorial, including orthostasis, uncontrolled HTn, CAYDEN, BG CVA  -BMP also shows sl inc BUn/Cr will repeat and monitor  -hydralazine inc 25 mg q12. monitor. Sl improved  -hospitalist f/u today read and appreciated     #HTN/HLD .    - norvasc 10 mg daily. hydralizine 25 bid (increased 2/6) manual sBP right arm on exam 2/7 140    #GI/Bowel Mgmt   -  Colace, Senna,  Dulcolax PRN, Miralax daily    #insomnia  -melatonin 9 mg qhs    -#DVT:  - Lovenox, SCDs, TEDs     Labs  [  ] HTN ASSESSMENT/PLAN  63 year-old man with a PMH of HTN, morbid obesity who was found to have acute CVA with left side hemiparesis and given TPA and is now with Gait Instability, ADL impairments and Functional impairments.    COMORBIDITIES / ACTIVE MEDICAL ISSUES     Gait Instability, ADL impairments and Functional impairments: start Comprehensive Rehab Program of PT/OT     #Acute CVA presenting with Left sided hemiparesis, dysarthria s/p tpa 1/26  - continue ASA/Lipitor 80 mg PO QHS  - continue prozac for motor recovery  -psychological support for adjustment symptoms, was seen yesterday. Feels beneficial    #CAYDEN (Obstructive Sleep Apnea).    - compliant with CPAP at home ; continue Nocturnal CPAP  O2 via NC PRN SOB during day  - Fu Pulm as outpt- pt follows up Dr Bakari Haas.    #dizziness:  -may be multifactorial, including orthostasis, uncontrolled HTn, CAYDEN, BG CVA  -BMP also shows sl inc BUn/Cr will repeat and monitor  -hydralazine inc 25 mg q12. monitor. Sl improved  -hospitalist f/u today read and appreciated     #HTN/HLD .    - norvasc 10 mg daily. hydralizine 25 bid (increased 2/6) manual sBP right arm on exam 2/7 140    #GI/Bowel Mgmt   -  Colace, Senna,  Dulcolax PRN, Miralax daily    #insomnia  -melatonin 9 mg qhs    -#DVT:  - Lovenox, SCDs, TEDs     IDT held on 2/7: Patient mod assist with transfers. Is ambulating with RW 55 feet with mod/max assist. Goals for PT/OT wis supervision /mod I. Patient will have sister to help for 1 month but will need assist with showering. LEILA 2/22 if on track for goals of mod I. Otherwise will plan for ASHUTOSH    Labs  [  ] HTN ASSESSMENT/PLAN  63 year-old man with a PMH of HTN, morbid obesity who was found to have acute CVA with left side hemiparesis and given TPA and is now with Gait Instability, ADL impairments and Functional impairments.    COMORBIDITIES / ACTIVE MEDICAL ISSUES     Gait Instability, ADL impairments and Functional impairments: start Comprehensive Rehab Program of PT/OT     #Acute CVA presenting with Left sided hemiparesis, dysarthria s/p tpa 1/26  - continue ASA/Lipitor 80 mg PO QHS  - continue prozac for motor recovery. Consider increasing dose to 20 mg for psychological benefits as well given h/o mood disorder  -psychological support for adjustment symptoms, was seen yesterday. Feels beneficial    #CAYDEN (Obstructive Sleep Apnea).    - compliant with CPAP at home ; continue Nocturnal CPAP  O2 via NC PRN SOB during day  - Fu Pulm as outpt- pt follows up Dr Bakari Haas.     #HTN/HLD .    - norvasc 10 mg daily. hydralizine 25 bid (increased 2/6)   manual sBP right arm on exam 2/7 142 SBP    #GI/Bowel Mgmt   -  Colace, Senna,  Dulcolax PRN, Miralax daily    #insomnia  -melatonin 9 mg qhs. improved sleep quality, patient requests timing changed to 8 PM    -#DVT:  - Lovenox, SCDs, TEDs     IDT held on 2/7: Patient mod assist with transfers. Is ambulating with RW 55 feet with mod/max assist. Goals for PT/OT wis supervision /mod I. Patient will have sister to help for 1 month but will need assist with showering. LEILA 2/22 if on track for goals of mod I. Otherwise will plan for ASHUTOSH    Labs  [  ] HTN  CBC BMP 2/11

## 2019-02-08 ENCOUNTER — TRANSCRIPTION ENCOUNTER (OUTPATIENT)
Age: 64
End: 2019-02-08

## 2019-02-08 DIAGNOSIS — Z71.89 OTHER SPECIFIED COUNSELING: ICD-10-CM

## 2019-02-08 PROCEDURE — 99232 SBSQ HOSP IP/OBS MODERATE 35: CPT

## 2019-02-08 RX ORDER — FLUTICASONE PROPIONATE 50 MCG
1 SPRAY, SUSPENSION NASAL
Qty: 0 | Refills: 0 | DISCHARGE
Start: 2019-02-08

## 2019-02-08 RX ORDER — POLYETHYLENE GLYCOL 3350 17 G/17G
17 POWDER, FOR SOLUTION ORAL
Qty: 0 | Refills: 0 | COMMUNITY
Start: 2019-02-08

## 2019-02-08 RX ORDER — ASPIRIN/CALCIUM CARB/MAGNESIUM 324 MG
1 TABLET ORAL
Qty: 0 | Refills: 0 | DISCHARGE
Start: 2019-02-08

## 2019-02-08 RX ORDER — LANOLIN ALCOHOL/MO/W.PET/CERES
3 CREAM (GRAM) TOPICAL
Qty: 0 | Refills: 0 | DISCHARGE
Start: 2019-02-08

## 2019-02-08 RX ORDER — POLYETHYLENE GLYCOL 3350 17 G/17G
17 POWDER, FOR SOLUTION ORAL
Qty: 0 | Refills: 0 | DISCHARGE
Start: 2019-02-08

## 2019-02-08 RX ORDER — ATORVASTATIN CALCIUM 80 MG/1
1 TABLET, FILM COATED ORAL
Qty: 30 | Refills: 0 | OUTPATIENT
Start: 2019-02-08 | End: 2019-03-09

## 2019-02-08 RX ORDER — DOCUSATE SODIUM 100 MG
1 CAPSULE ORAL
Qty: 0 | Refills: 0 | COMMUNITY
Start: 2019-02-08

## 2019-02-08 RX ORDER — ACETAMINOPHEN 500 MG
2 TABLET ORAL
Qty: 0 | Refills: 0 | DISCHARGE
Start: 2019-02-08

## 2019-02-08 RX ADMIN — HEPARIN SODIUM 5000 UNIT(S): 5000 INJECTION INTRAVENOUS; SUBCUTANEOUS at 21:22

## 2019-02-08 RX ADMIN — Medication 81 MILLIGRAM(S): at 11:05

## 2019-02-08 RX ADMIN — ATORVASTATIN CALCIUM 80 MILLIGRAM(S): 80 TABLET, FILM COATED ORAL at 21:22

## 2019-02-08 RX ADMIN — HEPARIN SODIUM 5000 UNIT(S): 5000 INJECTION INTRAVENOUS; SUBCUTANEOUS at 16:00

## 2019-02-08 RX ADMIN — Medication 100 MILLIGRAM(S): at 06:31

## 2019-02-08 RX ADMIN — POLYETHYLENE GLYCOL 3350 17 GRAM(S): 17 POWDER, FOR SOLUTION ORAL at 11:05

## 2019-02-08 RX ADMIN — HEPARIN SODIUM 5000 UNIT(S): 5000 INJECTION INTRAVENOUS; SUBCUTANEOUS at 06:31

## 2019-02-08 RX ADMIN — Medication 25 MILLIGRAM(S): at 17:21

## 2019-02-08 RX ADMIN — Medication 100 MILLIGRAM(S): at 17:21

## 2019-02-08 RX ADMIN — Medication 25 MILLIGRAM(S): at 06:31

## 2019-02-08 RX ADMIN — Medication 10 MILLIGRAM(S): at 11:05

## 2019-02-08 RX ADMIN — AMLODIPINE BESYLATE 10 MILLIGRAM(S): 2.5 TABLET ORAL at 06:31

## 2019-02-08 RX ADMIN — Medication 9 MILLIGRAM(S): at 21:22

## 2019-02-08 NOTE — DISCHARGE NOTE ADULT - CARE PROVIDERS DIRECT ADDRESSES
,[email or web address]

## 2019-02-08 NOTE — DISCHARGE NOTE ADULT - CARE PROVIDER_API CALL
Chemo Beebe)  Internal Medicine  [address] [place] [address]  Phone: [phone]  Fax: [phone]  Follow Up Time:     Waldo Villegas)  Critical Care Medicine; Internal Medicine; Pulmonary Disease  [address] [facility], [address]  [place] [address]  Phone: [phone]  Fax: [phone]  Follow Up Time:     Libman, Richard B (MD)  Neurology; Vascular Neurology  [address] [facility], [facility] [address]  [place] [address]  Phone: [phone]  Fax: [phone]  Follow Up Time:     Mandi Barkley)  Brain Injury Medicine; PhysicalRehab Medicine  [address] [place] [address]  Phone: [phone]  Fax: [phone]  Follow Up Time:

## 2019-02-08 NOTE — DISCHARGE NOTE ADULT - HOSPITAL COURSE
63 year-old Man with a PMH of HTN (not on meds), CAYDEN (on CPAP, compliant), obesity, and HLD (not on meds) who presents to hospital with acute onset of slurred speech and Left UE paresis found to have ischemic stroke  s/p tPA now in MICU for further observation  and management.  CT head on 1/27-Old lacunar infarcts involving the right basal ganglia, left caudate head /anterior corona radiata region. MRI Brain MRI: Acute right basal ganglia infarct. Chronic bilateral lacunar infarcts. Found to have Oropharyngeal dysphagia place on Dysphagia 3 diet. Patient transferred to Acute Rehabilitation for functional recovery. (01 Feb 2019 19:12)  ACUTE INPATIENT HOSPITAL COURSE:  patient was started on amlodipine and hydralazine for blood pressures. Patient had initial complaint of dizziness with sitting up. Orthostatic vitals negative. This symptoms resolved quickly. ______ 63 year-old Man with a PMH of HTN (not on meds), CAYDEN (on CPAP, compliant), obesity, and HLD (not on meds) who presents to hospital with acute onset of slurred speech and Left UE paresis found to have ischemic stroke  s/p tPA now in MICU for further observation  and management.  CT head on 1/27-Old lacunar infarcts involving the right basal ganglia, left caudate head /anterior corona radiata region. MRI Brain MRI: Acute right basal ganglia infarct. Chronic bilateral lacunar infarcts. Found to have Oropharyngeal dysphagia place on Dysphagia 3 diet. Patient transferred to Acute Rehabilitation for functional recovery. (01 Feb 2019 19:12)    ACUTE INPATIENT HOSPITAL COURSE:  patient was started on amlodipine and hydralazine for blood pressures. Patient had initial complaint of dizziness with sitting up. Orthostatic vitals negative. This symptoms resolved with switch from hydralazine to losartan, and BP was eventually managed with amlodipine and losartan. He was followed by pulmonary for his CPAP settings, and psychology for supportive counseling. He was placed on prozac for motor recovery and mood prior to admission, and did not wish to have an increase in the dose of his medication. He has made good progress, ambulating with a quad cane, but still has weakness in his left UE which affects his ADLs and is being transferred to [facility] for additional OT and PT services Mr. Jeffries is a 63 year-old Man with a PMH of HTN (not on meds), CAYDEN (on CPAP, compliant), obesity, and HLD (not on meds) who presents to hospital with acute onset of slurred speech and Left UE paresis found to have ischemic stroke s/p tPA now in MICU for further observation and management.  CT head on 1/27-Old lacunar infarcts involving the right basal ganglia, left caudate head/anterior corona radiata region. MRI Brain MRI: Acute right basal ganglia infarct. Chronic bilateral lacunar infarcts. Found to have Oropharyngeal dysphagia place on Dysphagia 3 diet. Patient transferred to Acute Rehabilitation for functional recovery. (01 Feb 2019 19:12)    ACUTE INPATIENT HOSPITAL COURSE:  Mr. Jeffries's blood pressure was uncontrolled during rehab stay; Medications were adjusted, and achieved better control with a regimen of Losartan 100mg, Lisinopril 2.5mg, and Norvasc 10mg. He had initial complaint of dizziness with sitting up. Orthostatic vitals negative. This symptoms resolved with switch from hydralazine to losartan. He was followed by pulmonary for his CPAP settings, and psychology for supportive counseling. He was placed on prozac for motor recovery and mood prior to admission, and did not wish to have an increase in the dose of his medication. He has made good progress, ambulating with a quad cane, but still has weakness in his left UE which affects his ADLs and is being transferred to [facility] for additional OT and PT services. Mr. Jeffries is a 63 year-old Man with a PMH of HTN (not on meds), CAYDEN (on CPAP, compliant), obesity, and HLD (not on meds) who presents to hospital with acute onset of slurred speech and Left UE paresis found to have ischemic stroke s/p tPA now in MICU for further observation and management.  CT head on 1/27-Old lacunar infarcts involving the right basal ganglia, left caudate head/anterior corona radiata region. MRI Brain MRI: Acute right basal ganglia infarct. Chronic bilateral lacunar infarcts. Found to have Oropharyngeal dysphagia place on Dysphagia 3 diet. Patient transferred to Acute Rehabilitation for functional recovery. (01 Feb 2019 19:12)    ACUTE INPATIENT HOSPITAL COURSE:  Mr. Jeffries's blood pressure was uncontrolled during rehab stay; Medications were adjusted, and achieved better control with a regimen of Losartan 100mg and Norvasc 10mg. He had initial complaint of dizziness with sitting up. Orthostatic vitals negative. This symptoms resolved with switch from hydralazine to losartan. He was followed by pulmonary for his CPAP settings, and psychology for supportive counseling. He was placed on prozac for motor recovery and mood prior to admission, and did not wish to have an increase in the dose of his medication. He has made good progress, ambulating with a quad cane, but still has weakness in his left UE which affects his ADLs and is being transferred to [facility] for additional OT and PT services.

## 2019-02-08 NOTE — PROGRESS NOTE ADULT - EXTREMITIES COMMENTS
Left upper extremity 3/5 at shoulder, elbow flexion and extension, grasp Left upper extremity 3/5 at shoulder, elbow flexion and extension, grasp  reduced gross motor corodination  hypotonia  trace hand swelling no erythema or warmth no pitting  no calf TTP

## 2019-02-08 NOTE — PROGRESS NOTE ADULT - GENERAL COMMENTS
Stable. No overnight events. patient seen with physical therapy walking with [facility] and with therapist. he overall feels better and is pleased with his progress. Stable. No overnight events. patient seen with physical therapy walking with [facility] and with therapist. he overall feels better and is pleased with his progress. Dizziness has reduced, only with position change and milder.

## 2019-02-08 NOTE — DISCHARGE NOTE ADULT - MEDICATION SUMMARY - MEDICATIONS TO TAKE
I will START or STAY ON the medications listed below when I get home from the hospital:    aspirin 81 mg oral tablet, chewable  -- 1 tab(s) by mouth once a day  -- Indication: For CVA (cerebral vascular accident)    atorvastatin 80 mg oral tablet  -- 1 tab(s) by mouth once a day (at bedtime)  -- Indication: For Cholesterol    amLODIPine 5 mg oral tablet  -- 1 tab(s) by mouth once a day  -- Indication: For HTN    fluticasone 50 mcg/inh nasal spray  -- 1 spray(s) into nose 2 times a day, As needed, into nose congestion  -- Indication: For Nasal Congestion I will START or STAY ON the medications listed below when I get home from the hospital:    acetaminophen 325 mg oral tablet  -- 2 tab(s) by mouth every 6 hours, As needed, Mild Pain (1 - 3), Moderate Pain (4 - 6)  -- Indication: For Pain aggravated by walking    aspirin 81 mg oral tablet, chewable  -- 1 tab(s) by mouth once a day  -- Indication: For Stroke    lisinopril 2.5 mg oral tablet  -- 1 tab(s) by mouth once a day  -- Indication: For essential hypergention    losartan 100 mg oral tablet  -- 1 tab(s) by mouth once a day  -- Indication: For essential hypertension    heparin  -- 5000 unit(s) subcutaneous every 8 hours  -- Indication: For DVT PPX    FLUoxetine 10 mg oral capsule  -- 1 cap(s) by mouth once a day  -- Indication: For mood and stroke recovery    amLODIPine 10 mg oral tablet  -- 1 tab(s) by mouth once a day  -- Indication: For essential hypertension    hydrocortisone 1% topical ointment  -- 1 application on skin every 12 hours, As needed, Itching  -- Indication: For itching skin    lactulose 10 g/15 mL oral syrup  -- 15 milliliter(s) by mouth once a day, As needed, constipation  -- Indication: For Constipation    docusate sodium 100 mg oral capsule  -- 1 cap(s) by mouth 2 times a day  -- Indication: For Cerebral infarction    polyethylene glycol 3350 oral powder for reconstitution  -- 17 gram(s) by mouth once a day  -- Indication: For Constipation    simethicone 80 mg oral tablet, chewable  -- 1 tab(s) by mouth once a day, As needed, Gas  -- Indication: For gas    oxymetazoline 0.05% nasal spray  -- 1 spray(s) into nose every 12 hours  -- Indication: For nasal congestion    fluticasone 50 mcg/inh nasal spray  -- 1 spray(s) into nose 2 times a day, As needed, into nose congestion  -- Indication: For nasal congestion    melatonin 3 mg oral tablet  -- 3 tab(s) by mouth   -- Indication: For insomnia    saccharomyces boulardii lyo 250 mg oral capsule  -- 1 cap(s) by mouth 2 times a day  -- Indication: For GI PPX I will START or STAY ON the medications listed below when I get home from the hospital:    acetaminophen 325 mg oral tablet  -- 2 tab(s) by mouth every 6 hours, As needed, Mild Pain (1 - 3), Moderate Pain (4 - 6)  -- Indication: For Pain aggravated by walking    aspirin 81 mg oral tablet, chewable  -- 1 tab(s) by mouth once a day  -- Indication: For Stroke    lisinopril 2.5 mg oral tablet  -- 1 tab(s) by mouth once a day  -- Indication: For essential hypertention    losartan 100 mg oral tablet  -- 1 tab(s) by mouth once a day  -- Indication: For essential hypertension    heparin  -- 5000 unit(s) subcutaneous every 8 hours  -- Indication: For DVT PPX    FLUoxetine 10 mg oral capsule  -- 1 cap(s) by mouth once a day  -- Indication: For mood and stroke recovery    amLODIPine 10 mg oral tablet  -- 1 tab(s) by mouth once a day  -- Indication: For essential hypertension    hydrocortisone 1% topical ointment  -- 1 application on skin every 12 hours, As needed, Itching  -- Indication: For itching skin    docusate sodium 100 mg oral capsule  -- 1 cap(s) by mouth 2 times a day  -- Indication: For Cerebral infarction    polyethylene glycol 3350 oral powder for reconstitution  -- 17 gram(s) by mouth once a day  -- Indication: For Constipation    lactulose 10 g/15 mL oral syrup  -- 20 milliliter(s) by mouth once a day, As Needed  -- Indication: For Constipation    simethicone 80 mg oral tablet, chewable  -- 1 tab(s) by mouth once a day, As needed, Gas  -- Indication: For gas    oxymetazoline 0.05% nasal spray  -- 1 spray(s) into nose every 12 hours  -- Indication: For nasal congestion    fluticasone 50 mcg/inh nasal spray  -- 1 spray(s) into nose 2 times a day, As needed, into nose congestion  -- Indication: For nasal congestion    melatonin 3 mg oral tablet  -- 3 tab(s) by mouth   -- Indication: For insomnia    saccharomyces boulardii lyo 250 mg oral capsule  -- 1 cap(s) by mouth 2 times a day  -- Indication: For GI PPX I will START or STAY ON the medications listed below when I get home from the hospital:    acetaminophen 325 mg oral tablet  -- 2 tab(s) by mouth every 6 hours, As needed, Mild Pain (1 - 3), Moderate Pain (4 - 6)  -- Indication: For Pain aggravated by walking    aspirin 81 mg oral tablet, chewable  -- 1 tab(s) by mouth once a day  -- Indication: For Stroke    losartan 100 mg oral tablet  -- 1 tab(s) by mouth once a day  -- Indication: For essential hypertension    heparin  -- 5000 unit(s) subcutaneous every 8 hours  -- Indication: For DVT PPX    FLUoxetine 10 mg oral capsule  -- 1 cap(s) by mouth once a day  -- Indication: For mood and stroke recovery    amLODIPine 10 mg oral tablet  -- 1 tab(s) by mouth once a day  -- Indication: For essential hypertension    hydrocortisone 1% topical ointment  -- 1 application on skin every 12 hours, As needed, Itching  -- Indication: For itching skin    docusate sodium 100 mg oral capsule  -- 1 cap(s) by mouth 2 times a day  -- Indication: For Cerebral infarction    polyethylene glycol 3350 oral powder for reconstitution  -- 17 gram(s) by mouth once a day  -- Indication: For Constipation    lactulose 10 g/15 mL oral syrup  -- 20 milliliter(s) by mouth once a day, As Needed  -- Indication: For Constipation    senna oral tablet  -- 2 tab(s) by mouth once a day (at bedtime)  -- Indication: For Constipation    simethicone 80 mg oral tablet, chewable  -- 1 tab(s) by mouth once a day, As needed, Gas  -- Indication: For gas    oxymetazoline 0.05% nasal spray  -- 1 spray(s) into nose every 12 hours  -- Indication: For nasal congestion    fluticasone 50 mcg/inh nasal spray  -- 1 spray(s) into nose 2 times a day, As needed, into nose congestion  -- Indication: For nasal congestion    melatonin 3 mg oral tablet  -- 3 tab(s) by mouth   -- Indication: For insomnia    saccharomyces boulardii lyo 250 mg oral capsule  -- 1 cap(s) by mouth 2 times a day  -- Indication: For GI PPX

## 2019-02-08 NOTE — DISCHARGE NOTE ADULT - MEDICATION SUMMARY - MEDICATIONS TO CHANGE
I will SWITCH the dose or number of times a day I take the medications listed below when I get home from the hospital:  None I will SWITCH the dose or number of times a day I take the medications listed below when I get home from the hospital:    amLODIPine 5 mg oral tablet  -- 1 tab(s) by mouth once a day

## 2019-02-08 NOTE — DISCHARGE NOTE ADULT - CARE PLAN
Principal Discharge DX:	CVA (cerebral vascular accident)  Goal:	To help recover or improve as much as possible your sensory and motor abilities, to become more independent, and prevent future strokes.  Assessment and plan of treatment:	Continue physical therapy and skills learned for rehabilitation.  Follow up with your neurologist in 1-2 weeks after discharge for further medical management.  Continue to take your medications as prescribed, low salt, low fat, and diabetic diet.  Consider joining a support group for stroke survivors for emotional support to prevent depression.  Secondary Diagnosis:	CAYDEN (Obstructive Sleep Apnea)  Goal:	Compliance with C Pap  Secondary Diagnosis:	Dysphagia  Goal:	Likely related to recent CVA  Secondary Diagnosis:	Hypertension  Goal:	To maintain a normal blood pressure to prevent heart attack, stroke and renal failure.  Assessment and plan of treatment:	Continue medication as above for blood pressure. Continue to check you blood pressure at home, you were started on new blood pressure medication to reduce your stroke and cardiac risk. Please continue these medications. Follow up with primary care physician to monitor blood pressures     Low sodium and fat diet, continue anti-hypertensive medications, and follow up with primary care physician.  Secondary Diagnosis:	Hyperlipidemia  Goal:	To maintain normal cholesterol levels to prevent stroke, coronary artery disease, peripheral vascular disease and heart attacks.  Assessment and plan of treatment:	Low fat diet, exercise daily and continue current medications. Follow up with primary care physician and cardiologist for management. Continue statin. Principal Discharge DX:	CVA (cerebral vascular accident)  Goal:	To help recover or improve as much as possible your sensory and motor abilities, to become more independent, and prevent future strokes.  Assessment and plan of treatment:	- Continue Aspirin 81mg daily and Lipitor 80mg Daily.  - Continue Prozac 10mg Daily for motor recovery and post-stroke depression.  - Continue physical therapy and skills learned for rehabilitation.    - Follow up with your neurologist in 1-2 weeks after discharge for further medical management.    - Continue to take your medications as prescribed, low salt, low fat, and diabetic diet.    - Consider joining a support group for stroke survivors for emotional support to prevent depression.  Secondary Diagnosis:	CAYDEN (Obstructive Sleep Apnea)  Goal:	Compliance with C Pap  Secondary Diagnosis:	Dysphagia  Goal:	Likely related to recent CVA  Assessment and plan of treatment:	- Continue Dysphagia diet with thin liquids  - Re-evaluate as needed  Secondary Diagnosis:	Hypertension  Goal:	To maintain a normal blood pressure to prevent heart attack, stroke and renal failure.  Assessment and plan of treatment:	- Continue medication as above for blood pressure.   - Continue to check you blood pressure at home, you were started on new blood pressure medication to reduce your stroke and cardiac risk. Please continue these medications.   - Follow up with primary care physician to monitor blood pressures.    Low sodium and fat diet, continue anti-hypertensive medications, and follow up with primary care physician.  Secondary Diagnosis:	Hyperlipidemia  Goal:	To maintain normal cholesterol levels to prevent stroke, coronary artery disease, peripheral vascular disease and heart attacks.  Assessment and plan of treatment:	Low fat diet, exercise daily and continue current medications. Follow up with primary care physician and cardiologist for management. Continue statin.

## 2019-02-08 NOTE — DISCHARGE NOTE ADULT - PROVIDER TOKENS
PROVIDER:[TOKEN:[[id number]]],PROVIDER:[TOKEN:[[id number]]],PROVIDER:[TOKEN:[[id number]]],PROVIDER:[TOKEN:[[id number]]]

## 2019-02-08 NOTE — PROGRESS NOTE ADULT - ASSESSMENT
ASSESSMENT/PLAN  63 year-old man with a PMH of HTN, morbid obesity who was found to have acute CVA with left side hemiparesis and given TPA and is now with Gait Instability, ADL impairments and Functional impairments.    COMORBIDITIES / ACTIVE MEDICAL ISSUES     Gait Instability, ADL impairments and Functional impairments: start Comprehensive Rehab Program of PT/OT     #Acute CVA presenting with Left sided hemiparesis, dysarthria s/p tpa 1/26  - continue ASA/Lipitor 80 mg PO QHS  - continue prozac for motor recovery. Consider increasing dose to 20 mg for psychological benefits as well given h/o mood disorder  -psychological support for adjustment symptoms, was seen yesterday. Feels beneficial    #CAYDEN (Obstructive Sleep Apnea).    - compliant with CPAP at home ; continue Nocturnal CPAP  O2 via NC PRN SOB during day  - [facility] as outpt- pt follows up Dr Bakari Haas.     #HTN/HLD .  2/8->148/82  - norvasc 10 mg daily. hydralizine 25 bid (increased 2/6)  -BP with large manual cuff only      #GI/Bowel Mgmt   -  Colace, Senna,  Dulcolax PRN, Miralax daily    #insomnia  -melatonin 9 mg qhs. improved sleep quality, patient requests timing changed to 8 PM    -#DVT:  - Lovenox, SCDs, TEDs     IDT held on 2/7: Patient mod assist with transfers. Is ambulating with RW 55 feet with mod/max assist. Goals for PT/OT wis supervision /mod I. Patient will have sister to help for 1 month but will need assist with showering. LEILA 2/22 if on track for goals of mod I. Otherwise will plan for ASHUTOSH    Labs  [  ] HTN  CBC BMP 2/11 ASSESSMENT/PLAN  63 year-old man with a PMH of HTN, morbid obesity who was found to have acute CVA with left side hemiparesis and given TPA and is now with Gait Instability, ADL impairments and Functional impairments.    COMORBIDITIES / ACTIVE MEDICAL ISSUES     Gait Instability, ADL impairments and Functional impairments: start Comprehensive Rehab Program of PT/OT     #Acute CVA presenting with Left sided hemiparesis, dysarthria s/p tpa 1/26  - continue ASA/Lipitor 80 mg PO QHS  - continue prozac for motor recovery. Consider increasing dose to 20 mg for psychological benefits as well given h/o mood disorder  -psychological support for adjustment symptoms, was seen yesterday. Feels beneficial    #CAYDEN (Obstructive Sleep Apnea).    - compliant with CPAP at home ; continue Nocturnal CPAP  O2 via NC PRN SOB during day  - [facility] as outpt- pt follows up Dr Bakari Haas.     #HTN/HLD .  2/8->148/82. IMPROVED  - norvasc 10 mg daily. hydralizine 25 bid (increased 2/6)  -BP with large manual cuff only for accuracy and consistency      #GI/Bowel Mgmt   -  Colace, Senna,  Dulcolax PRN, Miralax daily    #insomnia  -melatonin 9 mg q8 PMs. improved     -#DVT:  - Lovenox, SCDs, TEDs     IDT held on 2/7: Patient mod assist with transfers. Is ambulating with RW 55 feet with mod/max assist. Goals for PT/OT wis supervision /mod I. Patient will have sister to help for 1 month but will need assist with showering. LEILA 2/22 if on track for goals of mod I. Otherwise will plan for ASHUTOSH    Labs    CBC BMP 2/11

## 2019-02-08 NOTE — DISCHARGE NOTE ADULT - PLAN OF CARE
To help recover or improve as much as possible your sensory and motor abilities, to become more independent, and prevent future strokes. Continue physical therapy and skills learned for rehabilitation.  Follow up with your neurologist in 1-2 weeks after discharge for further medical management.  Continue to take your medications as prescribed, low salt, low fat, and diabetic diet.  Consider joining a support group for stroke survivors for emotional support to prevent depression. Compliance with C Pap Likely related to recent CVA To maintain a normal blood pressure to prevent heart attack, stroke and renal failure. Continue medication as above for blood pressure. Continue to check you blood pressure at home, you were started on new blood pressure medication to reduce your stroke and cardiac risk. Please continue these medications. Follow up with primary care physician to monitor blood pressures     Low sodium and fat diet, continue anti-hypertensive medications, and follow up with primary care physician. To maintain normal cholesterol levels to prevent stroke, coronary artery disease, peripheral vascular disease and heart attacks. Low fat diet, exercise daily and continue current medications. Follow up with primary care physician and cardiologist for management. Continue statin. - Continue Aspirin 81mg daily and Lipitor 80mg Daily.  - Continue Prozac 10mg Daily for motor recovery and post-stroke depression.  - Continue physical therapy and skills learned for rehabilitation.    - Follow up with your neurologist in 1-2 weeks after discharge for further medical management.    - Continue to take your medications as prescribed, low salt, low fat, and diabetic diet.    - Consider joining a support group for stroke survivors for emotional support to prevent depression. - Continue Dysphagia diet with thin liquids  - Re-evaluate as needed - Continue medication as above for blood pressure.   - Continue to check you blood pressure at home, you were started on new blood pressure medication to reduce your stroke and cardiac risk. Please continue these medications.   - Follow up with primary care physician to monitor blood pressures.    Low sodium and fat diet, continue anti-hypertensive medications, and follow up with primary care physician.

## 2019-02-08 NOTE — PROGRESS NOTE ADULT - SUBJECTIVE AND OBJECTIVE BOX
Patient is a 63y old  Male who presents with a chief complaint of right BG CVA (07 Feb 2019 11:50)      HPI:  63 year-old Man with a PMH of HTN (not on meds), CAYDEN (on CPAP, compliant), obesity, and HLD (not on meds) who presents to hospital with acute onset of slurred speech and Left UE paresis found to have ischemic stroke  s/p tPA now in MICU for further observation  and management.  CT head on 1/27-Old lacunar infarcts involving the right basal ganglia, left caudate head /anterior corona radiata region. MRI Brain MRI: Acute right basal ganglia infarct. Chronic bilateral lacunar infarcts. Found to have Oropharyngeal dysphagia place on Dysphagia 3 diet. Patient transferred to Acute Rehabilitation for functional recovery. (01 Feb 2019 19:12)      PAST MEDICAL & SURGICAL HISTORY:  Sciatica  High cholesterol  Hypertension  Peripheral Vascular Disease  CAYDEN (Obstructive Sleep Apnea)  CAD (Coronary Artery Disease)  History of cholecystectomy  History of tonsillectomy  S/P ACL repair      MEDICATIONS  (STANDING):  amLODIPine   Tablet 10 milliGRAM(s) Oral daily  aspirin  chewable 81 milliGRAM(s) Oral daily  atorvastatin 80 milliGRAM(s) Oral at bedtime  docusate sodium 100 milliGRAM(s) Oral two times a day  FLUoxetine 10 milliGRAM(s) Oral daily  heparin  Injectable 5000 Unit(s) SubCutaneous every 8 hours  hydrALAZINE 25 milliGRAM(s) Oral every 12 hours  melatonin 9 milliGRAM(s) Oral <User Schedule>  polyethylene glycol 3350 17 Gram(s) Oral daily    MEDICATIONS  (PRN):  acetaminophen   Tablet .. 650 milliGRAM(s) Oral every 6 hours PRN Mild Pain (1 - 3), Moderate Pain (4 - 6)  fluticasone propionate 50 MICROgram(s)/spray Nasal Spray 1 Spray(s) Both Nostrils two times a day PRN nasal congestion      Allergies    No Known Allergies    Intolerances    PHYSICAL EXAM  63y  Vital Signs Last 24 Hrs  T(C): 36.7 (08 Feb 2019 08:43), Max: 36.7 (08 Feb 2019 08:43)  T(F): 98.1 (08 Feb 2019 08:43), Max: 98.1 (08 Feb 2019 08:43)  HR: 81 (08 Feb 2019 08:43) (81 - 88)  BP: 148/82 (08 Feb 2019 08:43) (148/82 - 180/80)  BP(mean): --  RR: 14 (08 Feb 2019 08:43) (14 - 14)  SpO2: 99% (08 Feb 2019 08:43) (97% - 99%)  Daily         RECENT LABS:                      CAPILLARY BLOOD GLUCOSE        IMPRESSION AND PLAN:

## 2019-02-08 NOTE — PROGRESS NOTE ADULT - RS GEN PE MLT RESP DETAILS PC
respirations non-labored/breath sounds equal/clear to auscultation bilaterally/good air movement clear to auscultation bilaterally/respirations non-labored/good air movement/no rhonchi/no wheezes/no rales/breath sounds equal

## 2019-02-08 NOTE — DISCHARGE NOTE ADULT - PATIENT PORTAL LINK FT
You can access the [facility] Patient Portal, offered by [facility], by registering with the following website: [email or web address]

## 2019-02-09 PROCEDURE — 99232 SBSQ HOSP IP/OBS MODERATE 35: CPT

## 2019-02-09 PROCEDURE — 99233 SBSQ HOSP IP/OBS HIGH 50: CPT

## 2019-02-09 RX ADMIN — HEPARIN SODIUM 5000 UNIT(S): 5000 INJECTION INTRAVENOUS; SUBCUTANEOUS at 15:07

## 2019-02-09 RX ADMIN — AMLODIPINE BESYLATE 10 MILLIGRAM(S): 2.5 TABLET ORAL at 06:43

## 2019-02-09 RX ADMIN — POLYETHYLENE GLYCOL 3350 17 GRAM(S): 17 POWDER, FOR SOLUTION ORAL at 11:34

## 2019-02-09 RX ADMIN — HEPARIN SODIUM 5000 UNIT(S): 5000 INJECTION INTRAVENOUS; SUBCUTANEOUS at 22:59

## 2019-02-09 RX ADMIN — Medication 10 MILLIGRAM(S): at 11:34

## 2019-02-09 RX ADMIN — Medication 81 MILLIGRAM(S): at 11:34

## 2019-02-09 RX ADMIN — HEPARIN SODIUM 5000 UNIT(S): 5000 INJECTION INTRAVENOUS; SUBCUTANEOUS at 06:43

## 2019-02-09 RX ADMIN — ATORVASTATIN CALCIUM 80 MILLIGRAM(S): 80 TABLET, FILM COATED ORAL at 22:59

## 2019-02-09 RX ADMIN — Medication 25 MILLIGRAM(S): at 17:34

## 2019-02-09 RX ADMIN — Medication 9 MILLIGRAM(S): at 20:02

## 2019-02-09 RX ADMIN — Medication 100 MILLIGRAM(S): at 06:43

## 2019-02-09 RX ADMIN — Medication 100 MILLIGRAM(S): at 17:34

## 2019-02-09 RX ADMIN — Medication 25 MILLIGRAM(S): at 06:43

## 2019-02-09 NOTE — PROGRESS NOTE ADULT - ASSESSMENT
ASSESSMENT/PLAN  63 year-old man with a PMH of HTN, morbid obesity who was found to have acute CVA with left side hemiparesis and given TPA and is now with Gait Instability, ADL impairments and Functional impairments.    COMORBIDITIES / ACTIVE MEDICAL ISSUES     Gait Instability, ADL impairments and Functional impairments: start Comprehensive Rehab Program of PT/OT     #Acute CVA presenting with Left sided hemiparesis, dysarthria s/p tpa 1/26  - continue ASA/Lipitor 80 mg PO QHS  - continue prozac for motor recovery. Consider increasing dose to 20 mg for psychological benefits as well given h/o mood disorder  -psychological support for adjustment symptoms    #CAYDEN (Obstructive Sleep Apnea).    -  Nocturnal CPAP  O2 via NC PRN SOB during day. Has not required recently  - Fu Pulm as outpt- pt follows up Dr Bakari Haas.     #HTN:  - norvasc 10 mg daily. hydralizine 25 bid (increased 2/6)  -BP with large manual cuff only for accuracy and consistency  -monitor and adjust as needed      #GI/Bowel Mgmt   -  Colace, Senna,  Dulcolax PRN, Miralax daily    #insomnia  -melatonin 9 mg q8 PMs. improved     -#DVT:  - Lovenox, SCDs, TEDs     IDT held on 2/7: Patient mod assist with transfers. Is ambulating with RW 55 feet with mod/max assist. Goals for PT/OT wis supervision /mod I. Patient will have sister to help for 1 month but will need assist with showering. LEILA 2/22 if on track for goals of mod I. Otherwise will plan for ASHUTOSH    Labs    CBC BMP 2/11

## 2019-02-09 NOTE — PROGRESS NOTE ADULT - SUBJECTIVE AND OBJECTIVE BOX
Patient is a 63y old  Male who presents with a chief complaint of stroke (08 Feb 2019 23:42)      Patient seen and examined at bedside. feels well, no complaints     ALLERGIES:  No Known Allergies    MEDICATIONS:  acetaminophen   Tablet .. 650 milliGRAM(s) Oral every 6 hours PRN  amLODIPine   Tablet 10 milliGRAM(s) Oral daily  aspirin  chewable 81 milliGRAM(s) Oral daily  atorvastatin 80 milliGRAM(s) Oral at bedtime  docusate sodium 100 milliGRAM(s) Oral two times a day  FLUoxetine 10 milliGRAM(s) Oral daily  fluticasone propionate 50 MICROgram(s)/spray Nasal Spray 1 Spray(s) Both Nostrils two times a day PRN  heparin  Injectable 5000 Unit(s) SubCutaneous every 8 hours  hydrALAZINE 25 milliGRAM(s) Oral every 12 hours  melatonin 9 milliGRAM(s) Oral <User Schedule>  polyethylene glycol 3350 17 Gram(s) Oral daily    Vital Signs Last 24 Hrs  T(F): 98.8 (09 Feb 2019 09:04), Max: 98.8 (09 Feb 2019 09:04)  HR: 83 (09 Feb 2019 09:04) (81 - 85)  BP: 148/72 (09 Feb 2019 09:04) (148/72 - 170/90)  RR: 14 (09 Feb 2019 09:04) (14 - 14)  SpO2: 96% (09 Feb 2019 09:04) (96% - 98%)  I&O's Summary    09 Feb 2019 07:01  -  09 Feb 2019 09:11  --------------------------------------------------------  IN: 0 mL / OUT: 300 mL / NET: -300 mL        PHYSICAL EXAM:  General: NAD, AO x 3  Neck: Supple, No JVD  Lungs: Clear to auscultation bilaterally  Cardio: RRR, S1/S2, No murmurs  Abdomen: Soft, Nontender, Nondistended; Bowel sounds present  Extremities: No clubbing, cyanosis, or edema      LABS:  01-27 Chol 305 mg/dL  mg/dL HDL 31 mg/dL Trig 214 mg/dL      CAPILLARY BLOOD GLUCOSE    01-27 [id number] 5.6    RADIOLOGY & ADDITIONAL TESTS:    Care Discussed with Consultants/Other Providers:

## 2019-02-09 NOTE — PROGRESS NOTE ADULT - EXTREMITIES COMMENTS
calves soft, NT  left HF 4-/5 quad 4/5 ankle PF and DF 4-.5 PF 4./5  no erythema or warmth  left hand trace swelling non pitting, NT

## 2019-02-09 NOTE — PROGRESS NOTE ADULT - SUBJECTIVE AND OBJECTIVE BOX
Patient is a 63y old  Male who presents with a chief complaint of stroke (09 Feb 2019 09:11)      HPI:  63 year-old Man with a PMH of HTN (not on meds), CAYDEN (on CPAP, compliant), obesity, and HLD (not on meds) who presents to hospital with acute onset of slurred speech and Left UE paresis found to have ischemic stroke  s/p tPA now in MICU for further observation  and management.  CT head on 1/27-Old lacunar infarcts involving the right basal ganglia, left caudate head /anterior corona radiata region. MRI Brain MRI: Acute right basal ganglia infarct. Chronic bilateral lacunar infarcts. Found to have Oropharyngeal dysphagia place on Dysphagia 3 diet. Patient transferred to Acute Rehabilitation for functional recovery. (01 Feb 2019 19:12)      PAST MEDICAL & SURGICAL HISTORY:  Sciatica  High cholesterol  Hypertension  Peripheral Vascular Disease  CAYDEN (Obstructive Sleep Apnea)  CAD (Coronary Artery Disease)  History of cholecystectomy  History of tonsillectomy  S/P ACL repair      MEDICATIONS  (STANDING):  amLODIPine   Tablet 10 milliGRAM(s) Oral daily  aspirin  chewable 81 milliGRAM(s) Oral daily  atorvastatin 80 milliGRAM(s) Oral at bedtime  docusate sodium 100 milliGRAM(s) Oral two times a day  FLUoxetine 10 milliGRAM(s) Oral daily  heparin  Injectable 5000 Unit(s) SubCutaneous every 8 hours  hydrALAZINE 25 milliGRAM(s) Oral every 12 hours  melatonin 9 milliGRAM(s) Oral <User Schedule>  polyethylene glycol 3350 17 Gram(s) Oral daily    MEDICATIONS  (PRN):  acetaminophen   Tablet .. 650 milliGRAM(s) Oral every 6 hours PRN Mild Pain (1 - 3), Moderate Pain (4 - 6)  fluticasone propionate 50 MICROgram(s)/spray Nasal Spray 1 Spray(s) Both Nostrils two times a day PRN nasal congestion      Allergies    No Known Allergies    Intolerances          VITALS  63y  Vital Signs Last 24 Hrs  T(C): 37.1 (09 Feb 2019 09:04), Max: 37.1 (09 Feb 2019 09:04)  T(F): 98.8 (09 Feb 2019 09:04), Max: 98.8 (09 Feb 2019 09:04)  HR: 83 (09 Feb 2019 09:04) (81 - 85)  BP: 148/72 (09 Feb 2019 09:04) (148/72 - 170/90)  BP(mean): --  RR: 14 (09 Feb 2019 09:04) (14 - 14)  SpO2: 96% (09 Feb 2019 09:04) (96% - 98%)  Daily     Daily         RECENT LABS:                      CAPILLARY BLOOD GLUCOSE

## 2019-02-09 NOTE — PROGRESS NOTE ADULT - GENERAL COMMENTS
Patient doing well, no SOB, BP much better controlled 148/72. Dizziness continues to improve and improved standing/gait. no new complaints

## 2019-02-09 NOTE — PROGRESS NOTE ADULT - ASSESSMENT
64 yo male admitted 2-1-19 with pmh essential htn, CAYDEN on CPAP, dyslipidemia presents with acute cva s/p tpa with left sided weakness    1. acute cva: c/w asa and statin   2. left sided weakness secondary to acute cva being actively treated w/ pt and ot  3. uncontrolled htn: increased to hydralazine 25mg BID and c/w norvasc 10mg daily  4. depression: stable c/w prozac  5. dysphagia: c/w soft thin liquids  6. dvt ppx: heparin sub q

## 2019-02-10 PROCEDURE — 99233 SBSQ HOSP IP/OBS HIGH 50: CPT

## 2019-02-10 PROCEDURE — 99232 SBSQ HOSP IP/OBS MODERATE 35: CPT

## 2019-02-10 RX ORDER — SODIUM CHLORIDE 0.65 %
1 AEROSOL, SPRAY (ML) NASAL EVERY 4 HOURS
Qty: 0 | Refills: 0 | Status: DISCONTINUED | OUTPATIENT
Start: 2019-02-10 | End: 2019-02-25

## 2019-02-10 RX ORDER — HYDROCORTISONE 1 %
1 OINTMENT (GRAM) TOPICAL EVERY 12 HOURS
Qty: 0 | Refills: 0 | Status: DISCONTINUED | OUTPATIENT
Start: 2019-02-10 | End: 2019-02-25

## 2019-02-10 RX ORDER — LACTULOSE 10 G/15ML
20 SOLUTION ORAL ONCE
Qty: 0 | Refills: 0 | Status: COMPLETED | OUTPATIENT
Start: 2019-02-10 | End: 2019-02-11

## 2019-02-10 RX ADMIN — HEPARIN SODIUM 5000 UNIT(S): 5000 INJECTION INTRAVENOUS; SUBCUTANEOUS at 21:29

## 2019-02-10 RX ADMIN — Medication 25 MILLIGRAM(S): at 06:24

## 2019-02-10 RX ADMIN — HEPARIN SODIUM 5000 UNIT(S): 5000 INJECTION INTRAVENOUS; SUBCUTANEOUS at 14:20

## 2019-02-10 RX ADMIN — HEPARIN SODIUM 5000 UNIT(S): 5000 INJECTION INTRAVENOUS; SUBCUTANEOUS at 06:24

## 2019-02-10 RX ADMIN — Medication 25 MILLIGRAM(S): at 17:09

## 2019-02-10 RX ADMIN — Medication 10 MILLIGRAM(S): at 11:55

## 2019-02-10 RX ADMIN — Medication 81 MILLIGRAM(S): at 11:55

## 2019-02-10 RX ADMIN — ATORVASTATIN CALCIUM 80 MILLIGRAM(S): 80 TABLET, FILM COATED ORAL at 21:30

## 2019-02-10 RX ADMIN — Medication 100 MILLIGRAM(S): at 17:09

## 2019-02-10 RX ADMIN — AMLODIPINE BESYLATE 10 MILLIGRAM(S): 2.5 TABLET ORAL at 06:24

## 2019-02-10 RX ADMIN — POLYETHYLENE GLYCOL 3350 17 GRAM(S): 17 POWDER, FOR SOLUTION ORAL at 11:56

## 2019-02-10 RX ADMIN — Medication 9 MILLIGRAM(S): at 21:29

## 2019-02-10 RX ADMIN — Medication 100 MILLIGRAM(S): at 06:24

## 2019-02-10 NOTE — PROGRESS NOTE ADULT - SUBJECTIVE AND OBJECTIVE BOX
Patient is a 63y old  Male who presents with a chief complaint of stroke (10 Feb 2019 08:30)      HPI:  63 year-old Man with a PMH of HTN (not on meds), CAYDEN (on CPAP, compliant), obesity, and HLD (not on meds) who presents to hospital with acute onset of slurred speech and Left UE paresis found to have ischemic stroke  s/p tPA now in MICU for further observation  and management.  CT head on 1/27-Old lacunar infarcts involving the right basal ganglia, left caudate head /anterior corona radiata region. MRI Brain MRI: Acute right basal ganglia infarct. Chronic bilateral lacunar infarcts. Found to have Oropharyngeal dysphagia place on Dysphagia 3 diet. Patient transferred to Acute Rehabilitation for functional recovery. (01 Feb 2019 19:12)      PAST MEDICAL & SURGICAL HISTORY:  Sciatica  High cholesterol  Hypertension  Peripheral Vascular Disease  CAYDEN (Obstructive Sleep Apnea)  CAD (Coronary Artery Disease)  History of cholecystectomy  History of tonsillectomy  S/P ACL repair      MEDICATIONS  (STANDING):  amLODIPine   Tablet 10 milliGRAM(s) Oral daily  aspirin  chewable 81 milliGRAM(s) Oral daily  atorvastatin 80 milliGRAM(s) Oral at bedtime  docusate sodium 100 milliGRAM(s) Oral two times a day  FLUoxetine 10 milliGRAM(s) Oral daily  heparin  Injectable 5000 Unit(s) SubCutaneous every 8 hours  hydrALAZINE 25 milliGRAM(s) Oral every 12 hours  melatonin 9 milliGRAM(s) Oral <User Schedule>  polyethylene glycol 3350 17 Gram(s) Oral daily    MEDICATIONS  (PRN):  acetaminophen   Tablet .. 650 milliGRAM(s) Oral every 6 hours PRN Mild Pain (1 - 3), Moderate Pain (4 - 6)  fluticasone propionate 50 MICROgram(s)/spray Nasal Spray 1 Spray(s) Both Nostrils two times a day PRN nasal congestion      Allergies    No Known Allergies    Intolerances          VITALS  63y  Vital Signs Last 24 Hrs  T(C): 36.7 (10 Feb 2019 08:19), Max: 36.8 (09 Feb 2019 23:32)  T(F): 98.1 (10 Feb 2019 08:19), Max: 98.2 (09 Feb 2019 23:32)  HR: 78 (10 Feb 2019 08:19) (78 - 84)  BP: 142/76 (10 Feb 2019 08:19) (142/76 - 170/90)  BP(mean): --  RR: 14 (10 Feb 2019 08:19) (14 - 14)  SpO2: 100% (10 Feb 2019 08:19) (97% - 100%)  Daily     Daily         RECENT LABS:                      CAPILLARY BLOOD GLUCOSE

## 2019-02-10 NOTE — PROGRESS NOTE ADULT - GENERAL COMMENTS
Stable no acute issues overnight. Patient stable, on CPAP overnight, compliant. Denies H/A, dizziness continues to improve.  BP still elevated at times

## 2019-02-10 NOTE — PROGRESS NOTE ADULT - EXTREMITIES COMMENTS
no calf swelling or pedal edema, seen in bed in AM  no significant hand edema, no redness  finger flexion 3-/5 reduced motor control

## 2019-02-10 NOTE — PROGRESS NOTE ADULT - RS GEN PE MLT RESP DETAILS PC
respirations non-labored/breath sounds equal/good air movement/normal/clear to auscultation bilaterally

## 2019-02-10 NOTE — PROGRESS NOTE ADULT - ASSESSMENT
62 yo male admitted 2-1-19 with pmh essential htn, CAYDEN on CPAP, dyslipidemia presents with acute cva s/p tpa with left sided weakness    1. acute cva: c/w asa and statin   2. left sided weakness secondary to acute cva being actively treated w/ pt and ot  3. uncontrolled htn: increase to hydralazine 25mg Q8H and c/w norvasc 10mg daily  4. depression: stable c/w prozac  5. dysphagia: c/w soft thin liquids  6. dvt ppx: heparin sub q

## 2019-02-10 NOTE — PROGRESS NOTE ADULT - SUBJECTIVE AND OBJECTIVE BOX
Patient is a 63y old  Male who presents with a chief complaint of stroke (09 Feb 2019 09:45)      Patient seen and examined at bedside. feels well, no complaints     ALLERGIES:  No Known Allergies    MEDICATIONS:  acetaminophen   Tablet .. 650 milliGRAM(s) Oral every 6 hours PRN  amLODIPine   Tablet 10 milliGRAM(s) Oral daily  aspirin  chewable 81 milliGRAM(s) Oral daily  atorvastatin 80 milliGRAM(s) Oral at bedtime  docusate sodium 100 milliGRAM(s) Oral two times a day  FLUoxetine 10 milliGRAM(s) Oral daily  fluticasone propionate 50 MICROgram(s)/spray Nasal Spray 1 Spray(s) Both Nostrils two times a day PRN  heparin  Injectable 5000 Unit(s) SubCutaneous every 8 hours  hydrALAZINE 25 milliGRAM(s) Oral every 12 hours  melatonin 9 milliGRAM(s) Oral <User Schedule>  polyethylene glycol 3350 17 Gram(s) Oral daily    Vital Signs Last 24 Hrs  T(F): 98.1 (10 Feb 2019 08:19), Max: 98.8 (09 Feb 2019 09:04)  HR: 78 (10 Feb 2019 08:19) (78 - 84)  BP: 142/76 (10 Feb 2019 08:19) (142/76 - 170/90)  RR: 14 (10 Feb 2019 08:19) (14 - 14)  SpO2: 100% (10 Feb 2019 08:19) (96% - 100%)  I&O's Summary    09 Feb 2019 07:01  -  10 Feb 2019 07:00  --------------------------------------------------------  IN: 0 mL / OUT: 300 mL / NET: -300 mL        PHYSICAL EXAM:  General: NAD, AO x 3  Neck: Supple, No JVD  Lungs: Clear to auscultation bilaterally  Cardio: RRR, S1/S2, No murmurs  Abdomen: Soft, Nontender, Nondistended; Bowel sounds present  Extremities: No clubbing, cyanosis, or edema    LABS:      01-27 Chol 305 mg/dL  mg/dL HDL 31 mg/dL Trig 214 mg/dL    CAPILLARY BLOOD GLUCOSE    01-27 [id number] 5.6  RADIOLOGY & ADDITIONAL TESTS:    Care Discussed with Consultants/Other Providers:

## 2019-02-10 NOTE — PROGRESS NOTE ADULT - ASSESSMENT
ASSESSMENT/PLAN  63 year-old man with a PMH of HTN, morbid obesity who was found to have acute CVA with left side hemiparesis and given TPA     #Acute CVA presenting with Left sided hemiparesis, dysarthria s/p tpa 1/26  - continue ASA/Lipitor 80 mg PO QHS  - continue prozac for motor recovery. Consider increasing dose to 20 mg for psychological benefits as well given h/o mood disorder  -psychological support for adjustment symptoms  -continue PT, OT, SLP    #CAYDEN (Obstructive Sleep Apnea).    -  Nocturnal CPAP  O2 via NC PRN SOB during day. Has not required recently  - Fu Pulm as outpt- pt follows up Dr Bakari Haas.     #HTN:  - norvasc 10 mg daily. hydralizine 25 q12. consider q8 hours, will keep at 25 mg dose for now, discussed with hospitalist this morning 2/10  -dizziness improving, tolerating medication  -BP with large manual cuff only for accuracy and consistency  -monitor and adjust as needed      #GI/Bowel Mgmt   -  Colace, Senna,  Dulcolax PRN, Miralax daily    #insomnia  -melatonin 9 mg q8 PMs. improved     -#DVT:  - Lovenox, SCDs, TEDs     IDT held on 2/7: Patient mod assist with transfers. Is ambulating with RW 55 feet with mod/max assist. Goals for PT/OT wis supervision /mod I. Patient will have sister to help for 1 month but will need assist with showering. LEILA 2/22 if on track for goals of mod I. Otherwise will plan for ASHUTOSH    Labs    CBC BMP 2/11  -monitor BP ASSESSMENT/PLAN  63 year-old man with a PMH of HTN, morbid obesity who was found to have acute CVA with left side hemiparesis and given TPA     #Acute CVA presenting with Left sided hemiparesis, dysarthria s/p tpa 1/26  - continue ASA/Lipitor 80 mg PO QHS  - continue prozac for motor recovery. Consider increasing dose to 20 mg for psychological benefits as well given h/o mood disorder  -psychological support for adjustment symptoms  -continue PT, OT, SLP    #CAYDEN (Obstructive Sleep Apnea).    -  Nocturnal CPAP  O2 via NC PRN SOB during day. Has not required recently  - Fu Pulm as outpt- pt follows up Dr Bakari Haas.     #HTN:  - norvasc 10 mg daily. hydralizine 25 q12, increase to q 8 HOURS 2/10. Patient agreeable. Hospitalist appreciated  -dizziness improving, tolerating medication  -BP with large manual cuff only for accuracy and consistency  -monitor and adjust as needed      #GI/Bowel Mgmt   -  Colace, Senna,  Dulcolax PRN, Miralax daily    #insomnia  -melatonin 9 mg q8 PMs. improved     -#DVT:  - Lovenox, SCDs, TEDs     IDT held on 2/7: Patient mod assist with transfers. Is ambulating with RW 55 feet with mod/max assist. Goals for PT/OT wis supervision /mod I. Patient will have sister to help for 1 month but will need assist with showering. LEILA 2/22 if on track for goals of mod I. Otherwise will plan for ASHUTOSH    Labs    CBC BMP 2/11  -monitor BP

## 2019-02-11 DIAGNOSIS — I63.9 CEREBRAL INFARCTION, UNSPECIFIED: ICD-10-CM

## 2019-02-11 DIAGNOSIS — R13.10 DYSPHAGIA, UNSPECIFIED: ICD-10-CM

## 2019-02-11 DIAGNOSIS — R53.1 WEAKNESS: ICD-10-CM

## 2019-02-11 DIAGNOSIS — E78.00 PURE HYPERCHOLESTEROLEMIA, UNSPECIFIED: ICD-10-CM

## 2019-02-11 DIAGNOSIS — I10 ESSENTIAL (PRIMARY) HYPERTENSION: ICD-10-CM

## 2019-02-11 DIAGNOSIS — E87.6 HYPOKALEMIA: ICD-10-CM

## 2019-02-11 LAB
ANION GAP SERPL CALC-SCNC: 14 MMOL/L — SIGNIFICANT CHANGE UP (ref 5–17)
BUN SERPL-MCNC: 22 MG/DL — SIGNIFICANT CHANGE UP (ref 7–23)
CALCIUM SERPL-MCNC: 9.1 MG/DL — SIGNIFICANT CHANGE UP (ref 8.4–10.5)
CHLORIDE SERPL-SCNC: 101 MMOL/L — SIGNIFICANT CHANGE UP (ref 96–108)
CO2 SERPL-SCNC: 20 MMOL/L — LOW (ref 22–31)
CREAT SERPL-MCNC: 0.84 MG/DL — SIGNIFICANT CHANGE UP (ref 0.5–1.3)
GLUCOSE SERPL-MCNC: 127 MG/DL — HIGH (ref 70–99)
HCT VFR BLD CALC: 45 % — SIGNIFICANT CHANGE UP (ref 39–50)
HGB BLD-MCNC: 15.1 G/DL — SIGNIFICANT CHANGE UP (ref 13–17)
MCHC RBC-ENTMCNC: 29.9 PG — SIGNIFICANT CHANGE UP (ref 27–34)
MCHC RBC-ENTMCNC: 33.6 GM/DL — SIGNIFICANT CHANGE UP (ref 32–36)
MCV RBC AUTO: 89.1 FL — SIGNIFICANT CHANGE UP (ref 80–100)
PLATELET # BLD AUTO: 271 K/UL — SIGNIFICANT CHANGE UP (ref 150–400)
POTASSIUM SERPL-MCNC: 4.5 MMOL/L — SIGNIFICANT CHANGE UP (ref 3.5–5.3)
POTASSIUM SERPL-SCNC: 4.5 MMOL/L — SIGNIFICANT CHANGE UP (ref 3.5–5.3)
RBC # BLD: 5.06 M/UL — SIGNIFICANT CHANGE UP (ref 4.2–5.8)
RBC # FLD: 12.7 % — SIGNIFICANT CHANGE UP (ref 10.3–14.5)
SODIUM SERPL-SCNC: 135 MMOL/L — SIGNIFICANT CHANGE UP (ref 135–145)
WBC # BLD: 9.5 K/UL — SIGNIFICANT CHANGE UP (ref 3.8–10.5)
WBC # FLD AUTO: 9.5 K/UL — SIGNIFICANT CHANGE UP (ref 3.8–10.5)

## 2019-02-11 PROCEDURE — 99232 SBSQ HOSP IP/OBS MODERATE 35: CPT

## 2019-02-11 PROCEDURE — 99233 SBSQ HOSP IP/OBS HIGH 50: CPT

## 2019-02-11 RX ADMIN — Medication 10 MILLIGRAM(S): at 12:13

## 2019-02-11 RX ADMIN — HEPARIN SODIUM 5000 UNIT(S): 5000 INJECTION INTRAVENOUS; SUBCUTANEOUS at 19:56

## 2019-02-11 RX ADMIN — Medication 25 MILLIGRAM(S): at 06:18

## 2019-02-11 RX ADMIN — Medication 100 MILLIGRAM(S): at 06:18

## 2019-02-11 RX ADMIN — ATORVASTATIN CALCIUM 80 MILLIGRAM(S): 80 TABLET, FILM COATED ORAL at 19:56

## 2019-02-11 RX ADMIN — LACTULOSE 20 GRAM(S): 10 SOLUTION ORAL at 17:15

## 2019-02-11 RX ADMIN — AMLODIPINE BESYLATE 10 MILLIGRAM(S): 2.5 TABLET ORAL at 06:18

## 2019-02-11 RX ADMIN — POLYETHYLENE GLYCOL 3350 17 GRAM(S): 17 POWDER, FOR SOLUTION ORAL at 12:13

## 2019-02-11 RX ADMIN — HEPARIN SODIUM 5000 UNIT(S): 5000 INJECTION INTRAVENOUS; SUBCUTANEOUS at 06:18

## 2019-02-11 RX ADMIN — Medication 81 MILLIGRAM(S): at 12:13

## 2019-02-11 RX ADMIN — HEPARIN SODIUM 5000 UNIT(S): 5000 INJECTION INTRAVENOUS; SUBCUTANEOUS at 13:47

## 2019-02-11 RX ADMIN — Medication 9 MILLIGRAM(S): at 19:56

## 2019-02-11 NOTE — PROGRESS NOTE ADULT - SUBJECTIVE AND OBJECTIVE BOX
Patient is a 63y old  Male who presents with a chief complaint of stroke (10 Feb 2019 09:37)    Patient feels okay.  Prefers to be called Mr. Jeffries.    Denies chest pain, sob, nausea, vomiting, diarrhea.      Patient seen and examined at bedside.    ALLERGIES:  No Known Allergies    MEDICATIONS  (STANDING):  amLODIPine   Tablet 10 milliGRAM(s) Oral daily  aspirin  chewable 81 milliGRAM(s) Oral daily  atorvastatin 80 milliGRAM(s) Oral at bedtime  docusate sodium 100 milliGRAM(s) Oral two times a day  FLUoxetine 10 milliGRAM(s) Oral daily  heparin  Injectable 5000 Unit(s) SubCutaneous every 8 hours  hydrALAZINE 25 milliGRAM(s) Oral every 12 hours  lactulose Syrup 20 Gram(s) Oral once  melatonin 9 milliGRAM(s) Oral <User Schedule>  polyethylene glycol 3350 17 Gram(s) Oral daily    MEDICATIONS  (PRN):  acetaminophen   Tablet .. 650 milliGRAM(s) Oral every 6 hours PRN Mild Pain (1 - 3), Moderate Pain (4 - 6)  fluticasone propionate 50 MICROgram(s)/spray Nasal Spray 1 Spray(s) Both Nostrils two times a day PRN nasal congestion  hydrocortisone 1% Ointment 1 Application(s) Topical every 12 hours PRN Itching  sodium chloride 0.65% Nasal 1 Spray(s) Both Nostrils every 4 hours PRN Nasal Congestion    Vital Signs Last 24 Hrs  T(F): 98.3 (11 Feb 2019 06:22), Max: 98.3 (11 Feb 2019 06:22)  HR: 83 (11 Feb 2019 06:22) (78 - 83)  BP: 160/80 (11 Feb 2019 06:22) (142/76 - 160/80)  RR: 14 (11 Feb 2019 06:22) (14 - 14)  SpO2: 100% (11 Feb 2019 06:22) (99% - 100%)  I&O's Summary    PHYSICAL EXAM:  General: NAD, A/O x 3, obese, prefers to be called Mr. Jeffries   ENT: MMM  Neck: Supple, No JVD  Lungs: Clear to auscultation bilaterally  Cardio: RRR, S1/S2, No murmurs  Abdomen: Soft, Nontender, Nondistended; Bowel sounds present  Extremities: No calf tenderness, Non-pitting edema in bilateral LE  Neuro: left upper and left lower extremity weakness    LABS:    01-27 Chol 305 mg/dL  mg/dL HDL 31 mg/dL Trig 214 mg/dL    CAPILLARY BLOOD GLUCOSE    01-27 [id number] 5.6    RADIOLOGY & ADDITIONAL TESTS:    Care Discussed with Consultants/Other Providers:

## 2019-02-11 NOTE — PROGRESS NOTE ADULT - ASSESSMENT
64 yo male admitted 2-1-19 with pmh essential htn, CAYDEN on CPAP, dyslipidemia presents with acute cva s/p tpa with left sided weakness

## 2019-02-11 NOTE — PROGRESS NOTE ADULT - ASSESSMENT
ASSESSMENT/PLAN  63 year-old man with a PMH of HTN, morbid obesity who was found to have acute CVA with left side hemiparesis and given TPA.     Acute CVA presenting with Left sided hemiparesis, dysarthria s/p tpa 1/26:  - continue ASA/Lipitor 80 mg PO QHS  - continue prozac for motor recovery. Consider increasing dose to 20 mg for psychological benefits as well given h/o mood disorder  - psychological support for adjustment symptoms  - continue PT, OT, SLP    Depression: Will place psychiatry consult for medication management as patient is already on prozac but requesting valium.     Obstructive Sleep Apnea:   - Nocturnal CPAP  - O2 via NC PRN SOB during day. Has not required recently  - f/u Pulm as outpt - pt follows up Dr Bakari Haas.     HTN:    - norvasc 10 mg daily  - hydralizine increased to 25mg q8 HOURS 2/10.   -  this morning and reporting dizziness, may need to be switched back to BID dosing if symptoms do not improve.   - BP with large manual cuff only for accuracy and consistency  - monitor and adjust as needed    GI/Bowel Mgmt: Colace BID, Senna, Lactulose, Dulcolax PRN, Miralax daily    Diet: Soft with thin liquids    Insomnia: Melatonin 9 mg q8 PMs. Will provide patient with earplugs to reduce noise from CPAP.     DVT: Heparin SC    Foot Care: Will place podiatry consult today    IDT held on 2/7: Patient mod assist with transfers. Is ambulating with RW 55 feet with mod/max assist. Goals for PT/OT wis supervision/mod I. Patient will have sister to help for 1 month but will need assist with showering. LEILA 2/22 if on track for goals of mod I. Otherwise will plan for ASHUTOSH.    Labs  CBC BMP 2/11 - Not collected, will re-order ASSESSMENT/PLAN  63 year-old man with a PMH of HTN, morbid obesity who was found to have acute CVA with left side hemiparesis and given TPA.     #Acute CVA presenting with Left sided hemiparesis, dysarthria s/p tpa 1/26:  - continue ASA/Lipitor 80 mg PO QHS  - continue prozac for motor recovery. Consider increasing dose to 20 mg for psychological benefits as well given h/o mood disorder  - psychological support for adjustment symptoms  - continue PT, OT, SLP    #Depression:   Will place psychiatry consult for medication management as patient is already on prozac but requesting valium.   -discussed with pateint who is agreeable    #Obstructive Sleep Apnea:   - Nocturnal CPAP  - O2 via NC PRN SOB during day. Has not required recently  - f/u Pulm as outpt - pt follows up Dr Bakari Haas.     #HTN:    - norvasc 10 mg daily  - hydralazine increased to 25mg q8 HOURS 2/10.   -  this morning and reporting dizziness, may need to be switched back to BID dosing if symptoms do not improve. Dsicussed with patient  - BP with large manual cuff only for accuracy and consistency  - monitor and adjust as needed    #GI/Bowel Mgmt:   Colace BID, Senna, Lactulose, Dulcolax PRN, Miralax daily    #Diet:   Soft with thin liquids      #Insomnia:   Melatonin 9 mg q8 PMs. Will provide patient with earplugs to reduce noise from CPAP.   -psychiatry for management mood    #DVT:   Heparin SC    #Foot Care:   Will place podiatry consult today    IDT held on 2/7: Patient mod assist with transfers. Is ambulating with RW 55 feet with mod/max assist. Goals for PT/OT wis supervision/mod I. Patient will have sister to help for 1 month but will need assist with showering. LEILA 2/22 if on track for goals of mod I. Otherwise will plan for ASHUTOSH.    Labs  CBC BMP 2/11 - Not collected, will re-order

## 2019-02-11 NOTE — PROGRESS NOTE ADULT - SUBJECTIVE AND OBJECTIVE BOX
Daily Progress Note:  HPI: 63 year-old Man with a PMH of HTN (not on meds), CAYDEN (on CPAP, compliant), obesity, and HLD (not on meds) who presents to hospital with acute onset of slurred speech and Left UE paresis found to have ischemic stroke s/p tPA.  CT head 1/27 showed old lacunar infarcts involving the right basal ganglia, left caudate head/anterior corona radiata region. MRI Brain: Acute right basal ganglia infarct. Chronic bilateral lacunar infarcts. Found to have Oropharyngeal dysphagia place on Dysphagia 3 diet. Patient transferred to Acute Rehabilitation for functional recovery.    Daily Subjective: Patient seen and examined at bedside. This morning patient complained of dizziness, moreso when he first sat up, but has not yet resolved. He is concerned about the dizziness limiting his therapy. He also complains of poor sleep due to frequent interruptions and noise from his CPAP machine. He says his mood is also poor today, he finds himself holding back from "lashing out" at staff when he is dissatisfied with an aspect of care. Requesting valium which he reports he has taken in the past.     REVIEW OF SYSTEMS  Constitutional: No fever, No Chills, No fatigue  Pulm: No cough,  No shortness of breath  Cardio: No chest pain, No palpitations  GI:  No abdominal pain, + Constipation  Neuro: Left sided weakness  Psych:  + poor mood    VITALS  Vital Signs Last 24 Hrs  T(C): 36.8 (11 Feb 2019 08:09), Max: 36.8 (11 Feb 2019 06:22)  T(F): 98.2 (11 Feb 2019 08:09), Max: 98.3 (11 Feb 2019 06:22)  HR: 80 (11 Feb 2019 08:09) (80 - 83)  BP: 160/84 (11 Feb 2019 08:09) (146/88 - 160/84)  BP(mean): --  RR: 14 (11 Feb 2019 08:09) (14 - 14)  SpO2: 100% (11 Feb 2019 08:09) (99% - 100%)    Physical Exam:  Gen - NAD, Comfortable  HEENT - NCAT, EOMI, MMM  Pulm - Poor inspiratory effort, No wheeze, No rhonchi, No crackles  Cardiovascular - RRR, S1S2, No m/r/g  Abdomen - Soft, NT/ND, +BS  Extremities - No C/C/E, No calf tenderness  Neuro-     Cognitive - AAOx3     Communication - Fluent, No dysarthria     Attention: Intact       Cranial Nerves - CN 2-12 intact     Motor - Left upper extremity 3/5 at shoulder, elbow flexion and extension, grasp  reduced gross motor coordination      Tone - Hypotonia  Psychiatric - Mood stable, Affect Flat  Skin: All skin intact      Recent Labs/Imaging: Daily Progress Note:  HPI: 63 year-old Man with a PMH of HTN (not on meds), CAYDEN (on CPAP, compliant), obesity, and HLD (not on meds) who presents to hospital with acute onset of slurred speech and Left UE paresis found to have ischemic stroke s/p tPA.  CT head 1/27 showed old lacunar infarcts involving the right basal ganglia, left caudate head/anterior corona radiata region. MRI Brain: Acute right basal ganglia infarct. Chronic bilateral lacunar infarcts. Found to have Oropharyngeal dysphagia place on Dysphagia 3 diet. Patient transferred to Acute Rehabilitation for functional recovery.    Daily Subjective: Patient seen and examined at bedside. This morning patient complained of dizziness, moreso when he first sat up, but has not yet resolved. He is concerned about the dizziness limiting his therapy. He also complains of poor sleep due to frequent interruptions and noise from his CPAP machine. He says his mood is also poor today, he finds himself holding back from "lashing out" at staff when he is dissatisfied with an aspect of care. Requesting valium which he reports he has taken in the past.     REVIEW OF SYSTEMS  Constitutional: No fever, No Chills, No fatigue  Pulm: No cough,  No shortness of breath  Cardio: No chest pain, No palpitations  GI:  No abdominal pain, + Constipation  Neuro: Left sided weakness  Psych:  + poor mood    VITALS  Vital Signs Last 24 Hrs  T(C): 36.8 (11 Feb 2019 08:09), Max: 36.8 (11 Feb 2019 06:22)  T(F): 98.2 (11 Feb 2019 08:09), Max: 98.3 (11 Feb 2019 06:22)  HR: 80 (11 Feb 2019 08:09) (80 - 83)  BP: 160/84 (11 Feb 2019 08:09) (146/88 - 160/84)  BP(mean): --  RR: 14 (11 Feb 2019 08:09) (14 - 14)  SpO2: 100% (11 Feb 2019 08:09) (99% - 100%)    Physical Exam:  Gen - NAD, Comfortable  HEENT - NCAT, EOMI, MMM  Pulm - Poor inspiratory effort, No wheeze, No rhonchi, No crackles  Cardiovascular - RRR, S1S2, No m/r/g  Abdomen - Soft, NT/ND, +BS  Extremities - No C/C/E, No calf tenderness  Neuro-     Cognitive - AAOx3     Communication - Fluent, No dysarthria     Attention: Intact       Cranial Nerves - CN 2-12 intact     Motor - Left upper extremity 3/5 at shoulder, elbow flexion and extension, grasp  reduced gross motor coordination      Tone - Hypotonia  Psychiatric - Mood stable, Affect Flat  Skin: All skin intact      Recent Labs/Imaging:    MEDICATIONS  (STANDING):  amLODIPine   Tablet 10 milliGRAM(s) Oral daily  aspirin  chewable 81 milliGRAM(s) Oral daily  atorvastatin 80 milliGRAM(s) Oral at bedtime  docusate sodium 100 milliGRAM(s) Oral two times a day  FLUoxetine 10 milliGRAM(s) Oral daily  heparin  Injectable 5000 Unit(s) SubCutaneous every 8 hours  hydrALAZINE 25 milliGRAM(s) Oral every 12 hours  lactulose Syrup 20 Gram(s) Oral once  melatonin 9 milliGRAM(s) Oral <User Schedule>  polyethylene glycol 3350 17 Gram(s) Oral daily    MEDICATIONS  (PRN):  acetaminophen   Tablet .. 650 milliGRAM(s) Oral every 6 hours PRN Mild Pain (1 - 3), Moderate Pain (4 - 6)  fluticasone propionate 50 MICROgram(s)/spray Nasal Spray 1 Spray(s) Both Nostrils two times a day PRN nasal congestion  hydrocortisone 1% Ointment 1 Application(s) Topical every 12 hours PRN Itching  sodium chloride 0.65% Nasal 1 Spray(s) Both Nostrils every 4 hours PRN Nasal Congestion Daily Progress Note:  HPI: 63 year-old Man with a PMH of HTN (not on meds), CAYDEN (on CPAP, compliant), obesity, and HLD (not on meds) who presents to hospital with acute onset of slurred speech and Left UE paresis found to have ischemic stroke s/p tPA.  CT head 1/27 showed old lacunar infarcts involving the right basal ganglia, left caudate head/anterior corona radiata region. MRI Brain: Acute right basal ganglia infarct. Chronic bilateral lacunar infarcts. Found to have Oropharyngeal dysphagia place on Dysphagia 3 diet. Patient transferred to Acute Rehabilitation for functional recovery.    Daily Subjective: Patient seen and examined at bedside. This morning patient complained of dizziness, moreso when he first sat up, but has not yet resolved. He is concerned about the dizziness limiting his therapy. He also complains of poor sleep due to frequent interruptions and noise from his CPAP machine. He says his mood is also poor today, he finds himself holding back from "lashing out" at staff when he is dissatisfied with an aspect of care. Requesting valium which he reports he has taken in the past.   Slightly irritable, admits, reduced sleep. No cough, comfortable when using CPAP    REVIEW OF SYSTEMS  Constitutional: No fever, No Chills, No fatigue  Pulm: No cough,  No shortness of breath  Cardio: No chest pain, No palpitations  GI:  No abdominal pain, + Constipation  Neuro: Left sided weakness  Psych:  + poor mood    VITALS  Vital Signs Last 24 Hrs  T(C): 36.8 (11 Feb 2019 08:09), Max: 36.8 (11 Feb 2019 06:22)  T(F): 98.2 (11 Feb 2019 08:09), Max: 98.3 (11 Feb 2019 06:22)  HR: 80 (11 Feb 2019 08:09) (80 - 83)  BP: 160/84 (11 Feb 2019 08:09) (146/88 - 160/84)  BP(mean): --  RR: 14 (11 Feb 2019 08:09) (14 - 14)  SpO2: 100% (11 Feb 2019 08:09) (99% - 100%)    Physical Exam:  Gen - NAD, Comfortable  HEENT - NCAT, EOMI, MMM  Pulm - Poor inspiratory effort, No wheeze, No rhonchi, No crackles  Cardiovascular - RRR, S1S2, No m/r/g  Abdomen - Soft, NT/ND, +BS  Extremities - No C/C/E, No calf tenderness  Neuro-     Cognitive - AAOx3     Communication - Fluent, No dysarthria     Attention: Intact       Cranial Nerves - CN 2-12 intact     Motor - Left upper extremity 3/5 at shoulder, elbow flexion and extension, grasp  reduced gross motor coordination   trace hand swelling, finger flexion 3/5 incomplete grasp     Tone - Hypotonia  Psychiatric - Mood stable, Affect Flat  Skin: All skin intact      Recent Labs/Imaging:    MEDICATIONS  (STANDING):  amLODIPine   Tablet 10 milliGRAM(s) Oral daily  aspirin  chewable 81 milliGRAM(s) Oral daily  atorvastatin 80 milliGRAM(s) Oral at bedtime  docusate sodium 100 milliGRAM(s) Oral two times a day  FLUoxetine 10 milliGRAM(s) Oral daily  heparin  Injectable 5000 Unit(s) SubCutaneous every 8 hours  hydrALAZINE 25 milliGRAM(s) Oral every 12 hours  lactulose Syrup 20 Gram(s) Oral once  melatonin 9 milliGRAM(s) Oral <User Schedule>  polyethylene glycol 3350 17 Gram(s) Oral daily    MEDICATIONS  (PRN):  acetaminophen   Tablet .. 650 milliGRAM(s) Oral every 6 hours PRN Mild Pain (1 - 3), Moderate Pain (4 - 6)  fluticasone propionate 50 MICROgram(s)/spray Nasal Spray 1 Spray(s) Both Nostrils two times a day PRN nasal congestion  hydrocortisone 1% Ointment 1 Application(s) Topical every 12 hours PRN Itching  sodium chloride 0.65% Nasal 1 Spray(s) Both Nostrils every 4 hours PRN Nasal Congestion

## 2019-02-12 ENCOUNTER — APPOINTMENT (OUTPATIENT)
Dept: INTERNAL MEDICINE | Facility: HOSPITAL | Age: 64
End: 2019-02-12

## 2019-02-12 DIAGNOSIS — I73.9 PERIPHERAL VASCULAR DISEASE, UNSPECIFIED: ICD-10-CM

## 2019-02-12 DIAGNOSIS — B35.3 TINEA PEDIS: ICD-10-CM

## 2019-02-12 DIAGNOSIS — B35.1 TINEA UNGUIUM: ICD-10-CM

## 2019-02-12 DIAGNOSIS — I89.0 LYMPHEDEMA, NOT ELSEWHERE CLASSIFIED: ICD-10-CM

## 2019-02-12 DIAGNOSIS — R52 PAIN, UNSPECIFIED: ICD-10-CM

## 2019-02-12 PROCEDURE — 99232 SBSQ HOSP IP/OBS MODERATE 35: CPT

## 2019-02-12 PROCEDURE — 90792 PSYCH DIAG EVAL W/MED SRVCS: CPT

## 2019-02-12 RX ORDER — LOSARTAN POTASSIUM 100 MG/1
50 TABLET, FILM COATED ORAL DAILY
Qty: 0 | Refills: 0 | Status: DISCONTINUED | OUTPATIENT
Start: 2019-02-12 | End: 2019-02-12

## 2019-02-12 RX ORDER — LOSARTAN POTASSIUM 100 MG/1
50 TABLET, FILM COATED ORAL DAILY
Qty: 0 | Refills: 0 | Status: DISCONTINUED | OUTPATIENT
Start: 2019-02-12 | End: 2019-02-13

## 2019-02-12 RX ADMIN — HEPARIN SODIUM 5000 UNIT(S): 5000 INJECTION INTRAVENOUS; SUBCUTANEOUS at 20:31

## 2019-02-12 RX ADMIN — AMLODIPINE BESYLATE 10 MILLIGRAM(S): 2.5 TABLET ORAL at 06:37

## 2019-02-12 RX ADMIN — POLYETHYLENE GLYCOL 3350 17 GRAM(S): 17 POWDER, FOR SOLUTION ORAL at 12:00

## 2019-02-12 RX ADMIN — HEPARIN SODIUM 5000 UNIT(S): 5000 INJECTION INTRAVENOUS; SUBCUTANEOUS at 06:37

## 2019-02-12 RX ADMIN — Medication 100 MILLIGRAM(S): at 16:47

## 2019-02-12 RX ADMIN — Medication 81 MILLIGRAM(S): at 12:00

## 2019-02-12 RX ADMIN — HEPARIN SODIUM 5000 UNIT(S): 5000 INJECTION INTRAVENOUS; SUBCUTANEOUS at 15:23

## 2019-02-12 RX ADMIN — ATORVASTATIN CALCIUM 80 MILLIGRAM(S): 80 TABLET, FILM COATED ORAL at 20:31

## 2019-02-12 RX ADMIN — Medication 100 MILLIGRAM(S): at 06:37

## 2019-02-12 RX ADMIN — Medication 9 MILLIGRAM(S): at 20:31

## 2019-02-12 RX ADMIN — LOSARTAN POTASSIUM 50 MILLIGRAM(S): 100 TABLET, FILM COATED ORAL at 12:00

## 2019-02-12 RX ADMIN — Medication 25 MILLIGRAM(S): at 06:37

## 2019-02-12 RX ADMIN — Medication 10 MILLIGRAM(S): at 12:00

## 2019-02-12 NOTE — PROGRESS NOTE ADULT - SUBJECTIVE AND OBJECTIVE BOX
Daily Progress Note:  HPI: 63 year-old Man with a PMH of HTN (not on meds), CAYDEN (on CPAP, compliant), obesity, and HLD (not on meds) who presents to hospital with acute onset of slurred speech and Left UE paresis found to have ischemic stroke s/p tPA.  CT head 1/27 showed old lacunar infarcts involving the right basal ganglia, left caudate head/anterior corona radiata region. MRI Brain: Acute right basal ganglia infarct. Chronic bilateral lacunar infarcts. Found to have Oropharyngeal dysphagia place on Dysphagia 3 diet. Patient transferred to Acute Rehabilitation for functional recovery.    Daily Subjective: Patient seen and examined in PT gym. Patient reports he had some dizziness this morning but it is not limiting therapy. Slept better last night. Reports nose bleeds due to dry nostrils, requesting a "steam treatment".     REVIEW OF SYSTEMS  Constitutional: No fever, No Chills, No fatigue  Pulm: No cough, No shortness of breath  Cardio: No chest pain, No palpitations  GI:  No abdominal pain, + Constipation  Neuro: Left sided weakness  Psych:  + poor mood    VITALS  Vital Signs Last 24 Hrs  T(C): 36.7 (12 Feb 2019 09:33), Max: 36.8 (11 Feb 2019 19:55)  T(F): 98 (12 Feb 2019 09:33), Max: 98.3 (11 Feb 2019 19:55)  HR: 77 (12 Feb 2019 09:33) (77 - 85)  BP: 158/80 (12 Feb 2019 09:33) (155/85 - 170/90)  BP(mean): --  RR: 14 (12 Feb 2019 09:33) (14 - 14)  SpO2: 99% (12 Feb 2019 09:33) (98% - 99%)    Physical Exam:  Gen - NAD, Comfortable  Pulm - Poor inspiratory effort, No wheeze, No rhonchi, No crackles  Cardiovascular - RRR, S1S2, No m/r/g  Abdomen - Soft, NT/ND, +BS  Extremities - No C/C/E, No calf tenderness  Neuro-     Cognitive - AAOx3     Communication - Fluent, No dysarthria     Cranial Nerves - CN 2-12 intact     Motor - Left upper extremity 3/5 at shoulder, elbow flexion and extension, grasp  reduced gross motor coordination   trace hand swelling, finger flexion 3/5 incomplete grasp     Tone - Hypotonia  Psychiatric - Mood stable, Affect Flat    Recent Labs/Imaging:                     15.1   9.5   )-----------( 271      ( 11 Feb 2019 13:55 )             45.0   02-11    135  |  101  |  22  ----------------------------<  127<H>  4.5   |  20<L>  |  0.84    Ca    9.1      11 Feb 2019 13:00    MEDICATIONS  (STANDING):  amLODIPine   Tablet 10 milliGRAM(s) Oral daily  aspirin  chewable 81 milliGRAM(s) Oral daily  atorvastatin 80 milliGRAM(s) Oral at bedtime  docusate sodium 100 milliGRAM(s) Oral two times a day  FLUoxetine 10 milliGRAM(s) Oral daily  heparin  Injectable 5000 Unit(s) SubCutaneous every 8 hours  losartan 50 milliGRAM(s) Oral daily  melatonin 9 milliGRAM(s) Oral <User Schedule>  polyethylene glycol 3350 17 Gram(s) Oral daily    MEDICATIONS  (PRN):  acetaminophen   Tablet .. 650 milliGRAM(s) Oral every 6 hours PRN Mild Pain (1 - 3), Moderate Pain (4 - 6)  fluticasone propionate 50 MICROgram(s)/spray Nasal Spray 1 Spray(s) Both Nostrils two times a day PRN nasal congestion  hydrocortisone 1% Ointment 1 Application(s) Topical every 12 hours PRN Itching  sodium chloride 0.65% Nasal 1 Spray(s) Both Nostrils every 4 hours PRN Nasal Congestion Daily Progress Note:  HPI: 63 year-old Man with a PMH of HTN (not on meds), CAYDEN (on CPAP, compliant), obesity, and HLD (not on meds) who presents to hospital with acute onset of slurred speech and Left UE paresis found to have ischemic stroke s/p tPA.  CT head 1/27 showed old lacunar infarcts involving the right basal ganglia, left caudate head/anterior corona radiata region. MRI Brain: Acute right basal ganglia infarct. Chronic bilateral lacunar infarcts. Found to have Oropharyngeal dysphagia place on Dysphagia 3 diet. Patient transferred to Acute Rehabilitation for functional recovery.    Daily Subjective: Patient seen and examined in PT gym. Patient reports he had some dizziness this morning but it is not limiting therapy. Slept better last night. Reports nose bleeds due to dry nostrils, requesting a "steam treatment". Has saline spray already  Mood overall looks improved, less irritable    REVIEW OF SYSTEMS  Constitutional: No fever, No Chills, No fatigue  Pulm: No cough, No shortness of breath  Cardio: No chest pain, No palpitations  GI:  No abdominal pain, + Constipation  Neuro: Left sided weakness  Psych:  + poor mood but improved compared to yesteray    VITALS  Vital Signs Last 24 Hrs  T(C): 36.7 (12 Feb 2019 09:33), Max: 36.8 (11 Feb 2019 19:55)  T(F): 98 (12 Feb 2019 09:33), Max: 98.3 (11 Feb 2019 19:55)  HR: 77 (12 Feb 2019 09:33) (77 - 85)  BP: 158/80 (12 Feb 2019 09:33) (155/85 - 170/90)  BP(mean): --  RR: 14 (12 Feb 2019 09:33) (14 - 14)  SpO2: 99% (12 Feb 2019 09:33) (98% - 99%)    Physical Exam:  Gen - NAD, Comfortable  Pulm - Poor inspiratory effort, No wheeze, No rhonchi, No crackles  Cardiovascular - RRR, S1S2, No m/r/g  Abdomen - Soft, NT/ND, +BS  Extremities - No C/C/E, No calf tenderness  Neuro-     Cognitive - AAOx3     Communication - Fluent, No dysarthria     Cranial Nerves - CN 2-12 intact     Motor - Left upper extremity 3/5 at shoulder, elbow flexion and extension, grasp  reduced gross motor coordination   trace hand swelling, finger flexion 3/5 incomplete grasp     Tone - Hypotonia  Psychiatric - Mood stable, Affect Flat    Recent Labs/Imaging:                     15.1   9.5   )-----------( 271      ( 11 Feb 2019 13:55 )             45.0   02-11    135  |  101  |  22  ----------------------------<  127<H>  4.5   |  20<L>  |  0.84    Ca    9.1      11 Feb 2019 13:00    MEDICATIONS  (STANDING):  amLODIPine   Tablet 10 milliGRAM(s) Oral daily  aspirin  chewable 81 milliGRAM(s) Oral daily  atorvastatin 80 milliGRAM(s) Oral at bedtime  docusate sodium 100 milliGRAM(s) Oral two times a day  FLUoxetine 10 milliGRAM(s) Oral daily  heparin  Injectable 5000 Unit(s) SubCutaneous every 8 hours  losartan 50 milliGRAM(s) Oral daily  melatonin 9 milliGRAM(s) Oral <User Schedule>  polyethylene glycol 3350 17 Gram(s) Oral daily    MEDICATIONS  (PRN):  acetaminophen   Tablet .. 650 milliGRAM(s) Oral every 6 hours PRN Mild Pain (1 - 3), Moderate Pain (4 - 6)  fluticasone propionate 50 MICROgram(s)/spray Nasal Spray 1 Spray(s) Both Nostrils two times a day PRN nasal congestion  hydrocortisone 1% Ointment 1 Application(s) Topical every 12 hours PRN Itching  sodium chloride 0.65% Nasal 1 Spray(s) Both Nostrils every 4 hours PRN Nasal Congestion

## 2019-02-12 NOTE — CONSULT NOTE ADULT - PROBLEM SELECTOR RECOMMENDATION 5
rec md considers antifungal poder should pruitisi occur. no acutive tx rec dpm fu prn dc for long term mgmt

## 2019-02-12 NOTE — CONSULT NOTE ADULT - SUBJECTIVE AND OBJECTIVE BOX
Source: Patient  Reliability: Reliable    Identifying Data: 64yo  male living alone presently in private home, unemployed since 2005 Male with HEALTH ISSUES - PROBLEM Dx:  Hypokalemia, High cholesterol, Hypertension, unspecified type, Dysphagia, unspecified type, Weakness of left side of body,   Cerebrovascular accident (CVA), unspecified mechanism    Chief Complaint: "I am frustrated that I have sent out 100 resumes with interview or call back."    History of Present Illness:  63 year-old Man with a PMH of HTN (not on meds), CAYDEN (on CPAP, compliant), obesity, and HLD (not on meds) who presents to hospital with acute onset of slurred speech and Left UE paresis found to have ischemic stroke  s/p tPA now in MICU for further observation  and management.  CT head on 1/27-Old lacunar infarcts involving the right basal ganglia, left caudate head /anterior corona radiata region. MRI Brain MRI: Acute right basal ganglia infarct. Chronic bilateral lacunar infarcts. Found to have Oropharyngeal dysphagia place on Dysphagia 3 diet. Patient transferred to Acute Rehabilitation for functional recovery. (01 Feb 2019 19:12)    Symptoms and concerns-met with patient at bedside who presents awake alert with CPAP machine on.  Pleasant and conversational, reports to have been feeling "frustrated and disheartened" by his inability to retain employment since 2005 when he was under contract with [employer] as an  level 3.  Reports to have sent out a hundred resumes with no call back, leading to feelings of frustration.  Discussed marital issues with his wife, leading to wife and 2 sons aged 18 & 17 living in a separate home for the past 10 years.  Is concerned over his ability to maneuver at home upon discharge as his house is filled with electronics and "natural resources" for his hobbies.  Denies hoarding or being unable to part with items, but admits to house being cluttered with limited amounts of space.   Is encouraged over progress made in therapies and reports to be motivated.  Denies hopelessness or helplessness, SI, HI but admits to feeling short tempered when staff "messes with my things, when I have them laid out the way I want them."  Appetite is good with restful sleep, no anhedonia or feelings of guilt.              Psychiatric Review of Systems:  Mood: euthymic  Irritated over people moving his personal belongings around.    Sleep Disturbance-including insomnia, better last night.  Reports to have been a "night owl when I was home" sleeping in the daytime and staying up at night,   Anxiety-including worries over money and maintaining his home.  Sister has been supportive so far but patient is concerned that her assistance will run out if he is unable to obtain work.        MEDICATIONS  (STANDING):  amLODIPine   Tablet 10 milliGRAM(s) Oral daily  aspirin  chewable 81 milliGRAM(s) Oral daily  atorvastatin 80 milliGRAM(s) Oral at bedtime  docusate sodium 100 milliGRAM(s) Oral two times a day  FLUoxetine 10 milliGRAM(s) Oral daily  heparin  Injectable 5000 Unit(s) SubCutaneous every 8 hours  losartan 50 milliGRAM(s) Oral daily  melatonin 9 milliGRAM(s) Oral <User Schedule>  polyethylene glycol 3350 17 Gram(s) Oral daily    MEDICATIONS  (PRN):  acetaminophen   Tablet .. 650 milliGRAM(s) Oral every 6 hours PRN Mild Pain (1 - 3), Moderate Pain (4 - 6)  fluticasone propionate 50 MICROgram(s)/spray Nasal Spray 1 Spray(s) Both Nostrils two times a day PRN nasal congestion  hydrocortisone 1% Ointment 1 Application(s) Topical every 12 hours PRN Itching  sodium chloride 0.65% Nasal 1 Spray(s) Both Nostrils every 4 hours PRN Nasal Congestion    Allergies  No Known Allergies    PAST MEDICAL & SURGICAL HISTORY:  Sciatica  High cholesterol  Hypertension  Peripheral Vascular Disease  CAYDEN (Obstructive Sleep Apnea)  CAD (Coronary Artery Disease)  History of cholecystectomy  History of tonsillectomy  S/P ACL repair    Past Psychiatric History:  Inpatient involuntary admission to [facility] for passive Suicidal ideations.  Was hospitalized for a week 10 years ago.  No other hospitalizations.  Has been in individual therapy and group therapy at that time.  Cannot recall psychiatrist name or medication he was prescribed.    Suicidality/ Aggression-reports passive SI in past, denies intent or urges or attempts.      Substance Use History:  cannabis daily "when I have money"  last time was 3 weeks ago.  "I take a toke every couple of hours."  Denies other substance history.    Social and Personal History: 1 sister, living in [place].   but living apart from wife for 10 years.  2 sons 18 & 16 yo.  Worked as an  in past, has been seeking work.  Confucianism.  Grew up in [place], graduated high school and graduated trade school.   Hobbies/ Activities of Daily Living-electronics, woodwork,, engineering.  Legal background-24 yo for cannabis   Trauma background-denies  Violence-denies    FAMILY HISTORY:  Family history of cerebrovascular accident (CVA) in father (Father)    T(C): 36.7 (02-12-19 @ 09:33), Max: 36.8 (02-11-19 @ 19:55)  HR: 77 (02-12-19 @ 09:33) (77 - 85)  BP: 158/80 (02-12-19 @ 09:33) (155/85 - 170/90)  RR: 14 (02-12-19 @ 09:33) (14 - 14)  SpO2: 99% (02-12-19 @ 09:33) (98% - 99%)  Wt(kg): --    Mental Status Examination:  General Appearance-Obese white haired male with beard, wearing glasses with long, unclipped pinky nails, wearing CPAP machine sitting up in wheelchair  -hygiene/grooming-fair-poor  Behavior-calm, pleasant  Attitude/ Scfwgchlfgluo-ot-ofmsrgyrb  -eye contact-good  Speech-normal rate rhythm and volume  Thought Process-linear  Thought Content-concerns over ability to care for himself at home. No perceptual disturbances or delusions.  No thoughts of self harm or aggression.    Mood: "concerned"  Affect-congruent, appropriate    Cognition-alert oriented x 3  Impulse Control-good  Insight/Judgment-good    Studies:    Laboratory testing-                        15.1   9.5   )-----------( 271      ( 11 Feb 2019 13:55 )             45.0     02-11    135  |  101  |  22  ----------------------------<  127<H>  4.5   |  20<L>  |  0.84    Ca    9.1      11 Feb 2019 13:00    Assessment:  Adjustment Disorder with dysphoric & anxious features F43.23  Cannabis use F12.9  Unemployment Z56.0    Recommendations:  Discussed with patient the use of anti depressants in treatment of depressive and anxious features.  Patient does not wish to increase dosage of Prozac.  He is only in agreement with low dose for brain recovery and is not interested in increasing.  Does not wish to participate in supportive counseling either, feeling that "talking and not doing anything about it is not for me, I'm an action kind of mendoza that gets results."          Medication- Continue Prozac 10 mg po daily.  Discussed with patient side effect of Diazepam in relation to respiratory depression in light of Obstructive Sleep Apnea, if were to utilize would only do very low dose, 2mg as needed Q12H anxiousness.    Patient reports to be in discussion with  who is assessing & co-ordinating home care needs.     Hospital course Follow-up  Will sign off, please reconsult if needed. Source: Patient  Reliability: Reliable    Identifying Data: 62yo  male living alone presently in private home, unemployed since 2005 Male with HEALTH ISSUES - PROBLEM Dx:  Hypokalemia, High cholesterol, Hypertension, unspecified type, Dysphagia, unspecified type, Weakness of left side of body,   Cerebrovascular accident (CVA), unspecified mechanism    Chief Complaint: "I am frustrated that I have sent out 100 resumes with no interview or call back."    History of Present Illness:  63 year-old Man with a PMH of HTN (not on meds), CAYDEN (on CPAP, compliant), obesity, and HLD (not on meds) who presents to hospital with acute onset of slurred speech and Left UE paresis found to have ischemic stroke  s/p tPA now in MICU for further observation  and management.  CT head on 1/27-Old lacunar infarcts involving the right basal ganglia, left caudate head /anterior corona radiata region. MRI Brain MRI: Acute right basal ganglia infarct. Chronic bilateral lacunar infarcts. Found to have Oropharyngeal dysphagia place on Dysphagia 3 diet. Patient transferred to Acute Rehabilitation for functional recovery. (01 Feb 2019 19:12)    Symptoms and concerns-met with patient at bedside who presents awake & alert with CPAP machine on.  Pleasant and conversational, reports to have been feeling "frustrated and disheartened" by his inability to retain employment since 2005, when he was under contract with [employer] as an  level 3.  Reports to have sent out a hundred resumes with no call back, leading to feelings of frustration, worries over money and inability to pay his bills as well as low mood.  Discussed marital issues with his wife, leading to wife and 2 sons aged 18 & 17 living in a separate home for the past 10 years.  Is concerned over his ability to maneuver at home upon discharge as his house is filled with electronics and "natural resources" for his hobbies.  Denies hoarding or being unable to part with items, but admits to house being cluttered with limited amounts of space.   Is encouraged over progress made in therapies and reports to be motivated.  Denies hopelessness or helplessness, SI, HI but admits to feeling short tempered when staff "messes with my things, when I have them laid out the way I want them."  Appetite is good with restful sleep, no anhedonia or feelings of guilt.              Psychiatric Review of Systems:  Mood: euthymic  Irritated over people moving his personal belongings around.    Sleep Disturbance-including insomnia, better last night.  Reports to have been a "night owl when I was home" sleeping in the daytime and staying up at night,   Anxiety-including worries over money and maintaining his home.  Sister has been supportive so far but patient is concerned that her assistance will run out if he is unable to obtain work.        MEDICATIONS  (STANDING):  amLODIPine   Tablet 10 milliGRAM(s) Oral daily  aspirin  chewable 81 milliGRAM(s) Oral daily  atorvastatin 80 milliGRAM(s) Oral at bedtime  docusate sodium 100 milliGRAM(s) Oral two times a day  FLUoxetine 10 milliGRAM(s) Oral daily  heparin  Injectable 5000 Unit(s) SubCutaneous every 8 hours  losartan 50 milliGRAM(s) Oral daily  melatonin 9 milliGRAM(s) Oral <User Schedule>  polyethylene glycol 3350 17 Gram(s) Oral daily    MEDICATIONS  (PRN):  acetaminophen   Tablet .. 650 milliGRAM(s) Oral every 6 hours PRN Mild Pain (1 - 3), Moderate Pain (4 - 6)  fluticasone propionate 50 MICROgram(s)/spray Nasal Spray 1 Spray(s) Both Nostrils two times a day PRN nasal congestion  hydrocortisone 1% Ointment 1 Application(s) Topical every 12 hours PRN Itching  sodium chloride 0.65% Nasal 1 Spray(s) Both Nostrils every 4 hours PRN Nasal Congestion    Allergies  No Known Allergies    PAST MEDICAL & SURGICAL HISTORY:  Sciatica  High cholesterol  Hypertension  Peripheral Vascular Disease  CAYDEN (Obstructive Sleep Apnea)  CAD (Coronary Artery Disease)  History of cholecystectomy  History of tonsillectomy  S/P ACL repair    Past Psychiatric History:  Inpatient involuntary admission to [facility] for passive Suicidal ideations.  Was hospitalized for a week 10 years ago.  No other hospitalizations.  Has been in individual therapy and group therapy at that time.  Cannot recall psychiatrist name or medication he was prescribed.    Suicidality/ Aggression-reports passive SI in past, denies intent or urges or attempts.      Substance Use History:  cannabis daily "when I have money"  last time was 3 weeks ago.  "I take a toke every couple of hours."  Denies other substance history.    Social and Personal History: 1 sister, living in [place].   but living apart from wife for 10 years.  2 sons 18 & 16 yo.  Worked as an  in past, has been seeking work.  Jain.  Grew up in [place], graduated high school and graduated trade school.   Hobbies/ Activities of Daily Living-electronics, woodwork,, engineering.  Legal background-22 yo for cannabis   Trauma background-denies  Violence-denies    FAMILY HISTORY:  Family history of cerebrovascular accident (CVA) in father (Father)    T(C): 36.7 (02-12-19 @ 09:33), Max: 36.8 (02-11-19 @ 19:55)  HR: 77 (02-12-19 @ 09:33) (77 - 85)  BP: 158/80 (02-12-19 @ 09:33) (155/85 - 170/90)  RR: 14 (02-12-19 @ 09:33) (14 - 14)  SpO2: 99% (02-12-19 @ 09:33) (98% - 99%)  Wt(kg): --    Mental Status Examination:  General Appearance-Obese white haired male with beard, wearing glasses with long, unclipped pinky nails, wearing CPAP machine sitting up in wheelchair  -hygiene/grooming-fair-poor  Behavior-calm, pleasant  Attitude/ Toetuihwkpqea-yk-qyvcakqvj  -eye contact-good  Speech-normal rate rhythm and volume  Thought Process-linear  Thought Content-concerns over ability to care for himself at home. No perceptual disturbances or delusions.  No thoughts of self harm or aggression.    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  Cannabis use F12.9  Unemployment Z56.0    Recommendations:  Discussed with patient the use of anti depressants in treatment of depressive and anxious features.  Patient does not wish to increase dosage of Prozac.  He is only in agreement with low dose for brain recovery and is not interested in increasing.  Does not wish to participate in supportive counseling either, feeling that "talking and not doing anything about it is not for me, I'm an action kind of mendoza that gets results."          Medication- Continue Prozac 10 mg po daily.  Discussed with patient side effect of Diazepam in relation to respiratory depression in light of Obstructive Sleep Apnea, if were to utilize would only do very low dose, 2mg as needed Q12H anxiousness.    Patient reports to be in discussion with  who is assessing & co-ordinating home care needs.     Hospital course Follow-up  Will sign off, please reconsult if needed. Source: Patient  Reliability: Reliable    Identifying Data: 64yo  male living alone presently in private home, unemployed since 2005 Male with HEALTH ISSUES - PROBLEM Dx:  Hypokalemia, High cholesterol, Hypertension, unspecified type, Dysphagia, unspecified type, Weakness of left side of body,   Cerebrovascular accident (CVA), unspecified mechanism    Chief Complaint: "I am frustrated that I have sent out 100 resumes with no interview or call back."    History of Present Illness:  63 year-old Man with a PMH of HTN (not on meds), CAYDEN (on CPAP, compliant), obesity, and HLD (not on meds) who presents to hospital with acute onset of slurred speech and Left UE paresis found to have ischemic stroke  s/p tPA now in MICU for further observation  and management.  CT head on 1/27-Old lacunar infarcts involving the right basal ganglia, left caudate head /anterior corona radiata region. MRI Brain MRI: Acute right basal ganglia infarct. Chronic bilateral lacunar infarcts. Found to have Oropharyngeal dysphagia place on Dysphagia 3 diet. Patient transferred to Acute Rehabilitation for functional recovery. (01 Feb 2019 19:12)    Symptoms and concerns-met with patient at bedside who presents awake & alert with CPAP machine on.  Pleasant and conversational, reports to have been feeling "frustrated and disheartened" by his inability to retain employment since 2005, when he was under contract with [employer] as an  level 3.  Reports to have sent out a hundred resumes with no call back, leading to feelings of frustration, worries over money and inability to pay his bills as well as low mood.  Discussed marital issues with his wife, leading to wife and 2 sons aged 18 & 17 living in a separate home for the past 10 years.  Is concerned over his ability to maneuver at home upon discharge as his house is filled with electronics and "natural resources" for his hobbies.  Denies hoarding or being unable to part with items, but admits to house being cluttered with limited amounts of space.   Is encouraged over progress made in therapies and reports to be motivated.  Denies hopelessness or helplessness, SI, HI but admits to feeling short tempered when staff "messes with my things, when I have them laid out the way I want them."  Appetite is good with restful sleep, no anhedonia or feelings of guilt.             Psychiatric Review of Systems:  Mood: mildly dysphoric and anxious  Irritated over people moving his personal belongings around.    Sleep Disturbance-including insomnia, better last night.  Reports to have been a "night owl when I was home" sleeping in the daytime and staying up at night,   Anxiety-including worries over money and maintaining his home.  Sister has been supportive so far but patient is concerned that her assistance will run out if he is unable to obtain work. No panic symptoms.        MEDICATIONS  (STANDING):  amLODIPine   Tablet 10 milliGRAM(s) Oral daily  aspirin  chewable 81 milliGRAM(s) Oral daily  atorvastatin 80 milliGRAM(s) Oral at bedtime  docusate sodium 100 milliGRAM(s) Oral two times a day  FLUoxetine 10 milliGRAM(s) Oral daily  heparin  Injectable 5000 Unit(s) SubCutaneous every 8 hours  losartan 50 milliGRAM(s) Oral daily  melatonin 9 milliGRAM(s) Oral <User Schedule>  polyethylene glycol 3350 17 Gram(s) Oral daily    MEDICATIONS  (PRN):  acetaminophen   Tablet .. 650 milliGRAM(s) Oral every 6 hours PRN Mild Pain (1 - 3), Moderate Pain (4 - 6)  fluticasone propionate 50 MICROgram(s)/spray Nasal Spray 1 Spray(s) Both Nostrils two times a day PRN nasal congestion  hydrocortisone 1% Ointment 1 Application(s) Topical every 12 hours PRN Itching  sodium chloride 0.65% Nasal 1 Spray(s) Both Nostrils every 4 hours PRN Nasal Congestion    Allergies  No Known Allergies    PAST MEDICAL & SURGICAL HISTORY:  Sciatica  High cholesterol  Hypertension  Peripheral Vascular Disease  CAYDEN (Obstructive Sleep Apnea)  CAD (Coronary Artery Disease)  History of cholecystectomy  History of tonsillectomy  S/P ACL repair    Past Psychiatric History:  Inpatient involuntary admission to [facility] for passive Suicidal ideations.  Was hospitalized for a week 10 years ago.  No other hospitalizations.  Has been in individual therapy and group therapy at that time.  Cannot recall psychiatrist name or medication he was prescribed.    Suicidality/ Aggression-reports passive SI in past, denies intent or urges or attempts.      Substance Use History:  cannabis daily "when I have money"  last time was 3 weeks ago.  "I take a toke every couple of hours."  Denies other substance history.    Social and Personal History: 1 sister, living in [place].   but living apart from wife for 10 years.  2 sons 18 & 18 yo.  Worked as an  in past, has been seeking work.  Orthodoxy.  Grew up in [place], graduated high school and graduated trade school.   Hobbies/ Activities of Daily Living-electronics, woodwork,, engineering.  Legal background-22 yo for cannabis   Trauma background-denies  Violence-denies    FAMILY HISTORY:  Family history of cerebrovascular accident (CVA) in father (Father)    T(C): 36.7 (02-12-19 @ 09:33), Max: 36.8 (02-11-19 @ 19:55)  HR: 77 (02-12-19 @ 09:33) (77 - 85)  BP: 158/80 (02-12-19 @ 09:33) (155/85 - 170/90)  RR: 14 (02-12-19 @ 09:33) (14 - 14)  SpO2: 99% (02-12-19 @ 09:33) (98% - 99%)  Wt(kg): --    Mental Status Examination:  General Appearance-Obese white haired male with beard, wearing glasses with long, unclipped pinky nails, wearing CPAP machine sitting up in wheelchair  -hygiene/grooming-fair-poor  Behavior-calm, pleasant  Attitude/ Aqihldxrbvofu-xw-fuizuweob  -eye contact-good  Speech-normal rate rhythm and volume  Thought Process-linear  Thought Content-concerns over ability to care for himself at home. No perceptual disturbances or delusions.  No thoughts of self harm or aggression.    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  Cannabis use F12.9  Unemployment Z56.0    Recommendations:  Discussed with patient the use of anti depressants in treatment of depressive and anxious features.  Patient does not wish to increase dosage of Prozac.  He is only in agreement with low dose for brain recovery and is not interested in increasing.  Does not wish to participate in supportive counseling either, feeling that "talking and not doing anything about it is not for me, I'm an action kind of mendoza that gets results."          Medication- Continue Prozac 10 mg po daily.  Discussed with patient side effect of Diazepam in relation to respiratory depression in light of Obstructive Sleep Apnea, if were to utilize would only do very low dose, 2mg as needed Q12H anxiousness.    Patient reports to be in discussion with  who is assessing & co-ordinating home care needs.     Hospital course Follow-up  Will sign off, please reconsult if needed.

## 2019-02-12 NOTE — PROGRESS NOTE ADULT - ASSESSMENT
ASSESSMENT/PLAN  63 year-old man with a PMH of HTN, morbid obesity who was found to have acute CVA with left side hemiparesis and given TPA.     #Acute CVA presenting with Left sided hemiparesis, dysarthria s/p tpa 1/26:  - Continue ASA/Lipitor 80 mg PO QHS  - Continue prozac for motor recovery. Consider increasing dose to 20 mg for psychological benefits as well given h/o mood disorder, consulted psychiatry for recommendations.  - Psychological support for adjustment symptoms  - Continue PT, OT, SLP    #Depression:  - Will place psychiatry consult for medication management as patient is already on prozac but requesting valium.   - Discussed with patient who is agreeable    #Obstructive Sleep Apnea:   - Nocturnal CPAP  - O2 via NC PRN SOB during day. Has not required recently  - f/u Pulm as outpt - pt follows up Dr Bakari Haas.  - Will order humidified O2 via NC for dry nasal passages.      #HTN:    - norvasc 10 mg daily  - hydralazine increased to 25mg q8 HOURS 2/10.   -  2/12/19 and reporting dizziness, may need to be switched back to BID dosing if symptoms do not improve. Discussed with patient. Today symptoms improved slightly, BP and HR better controlled during PT this morning.   - BP with large manual cuff only for accuracy and consistency  - Monitor and adjust as needed    #GI/Bowel Mgmt:  - Colace BID, Senna, Lactulose, Dulcolax PRN, Miralax daily    #Diet:  - Soft with thin liquids    #Insomnia:  - Melatonin 9 mg q8PM. Will provide patient with earplugs to reduce noise from CPAP.   - Psychiatry for management mood    #DVT:  - Heparin SC    #Foot Care: Seen by Podiatry 2/11    IDT held on 2/7: Patient mod assist with transfers. Is ambulating with RW 55 feet with mod/max assist. Goals for PT/OT wis supervision/mod I. Patient will have sister to help for 1 month but will need assist with showering. LEILA 2/22 if on track for goals of mod I. Otherwise will plan for ASHUTOSH.    Labs ASSESSMENT/PLAN  63 year-old man with a PMH of HTN, morbid obesity who was found to have acute CVA with left side hemiparesis and given TPA.     #Acute CVA presenting with Left sided hemiparesis, dysarthria s/p tpa 1/26:  - Continue ASA/Lipitor 80 mg PO QHS  - Continue prozac for motor recovery. Consider increasing dose to 20 mg for psychological benefits as well given h/o mood disorder, consulted psychiatry for recommendations.  - Psychological support for adjustment symptoms  - Continue PT, OT, SLP    #Depression:  - Will place psychiatry consult for medication management as patient is already on prozac but requesting valium.   - Discussed with patient who is agreeable    #Obstructive Sleep Apnea:   - Nocturnal CPAP  - O2 via NC PRN SOB during day. Has not required recently  - f/u Pulm as outpt - pt follows up Dr Bakari Haas.  - Will order humidified O2 via NC for dry nasal passages. Discussed with patient     #HTN:    - norvasc 10 mg daily  - hydralazine dc, changed to losartan 50 mg daily.  HR: 77 (12 Feb 2019 09:33) (77 - 85)  BP: 158/80 (12 Feb 2019 09:33) (155/85 - 170/90)  - BP with large manual cuff only for accuracy and consistency  - Monitor and adjust as needed    #GI/Bowel Mgmt:  - Colace BID, Senna, Lactulose, Dulcolax PRN, Miralax daily    #Diet:  - Soft with thin liquids    #Insomnia:  - Melatonin 9 mg q8PM. Will provide patient with earplugs to reduce noise from CPAP.   - Psychiatry for management mood    #DVT:  - Heparin SC    #Foot Care:   -Seen by Podiatry 2/12    IDT held on 2/7:   Patient mod assist with transfers. Is ambulating with RW 55 feet with mod/max assist. Goals for PT/OT wis supervision/mod I. Patient will have sister to help for 1 month but will need assist with showering.   -LEILA 2/22 if on track for goals of mod I. Otherwise will plan for ASHUTOSH.    Labs

## 2019-02-12 NOTE — CONSULT NOTE ADULT - SUBJECTIVE AND OBJECTIVE BOX
full podiatry consult with tx and rec dictated in consulkt section of this chart. Rna pan gann aware and CLAY fofana MD . treatment rendeerd and no acute process.  see details dictated in consult section of this chart

## 2019-02-12 NOTE — CHART NOTE - NSCHARTNOTEFT_GEN_A_CORE
Nutrition Follow Up Note  Hospital Course (Per Electronic Medical Record):   Source: Medical Record [X] Patient [X] Family [ ] Nursing Staff [ ]     Diet: Regular Soft (Dysphagia 3) Diet w/ Thin Liquids   Educated Patient on Current Diet Consistency   Tolerates Diet Well  No Chewing/Swallowing Difficulties  No Recent Nausea, Vomiting, Diarrhea or Constipation    Consumes 100% of Meals (as Per Documentation) - States Good PO Intake/Appetite     Enteral/Parenteral Nutrition: N/A    Current Weight: 350.7lb on 2/1  Obtain New Weight   Obtain Weights Weekly     Pertinent Medications: MEDICATIONS  (STANDING):  amLODIPine   Tablet 10 milliGRAM(s) Oral daily  aspirin  chewable 81 milliGRAM(s) Oral daily  atorvastatin 80 milliGRAM(s) Oral at bedtime  docusate sodium 100 milliGRAM(s) Oral two times a day  FLUoxetine 10 milliGRAM(s) Oral daily  heparin  Injectable 5000 Unit(s) SubCutaneous every 8 hours  losartan 50 milliGRAM(s) Oral daily  melatonin 9 milliGRAM(s) Oral <User Schedule>  polyethylene glycol 3350 17 Gram(s) Oral daily    MEDICATIONS  (PRN):  acetaminophen   Tablet .. 650 milliGRAM(s) Oral every 6 hours PRN Mild Pain (1 - 3), Moderate Pain (4 - 6)  fluticasone propionate 50 MICROgram(s)/spray Nasal Spray 1 Spray(s) Both Nostrils two times a day PRN nasal congestion  hydrocortisone 1% Ointment 1 Application(s) Topical every 12 hours PRN Itching  sodium chloride 0.65% Nasal 1 Spray(s) Both Nostrils every 4 hours PRN Nasal Congestion    Pertinent Labs:  02-11 Na135 mmol/L Glu 127 mg/dL<H> K+ 4.5 mmol/L Cr  0.84 mg/dL BUN 22 mg/dL 01-27 [id number] 5.6 % 01-27 Chol 305 mg/dL<H>  mg/dL HDL 31 mg/dL<L> Trig 214 mg/dL<H>    Skin: No Pressure Ulcers     Edema: None Noted     Last BM: on 2/8    Estimated Needs:   [X] No Change since Previous Assessment    Previous Nutrition Diagnosis:   Morbidly Obese   Chewing Difficulties     Nutrition Diagnosis is [X] Ongoing - Educated Patient on Current Diet Consistency      New Nutrition Diagnosis: [X] Not Applicable  Interventions:   1. Recommend Continue Nutrition Plan of Care   2. Educated Patient on Current Diet Consistency     Monitoring & Evaluation:   [X] Weights   [X] PO Intake   [X] Follow Up (Per Protocol)  [X] Tolerance to Diet Prescription   [X] Other: Labs    RD Remains Available.  Binh Wei RD

## 2019-02-13 PROCEDURE — 99233 SBSQ HOSP IP/OBS HIGH 50: CPT

## 2019-02-13 PROCEDURE — 99232 SBSQ HOSP IP/OBS MODERATE 35: CPT

## 2019-02-13 RX ORDER — MAGNESIUM HYDROXIDE 400 MG/1
30 TABLET, CHEWABLE ORAL DAILY
Qty: 0 | Refills: 0 | Status: DISCONTINUED | OUTPATIENT
Start: 2019-02-13 | End: 2019-02-25

## 2019-02-13 RX ORDER — LOSARTAN POTASSIUM 100 MG/1
75 TABLET, FILM COATED ORAL DAILY
Qty: 0 | Refills: 0 | Status: DISCONTINUED | OUTPATIENT
Start: 2019-02-13 | End: 2019-02-15

## 2019-02-13 RX ADMIN — LOSARTAN POTASSIUM 75 MILLIGRAM(S): 100 TABLET, FILM COATED ORAL at 12:24

## 2019-02-13 RX ADMIN — HEPARIN SODIUM 5000 UNIT(S): 5000 INJECTION INTRAVENOUS; SUBCUTANEOUS at 23:06

## 2019-02-13 RX ADMIN — Medication 9 MILLIGRAM(S): at 20:45

## 2019-02-13 RX ADMIN — AMLODIPINE BESYLATE 10 MILLIGRAM(S): 2.5 TABLET ORAL at 05:50

## 2019-02-13 RX ADMIN — HEPARIN SODIUM 5000 UNIT(S): 5000 INJECTION INTRAVENOUS; SUBCUTANEOUS at 13:28

## 2019-02-13 RX ADMIN — Medication 81 MILLIGRAM(S): at 12:24

## 2019-02-13 RX ADMIN — LOSARTAN POTASSIUM 50 MILLIGRAM(S): 100 TABLET, FILM COATED ORAL at 05:50

## 2019-02-13 RX ADMIN — Medication 100 MILLIGRAM(S): at 05:50

## 2019-02-13 RX ADMIN — Medication 100 MILLIGRAM(S): at 17:26

## 2019-02-13 RX ADMIN — HEPARIN SODIUM 5000 UNIT(S): 5000 INJECTION INTRAVENOUS; SUBCUTANEOUS at 05:50

## 2019-02-13 RX ADMIN — Medication 10 MILLIGRAM(S): at 12:24

## 2019-02-13 RX ADMIN — ATORVASTATIN CALCIUM 80 MILLIGRAM(S): 80 TABLET, FILM COATED ORAL at 23:06

## 2019-02-13 NOTE — PROGRESS NOTE ADULT - GENERAL COMMENTS
Patient stable, states dizziness is much better. Mood also looks improved, better sleep. Seen by podiatrist, consult appreciated.

## 2019-02-13 NOTE — PROGRESS NOTE ADULT - ASSESSMENT
ASSESSMENT/PLAN  63 year-old man with a PMH of HTN, morbid obesity who was found to have acute CVA with left side hemiparesis and given TPA.     #Acute CVA presenting with Left sided hemiparesis, dysarthria s/p tpa 1/26:  - Continue ASA/Lipitor 80 mg PO QHS  - Continue prozac for motor recovery. Patient refuses increase dose for mood  - Psychological support for adjustment symptoms  - Continue PT, OT, SLP    #Depression:  -psychiatry greatly appreciated.  - continue prozac, would not recommend restarting valium given respiratory status, discussed with pateint      #Obstructive Sleep Apnea:   - Nocturnal CPAP  - O2 via NC PRN SOB during day. Has not required recently  - f/u Pulm as outpt - pt follows up Dr Bakari Haas.  - Will order humidified O2 via NC for dry nasal passages. Discussed with patient     #HTN:    - norvasc 10 mg daily  -  losartan increased to 75 mg today 2/13    #GI/Bowel Mgmt:  - Colace BID, Senna, Lactulose, Dulcolax PRN, Miralax daily    #Diet:  - Soft with thin liquids    #Insomnia:  - Melatonin 9 mg q8PM.  -sleep hygiene    #DVT:  - Heparin SC    #Foot Care:   -Seen by Podiatry 2/12    IDT held on 2/7:   Patient mod assist with transfers. Is ambulating with RW 55 feet with mod/max assist. Goals for PT/OT wis supervision/mod I. Patient will have sister to help for 1 month but will need assist with showering.   -LEILA 2/22 if on track for goals of mod I. Otherwise will plan for ASHUTOSH.    Labs

## 2019-02-13 NOTE — PROGRESS NOTE ADULT - SUBJECTIVE AND OBJECTIVE BOX
Patient is a 63y old  Male who presents with a chief complaint of stroke (12 Feb 2019 12:59)      Patient seen and examined at bedside.  Patient denies any chest pain or shortness of breath.  Reports sleeping ok and moving his bowels without issue    ALLERGIES:  No Known Allergies    MEDICATIONS:  acetaminophen   Tablet .. 650 milliGRAM(s) Oral every 6 hours PRN  amLODIPine   Tablet 10 milliGRAM(s) Oral daily  aspirin  chewable 81 milliGRAM(s) Oral daily  atorvastatin 80 milliGRAM(s) Oral at bedtime  docusate sodium 100 milliGRAM(s) Oral two times a day  FLUoxetine 10 milliGRAM(s) Oral daily  fluticasone propionate 50 MICROgram(s)/spray Nasal Spray 1 Spray(s) Both Nostrils two times a day PRN  heparin  Injectable 5000 Unit(s) SubCutaneous every 8 hours  hydrocortisone 1% Ointment 1 Application(s) Topical every 12 hours PRN  losartan 75 milliGRAM(s) Oral daily  melatonin 9 milliGRAM(s) Oral <User Schedule>  polyethylene glycol 3350 17 Gram(s) Oral daily  sodium chloride 0.65% Nasal 1 Spray(s) Both Nostrils every 4 hours PRN    Vital Signs Last 24 Hrs  T(F): 98.1 (13 Feb 2019 07:59), Max: 98.1 (13 Feb 2019 07:59)  HR: 78 (13 Feb 2019 07:59) (66 - 85)  BP: 150/90 (13 Feb 2019 07:59) (138/78 - 160/85)  RR: 14 (13 Feb 2019 07:59) (14 - 14)  SpO2: 100% (13 Feb 2019 07:59) (98% - 100%)  I&O's Summary      PHYSICAL EXAM:  General: NAD, A/O x 3  ENT: MMM  Neck: Supple, No JVD  Lungs: diminished throughout due to body habitus   Cardio: RRR, S1/S2, No murmurs  Abdomen: Soft, Nontender, Nondistended; morbid obesity   Extremities: No cyanosis, No edema; tinea pedis present    LABS:                        15.1   9.5   )-----------( 271      ( 11 Feb 2019 13:55 )             45.0     02-11    135  |  101  |  22  ----------------------------<  127  4.5   |  20  |  0.84    Ca    9.1      11 Feb 2019 13:00      eGFR if Non African American: 95 mL/min/1.73M2 (02-11-19 @ 13:00)  eGFR if African American: 110 mL/min/1.73M2 (02-11-19 @ 13:00)            01-27 Chol 305 mg/dL  mg/dL HDL 31 mg/dL Trig 214 mg/dL        CAPILLARY BLOOD GLUCOSE        01-27 [id number] 5.6          RADIOLOGY & ADDITIONAL TESTS:    Care Discussed with Consultants/Other Providers: Dr. Barkley

## 2019-02-14 PROCEDURE — 99233 SBSQ HOSP IP/OBS HIGH 50: CPT

## 2019-02-14 PROCEDURE — 99232 SBSQ HOSP IP/OBS MODERATE 35: CPT

## 2019-02-14 RX ORDER — ATORVASTATIN CALCIUM 80 MG/1
80 TABLET, FILM COATED ORAL
Qty: 0 | Refills: 0 | Status: DISCONTINUED | OUTPATIENT
Start: 2019-02-14 | End: 2019-02-25

## 2019-02-14 RX ADMIN — Medication 9 MILLIGRAM(S): at 21:01

## 2019-02-14 RX ADMIN — HEPARIN SODIUM 5000 UNIT(S): 5000 INJECTION INTRAVENOUS; SUBCUTANEOUS at 13:26

## 2019-02-14 RX ADMIN — HEPARIN SODIUM 5000 UNIT(S): 5000 INJECTION INTRAVENOUS; SUBCUTANEOUS at 21:02

## 2019-02-14 RX ADMIN — AMLODIPINE BESYLATE 10 MILLIGRAM(S): 2.5 TABLET ORAL at 06:54

## 2019-02-14 RX ADMIN — Medication 10 MILLIGRAM(S): at 12:35

## 2019-02-14 RX ADMIN — Medication 100 MILLIGRAM(S): at 17:11

## 2019-02-14 RX ADMIN — POLYETHYLENE GLYCOL 3350 17 GRAM(S): 17 POWDER, FOR SOLUTION ORAL at 12:36

## 2019-02-14 RX ADMIN — Medication 81 MILLIGRAM(S): at 12:35

## 2019-02-14 RX ADMIN — Medication 100 MILLIGRAM(S): at 06:53

## 2019-02-14 RX ADMIN — ATORVASTATIN CALCIUM 80 MILLIGRAM(S): 80 TABLET, FILM COATED ORAL at 21:01

## 2019-02-14 RX ADMIN — HEPARIN SODIUM 5000 UNIT(S): 5000 INJECTION INTRAVENOUS; SUBCUTANEOUS at 06:53

## 2019-02-14 RX ADMIN — LOSARTAN POTASSIUM 75 MILLIGRAM(S): 100 TABLET, FILM COATED ORAL at 12:36

## 2019-02-14 NOTE — PROGRESS NOTE ADULT - ASSESSMENT
62 yo male admitted 2-1-19 with pmh essential htn, CAYDEN on CPAP, dyslipidemia presents with acute cva s/p tpa with left sided weakness

## 2019-02-14 NOTE — PROGRESS NOTE ADULT - ASSESSMENT
ASSESSMENT/PLAN  63 year-old man with a PMH of HTN, morbid obesity who was found to have acute CVA with left side hemiparesis and given TPA.     #Acute CVA presenting with Left sided hemiparesis, dysarthria s/p tpa 1/26:  - Continue ASA/Lipitor 80 mg PO QHS  - Continue prozac for motor recovery. Patient refuses increase dose for mood. Understands that valium is not recommended given respiratory status, psychiatry appreciated  - Psychological support for adjustment symptoms  - Continue PT, OT, SLP    #Depression:  - continue prozac, supportive counseling      #Obstructive Sleep Apnea:   - Nocturnal CPAP  - O2 via NC PRN  - f/u Pulm as outpt - pt follows up Dr Bakari Haas.       #HTN:    - norvasc 10 mg daily  -  losartan increased to 75 mg. (140/84 - 148/84) Better controlled and tolerating medication, patien taware    #GI/Bowel Mgmt:  - Colace BID, Senna, Lactulose, Dulcolax PRN, Miralax daily    #Diet:  - Soft with thin liquids    #Insomnia:  - Melatonin 9 mg q8PM.  -sleep hygiene. atrovastatin changed to 8 PM as well, patient aware    #DVT:  - Heparin SC    #Foot Care:   -Seen by Podiatry 2/12    IDT held on 2/14:  Patient with slower progress than anticipated, still with reduced hand strength and coordination, will require assistance with hygiene. Family also working on cleaning house, patient was hoarder and had needs space to mobilize in home and for equipment. +episode LOB during gait in PT this week. Due to supervision needs, assistance with hygiene, steps, recommend ASHUTOSH for OT, PT unless family is able to provide level of support required  -work on ASHUTOSH, target dc by 2/19/19 after discussions with patient and family    Labs    CBC BMP 2/18

## 2019-02-14 NOTE — PROGRESS NOTE ADULT - SUBJECTIVE AND OBJECTIVE BOX
Patient is a 63y old  Male who presents with a chief complaint of stroke (14 Feb 2019 11:05)      Patient seen and examined at bedside.    ALLERGIES:  No Known Allergies    MEDICATIONS:  acetaminophen   Tablet .. 650 milliGRAM(s) Oral every 6 hours PRN  amLODIPine   Tablet 10 milliGRAM(s) Oral daily  aspirin  chewable 81 milliGRAM(s) Oral daily  atorvastatin 80 milliGRAM(s) Oral <User Schedule>  docusate sodium 100 milliGRAM(s) Oral two times a day  FLUoxetine 10 milliGRAM(s) Oral daily  fluticasone propionate 50 MICROgram(s)/spray Nasal Spray 1 Spray(s) Both Nostrils two times a day PRN  heparin  Injectable 5000 Unit(s) SubCutaneous every 8 hours  hydrocortisone 1% Ointment 1 Application(s) Topical every 12 hours PRN  losartan 75 milliGRAM(s) Oral daily  magnesium hydroxide Suspension 30 milliLiter(s) Oral daily PRN  melatonin 9 milliGRAM(s) Oral <User Schedule>  polyethylene glycol 3350 17 Gram(s) Oral daily  sodium chloride 0.65% Nasal 1 Spray(s) Both Nostrils every 4 hours PRN    Vital Signs Last 24 Hrs  T(F): 98.1 (14 Feb 2019 08:53), Max: 98.1 (13 Feb 2019 23:23)  HR: 69 (14 Feb 2019 08:53) (69 - 78)  BP: 148/84 (14 Feb 2019 08:53) (140/84 - 148/84)  RR: 15 (14 Feb 2019 08:53) (14 - 15)  SpO2: 98% (14 Feb 2019 08:53) (98% - 99%)  I&O's Summary      PHYSICAL EXAM:  General: NAD, A/O x 3  ENT: MMM  Neck: Supple, No JVD  Lungs: diminished throughout due to body habitus  Cardio: RRR, S1/S2, No murmurs heard  Abdomen: Soft, Nontender, Nondistended; morbid obesity  Extremities: No cyanosis, No edema    LABS:                    01-27 Chol 305 mg/dL  mg/dL HDL 31 mg/dL Trig 214 mg/dL        CAPILLARY BLOOD GLUCOSE        01-27 [id number] 5.6          RADIOLOGY & ADDITIONAL TESTS:    Care Discussed with Consultants/Other Providers:

## 2019-02-14 NOTE — PROGRESS NOTE ADULT - GENERAL COMMENTS
Patient irritable, states fell asleep at 9:30 pm last night but was awoken at 11:30 for bedtime medications.

## 2019-02-14 NOTE — PROGRESS NOTE ADULT - EXTREMITIES COMMENTS
no hand swelling  no calf swelling, no C/C/E  left hand 3+-4-/5  left shoulder and elbow 4-/5  left uqad 4/5

## 2019-02-14 NOTE — PROGRESS NOTE ADULT - NEUROLOGICAL COMMENTS
dizziness improved, no H/A. dizziness only at end of therapy with exertion yesterday and resolved with rest

## 2019-02-14 NOTE — PROGRESS NOTE ADULT - SUBJECTIVE AND OBJECTIVE BOX
Patient is a 63y old  Male who presents with a chief complaint of stroke (13 Feb 2019 12:34)      HPI:  63 year-old Man with a PMH of HTN (not on meds), CAYDEN (on CPAP, compliant), obesity, and HLD (not on meds) who presents to hospital with acute onset of slurred speech and Left UE paresis found to have ischemic stroke  s/p tPA now in MICU for further observation  and management.  CT head on 1/27-Old lacunar infarcts involving the right basal ganglia, left caudate head /anterior corona radiata region. MRI Brain MRI: Acute right basal ganglia infarct. Chronic bilateral lacunar infarcts. Found to have Oropharyngeal dysphagia place on Dysphagia 3 diet. Patient transferred to Acute Rehabilitation for functional recovery. (01 Feb 2019 19:12)      PAST MEDICAL & SURGICAL HISTORY:  Sciatica  High cholesterol  Hypertension  Peripheral Vascular Disease  CAYDEN (Obstructive Sleep Apnea)  CAD (Coronary Artery Disease)  History of cholecystectomy  History of tonsillectomy  S/P ACL repair      MEDICATIONS  (STANDING):  amLODIPine   Tablet 10 milliGRAM(s) Oral daily  aspirin  chewable 81 milliGRAM(s) Oral daily  atorvastatin 80 milliGRAM(s) Oral <User Schedule>  docusate sodium 100 milliGRAM(s) Oral two times a day  FLUoxetine 10 milliGRAM(s) Oral daily  heparin  Injectable 5000 Unit(s) SubCutaneous every 8 hours  losartan 75 milliGRAM(s) Oral daily  melatonin 9 milliGRAM(s) Oral <User Schedule>  polyethylene glycol 3350 17 Gram(s) Oral daily    MEDICATIONS  (PRN):  acetaminophen   Tablet .. 650 milliGRAM(s) Oral every 6 hours PRN Mild Pain (1 - 3), Moderate Pain (4 - 6)  fluticasone propionate 50 MICROgram(s)/spray Nasal Spray 1 Spray(s) Both Nostrils two times a day PRN nasal congestion  hydrocortisone 1% Ointment 1 Application(s) Topical every 12 hours PRN Itching  magnesium hydroxide Suspension 30 milliLiter(s) Oral daily PRN Constipation  sodium chloride 0.65% Nasal 1 Spray(s) Both Nostrils every 4 hours PRN Nasal Congestion      Allergies    No Known Allergies    Intolerances          VITALS  63y  Vital Signs Last 24 Hrs  T(C): 36.7 (14 Feb 2019 08:53), Max: 36.7 (13 Feb 2019 23:23)  T(F): 98.1 (14 Feb 2019 08:53), Max: 98.1 (13 Feb 2019 23:23)  HR: 69 (14 Feb 2019 08:53) (69 - 78)  BP: 148/84 (14 Feb 2019 08:53) (140/84 - 148/84)  BP(mean): --  RR: 15 (14 Feb 2019 08:53) (14 - 16)  SpO2: 98% (14 Feb 2019 08:53) (98% - 99%)  Daily     Daily         RECENT LABS:                      CAPILLARY BLOOD GLUCOSE

## 2019-02-15 LAB
CO2 BLDA-SCNC: 26 MMOL/L — SIGNIFICANT CHANGE UP (ref 22–30)
GAS PNL BLDA: SIGNIFICANT CHANGE UP
HOROWITZ INDEX BLDA+IHG-RTO: SIGNIFICANT CHANGE UP
PCO2 BLDA: 34 MMHG — SIGNIFICANT CHANGE UP (ref 32–46)
PH BLDA: 7.48 — HIGH (ref 7.35–7.45)
PO2 BLDA: 82 MMHG — SIGNIFICANT CHANGE UP (ref 74–108)
SAO2 % BLDA: 97 % — HIGH (ref 92–96)

## 2019-02-15 PROCEDURE — 99223 1ST HOSP IP/OBS HIGH 75: CPT

## 2019-02-15 PROCEDURE — 99232 SBSQ HOSP IP/OBS MODERATE 35: CPT

## 2019-02-15 PROCEDURE — 99233 SBSQ HOSP IP/OBS HIGH 50: CPT

## 2019-02-15 RX ORDER — SIMETHICONE 80 MG/1
80 TABLET, CHEWABLE ORAL DAILY
Qty: 0 | Refills: 0 | Status: DISCONTINUED | OUTPATIENT
Start: 2019-02-15 | End: 2019-02-25

## 2019-02-15 RX ORDER — LOSARTAN POTASSIUM 100 MG/1
100 TABLET, FILM COATED ORAL DAILY
Qty: 0 | Refills: 0 | Status: DISCONTINUED | OUTPATIENT
Start: 2019-02-15 | End: 2019-02-15

## 2019-02-15 RX ORDER — HYDROCORTISONE 1 %
1 OINTMENT (GRAM) TOPICAL
Qty: 0 | Refills: 0 | Status: DISCONTINUED | OUTPATIENT
Start: 2019-02-15 | End: 2019-02-25

## 2019-02-15 RX ORDER — LOSARTAN POTASSIUM 100 MG/1
100 TABLET, FILM COATED ORAL DAILY
Qty: 0 | Refills: 0 | Status: DISCONTINUED | OUTPATIENT
Start: 2019-02-16 | End: 2019-02-25

## 2019-02-15 RX ORDER — LACTULOSE 10 G/15ML
10 SOLUTION ORAL DAILY
Qty: 0 | Refills: 0 | Status: DISCONTINUED | OUTPATIENT
Start: 2019-02-15 | End: 2019-02-21

## 2019-02-15 RX ADMIN — ATORVASTATIN CALCIUM 80 MILLIGRAM(S): 80 TABLET, FILM COATED ORAL at 21:19

## 2019-02-15 RX ADMIN — AMLODIPINE BESYLATE 10 MILLIGRAM(S): 2.5 TABLET ORAL at 06:50

## 2019-02-15 RX ADMIN — Medication 9 MILLIGRAM(S): at 21:19

## 2019-02-15 RX ADMIN — Medication 81 MILLIGRAM(S): at 13:16

## 2019-02-15 RX ADMIN — Medication 100 MILLIGRAM(S): at 18:38

## 2019-02-15 RX ADMIN — Medication 100 MILLIGRAM(S): at 06:50

## 2019-02-15 RX ADMIN — Medication 1 APPLICATION(S): at 18:39

## 2019-02-15 RX ADMIN — HEPARIN SODIUM 5000 UNIT(S): 5000 INJECTION INTRAVENOUS; SUBCUTANEOUS at 21:19

## 2019-02-15 RX ADMIN — LOSARTAN POTASSIUM 75 MILLIGRAM(S): 100 TABLET, FILM COATED ORAL at 06:50

## 2019-02-15 RX ADMIN — HEPARIN SODIUM 5000 UNIT(S): 5000 INJECTION INTRAVENOUS; SUBCUTANEOUS at 06:50

## 2019-02-15 RX ADMIN — HEPARIN SODIUM 5000 UNIT(S): 5000 INJECTION INTRAVENOUS; SUBCUTANEOUS at 13:16

## 2019-02-15 RX ADMIN — Medication 10 MILLIGRAM(S): at 13:16

## 2019-02-15 NOTE — CONSULT NOTE ADULT - CONSULT REASON
evaluation and treatment
podiatry consult with tx and rec bilateral painful feet edema and tinea skin and nails x 10
sleep apnea
Depression, medication management

## 2019-02-15 NOTE — CONSULT NOTE ADULT - SUBJECTIVE AND OBJECTIVE BOX
HPI:HPI:  63 year-old Man with a PMH of HTN (not on meds), CAYDEN (on CPAP, compliant), obesity, and HLD (not on meds) who presents to hospital with acute onset of slurred speech and Left UE paresis found to have ischemic stroke  s/p tPA now in MICU for further observation  and management.  CT head on 1/27-Old lacunar infarcts involving the right basal ganglia, left caudate head /anterior corona radiata region. MRI Brain MRI: Acute right basal ganglia infarct. Chronic bilateral lacunar infarcts. Found to have Oropharyngeal dysphagia place on Dysphagia 3 diet. Patient transferred to Acute Rehabilitation for functional recovery. (01 Feb 2019 19:12)  Pulmonary:  Patient has diagnosis of obstructive sleep apnea he was last tested in 2013. At that time CPAP of 16 was recommended. He has been comfortable with those settings since that period    PAST MEDICAL & SURGICAL HISTORY:  Sciatica  High cholesterol  Hypertension  Peripheral Vascular Disease  CAYDEN (Obstructive Sleep Apnea)  CAD (Coronary Artery Disease)  History of cholecystectomy  History of tonsillectomy  S/P ACL repair      FAMILY HISTORY:  Family history of cerebrovascular accident (CVA) in father (Father)      SOCIAL HISTORY:  Smoking: denies      Allergies    No Known Allergies    Intolerances        HOME  MEDICATIONS:Home Medications:  acetaminophen 325 mg oral tablet: 2 tab(s) orally every 6 hours, As needed, Mild Pain (1 - 3), Moderate Pain (4 - 6) (08 Feb 2019 23:44)  amLODIPine 5 mg oral tablet: 1 tab(s) orally once a day (01 Feb 2019 11:48)  aspirin 81 mg oral tablet, chewable: 1 tab(s) orally once a day (08 Feb 2019 23:44)  docusate sodium 100 mg oral capsule: 1 cap(s) orally 2 times a day (08 Feb 2019 23:44)  fluticasone 50 mcg/inh nasal spray: 1 spray(s) nasal 2 times a day, As needed, nasal congestion (08 Feb 2019 23:44)  melatonin 3 mg oral tablet: 3 tab(s) orally  (08 Feb 2019 23:44)  polyethylene glycol 3350 oral powder for reconstitution: 17 gram(s) orally once a day (08 Feb 2019 23:44)      REVIEW OF SYSTEMS:  Constitutional: No fevers or chills. No weight loss. .  Eyes: No itching or discharge from the eyes  ENT: . No nasal congestion. No post nasal drip  CV: No chest pain. No palpitations. No lightheadedness or dizziness.   Resp: No dyspnea at rest. No dyspnea on exertion. .  GI: No nausea. No vomiting. No diarrhea.  MSK: No joint pain or pain in any extremities  Integumentary: No skin lesions. No pedal edema.  Neurological: No gross motor weakness. No sensory changes.  sleep apnea:  patient is Not comfortable with CPAP as ordered  [ ] All other systems negative  [ ] Unable to assess ROS because ________    OBJECTIVE:  ICU Vital Signs Last 24 Hrs  T(C): 36.9 (15 Feb 2019 08:27), Max: 37 (14 Feb 2019 21:05)  T(F): 98.4 (15 Feb 2019 08:27), Max: 98.6 (14 Feb 2019 21:05)  HR: 73 (15 Feb 2019 08:27) (73 - 83)  BP: 140/83 (15 Feb 2019 08:27) (140/83 - 152/78)  BP(mean): --  ABP: --  ABP(mean): --  RR: 14 (15 Feb 2019 08:27) (14 - 14)  SpO2: 99% (15 Feb 2019 08:27) (99% - 100%)        CAPILLARY BLOOD GLUCOSE          PHYSICAL EXAM:  General: Awake, alert, oriented X 3.   HEENT: Atraumatic, normocephalic.                            .  Lymph Nodes: No palpable lymphadenopathy  Neck: No JVD. No carotid bruit.   Respiratory:                          Normal and equal air entry                         No wheeze, rhonchi or rales.  Cardiovascular: S1 S2 normal. No murmurs, rubs or gallops.   Abdomen: Soft, non-tender,   Extremities: Warm to touch.    Skin: No rashes or skin lesions  .    HOSPITAL MEDICATIONS:  MEDICATIONS  (STANDING):  amLODIPine   Tablet 10 milliGRAM(s) Oral daily  aspirin  chewable 81 milliGRAM(s) Oral daily  atorvastatin 80 milliGRAM(s) Oral <User Schedule>  docusate sodium 100 milliGRAM(s) Oral two times a day  FLUoxetine 10 milliGRAM(s) Oral daily  heparin  Injectable 5000 Unit(s) SubCutaneous every 8 hours  hydrocortisone 0.5% Ointment 1 Application(s) Topical two times a day  melatonin 9 milliGRAM(s) Oral <User Schedule>  polyethylene glycol 3350 17 Gram(s) Oral daily    MEDICATIONS  (PRN):  acetaminophen   Tablet .. 650 milliGRAM(s) Oral every 6 hours PRN Mild Pain (1 - 3), Moderate Pain (4 - 6)  fluticasone propionate 50 MICROgram(s)/spray Nasal Spray 1 Spray(s) Both Nostrils two times a day PRN nasal congestion  hydrocortisone 1% Ointment 1 Application(s) Topical every 12 hours PRN Itching  lactulose Syrup 10 Gram(s) Oral daily PRN constipation  magnesium hydroxide Suspension 30 milliLiter(s) Oral daily PRN Constipation  sodium chloride 0.65% Nasal 1 Spray(s) Both Nostrils every 4 hours PRN Nasal Congestion

## 2019-02-15 NOTE — PROGRESS NOTE ADULT - NEGATIVE CARDIOVASCULAR SYMPTOMS
no chest pain/no palpitations
no palpitations/no chest pain

## 2019-02-15 NOTE — CONSULT NOTE ADULT - ASSESSMENT
pulmonary: CPAP titration study has been reviewed. We'll restart those settings and see how patient tolerates.   ASSESSMENT/PLAN  63 year-old man with a PMH of HTN, morbid obesity who was found to have acute CVA with left side hemiparesis and given TPA.     #Acute CVA presenting with Left sided hemiparesis, dysarthria s/p tpa 1/26:  - Continue ASA/Lipitor 80 mg PO QHS  - Continue prozac for motor recovery  - Psychological support for adjustment symptoms  - Continue PT, OT, SLP    #Depression:  - continue prozac, supportive counseling      #Obstructive Sleep Apnea:   - Nocturnal CPAP  - O2 via NC PRN  - pulmonary consult to evaluate CPAP settings. To see this afternoon, patient aware and agreeable       #HTN:    - norvasc 10 mg daily  -  losartan increased to 75 mg. (140/84 - 148/84) target SBP <140. No increased dizziness    #GI/Bowel Mgmt:  - Colace BID, Senna, Lactulose, PRN    #Diet:  - Soft with thin liquids    #Insomnia:  - Melatonin 9 mg q8PM.  -sleep hygiene.    #DVT:  - Heparin SC      IDT held on 2/14:  Patient with slower progress than anticipated, still with reduced hand strength and coordination, will require assistance with hygiene. Family also working on cleaning house, patient was hoarder and had needs space to mobilize in home and for equipment. +episode LOB during gait in PT this week. Due to supervision needs, assistance with hygiene, steps, recommend ASHUTOSH for OT, PT unless family is able to provide level of support required  -work on ASHUTOSH, target dc by 2/19/19 after discussions with patient and family    Labs    CBC BMP 2/18  pulmonary consult

## 2019-02-15 NOTE — PROGRESS NOTE ADULT - SUBJECTIVE AND OBJECTIVE BOX
Patient is a 63y old  Male who presents with a chief complaint of stroke (14 Feb 2019 14:30)      Patient seen and examined at bedside.  Patient denies any chest pain or shortness of breath.  Reports that he has itchy skin and requests steroid cream.     ALLERGIES:  No Known Allergies    MEDICATIONS:  acetaminophen   Tablet .. 650 milliGRAM(s) Oral every 6 hours PRN  amLODIPine   Tablet 10 milliGRAM(s) Oral daily  aspirin  chewable 81 milliGRAM(s) Oral daily  atorvastatin 80 milliGRAM(s) Oral <User Schedule>  docusate sodium 100 milliGRAM(s) Oral two times a day  FLUoxetine 10 milliGRAM(s) Oral daily  fluticasone propionate 50 MICROgram(s)/spray Nasal Spray 1 Spray(s) Both Nostrils two times a day PRN  heparin  Injectable 5000 Unit(s) SubCutaneous every 8 hours  hydrocortisone 1% Ointment 1 Application(s) Topical every 12 hours PRN  lactulose Syrup 10 Gram(s) Oral daily PRN  losartan 100 milliGRAM(s) Oral daily  magnesium hydroxide Suspension 30 milliLiter(s) Oral daily PRN  melatonin 9 milliGRAM(s) Oral <User Schedule>  polyethylene glycol 3350 17 Gram(s) Oral daily  sodium chloride 0.65% Nasal 1 Spray(s) Both Nostrils every 4 hours PRN    Vital Signs Last 24 Hrs  T(F): 98.4 (15 Feb 2019 08:27), Max: 98.6 (14 Feb 2019 21:05)  HR: 73 (15 Feb 2019 08:27) (73 - 83)  BP: 140/83 (15 Feb 2019 08:27) (140/83 - 152/78)  RR: 14 (15 Feb 2019 08:27) (14 - 14)  SpO2: 99% (15 Feb 2019 08:27) (99% - 100%)  I&O's Summary      PHYSICAL EXAM:  General: NAD, A/O x 3  ENT: MMM  Neck: Supple, No JVD  Lungs: limited due to body habitus  Cardio: RRR, S1/S2, No murmurs  Abdomen: Soft, Nontender, Nondistended; morbid obesity  Extremities: No cyanosis, No edema  Skin: no rashes at areas of itchyness    LABS:                    01-27 Chol 305 mg/dL  mg/dL HDL 31 mg/dL Trig 214 mg/dL        CAPILLARY BLOOD GLUCOSE        01-27 [id number] 5.6          RADIOLOGY & ADDITIONAL TESTS:    Care Discussed with Consultants/Other Providers:

## 2019-02-15 NOTE — PROGRESS NOTE ADULT - RS GEN PE MLT RESP DETAILS PC
clear to auscultation bilaterally/no rales/no rhonchi/no wheezes/normal/good air movement/respirations non-labored

## 2019-02-15 NOTE — PROGRESS NOTE ADULT - ASSESSMENT
ASSESSMENT/PLAN  63 year-old man with a PMH of HTN, morbid obesity who was found to have acute CVA with left side hemiparesis and given TPA.     #Acute CVA presenting with Left sided hemiparesis, dysarthria s/p tpa 1/26:  - Continue ASA/Lipitor 80 mg PO QHS  - Continue prozac for motor recovery  - Psychological support for adjustment symptoms  - Continue PT, OT, SLP    #Depression:  - continue prozac, supportive counseling      #Obstructive Sleep Apnea:   - Nocturnal CPAP  - O2 via NC PRN  - pulmonary consult to evaluate CPAP settings. To see this afternoon, patient aware and agreeable       #HTN:    - norvasc 10 mg daily  -  losartan increased to 75 mg. (140/84 - 148/84) target SBP <140. No increased dizziness    #GI/Bowel Mgmt:  - Colace BID, Senna, Lactulose, PRN    #Diet:  - Soft with thin liquids    #Insomnia:  - Melatonin 9 mg q8PM.  -sleep hygiene.    #DVT:  - Heparin SC      IDT held on 2/14:  Patient with slower progress than anticipated, still with reduced hand strength and coordination, will require assistance with hygiene. Family also working on cleaning house, patient was hoarder and had needs space to mobilize in home and for equipment. +episode LOB during gait in PT this week. Due to supervision needs, assistance with hygiene, steps, recommend ASHUTOSH for OT, PT unless family is able to provide level of support required  -work on ASHUTOSH, target dc by 2/19/19 after discussions with patient and family    Labs    CBC BMP 2/18  pulmonary consult

## 2019-02-15 NOTE — PROGRESS NOTE ADULT - SUBJECTIVE AND OBJECTIVE BOX
Patient is a 63y old  Male who presents with a chief complaint of stroke (15 Feb 2019 11:15)      HPI:  63 year-old Man with a PMH of HTN (not on meds), CAYDEN (on CPAP, compliant), obesity, and HLD (not on meds) who presents to hospital with acute onset of slurred speech and Left UE paresis found to have ischemic stroke  s/p tPA now in MICU for further observation  and management.  CT head on -Old lacunar infarcts involving the right basal ganglia, left caudate head /anterior corona radiata region. MRI Brain MRI: Acute right basal ganglia infarct. Chronic bilateral lacunar infarcts. Found to have Oropharyngeal dysphagia place on Dysphagia 3 diet. Patient transferred to Acute Rehabilitation for functional recovery. (2019 19:12)      PAST MEDICAL & SURGICAL HISTORY:  Sciatica  High cholesterol  Hypertension  Peripheral Vascular Disease  CAYDEN (Obstructive Sleep Apnea)  CAD (Coronary Artery Disease)  History of cholecystectomy  History of tonsillectomy  S/P ACL repair      MEDICATIONS  (STANDING):  amLODIPine   Tablet 10 milliGRAM(s) Oral daily  aspirin  chewable 81 milliGRAM(s) Oral daily  atorvastatin 80 milliGRAM(s) Oral <User Schedule>  docusate sodium 100 milliGRAM(s) Oral two times a day  FLUoxetine 10 milliGRAM(s) Oral daily  heparin  Injectable 5000 Unit(s) SubCutaneous every 8 hours  hydrocortisone 0.5% Ointment 1 Application(s) Topical two times a day  losartan 100 milliGRAM(s) Oral daily  melatonin 9 milliGRAM(s) Oral <User Schedule>  polyethylene glycol 3350 17 Gram(s) Oral daily    MEDICATIONS  (PRN):  acetaminophen   Tablet .. 650 milliGRAM(s) Oral every 6 hours PRN Mild Pain (1 - 3), Moderate Pain (4 - 6)  fluticasone propionate 50 MICROgram(s)/spray Nasal Spray 1 Spray(s) Both Nostrils two times a day PRN nasal congestion  hydrocortisone 1% Ointment 1 Application(s) Topical every 12 hours PRN Itching  lactulose Syrup 10 Gram(s) Oral daily PRN constipation  magnesium hydroxide Suspension 30 milliLiter(s) Oral daily PRN Constipation  sodium chloride 0.65% Nasal 1 Spray(s) Both Nostrils every 4 hours PRN Nasal Congestion      Allergies    No Known Allergies    Intolerances          VITALS  63y  Vital Signs Last 24 Hrs  T(C): 36.9 (15 Feb 2019 08:27), Max: 37 (2019 21:05)  T(F): 98.4 (15 Feb 2019 08:27), Max: 98.6 (2019 21:05)  HR: 73 (15 Feb 2019 08:27) (73 - 83)  BP: 140/83 (15 Feb 2019 08:27) (140/83 - 152/78)  BP(mean): --  RR: 14 (15 Feb 2019 08:27) (14 - 14)  SpO2: 99% (15 Feb 2019 08:27) (99% - 100%)  Daily     Daily Weight in k.9 (15 Feb 2019 07:00)        RECENT LABS:                      CAPILLARY BLOOD GLUCOSE

## 2019-02-15 NOTE — PROGRESS NOTE ADULT - GENERAL COMMENTS
Patient still irritable, dysphoric but does not want increase prozac and refuses anything but valium for anxiety. (currently not on due to respiratory issues) States that he feels like CPAP settings are off, air isn't getting through. Mildy constipated, wants to go back on lactulose

## 2019-02-16 PROCEDURE — 99233 SBSQ HOSP IP/OBS HIGH 50: CPT

## 2019-02-16 PROCEDURE — 99232 SBSQ HOSP IP/OBS MODERATE 35: CPT

## 2019-02-16 RX ADMIN — AMLODIPINE BESYLATE 10 MILLIGRAM(S): 2.5 TABLET ORAL at 06:40

## 2019-02-16 RX ADMIN — Medication 10 MILLIGRAM(S): at 11:52

## 2019-02-16 RX ADMIN — ATORVASTATIN CALCIUM 80 MILLIGRAM(S): 80 TABLET, FILM COATED ORAL at 20:57

## 2019-02-16 RX ADMIN — Medication 100 MILLIGRAM(S): at 17:45

## 2019-02-16 RX ADMIN — Medication 1 APPLICATION(S): at 07:49

## 2019-02-16 RX ADMIN — HEPARIN SODIUM 5000 UNIT(S): 5000 INJECTION INTRAVENOUS; SUBCUTANEOUS at 12:56

## 2019-02-16 RX ADMIN — Medication 1 APPLICATION(S): at 17:44

## 2019-02-16 RX ADMIN — LACTULOSE 10 GRAM(S): 10 SOLUTION ORAL at 12:56

## 2019-02-16 RX ADMIN — Medication 9 MILLIGRAM(S): at 20:57

## 2019-02-16 RX ADMIN — Medication 81 MILLIGRAM(S): at 11:52

## 2019-02-16 RX ADMIN — LOSARTAN POTASSIUM 100 MILLIGRAM(S): 100 TABLET, FILM COATED ORAL at 06:40

## 2019-02-16 RX ADMIN — HEPARIN SODIUM 5000 UNIT(S): 5000 INJECTION INTRAVENOUS; SUBCUTANEOUS at 20:58

## 2019-02-16 RX ADMIN — Medication 100 MILLIGRAM(S): at 06:40

## 2019-02-16 RX ADMIN — HEPARIN SODIUM 5000 UNIT(S): 5000 INJECTION INTRAVENOUS; SUBCUTANEOUS at 06:40

## 2019-02-16 NOTE — PROGRESS NOTE ADULT - ASSESSMENT
Pulmonary: CAYDEN:  Cpap to be slowly titrated and adjusted for pts comfort and tolerability.  Will attemp to get to prior settings(out patient)

## 2019-02-16 NOTE — PROGRESS NOTE ADULT - SUBJECTIVE AND OBJECTIVE BOX
Follow-up Pulm Progress Note    Skinny Royal MD  [phone]    Pt had problems adjusting to new cpap settings.  Returned to prior settings.  Cpap titration study from 2013 reviewed.  Cpap 16 recommended at that time.    Medications:  Vital Signs Last 24 Hrs  T(C): 36.9 (16 Feb 2019 08:53), Max: 37 (15 Feb 2019 20:27)  T(F): 98.4 (16 Feb 2019 08:53), Max: 98.6 (15 Feb 2019 20:27)  HR: 78 (16 Feb 2019 08:53) (78 - 80)  BP: 142/82 (16 Feb 2019 08:53) (122/80 - 144/82)  BP(mean): --  RR: 14 (16 Feb 2019 08:53) (14 - 14)  SpO2: 98% (16 Feb 2019 08:53) (97% - 98%)    ABG - ( 15 Feb 2019 20:53 )  pH, Arterial: 7.48  pH, Blood: x     /  pCO2: 34    /  pO2: 82    / HCO3: x     / Base Excess: x     /  SaO2: 97                    02-15 @ 07:01  -  02-16 @ 07:00  --------------------------------------------------------  IN: 0 mL / OUT: 500 mL / NET: -500 mL        LABS:                    CULTURES:        Physical Examination:  PULM: Clear to auscultation bilaterally, no significant sputum production  CVS: Regular rate and rhythm, no murmurs, rubs, or gallops  ABD: Soft, non-tender  EXT:  No clubbing, cyanosis, or edema

## 2019-02-16 NOTE — PROGRESS NOTE ADULT - SUBJECTIVE AND OBJECTIVE BOX
INTERVAL SUBJECTIVE & REVIEW OF SYMPTOMS:  Chart reviewed. Patient seen at bedside. Reports difficulty with resp machine after change in setting. Had ABG overnight. Seen by Pulmonary this am      REVIEW OF SYSTEMS  [  x ] Constitutional WNL  [  x ] Cardio WNL  [   ] Resp WNL  [x   ] GI WNL  [   ]  WNL    VITAL SIGNS  Vital Signs Last 24 Hrs  T(C): 36.9 (16 Feb 2019 08:53), Max: 37 (15 Feb 2019 20:27)  T(F): 98.4 (16 Feb 2019 08:53), Max: 98.6 (15 Feb 2019 20:27)  HR: 78 (16 Feb 2019 08:53) (78 - 80)  BP: 142/82 (16 Feb 2019 08:53) (122/80 - 144/82)  BP(mean): --  RR: 14 (16 Feb 2019 08:53) (14 - 14)  SpO2: 98% (16 Feb 2019 08:53) (97% - 98%)    PHYSICAL EXAM  General: NAD, obese male, supine in bed and later seated in chair  Cardio: S1S2  Resp:CLEAR   Abdomen: soft, obese  Extrem: no edema      MEDICATIONS  (STANDING):  amLODIPine   Tablet 10 milliGRAM(s) Oral daily  aspirin  chewable 81 milliGRAM(s) Oral daily  atorvastatin 80 milliGRAM(s) Oral <User Schedule>  docusate sodium 100 milliGRAM(s) Oral two times a day  FLUoxetine 10 milliGRAM(s) Oral daily  heparin  Injectable 5000 Unit(s) SubCutaneous every 8 hours  hydrocortisone 0.5% Ointment 1 Application(s) Topical two times a day  losartan 100 milliGRAM(s) Oral daily  melatonin 9 milliGRAM(s) Oral <User Schedule>  polyethylene glycol 3350 17 Gram(s) Oral daily    MEDICATIONS  (PRN):  acetaminophen   Tablet .. 650 milliGRAM(s) Oral every 6 hours PRN Mild Pain (1 - 3), Moderate Pain (4 - 6)  fluticasone propionate 50 MICROgram(s)/spray Nasal Spray 1 Spray(s) Both Nostrils two times a day PRN nasal congestion  hydrocortisone 1% Ointment 1 Application(s) Topical every 12 hours PRN Itching  lactulose Syrup 10 Gram(s) Oral daily PRN constipation  magnesium hydroxide Suspension 30 milliLiter(s) Oral daily PRN Constipation  simethicone 80 milliGRAM(s) Chew daily PRN Gas  sodium chloride 0.65% Nasal 1 Spray(s) Both Nostrils every 4 hours PRN Nasal Congestion          A/P:      63 year-old man with a PMH of HTN, morbid obesity who was found to have acute CVA with left side hemiparesis and given TPA.     #Acute CVA presenting with Left sided hemiparesis, dysarthria s/p tpa 1/26:  - Continue ASA/Lipitor 80 mg PO QHS  - Continue prozac for motor recovery    #Obstructive Sleep Apnea:   - Nocturnal CPAP  - O2 via NC PRN  - pulmonary consult NOTED     #HTN:  norvasc,   losartan     #GI/Bowel Mgmt:- Colace BID, Senna, Lactulose, PRN    #Diet:- Soft with thin liquids    #DVT prophylaxis:- Heparin SC

## 2019-02-17 PROCEDURE — 99231 SBSQ HOSP IP/OBS SF/LOW 25: CPT

## 2019-02-17 RX ADMIN — HEPARIN SODIUM 5000 UNIT(S): 5000 INJECTION INTRAVENOUS; SUBCUTANEOUS at 06:33

## 2019-02-17 RX ADMIN — Medication 100 MILLIGRAM(S): at 06:33

## 2019-02-17 RX ADMIN — ATORVASTATIN CALCIUM 80 MILLIGRAM(S): 80 TABLET, FILM COATED ORAL at 21:22

## 2019-02-17 RX ADMIN — Medication 81 MILLIGRAM(S): at 11:00

## 2019-02-17 RX ADMIN — Medication 9 MILLIGRAM(S): at 21:24

## 2019-02-17 RX ADMIN — Medication 650 MILLIGRAM(S): at 10:05

## 2019-02-17 RX ADMIN — Medication 650 MILLIGRAM(S): at 09:09

## 2019-02-17 RX ADMIN — Medication 100 MILLIGRAM(S): at 17:12

## 2019-02-17 RX ADMIN — Medication 1 APPLICATION(S): at 17:13

## 2019-02-17 RX ADMIN — Medication 1 APPLICATION(S): at 06:34

## 2019-02-17 RX ADMIN — Medication 10 MILLIGRAM(S): at 11:00

## 2019-02-17 RX ADMIN — HEPARIN SODIUM 5000 UNIT(S): 5000 INJECTION INTRAVENOUS; SUBCUTANEOUS at 15:37

## 2019-02-17 RX ADMIN — HEPARIN SODIUM 5000 UNIT(S): 5000 INJECTION INTRAVENOUS; SUBCUTANEOUS at 21:24

## 2019-02-17 RX ADMIN — LOSARTAN POTASSIUM 100 MILLIGRAM(S): 100 TABLET, FILM COATED ORAL at 06:33

## 2019-02-17 RX ADMIN — AMLODIPINE BESYLATE 10 MILLIGRAM(S): 2.5 TABLET ORAL at 06:33

## 2019-02-17 NOTE — PROGRESS NOTE ADULT - SUBJECTIVE AND OBJECTIVE BOX
INTERVAL SUBJECTIVE & REVIEW OF SYMPTOMS:  Chart reviewed. Patient seen at bedside. Seated in chair and checking his emails. States that he feels well and denies any new issues overnight.  Denies dyspnea      REVIEW OF SYSTEMS  [  x ] Constitutional WNL  [  x ] Cardio WNL  [   ] Resp WNL  [x   ] GI WNL  [   ]  WNL      Vital Signs Last 24 Hrs  T(C): 36.8 (17 Feb 2019 09:02), Max: 36.8 (16 Feb 2019 21:02)  T(F): 98.2 (17 Feb 2019 09:02), Max: 98.3 (16 Feb 2019 21:02)  HR: 77 (17 Feb 2019 09:02) (76 - 77)  BP: 138/84 (17 Feb 2019 09:02) (138/84 - 148/86)  BP(mean): --  RR: 14 (17 Feb 2019 09:02) (14 - 15)  SpO2: 98% (17 Feb 2019 09:02) (96% - 99%)        PHYSICAL EXAM  General: NAD, obese male, supine in bed and later seated in chair  Cardio: S1S2  Resp:CLEAR   Abdomen: soft, obese  Extrem: no edema  neuro:aaox3, no dysarthria or aphasia , left hemiparesis      MEDICATIONS  (STANDING):  amLODIPine   Tablet 10 milliGRAM(s) Oral daily  aspirin  chewable 81 milliGRAM(s) Oral daily  atorvastatin 80 milliGRAM(s) Oral <User Schedule>  docusate sodium 100 milliGRAM(s) Oral two times a day  FLUoxetine 10 milliGRAM(s) Oral daily  heparin  Injectable 5000 Unit(s) SubCutaneous every 8 hours  hydrocortisone 0.5% Ointment 1 Application(s) Topical two times a day  losartan 100 milliGRAM(s) Oral daily  melatonin 9 milliGRAM(s) Oral <User Schedule>  polyethylene glycol 3350 17 Gram(s) Oral daily    MEDICATIONS  (PRN):  acetaminophen   Tablet .. 650 milliGRAM(s) Oral every 6 hours PRN Mild Pain (1 - 3), Moderate Pain (4 - 6)  fluticasone propionate 50 MICROgram(s)/spray Nasal Spray 1 Spray(s) Both Nostrils two times a day PRN nasal congestion  hydrocortisone 1% Ointment 1 Application(s) Topical every 12 hours PRN Itching  lactulose Syrup 10 Gram(s) Oral daily PRN constipation  magnesium hydroxide Suspension 30 milliLiter(s) Oral daily PRN Constipation  simethicone 80 milliGRAM(s) Chew daily PRN Gas  sodium chloride 0.65% Nasal 1 Spray(s) Both Nostrils every 4 hours PRN Nasal Congestion        A/P:      63 year-old man with a PMH of HTN, morbid obesity who was found to have acute ?MCA territory infarct with left side hemiparesis and given TPA.     Acute CVA presenting with Left sided hemiparesis, dysarthria s/p tpa 1/26:  - Continue ASA/Lipitor   - Continue prozac for motor recovery    Obstructive Sleep Apnea: - Nocturnal CPAP, O2 via NC PRN, - pulmonary consult noted     HTN:  norvasc,   losartan     GI/Bowel Mgmt:- Colace, miralax,  Lactulose PRN    Diet:- Soft with thin liquids    DVT prophylaxis:- Heparin SC    Pain: tylenol prn     Sleep: on melatonin

## 2019-02-18 DIAGNOSIS — I25.10 ATHEROSCLEROTIC HEART DISEASE OF NATIVE CORONARY ARTERY WITHOUT ANGINA PECTORIS: ICD-10-CM

## 2019-02-18 DIAGNOSIS — I10 ESSENTIAL (PRIMARY) HYPERTENSION: ICD-10-CM

## 2019-02-18 DIAGNOSIS — G47.33 OBSTRUCTIVE SLEEP APNEA (ADULT) (PEDIATRIC): ICD-10-CM

## 2019-02-18 DIAGNOSIS — E78.00 PURE HYPERCHOLESTEROLEMIA, UNSPECIFIED: ICD-10-CM

## 2019-02-18 LAB
ANION GAP SERPL CALC-SCNC: 12 MMOL/L — SIGNIFICANT CHANGE UP (ref 5–17)
BUN SERPL-MCNC: 24 MG/DL — HIGH (ref 7–23)
CALCIUM SERPL-MCNC: 9.5 MG/DL — SIGNIFICANT CHANGE UP (ref 8.4–10.5)
CHLORIDE SERPL-SCNC: 102 MMOL/L — SIGNIFICANT CHANGE UP (ref 96–108)
CO2 SERPL-SCNC: 24 MMOL/L — SIGNIFICANT CHANGE UP (ref 22–31)
CREAT SERPL-MCNC: 1.01 MG/DL — SIGNIFICANT CHANGE UP (ref 0.5–1.3)
GLUCOSE SERPL-MCNC: 121 MG/DL — HIGH (ref 70–99)
HCT VFR BLD CALC: 45.7 % — SIGNIFICANT CHANGE UP (ref 39–50)
HGB BLD-MCNC: 14.9 G/DL — SIGNIFICANT CHANGE UP (ref 13–17)
MCHC RBC-ENTMCNC: 29.3 PG — SIGNIFICANT CHANGE UP (ref 27–34)
MCHC RBC-ENTMCNC: 32.6 GM/DL — SIGNIFICANT CHANGE UP (ref 32–36)
MCV RBC AUTO: 89.8 FL — SIGNIFICANT CHANGE UP (ref 80–100)
PLATELET # BLD AUTO: 242 K/UL — SIGNIFICANT CHANGE UP (ref 150–400)
POTASSIUM SERPL-MCNC: 3.7 MMOL/L — SIGNIFICANT CHANGE UP (ref 3.5–5.3)
POTASSIUM SERPL-SCNC: 3.7 MMOL/L — SIGNIFICANT CHANGE UP (ref 3.5–5.3)
RBC # BLD: 5.09 M/UL — SIGNIFICANT CHANGE UP (ref 4.2–5.8)
RBC # FLD: 12.9 % — SIGNIFICANT CHANGE UP (ref 10.3–14.5)
SODIUM SERPL-SCNC: 138 MMOL/L — SIGNIFICANT CHANGE UP (ref 135–145)
WBC # BLD: 8 K/UL — SIGNIFICANT CHANGE UP (ref 3.8–10.5)
WBC # FLD AUTO: 8 K/UL — SIGNIFICANT CHANGE UP (ref 3.8–10.5)

## 2019-02-18 PROCEDURE — 99233 SBSQ HOSP IP/OBS HIGH 50: CPT

## 2019-02-18 PROCEDURE — 99232 SBSQ HOSP IP/OBS MODERATE 35: CPT

## 2019-02-18 RX ORDER — LISINOPRIL 2.5 MG/1
2.5 TABLET ORAL DAILY
Qty: 0 | Refills: 0 | Status: DISCONTINUED | OUTPATIENT
Start: 2019-02-18 | End: 2019-02-24

## 2019-02-18 RX ADMIN — LOSARTAN POTASSIUM 100 MILLIGRAM(S): 100 TABLET, FILM COATED ORAL at 06:32

## 2019-02-18 RX ADMIN — HEPARIN SODIUM 5000 UNIT(S): 5000 INJECTION INTRAVENOUS; SUBCUTANEOUS at 21:10

## 2019-02-18 RX ADMIN — Medication 81 MILLIGRAM(S): at 12:11

## 2019-02-18 RX ADMIN — Medication 650 MILLIGRAM(S): at 20:15

## 2019-02-18 RX ADMIN — Medication 9 MILLIGRAM(S): at 21:10

## 2019-02-18 RX ADMIN — HEPARIN SODIUM 5000 UNIT(S): 5000 INJECTION INTRAVENOUS; SUBCUTANEOUS at 13:40

## 2019-02-18 RX ADMIN — ATORVASTATIN CALCIUM 80 MILLIGRAM(S): 80 TABLET, FILM COATED ORAL at 21:10

## 2019-02-18 RX ADMIN — Medication 1 APPLICATION(S): at 17:18

## 2019-02-18 RX ADMIN — HEPARIN SODIUM 5000 UNIT(S): 5000 INJECTION INTRAVENOUS; SUBCUTANEOUS at 06:32

## 2019-02-18 RX ADMIN — AMLODIPINE BESYLATE 10 MILLIGRAM(S): 2.5 TABLET ORAL at 06:32

## 2019-02-18 RX ADMIN — LACTULOSE 10 GRAM(S): 10 SOLUTION ORAL at 12:11

## 2019-02-18 RX ADMIN — Medication 100 MILLIGRAM(S): at 06:32

## 2019-02-18 RX ADMIN — Medication 650 MILLIGRAM(S): at 19:13

## 2019-02-18 RX ADMIN — Medication 1 APPLICATION(S): at 06:36

## 2019-02-18 RX ADMIN — Medication 100 MILLIGRAM(S): at 17:17

## 2019-02-18 RX ADMIN — Medication 10 MILLIGRAM(S): at 12:11

## 2019-02-18 NOTE — PROGRESS NOTE ADULT - SUBJECTIVE AND OBJECTIVE BOX
Follow-up Pulm Progress Note    Skinny Royal MD  [phone]    No new respiratory events overnight.  Denies SOB/CP. Tolerating cpap 10 for dary.    Medications:  Vital Signs Last 24 Hrs  T(C): 36.7 (18 Feb 2019 08:07), Max: 36.7 (18 Feb 2019 08:07)  T(F): 98 (18 Feb 2019 08:07), Max: 98 (18 Feb 2019 08:07)  HR: 77 (18 Feb 2019 08:07) (76 - 78)  BP: 142/84 (18 Feb 2019 08:07) (134/80 - 150/90)  BP(mean): --  RR: 14 (18 Feb 2019 08:07) (14 - 18)  SpO2: 98% (18 Feb 2019 08:07) (98% - 98%)            LABS:                        14.9   8.0   )-----------( 242      ( 18 Feb 2019 08:50 )             45.7     02-18    138  |  102  |  24<H>  ----------------------------<  121<H>  3.7   |  24  |  1.01    Ca    9.5      18 Feb 2019 08:50                CULTURES:        Physical Examination:  PULM: Clear to auscultation bilaterally, no significant sputum production  CVS: Regular rate and rhythm, no murmurs, rubs, or gallops  ABD: Soft, non-tender  EXT:  No clubbing, cyanosis, or edema

## 2019-02-18 NOTE — PROGRESS NOTE ADULT - SUBJECTIVE AND OBJECTIVE BOX
LION AUSTIN  63y  Male    Patient is a 63y old  Male who presents with a chief complaint of stroke (17 Feb 2019 11:20)    Pt seen and examined at bedside. Pt is complaining of not having good sleep because he is awoken at 5:20 am to shower and then again at 6am for a bed bath, for which is states he does not want to be bothered from 9pm-6am daily. Also having problems with his CPAP machine which is not allowing him to have restful sleep    PAST MEDICAL & SURGICAL HISTORY:  Sciatica  High cholesterol  Hypertension  Peripheral Vascular Disease  CAYDEN (Obstructive Sleep Apnea)  CAD (Coronary Artery Disease)  History of cholecystectomy  History of tonsillectomy  S/P ACL repair        MedsMEDICATIONS  (STANDING):  amLODIPine   Tablet 10 milliGRAM(s) Oral daily  aspirin  chewable 81 milliGRAM(s) Oral daily  atorvastatin 80 milliGRAM(s) Oral <User Schedule>  docusate sodium 100 milliGRAM(s) Oral two times a day  FLUoxetine 10 milliGRAM(s) Oral daily  heparin  Injectable 5000 Unit(s) SubCutaneous every 8 hours  hydrocortisone 0.5% Ointment 1 Application(s) Topical two times a day  losartan 100 milliGRAM(s) Oral daily  melatonin 9 milliGRAM(s) Oral <User Schedule>  polyethylene glycol 3350 17 Gram(s) Oral daily    MEDICATIONS  (PRN):  acetaminophen   Tablet .. 650 milliGRAM(s) Oral every 6 hours PRN Mild Pain (1 - 3), Moderate Pain (4 - 6)  fluticasone propionate 50 MICROgram(s)/spray Nasal Spray 1 Spray(s) Both Nostrils two times a day PRN nasal congestion  hydrocortisone 1% Ointment 1 Application(s) Topical every 12 hours PRN Itching  lactulose Syrup 10 Gram(s) Oral daily PRN constipation  magnesium hydroxide Suspension 30 milliLiter(s) Oral daily PRN Constipation  simethicone 80 milliGRAM(s) Chew daily PRN Gas  sodium chloride 0.65% Nasal 1 Spray(s) Both Nostrils every 4 hours PRN Nasal Congestion      Vital Signs Last 24 Hrs  T(C): 36.7 (18 Feb 2019 08:07), Max: 36.8 (17 Feb 2019 09:02)  T(F): 98 (18 Feb 2019 08:07), Max: 98.2 (17 Feb 2019 09:02)  HR: 77 (18 Feb 2019 08:07) (76 - 78)  BP: 142/84 (18 Feb 2019 08:07) (134/80 - 150/90)  BP(mean): --  RR: 14 (18 Feb 2019 08:07) (14 - 18)  SpO2: 98% (18 Feb 2019 08:07) (98% - 98%)    PHYSICAL EXAM:  GENERAL: NAD  HEAD:  NC/AT  EYES: EOMI, PERRLA, conjunctiva and sclera clear  NERVOUS SYSTEM:  Alert & Oriented X3  CHEST/LUNG: decreased breath sounds b/l  HEART: S1S2, RRR   ABDOMEN: Soft, obese, non-tender, non-distended, + bowel sounds  EXTREMITIES:  No clubbing, cyanosis, or edema  s    LABS:  reviewed  RADIOLOGY & ADDITIONAL TESTS:    Imaging Personally Reviewed:  [ ] YES  [ ] NO      HEALTH ISSUES - PROBLEM Dx:  Tinea pedis due to trichophyton: Tinea pedis due to trichophyton  Pain aggravated by walking: Pain aggravated by walking  PVD (peripheral vascular disease): PVD (peripheral vascular disease)  Onychomycosis due to dermatophyte: Onychomycosis due to dermatophyte  Lymph edema: Lymph edema  Hypokalemia: Hypokalemia  High cholesterol: High cholesterol  Hypertension, unspecified type: Hypertension, unspecified type  Dysphagia, unspecified type: Dysphagia, unspecified type  Weakness of left side of body: Weakness of left side of body  Cerebrovascular accident (CVA), unspecified mechanism: Cerebrovascular accident (CVA), unspecified mechanism          Care Discussed with Consultants/Other Providers [ x] YES  [ ] NO

## 2019-02-19 PROCEDURE — 99232 SBSQ HOSP IP/OBS MODERATE 35: CPT

## 2019-02-19 RX ORDER — SACCHAROMYCES BOULARDII 250 MG
250 POWDER IN PACKET (EA) ORAL
Qty: 0 | Refills: 0 | Status: DISCONTINUED | OUTPATIENT
Start: 2019-02-19 | End: 2019-02-25

## 2019-02-19 RX ADMIN — Medication 1 APPLICATION(S): at 06:55

## 2019-02-19 RX ADMIN — Medication 10 MILLIGRAM(S): at 12:55

## 2019-02-19 RX ADMIN — Medication 9 MILLIGRAM(S): at 20:24

## 2019-02-19 RX ADMIN — Medication 1 APPLICATION(S): at 17:48

## 2019-02-19 RX ADMIN — Medication 100 MILLIGRAM(S): at 06:51

## 2019-02-19 RX ADMIN — LOSARTAN POTASSIUM 100 MILLIGRAM(S): 100 TABLET, FILM COATED ORAL at 06:51

## 2019-02-19 RX ADMIN — Medication 81 MILLIGRAM(S): at 12:55

## 2019-02-19 RX ADMIN — HEPARIN SODIUM 5000 UNIT(S): 5000 INJECTION INTRAVENOUS; SUBCUTANEOUS at 12:55

## 2019-02-19 RX ADMIN — Medication 650 MILLIGRAM(S): at 12:57

## 2019-02-19 RX ADMIN — Medication 650 MILLIGRAM(S): at 13:45

## 2019-02-19 RX ADMIN — LISINOPRIL 2.5 MILLIGRAM(S): 2.5 TABLET ORAL at 06:51

## 2019-02-19 RX ADMIN — ATORVASTATIN CALCIUM 80 MILLIGRAM(S): 80 TABLET, FILM COATED ORAL at 20:25

## 2019-02-19 RX ADMIN — AMLODIPINE BESYLATE 10 MILLIGRAM(S): 2.5 TABLET ORAL at 06:50

## 2019-02-19 RX ADMIN — Medication 100 MILLIGRAM(S): at 17:46

## 2019-02-19 RX ADMIN — LACTULOSE 10 GRAM(S): 10 SOLUTION ORAL at 13:49

## 2019-02-19 RX ADMIN — HEPARIN SODIUM 5000 UNIT(S): 5000 INJECTION INTRAVENOUS; SUBCUTANEOUS at 06:51

## 2019-02-19 RX ADMIN — HEPARIN SODIUM 5000 UNIT(S): 5000 INJECTION INTRAVENOUS; SUBCUTANEOUS at 20:25

## 2019-02-19 NOTE — CHART NOTE - NSCHARTNOTEFT_GEN_A_CORE
Nutrition Follow Up Note  Hospital Course (Per Electronic Medical Record):   Source: Medical Record [X] Patient [X] Family [ ] Nursing Staff [ ]     Diet: Regular Soft (Dysphagia 3)-Thin Liquids  Pt consuming 100% of meals per chart  Pt reports feeling bloated recently, but it has not affected appetite  Pt reports consuming adequate fluids, and is drinking between meals to support digestion  Pt educated on portion control and recommended speaking with  about meals on wheels upon discharge  No recent chewing/swallowing difficulties  No recent nausea/vomiting    Enteral/Parenteral Nutrition: N/A    Current Weight: 350.3lb on 02/15    Pertinent Medications: MEDICATIONS  (STANDING):  amLODIPine   Tablet 10 milliGRAM(s) Oral daily  aspirin  chewable 81 milliGRAM(s) Oral daily  atorvastatin 80 milliGRAM(s) Oral <User Schedule>  docusate sodium 100 milliGRAM(s) Oral two times a day  FLUoxetine 10 milliGRAM(s) Oral daily  heparin  Injectable 5000 Unit(s) SubCutaneous every 8 hours  hydrocortisone 0.5% Ointment 1 Application(s) Topical two times a day  lisinopril 2.5 milliGRAM(s) Oral daily  losartan 100 milliGRAM(s) Oral daily  melatonin 9 milliGRAM(s) Oral <User Schedule>  polyethylene glycol 3350 17 Gram(s) Oral daily    MEDICATIONS  (PRN):  acetaminophen   Tablet .. 650 milliGRAM(s) Oral every 6 hours PRN Mild Pain (1 - 3), Moderate Pain (4 - 6)  fluticasone propionate 50 MICROgram(s)/spray Nasal Spray 1 Spray(s) Both Nostrils two times a day PRN nasal congestion  hydrocortisone 1% Ointment 1 Application(s) Topical every 12 hours PRN Itching  lactulose Syrup 10 Gram(s) Oral daily PRN constipation  magnesium hydroxide Suspension 30 milliLiter(s) Oral daily PRN Constipation  simethicone 80 milliGRAM(s) Chew daily PRN Gas  sodium chloride 0.65% Nasal 1 Spray(s) Both Nostrils every 4 hours PRN Nasal Congestion      Pertinent Labs:  02-18 Na138 mmol/L Glu 121 mg/dL<H> K+ 3.7 mmol/L Cr  1.01 mg/dL BUN 24 mg/dL<H> 01-27 [id number] 5.6 % 01-27 Chol 305 mg/dL<H>  mg/dL HDL 31 mg/dL<L> Trig 214 mg/dL<H>      Skin: Intact    Edema: None    Last BM: on 2/18    Estimated Needs:   [X] No Change since Previous Assessment    Previous Nutrition Diagnosis:   Morbidly Obese   Chewing Difficulties     Nutrition Diagnosis is [X] Ongoing - Educated pt about portion control, ways to reduce bloating after meals       New Nutrition Diagnosis: [X] Not Applicable    Interventions:   1. Recommend continued nutrition care place  2. Recommended to speak with  about Meals on Wheels upon discharge    Monitoring & Evaluation:   [X] Weights   [X] PO Intake   [X] Follow Up (Per Protocol)  [X] Tolerance to Diet Prescription   [X] Other: Labs    Remains Available.  Mehdi Alexander Dietetic Intern

## 2019-02-19 NOTE — PROGRESS NOTE ADULT - SUBJECTIVE AND OBJECTIVE BOX
Daily Progress Note:  HPI: 63M PMH HTN (not on meds), CAYDEN (on CPAP, compliant), obesity, and HLD (not on meds) who presents to hospital with acute onset of slurred speech and Left UE paresis found to have ischemic stroke  s/p tPA now in MICU for further observation  and management.  CT head on -Old lacunar infarcts involving the right basal ganglia, left caudate head /anterior corona radiata region. MRI Brain MRI: Acute right basal ganglia infarct. Chronic bilateral lacunar infarcts. Found to have Oropharyngeal dysphagia place on Dysphagia 3 diet. Patient transferred to Acute Rehabilitation for functional recovery. (2019 19:12)    Daily Subjective: Patient seen and examined at bedside. Reports reduction of night-time interruptions. Pain and dizzy spells have improved, reports only brief dizzy spells during therapy that have not been limiting him. Feels better with new CPAP settings, feels it makes less noise.     REVIEW OF SYSTEMS  Constitutional: No fever, No Chills, No fatigue  Pulm: No cough,  No shortness of breath  Cardio: No chest pain, No palpitations  GI:  No abdominal pain, No nausea, No vomiting, No diarrhea, No constipation, No incontinence   : No dysuria, No frequency, No hematuria, No incontinence  Neuro: + loss of strength  MSK: No joint pain, No joint swelling, No muscle pain, No Neck or back pain    Vital Signs Last 24 Hrs  T(C): 36.4 (2019 08:51), Max: 36.4 (2019 21:50)  T(F): 97.5 (2019 08:51), Max: 97.6 (2019 21:50)  HR: 67 (2019 08:51) (67 - 88)  BP: 136/74 (2019 08:51) (136/74 - 150/98)  BP(mean): --  RR: 16 (2019 08:51) (16 - 16)  SpO2: 95% (2019 08:51) (95% - 96%)    Physical Exam:  Gen - NAD, Comfortable  Pulm - Poor inspiratory effort, No wheeze, No rhonchi, No crackles  Cardiovascular - RRR, S1S2, No m/r/g  Abdomen - Soft, NT/ND, +BS  Extremities - No C/C/E, No calf tenderness  Neuro- AAOx3; Fluent, + dysarthria; Left upper extremity 3/5 at shoulder, elbow flexion and extension, grasp  reduced gross motor coordination; trace hand swelling, finger flexion 3/5 incomplete grasp  Tone - Hypotonia  Psychiatric - Mood stable, Affect Flat    Recent Labs/Imagin.9   8.0   )-----------( 242      ( 2019 08:50 )             45.7     02-    138  |  102  |  24<H>  ----------------------------<  121<H>  3.7   |  24  |  1.01    Ca    9.5      2019 08:50    MEDICATIONS  (STANDING):  amLODIPine   Tablet 10 milliGRAM(s) Oral daily  aspirin  chewable 81 milliGRAM(s) Oral daily  atorvastatin 80 milliGRAM(s) Oral <User Schedule>  docusate sodium 100 milliGRAM(s) Oral two times a day  FLUoxetine 10 milliGRAM(s) Oral daily  heparin  Injectable 5000 Unit(s) SubCutaneous every 8 hours  hydrocortisone 0.5% Ointment 1 Application(s) Topical two times a day  lisinopril 2.5 milliGRAM(s) Oral daily  losartan 100 milliGRAM(s) Oral daily  melatonin 9 milliGRAM(s) Oral <User Schedule>  polyethylene glycol 3350 17 Gram(s) Oral daily    MEDICATIONS  (PRN):  acetaminophen   Tablet .. 650 milliGRAM(s) Oral every 6 hours PRN Mild Pain (1 - 3), Moderate Pain (4 - 6)  fluticasone propionate 50 MICROgram(s)/spray Nasal Spray 1 Spray(s) Both Nostrils two times a day PRN nasal congestion  hydrocortisone 1% Ointment 1 Application(s) Topical every 12 hours PRN Itching  lactulose Syrup 10 Gram(s) Oral daily PRN constipation  magnesium hydroxide Suspension 30 milliLiter(s) Oral daily PRN Constipation  simethicone 80 milliGRAM(s) Chew daily PRN Gas  sodium chloride 0.65% Nasal 1 Spray(s) Both Nostrils every 4 hours PRN Nasal Congestion    A/P: 63 year-old man with a PMH of HTN, morbid obesity who was found to have acute CVA with left side hemiparesis and given TPA.     #Acute CVA presenting with Left sided hemiparesis, dysarthria s/p tpa :  - Continue ASA/Lipitor 80 mg PO QHS  - Continue prozac for motor recovery  - Psychological support for adjustment symptoms  - Continue PT, OT, SLP    #Depression:  - continue prozac, supportive counseling      #Obstructive Sleep Apnea:   - Nocturnal CPAP  - O2 via NC PRN  - Pulmonary consulted for CPAP settings - patient tolerating new settings    #HTN: Better controlled now.   - Norvasc 10 mg daily  - Losartan increased to 75 mg. No increased dizziness.  - Target SBP <140.     #GI/Bowel Mgmt:  - Colace BID, Senna, Lactulose, PRN    #Diet:  - Soft with thin liquids    #Insomnia:  - Melatonin 9 mg q8PM.  - sleep hygiene.    #DVT: Heparin SC    IDT held on :  Patient with slower progress than anticipated, still with reduced hand strength and coordination, will require assistance with hygiene. Family also working on cleaning house, patient was hoarder and had needs space to mobilize in home and for equipment. +episode LOB during gait in PT this week. Due to supervision needs, assistance with hygiene, steps, recommend ASHUTOSH for OT, PT unless family is able to provide level of support required  -work on ASHUTOSH, target dc by 19 after discussions with patient and family Daily Progress Note:  HPI: 63M PMH HTN (not on meds), CAYDEN (on CPAP, compliant), obesity, and HLD (not on meds) who presents to hospital with acute onset of slurred speech and Left UE paresis found to have ischemic stroke  s/p tPA now in MICU for further observation  and management.  CT head on -Old lacunar infarcts involving the right basal ganglia, left caudate head /anterior corona radiata region. MRI Brain MRI: Acute right basal ganglia infarct. Chronic bilateral lacunar infarcts. Found to have Oropharyngeal dysphagia place on Dysphagia 3 diet. Patient transferred to Acute Rehabilitation for functional recovery. (2019 19:12)    Daily Subjective: Patient seen and examined at bedside. Reports reduction of night-time interruptions. Pain and dizzy spells have improved, reports only brief dizzy spells during therapy that have not been limiting him. Feels better with new CPAP settings, feels it makes less noise. Also reports sl increase saliva production, usualy swallows it, no change in swallow or new facial droop.    Seen with family at bedside.    REVIEW OF SYSTEMS  Constitutional: No fever, No Chills, No fatigue  Pulm: No cough,  No shortness of breath  Cardio: No chest pain, No palpitations  GI:  No abdominal pain, No nausea, No vomiting, No diarrhea, No constipation, No incontinence   : No dysuria, No frequency, No hematuria, No incontinence  Neuro: + loss of strength  MSK: No joint pain, No joint swelling, No muscle pain, No Neck or back pain    Vital Signs Last 24 Hrs  T(C): 36.4 (2019 08:51), Max: 36.4 (2019 21:50)  T(F): 97.5 (2019 08:51), Max: 97.6 (2019 21:50)  HR: 67 (2019 08:51) (67 - 88)  BP: 136/74 (2019 08:51) (136/74 - 150/98)  BP(mean): --  RR: 16 (2019 08:51) (16 - 16)  SpO2: 95% (2019 08:51) (95% - 96%)    Physical Exam:  Gen - NAD, Comfortable, continues to be motivated for recovery  Pulm - Poor inspiratory effort, No wheeze, No rhonchi, No crackles  Cardiovascular - RRR, S1S2, No m/r/g  Abdomen - Soft, NT/ND, +BS  Extremities - No C/C/E, No calf tenderness  Neuro- AAOx3; Fluent, + dysarthria; Left upper extremity 3/5 at shoulder, elbow flexion and extension, grasp  reduced gross motor coordination; trace hand swelling, finger flexion 3/5 incomplete grasp  Tone - Hypotonia  Psychiatric - Mood stable, Affect Flat    Recent Labs/Imagin.9   8.0   )-----------( 242      ( 2019 08:50 )             45.7     02-18    138  |  102  |  24<H>  ----------------------------<  121<H>  3.7   |  24  |  1.01    Ca    9.5      2019 08:50    MEDICATIONS  (STANDING):  amLODIPine   Tablet 10 milliGRAM(s) Oral daily  aspirin  chewable 81 milliGRAM(s) Oral daily  atorvastatin 80 milliGRAM(s) Oral <User Schedule>  docusate sodium 100 milliGRAM(s) Oral two times a day  FLUoxetine 10 milliGRAM(s) Oral daily  heparin  Injectable 5000 Unit(s) SubCutaneous every 8 hours  hydrocortisone 0.5% Ointment 1 Application(s) Topical two times a day  lisinopril 2.5 milliGRAM(s) Oral daily  losartan 100 milliGRAM(s) Oral daily  melatonin 9 milliGRAM(s) Oral <User Schedule>  polyethylene glycol 3350 17 Gram(s) Oral daily    MEDICATIONS  (PRN):  acetaminophen   Tablet .. 650 milliGRAM(s) Oral every 6 hours PRN Mild Pain (1 - 3), Moderate Pain (4 - 6)  fluticasone propionate 50 MICROgram(s)/spray Nasal Spray 1 Spray(s) Both Nostrils two times a day PRN nasal congestion  hydrocortisone 1% Ointment 1 Application(s) Topical every 12 hours PRN Itching  lactulose Syrup 10 Gram(s) Oral daily PRN constipation  magnesium hydroxide Suspension 30 milliLiter(s) Oral daily PRN Constipation  simethicone 80 milliGRAM(s) Chew daily PRN Gas  sodium chloride 0.65% Nasal 1 Spray(s) Both Nostrils every 4 hours PRN Nasal Congestion

## 2019-02-19 NOTE — PROGRESS NOTE ADULT - ASSESSMENT
A/P: 63 year-old man with a PMH of HTN, morbid obesity who was found to have acute CVA with left side hemiparesis and given TPA.     #Acute CVA presenting with Left sided hemiparesis, dysarthria s/p tpa 1/26:  - Continue ASA/Lipitor 80 mg PO QHS  - Continue prozac for motor recovery  - Psychological support for adjustment symptoms  - Continue PT, OT, SLP    #Depression:  - continue prozac, supportive counseling      #Obstructive Sleep Apnea:   - Nocturnal CPAP  - O2 via NC PRN  - Pulmonary consulted for CPAP settings - patient tolerating new settings    #HTN: Better controlled now.   - Norvasc 10 mg daily  - Losartan increased to 75 mg. No increased dizziness.  - Target SBP <140.     #GI/Bowel Mgmt:  - Colace BID, Senna, Lactulose, PRN    #Diet:  - Soft with thin liquids    #Insomnia:  - Melatonin 9 mg q8PM.  - sleep hygiene.    #DVT: Heparin SC    IDT held on 2/14:  Patient with slower progress than anticipated, still with reduced hand strength and coordination, will require assistance with hygiene. Family also working on cleaning house, patient was hoarder and had needs space to mobilize in home and for equipment. +episode LOB during gait in PT this week. Due to supervision needs, assistance with hygiene, steps, recommend ASHUTOSH for OT, PT unless family is able to provide level of support required  -work on ASHUTOSH, target dc by 2/19/19 after discussions with patient and family. ASHUTOSH process reviewed in detail, reasons for recommendations also discussed. Patient and family agreeable

## 2019-02-20 PROCEDURE — 99232 SBSQ HOSP IP/OBS MODERATE 35: CPT

## 2019-02-20 RX ORDER — AMLODIPINE BESYLATE 2.5 MG/1
1 TABLET ORAL
Qty: 0 | Refills: 0 | DISCHARGE
Start: 2019-02-20

## 2019-02-20 RX ORDER — FLUOXETINE HCL 10 MG
1 CAPSULE ORAL
Qty: 0 | Refills: 0 | DISCHARGE
Start: 2019-02-20

## 2019-02-20 RX ORDER — OXYMETAZOLINE HYDROCHLORIDE 0.5 MG/ML
1 SPRAY NASAL
Qty: 0 | Refills: 0 | COMMUNITY
Start: 2019-02-20

## 2019-02-20 RX ORDER — LACTULOSE 10 G/15ML
15 SOLUTION ORAL
Qty: 0 | Refills: 0 | COMMUNITY
Start: 2019-02-20

## 2019-02-20 RX ORDER — LISINOPRIL 2.5 MG/1
1 TABLET ORAL
Qty: 0 | Refills: 0 | COMMUNITY
Start: 2019-02-20

## 2019-02-20 RX ORDER — HEPARIN SODIUM 5000 [USP'U]/ML
5000 INJECTION INTRAVENOUS; SUBCUTANEOUS
Qty: 0 | Refills: 0 | COMMUNITY
Start: 2019-02-20

## 2019-02-20 RX ORDER — OXYMETAZOLINE HYDROCHLORIDE 0.5 MG/ML
1 SPRAY NASAL EVERY 12 HOURS
Qty: 0 | Refills: 0 | Status: DISCONTINUED | OUTPATIENT
Start: 2019-02-20 | End: 2019-02-25

## 2019-02-20 RX ORDER — LACTULOSE 10 G/15ML
20 SOLUTION ORAL
Qty: 0 | Refills: 0 | COMMUNITY
Start: 2019-02-20

## 2019-02-20 RX ORDER — LOSARTAN POTASSIUM 100 MG/1
1 TABLET, FILM COATED ORAL
Qty: 0 | Refills: 0 | COMMUNITY
Start: 2019-02-20

## 2019-02-20 RX ORDER — SIMETHICONE 80 MG/1
1 TABLET, CHEWABLE ORAL
Qty: 0 | Refills: 0 | DISCHARGE
Start: 2019-02-20

## 2019-02-20 RX ORDER — SACCHAROMYCES BOULARDII 250 MG
1 POWDER IN PACKET (EA) ORAL
Qty: 0 | Refills: 0 | COMMUNITY
Start: 2019-02-20

## 2019-02-20 RX ORDER — HYDROCORTISONE 1 %
1 OINTMENT (GRAM) TOPICAL
Qty: 0 | Refills: 0 | DISCHARGE
Start: 2019-02-20

## 2019-02-20 RX ADMIN — ATORVASTATIN CALCIUM 80 MILLIGRAM(S): 80 TABLET, FILM COATED ORAL at 19:44

## 2019-02-20 RX ADMIN — HEPARIN SODIUM 5000 UNIT(S): 5000 INJECTION INTRAVENOUS; SUBCUTANEOUS at 15:03

## 2019-02-20 RX ADMIN — LOSARTAN POTASSIUM 100 MILLIGRAM(S): 100 TABLET, FILM COATED ORAL at 06:36

## 2019-02-20 RX ADMIN — HEPARIN SODIUM 5000 UNIT(S): 5000 INJECTION INTRAVENOUS; SUBCUTANEOUS at 06:36

## 2019-02-20 RX ADMIN — Medication 650 MILLIGRAM(S): at 13:30

## 2019-02-20 RX ADMIN — AMLODIPINE BESYLATE 10 MILLIGRAM(S): 2.5 TABLET ORAL at 06:37

## 2019-02-20 RX ADMIN — Medication 100 MILLIGRAM(S): at 18:03

## 2019-02-20 RX ADMIN — Medication 250 MILLIGRAM(S): at 06:35

## 2019-02-20 RX ADMIN — Medication 250 MILLIGRAM(S): at 18:03

## 2019-02-20 RX ADMIN — Medication 10 MILLIGRAM(S): at 12:27

## 2019-02-20 RX ADMIN — Medication 81 MILLIGRAM(S): at 12:27

## 2019-02-20 RX ADMIN — LISINOPRIL 2.5 MILLIGRAM(S): 2.5 TABLET ORAL at 06:36

## 2019-02-20 RX ADMIN — Medication 9 MILLIGRAM(S): at 19:44

## 2019-02-20 RX ADMIN — HEPARIN SODIUM 5000 UNIT(S): 5000 INJECTION INTRAVENOUS; SUBCUTANEOUS at 19:44

## 2019-02-20 RX ADMIN — Medication 650 MILLIGRAM(S): at 12:32

## 2019-02-20 RX ADMIN — LACTULOSE 10 GRAM(S): 10 SOLUTION ORAL at 15:03

## 2019-02-20 RX ADMIN — Medication 100 MILLIGRAM(S): at 06:36

## 2019-02-20 NOTE — PROGRESS NOTE ADULT - EXTREMITIES COMMENTS
no significant swelling, calves soft, NT  trace non pitting edema left hand, motor 3+/5  proximal 3-4/5 reduced [facility] LUE

## 2019-02-20 NOTE — PROGRESS NOTE ADULT - ASSESSMENT
A/P: 63 year-old man with a PMH of HTN, morbid obesity who was found to have acute CVA with left side hemiparesis and given TPA.     #Acute CVA presenting with Left sided hemiparesis, dysarthria s/p tpa 1/26:  - Continue ASA/Lipitor 80 mg PO QHS  - Continue prozac for motor recovery. Patient does not want increased dosee  - Psychological support for adjustment symptoms  - Continue PT, OT, SLP    #Depression:  - continue prozac, supportive counseling      #Obstructive Sleep Apnea:   - Nocturnal CPAP  - O2 via NC PRN  - Pulmonary consulted for CPAP settings - patient tolerating new settings  -continue flonase and oxymetazole for nasal congestion    #HTN:   - Norvasc 10 mg daily, Losartan  100 mg. /82 today      #GI/Bowel Mgmt:  - Colace BID, Senna, Lactulose, PRN    #Diet:  - Soft with thin liquids    #Insomnia:  - Melatonin 9 mg q8PM.  - sleep hygiene.    #DVT: Heparin SC    IDT held on 2/14:  Patient with slower progress than anticipated, still with reduced hand strength and coordination, will require assistance with hygiene. Family also working on cleaning house, patient was hoarder and had needs space to mobilize in home and for equipment. +episode LOB during gait in PT this week. Due to supervision needs, assistance with hygiene, steps, recommend ASHUTOSH for OT, PT unless family is able to provide level of support required  -work on ASHUTOSH, target dc by 2/19/19 after discussions with patient and family. ASHUTOSH process reviewed in detail, reasons for recommendations also discussed. Patient and family agreeable                LABS:  BMP 2/21

## 2019-02-20 NOTE — PROGRESS NOTE ADULT - SUBJECTIVE AND OBJECTIVE BOX
Patient is a 63y old  Male who presents with a chief complaint of stroke (19 Feb 2019 15:48)      HPI:  63 year-old Man with a PMH of HTN (not on meds), CAYDEN (on CPAP, compliant), obesity, and HLD (not on meds) who presents to hospital with acute onset of slurred speech and Left UE paresis found to have ischemic stroke  s/p tPA now in MICU for further observation  and management.  CT head on 1/27-Old lacunar infarcts involving the right basal ganglia, left caudate head /anterior corona radiata region. MRI Brain MRI: Acute right basal ganglia infarct. Chronic bilateral lacunar infarcts. Found to have Oropharyngeal dysphagia place on Dysphagia 3 diet. Patient transferred to Acute Rehabilitation for functional recovery. (01 Feb 2019 19:12)      PAST MEDICAL & SURGICAL HISTORY:  Sciatica  High cholesterol  Hypertension  Peripheral Vascular Disease  CAYDEN (Obstructive Sleep Apnea)  CAD (Coronary Artery Disease)  History of cholecystectomy  History of tonsillectomy  S/P ACL repair      MEDICATIONS  (STANDING):  amLODIPine   Tablet 10 milliGRAM(s) Oral daily  aspirin  chewable 81 milliGRAM(s) Oral daily  atorvastatin 80 milliGRAM(s) Oral <User Schedule>  docusate sodium 100 milliGRAM(s) Oral two times a day  FLUoxetine 10 milliGRAM(s) Oral daily  heparin  Injectable 5000 Unit(s) SubCutaneous every 8 hours  hydrocortisone 0.5% Ointment 1 Application(s) Topical two times a day  lisinopril 2.5 milliGRAM(s) Oral daily  losartan 100 milliGRAM(s) Oral daily  melatonin 9 milliGRAM(s) Oral <User Schedule>  polyethylene glycol 3350 17 Gram(s) Oral daily  saccharomyces boulardii 250 milliGRAM(s) Oral two times a day    MEDICATIONS  (PRN):  acetaminophen   Tablet .. 650 milliGRAM(s) Oral every 6 hours PRN Mild Pain (1 - 3), Moderate Pain (4 - 6)  fluticasone propionate 50 MICROgram(s)/spray Nasal Spray 1 Spray(s) Both Nostrils two times a day PRN nasal congestion  hydrocortisone 1% Ointment 1 Application(s) Topical every 12 hours PRN Itching  lactulose Syrup 10 Gram(s) Oral daily PRN constipation  magnesium hydroxide Suspension 30 milliLiter(s) Oral daily PRN Constipation  simethicone 80 milliGRAM(s) Chew daily PRN Gas  sodium chloride 0.65% Nasal 1 Spray(s) Both Nostrils every 4 hours PRN Nasal Congestion      Allergies    No Known Allergies    Intolerances          VITALS  63y  Vital Signs Last 24 Hrs  T(C): 37.1 (20 Feb 2019 08:30), Max: 37.1 (20 Feb 2019 08:30)  T(F): 98.8 (20 Feb 2019 08:30), Max: 98.8 (20 Feb 2019 08:30)  HR: 75 (20 Feb 2019 08:30) (75 - 86)  BP: 158/82 (20 Feb 2019 08:30) (130/80 - 158/82)  BP(mean): --  RR: 16 (20 Feb 2019 08:30) (16 - 16)  SpO2: 96% (20 Feb 2019 08:30) (94% - 96%)  Daily     Daily         RECENT LABS:                      CAPILLARY BLOOD GLUCOSE

## 2019-02-20 NOTE — PROGRESS NOTE ADULT - EXTREMITIES
detailed exam

## 2019-02-20 NOTE — PROGRESS NOTE ADULT - CONSTITUTIONAL COMMENTS
mood seems improved, NAD
alert, sustained eye opening throughout, no lability. O x 3
alert. does not want increase in prozac, seen also by psychiatry, aware of recommendations. NAD
alert, hopeful. no dyspnea with convesation O x 3
alert, less anxious, mood fair O x 3
alert O x 4 irritable
mood stable. seen with caregiver at bedside. no new facial droop or change I communication
alert NAD O x 4
Bp measured manually post PT, 142/78
alert, joking this morning, mood looks improved. no dyspnea
mood appears stable, although at one point looks fairly distressed and near tearful towards end of exam. admits to h/o depression. Does have family support on dc even though he lives alone

## 2019-02-20 NOTE — PROGRESS NOTE ADULT - GENERAL COMMENTS
continues to have improved sleep. mildly constipated, will take lactulose. Breathing more comfortably, brings in nasal spray from home which he uses to help at bedtime in addition to flonase (oxymetazole)

## 2019-02-21 LAB
ANION GAP SERPL CALC-SCNC: 10 MMOL/L — SIGNIFICANT CHANGE UP (ref 5–17)
BUN SERPL-MCNC: 22 MG/DL — SIGNIFICANT CHANGE UP (ref 7–23)
CALCIUM SERPL-MCNC: 9.4 MG/DL — SIGNIFICANT CHANGE UP (ref 8.4–10.5)
CHLORIDE SERPL-SCNC: 104 MMOL/L — SIGNIFICANT CHANGE UP (ref 96–108)
CO2 SERPL-SCNC: 24 MMOL/L — SIGNIFICANT CHANGE UP (ref 22–31)
CREAT SERPL-MCNC: 1.04 MG/DL — SIGNIFICANT CHANGE UP (ref 0.5–1.3)
GLUCOSE SERPL-MCNC: 104 MG/DL — HIGH (ref 70–99)
POTASSIUM SERPL-MCNC: 3.8 MMOL/L — SIGNIFICANT CHANGE UP (ref 3.5–5.3)
POTASSIUM SERPL-SCNC: 3.8 MMOL/L — SIGNIFICANT CHANGE UP (ref 3.5–5.3)
SODIUM SERPL-SCNC: 138 MMOL/L — SIGNIFICANT CHANGE UP (ref 135–145)

## 2019-02-21 PROCEDURE — 99232 SBSQ HOSP IP/OBS MODERATE 35: CPT

## 2019-02-21 RX ORDER — LACTULOSE 10 G/15ML
20 SOLUTION ORAL DAILY
Qty: 0 | Refills: 0 | Status: DISCONTINUED | OUTPATIENT
Start: 2019-02-21 | End: 2019-02-25

## 2019-02-21 RX ADMIN — Medication 650 MILLIGRAM(S): at 19:00

## 2019-02-21 RX ADMIN — Medication 10 MILLIGRAM(S): at 12:33

## 2019-02-21 RX ADMIN — HEPARIN SODIUM 5000 UNIT(S): 5000 INJECTION INTRAVENOUS; SUBCUTANEOUS at 06:42

## 2019-02-21 RX ADMIN — Medication 100 MILLIGRAM(S): at 18:13

## 2019-02-21 RX ADMIN — LOSARTAN POTASSIUM 100 MILLIGRAM(S): 100 TABLET, FILM COATED ORAL at 06:42

## 2019-02-21 RX ADMIN — AMLODIPINE BESYLATE 10 MILLIGRAM(S): 2.5 TABLET ORAL at 06:41

## 2019-02-21 RX ADMIN — Medication 650 MILLIGRAM(S): at 08:25

## 2019-02-21 RX ADMIN — Medication 81 MILLIGRAM(S): at 12:33

## 2019-02-21 RX ADMIN — Medication 9 MILLIGRAM(S): at 20:05

## 2019-02-21 RX ADMIN — Medication 250 MILLIGRAM(S): at 18:13

## 2019-02-21 RX ADMIN — Medication 1 APPLICATION(S): at 20:09

## 2019-02-21 RX ADMIN — HEPARIN SODIUM 5000 UNIT(S): 5000 INJECTION INTRAVENOUS; SUBCUTANEOUS at 13:01

## 2019-02-21 RX ADMIN — Medication 650 MILLIGRAM(S): at 09:00

## 2019-02-21 RX ADMIN — Medication 1 APPLICATION(S): at 08:15

## 2019-02-21 RX ADMIN — Medication 650 MILLIGRAM(S): at 18:35

## 2019-02-21 RX ADMIN — Medication 250 MILLIGRAM(S): at 06:41

## 2019-02-21 RX ADMIN — ATORVASTATIN CALCIUM 80 MILLIGRAM(S): 80 TABLET, FILM COATED ORAL at 20:06

## 2019-02-21 RX ADMIN — HEPARIN SODIUM 5000 UNIT(S): 5000 INJECTION INTRAVENOUS; SUBCUTANEOUS at 20:05

## 2019-02-21 RX ADMIN — LACTULOSE 20 GRAM(S): 10 SOLUTION ORAL at 14:35

## 2019-02-21 RX ADMIN — LISINOPRIL 2.5 MILLIGRAM(S): 2.5 TABLET ORAL at 06:41

## 2019-02-21 RX ADMIN — Medication 100 MILLIGRAM(S): at 06:41

## 2019-02-21 NOTE — PROGRESS NOTE ADULT - SUBJECTIVE AND OBJECTIVE BOX
Daily Progress Note:  HPI: 63M PMH HTN (not on meds), CAYDEN (on CPAP, compliant), obesity, and HLD (not on meds) who presents to hospital with acute onset of slurred speech and Left UE paresis found to have ischemic stroke  s/p tPA now in MICU for further observation  and management.  CT head on -Old lacunar infarcts involving the right basal ganglia, left caudate head /anterior corona radiata region. MRI Brain MRI: Acute right basal ganglia infarct. Chronic bilateral lacunar infarcts. Found to have Oropharyngeal dysphagia place on Dysphagia 3 diet. Patient transferred to Acute Rehabilitation for functional recovery. (2019 19:12)    Daily Subjective: Patient seen and examined at bedside. Patient upset that he was awoken early by the phlebotomist. Had BM yesterday, but had to strain, requesting more lactulose. Also had pain in shoulder and back from "working out", received hot packs with some relief.     REVIEW OF SYSTEMS  Constitutional: No fever, No Chills, No fatigue  Pulm: No cough,  No shortness of breath  Cardio: No chest pain, No palpitations  GI:  No abdominal pain, No nausea, No vomiting, No diarrhea, No constipation, No incontinence   : No dysuria, No frequency, No hematuria, No incontinence  Neuro: + loss of strength  MSK: No joint pain, No joint swelling, No muscle pain, + Shoulder and Back pain    Vital Signs Last 24 Hrs  T(C): 37.1 (2019 08:42), Max: 37.1 (2019 08:42)  T(F): 98.8 (2019 08:42), Max: 98.8 (2019 08:42)  HR: 71 (2019 08:42) (69 - 76)  BP: 146/78 (2019 08:42) (125/79 - 146/78)  BP(mean): --  RR: 16 (2019 08:42) (16 - 16)  SpO2: 95% (2019 08:42) (95% - 97%)    Physical Exam:  Gen - NAD, Comfortable, continues to be motivated for recovery  Pulm - Poor inspiratory effort, No wheeze, No rhonchi, No crackles  Cardiovascular - RRR, S1S2, No m/r/g  Abdomen - Soft, NT/ND, +BS  Extremities - No C/C/E, No calf tenderness  Neuro- AAOx3; Fluent, + dysarthria; Left upper extremity 3/5 at shoulder, elbow flexion and extension, grasp  reduced gross motor coordination; trace hand swelling, finger flexion 3/5 incomplete grasp  Tone - Hypotonia  Psychiatric - Mood stable, Affect Flat    Recent Labs/Imagin.9   8.0   )-----------( 242      ( 2019 08:50 )             45.7   02-21    138  |  104  |  22  ----------------------------<  104<H>  3.8   |  24  |  1.04    Ca    9.4      2019 08:29    MEDICATIONS  (STANDING):  amLODIPine   Tablet 10 milliGRAM(s) Oral daily  aspirin  chewable 81 milliGRAM(s) Oral daily  atorvastatin 80 milliGRAM(s) Oral <User Schedule>  docusate sodium 100 milliGRAM(s) Oral two times a day  FLUoxetine 10 milliGRAM(s) Oral daily  heparin  Injectable 5000 Unit(s) SubCutaneous every 8 hours  hydrocortisone 0.5% Ointment 1 Application(s) Topical two times a day  lisinopril 2.5 milliGRAM(s) Oral daily  losartan 100 milliGRAM(s) Oral daily  melatonin 9 milliGRAM(s) Oral <User Schedule>  polyethylene glycol 3350 17 Gram(s) Oral daily  saccharomyces boulardii 250 milliGRAM(s) Oral two times a day    MEDICATIONS  (PRN):  acetaminophen   Tablet .. 650 milliGRAM(s) Oral every 6 hours PRN Mild Pain (1 - 3), Moderate Pain (4 - 6)  fluticasone propionate 50 MICROgram(s)/spray Nasal Spray 1 Spray(s) Both Nostrils two times a day PRN nasal congestion  hydrocortisone 1% Ointment 1 Application(s) Topical every 12 hours PRN Itching  lactulose Syrup 20 Gram(s) Oral daily PRN constipation  magnesium hydroxide Suspension 30 milliLiter(s) Oral daily PRN Constipation  oxymetazoline 0.05% Nasal Spray 1 Spray(s) Both Nostrils every 12 hours PRN Nasal Congestion  simethicone 80 milliGRAM(s) Chew daily PRN Gas  sodium chloride 0.65% Nasal 1 Spray(s) Both Nostrils every 4 hours PRN Nasal Congestion Daily Progress Note:  HPI: 63M PMH HTN (not on meds), CAYDEN (on CPAP, compliant), obesity, and HLD (not on meds) who presents to hospital with acute onset of slurred speech and Left UE paresis found to have ischemic stroke  s/p tPA now in MICU for further observation  and management.  CT head on -Old lacunar infarcts involving the right basal ganglia, left caudate head /anterior corona radiata region. MRI Brain MRI: Acute right basal ganglia infarct. Chronic bilateral lacunar infarcts. Found to have Oropharyngeal dysphagia place on Dysphagia 3 diet. Patient transferred to Acute Rehabilitation for functional recovery. (2019 19:12)    Daily Subjective: Patient seen and examined at bedside. Patient upset that he was awoken early by the phlebotomist. Had BM yesterday, but had to strain, requesting more lactulose. Also had pain in shoulder and back from "working out", received hot packs with some relief.   No complaints of dizzines, not complaining of nasal congestion today. BP improved    REVIEW OF SYSTEMS  Constitutional: No fever, No Chills, No fatigue  Pulm: No cough,  No shortness of breath  Cardio: No chest pain, No palpitations  GI:  No abdominal pain, No nausea, No vomiting, No diarrhea, No constipation, No incontinence   : No dysuria, No frequency, No hematuria, No incontinence  Neuro: + loss of strength  MSK: No joint pain, No joint swelling, No muscle pain, + Shoulder and Back pain    Vital Signs Last 24 Hrs  T(C): 37.1 (2019 08:42), Max: 37.1 (2019 08:42)  T(F): 98.8 (2019 08:42), Max: 98.8 (2019 08:42)  HR: 71 (2019 08:42) (69 - 76)  BP: 146/78 (2019 08:42) (125/79 - 146/78)  BP(mean): --  RR: 16 (2019 08:42) (16 - 16)  SpO2: 95% (2019 08:42) (95% - 97%)    Physical Exam:  Gen - NAD, Comfortable, continues to be motivated for recovery  Pulm - Poor inspiratory effort, No wheeze, No rhonchi, No crackles  Cardiovascular - RRR, S1S2, No m/r/g  Abdomen - Soft, NT/ND, +BS  Extremities - No C/C/E, No calf tenderness  Neuro- AAOx3; Fluent, + dysarthria; Left upper extremity 3/5 at shoulder, elbow flexion and extension, grasp  reduced gross motor coordination; trace hand swelling, finger flexion 3/5 incomplete grasp  no calf swelling +soft, NT  Tone - Hypotonia  Psychiatric - Mood stable, Affect Flat, sl irritable  less fatigued    Recent Labs/Imagin.9   8.0   )-----------( 242      ( 2019 08:50 )             45.7   02-21    138  |  104  |  22  ----------------------------<  104<H>  3.8   |  24  |  1.04    Ca    9.4      2019 08:29    MEDICATIONS  (STANDING):  amLODIPine   Tablet 10 milliGRAM(s) Oral daily  aspirin  chewable 81 milliGRAM(s) Oral daily  atorvastatin 80 milliGRAM(s) Oral <User Schedule>  docusate sodium 100 milliGRAM(s) Oral two times a day  FLUoxetine 10 milliGRAM(s) Oral daily  heparin  Injectable 5000 Unit(s) SubCutaneous every 8 hours  hydrocortisone 0.5% Ointment 1 Application(s) Topical two times a day  lisinopril 2.5 milliGRAM(s) Oral daily  losartan 100 milliGRAM(s) Oral daily  melatonin 9 milliGRAM(s) Oral <User Schedule>  polyethylene glycol 3350 17 Gram(s) Oral daily  saccharomyces boulardii 250 milliGRAM(s) Oral two times a day    MEDICATIONS  (PRN):  acetaminophen   Tablet .. 650 milliGRAM(s) Oral every 6 hours PRN Mild Pain (1 - 3), Moderate Pain (4 - 6)  fluticasone propionate 50 MICROgram(s)/spray Nasal Spray 1 Spray(s) Both Nostrils two times a day PRN nasal congestion  hydrocortisone 1% Ointment 1 Application(s) Topical every 12 hours PRN Itching  lactulose Syrup 20 Gram(s) Oral daily PRN constipation  magnesium hydroxide Suspension 30 milliLiter(s) Oral daily PRN Constipation  oxymetazoline 0.05% Nasal Spray 1 Spray(s) Both Nostrils every 12 hours PRN Nasal Congestion  simethicone 80 milliGRAM(s) Chew daily PRN Gas  sodium chloride 0.65% Nasal 1 Spray(s) Both Nostrils every 4 hours PRN Nasal Congestion

## 2019-02-21 NOTE — PROGRESS NOTE ADULT - ASSESSMENT
A/P: 63 year-old man with a PMH of HTN, morbid obesity who was found to have acute CVA with left side hemiparesis and given TPA.     #Acute CVA presenting with Left sided hemiparesis, dysarthria s/p tpa 1/26:  - Continue ASA/Lipitor 80 mg PO QHS  - Continue prozac for motor recovery. Patient does not want increased dose  - Psychological support for adjustment symptoms  - Continue PT, OT, SLP    #Depression:  - continue prozac, supportive counseling    #Obstructive Sleep Apnea:   - Nocturnal CPAP  - O2 via NC PRN  - Pulmonary consulted for CPAP settings - patient tolerating new settings  - continue flonase and oxymetazole for nasal congestion    #HTN:   - Norvasc 10 mg daily, Losartan  100 mg. Lisinopril 2.5mg added /78 today.    #GI/Bowel Mgmt:  - Colace BID, Senna, Lactulose PRN increased to 20mL    #Diet:  - Soft with thin liquids    #Insomnia:  - Melatonin 9 mg q8PM.  - sleep hygiene.    #DVT: Heparin SC    IDT held on 2/21:  Patient with slower progress than anticipated, still with reduced hand strength and coordination, will require assistance with hygiene. Family also working on cleaning house, patient was hoarder and had needs space to mobilize in home and for equipment. +episode LOB during gait in PT this week. Due to supervision needs, assistance with hygiene, steps, recommend [facility] for OT, PT unless family is able to provide level of support required.  - Awaiting acceptances from [facility]. Patient and family agreeable A/P: 63 year-old man with a PMH of HTN, morbid obesity who was found to have acute CVA with left side hemiparesis and given TPA.     #Acute CVA presenting with Left sided hemiparesis, dysarthria s/p tpa 1/26:  - Continue ASA/Lipitor 80 mg PO QHS  - Continue prozac for motor recovery. Patient does not want increased dose. continue Psychological support for adjustment symptoms  - Continue PT, OT, SLP. [facility] pending    #Depression:  - continue prozac, supportive counseling    #Obstructive Sleep Apnea:   - Nocturnal CPAP  - O2 via NC PRN  - Pulmonary consulted for CPAP settings - patient tolerating new settings  - continue flonase and oxymetazole for nasal congestion. stable    #HTN:   - Norvasc 10 mg daily, Losartan  100 mg. Lisinopril 2.5mg added /78 today. improved    #GI/Bowel Mgmt:  - Colace BID, Senna, Lactulose PRN increased to 20mL    #Diet:  - Soft with thin liquids    #Insomnia:  - Melatonin 9 mg q8PM.  - sleep hygiene.    #DVT: Heparin SC    IDT held on 2/21:  Patient with slower progress than anticipated, still with reduced hand strength and coordination, will require assistance with hygiene. Family also working on cleaning house, patient was hoarder and had needs space to mobilize in home and for equipment. +episode LOB during gait in PT this week. Due to supervision needs, assistance with hygiene, steps, recommend [facility] for OT, PT unless family is able to provide level of support required.  - Awaiting acceptances from [facility]. Patient and family agreeable

## 2019-02-22 PROCEDURE — 99232 SBSQ HOSP IP/OBS MODERATE 35: CPT

## 2019-02-22 RX ADMIN — AMLODIPINE BESYLATE 10 MILLIGRAM(S): 2.5 TABLET ORAL at 06:34

## 2019-02-22 RX ADMIN — Medication 100 MILLIGRAM(S): at 06:34

## 2019-02-22 RX ADMIN — HEPARIN SODIUM 5000 UNIT(S): 5000 INJECTION INTRAVENOUS; SUBCUTANEOUS at 06:34

## 2019-02-22 RX ADMIN — Medication 10 MILLIGRAM(S): at 12:00

## 2019-02-22 RX ADMIN — LISINOPRIL 2.5 MILLIGRAM(S): 2.5 TABLET ORAL at 06:34

## 2019-02-22 RX ADMIN — ATORVASTATIN CALCIUM 80 MILLIGRAM(S): 80 TABLET, FILM COATED ORAL at 20:20

## 2019-02-22 RX ADMIN — Medication 250 MILLIGRAM(S): at 17:20

## 2019-02-22 RX ADMIN — Medication 650 MILLIGRAM(S): at 18:15

## 2019-02-22 RX ADMIN — Medication 9 MILLIGRAM(S): at 20:20

## 2019-02-22 RX ADMIN — HEPARIN SODIUM 5000 UNIT(S): 5000 INJECTION INTRAVENOUS; SUBCUTANEOUS at 21:27

## 2019-02-22 RX ADMIN — Medication 100 MILLIGRAM(S): at 17:20

## 2019-02-22 RX ADMIN — Medication 1 APPLICATION(S): at 21:28

## 2019-02-22 RX ADMIN — LOSARTAN POTASSIUM 100 MILLIGRAM(S): 100 TABLET, FILM COATED ORAL at 06:34

## 2019-02-22 RX ADMIN — Medication 250 MILLIGRAM(S): at 06:35

## 2019-02-22 RX ADMIN — Medication 81 MILLIGRAM(S): at 12:00

## 2019-02-22 RX ADMIN — HEPARIN SODIUM 5000 UNIT(S): 5000 INJECTION INTRAVENOUS; SUBCUTANEOUS at 13:08

## 2019-02-22 RX ADMIN — Medication 1 APPLICATION(S): at 07:58

## 2019-02-22 RX ADMIN — Medication 650 MILLIGRAM(S): at 17:23

## 2019-02-22 NOTE — PROGRESS NOTE ADULT - ASSESSMENT
A/P: 63 year-old man with a PMH of HTN, morbid obesity who was found to have acute CVA with left side hemiparesis and given TPA.     #Acute CVA presenting with Left sided hemiparesis, dysarthria s/p tpa 1/26:  - Continue ASA/Lipitor 80 mg PO QHS  - Continue prozac for motor recovery. Patient does not want increased dose. continue Psychological support for adjustment symptoms  - Continue PT, OT, SLP. [facility] pending    #Depression:  - continue prozac, supportive counseling    #Obstructive Sleep Apnea:   - Nocturnal CPAP  - O2 via NC PRN  - Pulmonary consulted for CPAP settings - patient tolerating new settings  - continue flonase and oxymetazole for nasal congestion. stable    #HTN:   - Norvasc 10 mg daily, Losartan 100 mg. Lisinopril 2.5mg added /80 today. improved    #GI/Bowel Mgmt:  - Colace BID, Senna, Lactulose PRN increased to 20mL 2/21    #Diet:  - Soft with thin liquids    #Insomnia:  - Melatonin 9 mg q8PM.  - Sleep hygiene.    #DVT: Heparin SC    IDT held on 2/21:  Patient with slower progress than anticipated, still with reduced hand strength and coordination, will require assistance with hygiene. Family also working on cleaning house, patient was hoarder and had needs space to mobilize in home and for equipment. +episode LOB during gait in PT this week. Due to supervision needs, assistance with hygiene, steps, recommend [facility] for OT, PT unless family is able to provide level of support required.  - Awaiting [facility] authorization. Patient and family agreeable. A/P: 63 year-old man with a PMH of HTN, morbid obesity who was found to have acute CVA with left side hemiparesis and given TPA.     #Acute CVA presenting with Left sided hemiparesis, dysarthria s/p tpa 1/26:  - Continue ASA/Lipitor 80 mg PO QHS  - Continue prozac for motor recovery. Patient does not want increased dose. continue Psychological support for adjustment symptoms  - Continue PT, OT, SLP  -[facility] pending    #Depression:  - continue prozac, supportive counseling    #Obstructive Sleep Apnea:   - Nocturnal CPAP  - O2 via NC PRN  - continue flonase and oxymetazole for nasal congestion. stable, improved    #HTN:   - Norvasc 10 mg daily, Losartan 100 mg. Lisinopril 2.5mg   -monitor RF    #GI/Bowel Mgmt:  - Colace BID, Senna, Lactulose PRN increased to 20mL 2/21    #Diet:  - Soft with thin liquids    #Insomnia:  - Melatonin 9 mg q8PM.  - Sleep hygiene.    #DVT: Heparin SC    IDT held on 2/21:  Patient with slower progress than anticipated, still with reduced hand strength and coordination, will require assistance with hygiene. Family also working on cleaning house, patient was hoarder and had needs space to mobilize in home and for equipment. +episode LOB during gait in PT this week. Due to supervision needs, assistance with hygiene, steps, recommend [facility] for OT, PT unless family is able to provide level of support required.  - Awaiting [facility] authorization. Patient and family agreeable.          LAbs:  CBC BMP 2/25

## 2019-02-22 NOTE — PROGRESS NOTE ADULT - SUBJECTIVE AND OBJECTIVE BOX
Daily Progress Note:  HPI: 63M PMH HTN (not on meds), CAYDEN (on CPAP, compliant), obesity, and HLD (not on meds) who presents to hospital with acute onset of slurred speech and Left UE paresis found to have ischemic stroke  s/p tPA now in MICU for further observation  and management.  CT head on -Old lacunar infarcts involving the right basal ganglia, left caudate head /anterior corona radiata region. MRI Brain MRI: Acute right basal ganglia infarct. Chronic bilateral lacunar infarcts. Found to have Oropharyngeal dysphagia place on Dysphagia 3 diet. Patient transferred to Acute Rehabilitation for functional recovery. (2019 19:12)    Daily Subjective: Patient seen and examined at bedside. Reports better sleep last night. Continued nasal congestion but is happy with his current regimen of nasal flushes. Reports he felt weak yesterday when his blood pressure was on the lower side.     REVIEW OF SYSTEMS  Constitutional: No fever, No Chills, No fatigue  Pulm: No cough,  No shortness of breath  Cardio: No chest pain, No palpitations  GI:  No abdominal pain, No nausea, No vomiting, No diarrhea, No constipation, No incontinence   : No dysuria, No frequency, No hematuria, No incontinence  Neuro: + loss of strength  MSK: No joint pain, No joint swelling, No muscle pain, + Shoulder and Back pain    Vital Signs Last 24 Hrs  T(C): 36.6 (2019 08:20), Max: 36.7 (2019 22:02)  T(F): 97.8 (2019 08:20), Max: 98.1 (2019 22:02)  HR: 75 (2019 08:20) (75 - 86)  BP: 148/80 (2019 08:20) (130/80 - 148/80)  BP(mean): --  RR: 16 (2019 08:20) (16 - 16)  SpO2: 96% (2019 08:20) (96% - 96%)    Physical Exam:  Gen - NAD, Comfortable, continues to be motivated for recovery  Pulm - Poor inspiratory effort, No wheeze, No rhonchi, No crackles  Cardiovascular - RRR, S1S2, No m/r/g  Abdomen - Soft, NT/ND, +BS  Extremities - No C/C/E, No calf tenderness  Neuro- AAOx3; Fluent, + dysarthria; Left upper extremity 3/5 at shoulder, elbow flexion and extension, grasp  reduced gross motor coordination; trace hand swelling, finger flexion 3/5 incomplete grasp  no calf swelling +soft, NT  Tone - Hypotonia  Psychiatric - Mood stable, Affect Flat, sl irritable  less fatigued    Recent Labs/Imagin.9   8.0   )-----------( 242      ( 2019 08:50 )             45.7   02-21    138  |  104  |  22  ----------------------------<  104<H>  3.8   |  24  |  1.04    Ca    9.4      2019 08:29    MEDICATIONS  (STANDING):  amLODIPine   Tablet 10 milliGRAM(s) Oral daily  aspirin  chewable 81 milliGRAM(s) Oral daily  atorvastatin 80 milliGRAM(s) Oral <User Schedule>  docusate sodium 100 milliGRAM(s) Oral two times a day  FLUoxetine 10 milliGRAM(s) Oral daily  heparin  Injectable 5000 Unit(s) SubCutaneous every 8 hours  hydrocortisone 0.5% Ointment 1 Application(s) Topical two times a day  lisinopril 2.5 milliGRAM(s) Oral daily  losartan 100 milliGRAM(s) Oral daily  melatonin 9 milliGRAM(s) Oral <User Schedule>  polyethylene glycol 3350 17 Gram(s) Oral daily  saccharomyces boulardii 250 milliGRAM(s) Oral two times a day    MEDICATIONS  (PRN):  acetaminophen   Tablet .. 650 milliGRAM(s) Oral every 6 hours PRN Mild Pain (1 - 3), Moderate Pain (4 - 6)  fluticasone propionate 50 MICROgram(s)/spray Nasal Spray 1 Spray(s) Both Nostrils two times a day PRN nasal congestion  hydrocortisone 1% Ointment 1 Application(s) Topical every 12 hours PRN Itching  lactulose Syrup 20 Gram(s) Oral daily PRN constipation  magnesium hydroxide Suspension 30 milliLiter(s) Oral daily PRN Constipation  oxymetazoline 0.05% Nasal Spray 1 Spray(s) Both Nostrils every 12 hours PRN Nasal Congestion  simethicone 80 milliGRAM(s) Chew daily PRN Gas  sodium chloride 0.65% Nasal 1 Spray(s) Both Nostrils every 4 hours PRN Nasal Congestion Daily Progress Note:  HPI: 63M PMH HTN (not on meds), CAYDEN (on CPAP, compliant), obesity, and HLD (not on meds) who presents to hospital with acute onset of slurred speech and Left UE paresis found to have ischemic stroke  s/p tPA now in MICU for further observation  and management.  CT head on -Old lacunar infarcts involving the right basal ganglia, left caudate head /anterior corona radiata region. MRI Brain MRI: Acute right basal ganglia infarct. Chronic bilateral lacunar infarcts. Found to have Oropharyngeal dysphagia place on Dysphagia 3 diet. Patient transferred to Acute Rehabilitation for functional recovery. (2019 19:12)    Daily Subjective: Patient seen and examined at bedside. Reports better sleep last night. Continued nasal congestion but is happy with his current regimen of nasal flushes. Reports he felt weak yesterday when his blood pressure was on the lower side. Better today, overall mood looks improved. Aware that he has been accepted into [facility], awaiting insurance auth    REVIEW OF SYSTEMS  Constitutional: No fever, No Chills, No fatigue  Pulm: No cough,  No shortness of breath  Cardio: No chest pain, No palpitations  GI:  No abdominal pain, No nausea, No vomiting, No diarrhea, No constipation, No incontinence   : No dysuria, No frequency, No hematuria, No incontinence  Neuro: + loss of strength  MSK: No joint pain, No joint swelling, No muscle pain, + Shoulder and Back pain    Vital Signs Last 24 Hrs  T(C): 36.6 (2019 08:20), Max: 36.7 (2019 22:02)  T(F): 97.8 (2019 08:20), Max: 98.1 (2019 22:02)  HR: 75 (2019 08:20) (75 - 86)  BP: 148/80 (2019 08:20) (130/80 - 148/80)  BP(mean): --  RR: 16 (2019 08:20) (16 - 16)  SpO2: 96% (2019 08:20) (96% - 96%)    Physical Exam:  Gen - NAD, Comfortable, less irritable today. good po intake, happy with breakfast  Pulm - Poor inspiratory effort, No wheeze, No rhonchi, No crackles  Cardiovascular - RRR, S1S2, No m/r/g  Abdomen - Soft, NT/ND, +BS  Extremities - No C/C/E, No calf tenderness  Neuro- AAOx3; Fluent, + dysarthria; Left upper extremity 3/5 at shoulder, elbow flexion and extension, grasp  reduced gross motor coordination; trace hand swelling, finger flexion 3/5 incomplete grasp  no calf swelling +soft, NT  Tone - Hypotonia  Psychiatric - Mood stable, Affect Flat, sl irritable  less fatigued    Recent Labs/Imagin.9   8.0   )-----------( 242      ( 2019 08:50 )             45.7   02-21    138  |  104  |  22  ----------------------------<  104<H>  3.8   |  24  |  1.04    Ca    9.4      2019 08:29    MEDICATIONS  (STANDING):  amLODIPine   Tablet 10 milliGRAM(s) Oral daily  aspirin  chewable 81 milliGRAM(s) Oral daily  atorvastatin 80 milliGRAM(s) Oral <User Schedule>  docusate sodium 100 milliGRAM(s) Oral two times a day  FLUoxetine 10 milliGRAM(s) Oral daily  heparin  Injectable 5000 Unit(s) SubCutaneous every 8 hours  hydrocortisone 0.5% Ointment 1 Application(s) Topical two times a day  lisinopril 2.5 milliGRAM(s) Oral daily  losartan 100 milliGRAM(s) Oral daily  melatonin 9 milliGRAM(s) Oral <User Schedule>  polyethylene glycol 3350 17 Gram(s) Oral daily  saccharomyces boulardii 250 milliGRAM(s) Oral two times a day    MEDICATIONS  (PRN):  acetaminophen   Tablet .. 650 milliGRAM(s) Oral every 6 hours PRN Mild Pain (1 - 3), Moderate Pain (4 - 6)  fluticasone propionate 50 MICROgram(s)/spray Nasal Spray 1 Spray(s) Both Nostrils two times a day PRN nasal congestion  hydrocortisone 1% Ointment 1 Application(s) Topical every 12 hours PRN Itching  lactulose Syrup 20 Gram(s) Oral daily PRN constipation  magnesium hydroxide Suspension 30 milliLiter(s) Oral daily PRN Constipation  oxymetazoline 0.05% Nasal Spray 1 Spray(s) Both Nostrils every 12 hours PRN Nasal Congestion  simethicone 80 milliGRAM(s) Chew daily PRN Gas  sodium chloride 0.65% Nasal 1 Spray(s) Both Nostrils every 4 hours PRN Nasal Congestion

## 2019-02-23 PROCEDURE — 99233 SBSQ HOSP IP/OBS HIGH 50: CPT

## 2019-02-23 PROCEDURE — 99232 SBSQ HOSP IP/OBS MODERATE 35: CPT

## 2019-02-23 RX ORDER — SENNA PLUS 8.6 MG/1
2 TABLET ORAL AT BEDTIME
Qty: 0 | Refills: 0 | Status: DISCONTINUED | OUTPATIENT
Start: 2019-02-23 | End: 2019-02-25

## 2019-02-23 RX ADMIN — Medication 1 APPLICATION(S): at 07:02

## 2019-02-23 RX ADMIN — Medication 81 MILLIGRAM(S): at 12:39

## 2019-02-23 RX ADMIN — LACTULOSE 20 GRAM(S): 10 SOLUTION ORAL at 14:40

## 2019-02-23 RX ADMIN — HEPARIN SODIUM 5000 UNIT(S): 5000 INJECTION INTRAVENOUS; SUBCUTANEOUS at 22:07

## 2019-02-23 RX ADMIN — Medication 650 MILLIGRAM(S): at 12:42

## 2019-02-23 RX ADMIN — AMLODIPINE BESYLATE 10 MILLIGRAM(S): 2.5 TABLET ORAL at 06:56

## 2019-02-23 RX ADMIN — Medication 250 MILLIGRAM(S): at 06:57

## 2019-02-23 RX ADMIN — Medication 650 MILLIGRAM(S): at 14:38

## 2019-02-23 RX ADMIN — Medication 9 MILLIGRAM(S): at 20:02

## 2019-02-23 RX ADMIN — Medication 100 MILLIGRAM(S): at 17:28

## 2019-02-23 RX ADMIN — HEPARIN SODIUM 5000 UNIT(S): 5000 INJECTION INTRAVENOUS; SUBCUTANEOUS at 06:57

## 2019-02-23 RX ADMIN — Medication 1 APPLICATION(S): at 17:29

## 2019-02-23 RX ADMIN — Medication 10 MILLIGRAM(S): at 12:39

## 2019-02-23 RX ADMIN — LOSARTAN POTASSIUM 100 MILLIGRAM(S): 100 TABLET, FILM COATED ORAL at 06:56

## 2019-02-23 RX ADMIN — ATORVASTATIN CALCIUM 80 MILLIGRAM(S): 80 TABLET, FILM COATED ORAL at 20:02

## 2019-02-23 RX ADMIN — LISINOPRIL 2.5 MILLIGRAM(S): 2.5 TABLET ORAL at 06:57

## 2019-02-23 RX ADMIN — Medication 250 MILLIGRAM(S): at 17:28

## 2019-02-23 RX ADMIN — HEPARIN SODIUM 5000 UNIT(S): 5000 INJECTION INTRAVENOUS; SUBCUTANEOUS at 14:40

## 2019-02-23 RX ADMIN — Medication 100 MILLIGRAM(S): at 06:57

## 2019-02-23 NOTE — PROGRESS NOTE ADULT - SUBJECTIVE AND OBJECTIVE BOX
Patient is a 63y old  Male who presents with a chief complaint of stroke (22 Feb 2019 10:52)      Patient seen and examined at bedside. feels well, no complaints     ALLERGIES:  No Known Allergies    MEDICATIONS:  acetaminophen   Tablet .. 650 milliGRAM(s) Oral every 6 hours PRN  amLODIPine   Tablet 10 milliGRAM(s) Oral daily  aspirin  chewable 81 milliGRAM(s) Oral daily  atorvastatin 80 milliGRAM(s) Oral <User Schedule>  docusate sodium 100 milliGRAM(s) Oral two times a day  FLUoxetine 10 milliGRAM(s) Oral daily  fluticasone propionate 50 MICROgram(s)/spray Nasal Spray 1 Spray(s) Both Nostrils two times a day PRN  heparin  Injectable 5000 Unit(s) SubCutaneous every 8 hours  hydrocortisone 0.5% Ointment 1 Application(s) Topical two times a day  hydrocortisone 1% Ointment 1 Application(s) Topical every 12 hours PRN  lactulose Syrup 20 Gram(s) Oral daily PRN  lisinopril 2.5 milliGRAM(s) Oral daily  losartan 100 milliGRAM(s) Oral daily  magnesium hydroxide Suspension 30 milliLiter(s) Oral daily PRN  melatonin 9 milliGRAM(s) Oral <User Schedule>  oxymetazoline 0.05% Nasal Spray 1 Spray(s) Both Nostrils every 12 hours PRN  polyethylene glycol 3350 17 Gram(s) Oral daily  saccharomyces boulardii 250 milliGRAM(s) Oral two times a day  simethicone 80 milliGRAM(s) Chew daily PRN  sodium chloride 0.65% Nasal 1 Spray(s) Both Nostrils every 4 hours PRN    Vital Signs Last 24 Hrs  T(F): 97.3 (23 Feb 2019 00:22), Max: 97.3 (23 Feb 2019 00:22)  HR: 76 (23 Feb 2019 07:03) (74 - 76)  BP: 150/74 (23 Feb 2019 07:03) (140/76 - 150/74)  RR: 16 (23 Feb 2019 00:22) (16 - 16)  SpO2: 96% (23 Feb 2019 00:22) (96% - 96%)  I&O's Summary      PHYSICAL EXAM:  General: NAD, alert oriented x3  Neck: Supple, No JVD  Lungs: Clear to auscultation bilaterally  Cardio: RRR, S1/S2, No murmurs  Abdomen: Soft, Nontender, Nondistended; Bowel sounds present  Extremities: No clubbing, cyanosis, or edema    LABS:    02-21    138  |  104  |  22  ----------------------------<  104  3.8   |  24  |  1.04    Ca    9.4      21 Feb 2019 08:29      eGFR if Non African American: 77 mL/min/1.73M2 (02-21-19 @ 08:29)  eGFR if : 89 mL/min/1.73M2 (02-21-19 @ 08:29)    01-27 Chol 305 mg/dL  mg/dL HDL 31 mg/dL Trig 214 mg/dL    CAPILLARY BLOOD GLUCOSE    01-27 [id number] 5.6    RADIOLOGY & ADDITIONAL TESTS:    Care Discussed with Consultants/Other Providers:

## 2019-02-23 NOTE — PROGRESS NOTE ADULT - SUBJECTIVE AND OBJECTIVE BOX
CC: Gait dysfunction    Subjective: Patient seen tnaiya valuated at the bedside. Asleep, but arousable  no chest pain, no nausea, vomiting no fever, shills.   No acute events overnight  Has been tolerating rehabilitation program.    acetaminophen   Tablet .. 650 milliGRAM(s) Oral every 6 hours PRN  amLODIPine   Tablet 10 milliGRAM(s) Oral daily  aspirin  chewable 81 milliGRAM(s) Oral daily  atorvastatin 80 milliGRAM(s) Oral <User Schedule>  docusate sodium 100 milliGRAM(s) Oral two times a day  FLUoxetine 10 milliGRAM(s) Oral daily  fluticasone propionate 50 MICROgram(s)/spray Nasal Spray 1 Spray(s) Both Nostrils two times a day PRN  heparin  Injectable 5000 Unit(s) SubCutaneous every 8 hours  hydrocortisone 0.5% Ointment 1 Application(s) Topical two times a day  hydrocortisone 1% Ointment 1 Application(s) Topical every 12 hours PRN  lactulose Syrup 20 Gram(s) Oral daily PRN  lisinopril 2.5 milliGRAM(s) Oral daily  losartan 100 milliGRAM(s) Oral daily  magnesium hydroxide Suspension 30 milliLiter(s) Oral daily PRN  melatonin 9 milliGRAM(s) Oral <User Schedule>  oxymetazoline 0.05% Nasal Spray 1 Spray(s) Both Nostrils every 12 hours PRN  polyethylene glycol 3350 17 Gram(s) Oral daily  saccharomyces boulardii 250 milliGRAM(s) Oral two times a day  simethicone 80 milliGRAM(s) Chew daily PRN  sodium chloride 0.65% Nasal 1 Spray(s) Both Nostrils every 4 hours PRN      T(C): 36.3 (02-23-19 @ 00:22), Max: 36.3 (02-23-19 @ 00:22)  HR: 76 (02-23-19 @ 07:03) (74 - 76)  BP: 150/74 (02-23-19 @ 07:03) (140/76 - 150/74)  RR: 16 (02-23-19 @ 00:22) (16 - 16)  SpO2: 96% (02-23-19 @ 00:22) (96% - 96%)    Exam:  NAD  HEENT: EOMI  Heart: RRR, S1/2  Lungs: CTA bilaterally  Abd: soft ND  Ext: no calf pain  Neuro: exam unchanged      Impression:  Cerebral infarction  Undefined  Family history of cerebrovascular accident (CVA) in father (Father)  Handoff  MEWS Score  Sciatica  High cholesterol  Hypertension  Peripheral Vascular Disease  CAYDEN (Obstructive Sleep Apnea)  CAD (Coronary Artery Disease)  Pure hypercholesterolemia  Coronary artery disease involving native coronary artery of native heart without angina pectoris  Uncontrolled hypertension  Obstructive sleep apnea  Tinea pedis due to trichophyton  Pain aggravated by walking  PVD (peripheral vascular disease)  Onychomycosis due to dermatophyte  Lymph edema  Hypokalemia  High cholesterol  Hypertension, unspecified type  Dysphagia, unspecified type  Weakness of left side of body  Cerebrovascular accident (CVA), unspecified mechanism  History of cholecystectomy  History of tonsillectomy  S/P ACL repair  No significant past surgical history      [facility] with diagnosis of CVA admitted for comprehensive acute rehabilitation with left side hemiparesis.    Plan:  - continue medical management as per primary team  - continue PT/OT/SLP  - DVT prophylaxis  - CVS stable  - Patient is stable to continue current rehabilitation program  pending [facility]

## 2019-02-24 PROCEDURE — 99232 SBSQ HOSP IP/OBS MODERATE 35: CPT

## 2019-02-24 PROCEDURE — 99233 SBSQ HOSP IP/OBS HIGH 50: CPT

## 2019-02-24 RX ADMIN — Medication 9 MILLIGRAM(S): at 21:12

## 2019-02-24 RX ADMIN — Medication 250 MILLIGRAM(S): at 14:11

## 2019-02-24 RX ADMIN — AMLODIPINE BESYLATE 10 MILLIGRAM(S): 2.5 TABLET ORAL at 14:14

## 2019-02-24 RX ADMIN — ATORVASTATIN CALCIUM 80 MILLIGRAM(S): 80 TABLET, FILM COATED ORAL at 21:11

## 2019-02-24 RX ADMIN — Medication 250 MILLIGRAM(S): at 17:33

## 2019-02-24 RX ADMIN — LOSARTAN POTASSIUM 100 MILLIGRAM(S): 100 TABLET, FILM COATED ORAL at 14:13

## 2019-02-24 RX ADMIN — HEPARIN SODIUM 5000 UNIT(S): 5000 INJECTION INTRAVENOUS; SUBCUTANEOUS at 14:15

## 2019-02-24 RX ADMIN — SENNA PLUS 2 TABLET(S): 8.6 TABLET ORAL at 21:11

## 2019-02-24 RX ADMIN — HEPARIN SODIUM 5000 UNIT(S): 5000 INJECTION INTRAVENOUS; SUBCUTANEOUS at 06:01

## 2019-02-24 RX ADMIN — Medication 100 MILLIGRAM(S): at 17:33

## 2019-02-24 RX ADMIN — Medication 100 MILLIGRAM(S): at 14:13

## 2019-02-24 RX ADMIN — Medication 10 MILLIGRAM(S): at 14:16

## 2019-02-24 RX ADMIN — HEPARIN SODIUM 5000 UNIT(S): 5000 INJECTION INTRAVENOUS; SUBCUTANEOUS at 21:11

## 2019-02-24 RX ADMIN — Medication 650 MILLIGRAM(S): at 17:38

## 2019-02-24 RX ADMIN — Medication 81 MILLIGRAM(S): at 17:32

## 2019-02-24 RX ADMIN — Medication 650 MILLIGRAM(S): at 19:00

## 2019-02-24 RX ADMIN — LACTULOSE 20 GRAM(S): 10 SOLUTION ORAL at 14:15

## 2019-02-24 NOTE — PROGRESS NOTE ADULT - SUBJECTIVE AND OBJECTIVE BOX
CC: Gait dysfunction    Subjective: Patient seen taniya valuated at the bedside. Asleep, but arousable  No acute events overnight  Has been tolerating rehabilitation program.    acetaminophen   Tablet .. 650 milliGRAM(s) Oral every 6 hours PRN  amLODIPine   Tablet 10 milliGRAM(s) Oral daily  aspirin  chewable 81 milliGRAM(s) Oral daily  atorvastatin 80 milliGRAM(s) Oral <User Schedule>  docusate sodium 100 milliGRAM(s) Oral two times a day  FLUoxetine 10 milliGRAM(s) Oral daily  fluticasone propionate 50 MICROgram(s)/spray Nasal Spray 1 Spray(s) Both Nostrils two times a day PRN  heparin  Injectable 5000 Unit(s) SubCutaneous every 8 hours  hydrocortisone 0.5% Ointment 1 Application(s) Topical two times a day  hydrocortisone 1% Ointment 1 Application(s) Topical every 12 hours PRN  lactulose Syrup 20 Gram(s) Oral daily PRN  lisinopril 2.5 milliGRAM(s) Oral daily  losartan 100 milliGRAM(s) Oral daily  magnesium hydroxide Suspension 30 milliLiter(s) Oral daily PRN  melatonin 9 milliGRAM(s) Oral <User Schedule>  oxymetazoline 0.05% Nasal Spray 1 Spray(s) Both Nostrils every 12 hours PRN  polyethylene glycol 3350 17 Gram(s) Oral daily  saccharomyces boulardii 250 milliGRAM(s) Oral two times a day  simethicone 80 milliGRAM(s) Chew daily PRN  sodium chloride 0.65% Nasal 1 Spray(s) Both Nostrils every 4 hours PRN    Vital Signs Last 24 Hrs  T(C): --  T(F): --  HR: 77 (23 Feb 2019 22:36) (77 - 77)  BP: 146/77 (23 Feb 2019 22:36) (146/77 - 146/77)  BP(mean): --  RR: 15 (23 Feb 2019 22:36) (15 - 15)  SpO2: 97% (23 Feb 2019 22:36) (97% - 97%)    Exam:  NAD  HEENT: EOMI  Heart: RRR, S1/2  Lungs: CTA bilaterally  Abd: soft ND  Ext: no calf pain  Neuro: exam unchanged      Impression:  Cerebral infarction  Undefined  Family history of cerebrovascular accident (CVA) in father (Father)  Handoff  MEWS Score  Sciatica  High cholesterol  Hypertension  Peripheral Vascular Disease  CAYDEN (Obstructive Sleep Apnea)  CAD (Coronary Artery Disease)  Pure hypercholesterolemia  Coronary artery disease involving native coronary artery of native heart without angina pectoris  Uncontrolled hypertension  Obstructive sleep apnea  Tinea pedis due to trichophyton  Pain aggravated by walking  PVD (peripheral vascular disease)  Onychomycosis due to dermatophyte  Lymph edema  Hypokalemia  High cholesterol  Hypertension, unspecified type  Dysphagia, unspecified type  Weakness of left side of body  Cerebrovascular accident (CVA), unspecified mechanism  History of cholecystectomy  History of tonsillectomy  S/P ACL repair  No significant past surgical history      [facility] with diagnosis of CVA admitted for comprehensive acute rehabilitation with left side hemiparesis.    Plan:  - continue medical management as per primary team  - continue PT/OT/SLP  - DVT prophylaxis  - CVS stable  - Patient is stable to continue current rehabilitation program  pending [facility]

## 2019-02-24 NOTE — PROGRESS NOTE ADULT - SUBJECTIVE AND OBJECTIVE BOX
Patient is a 63y old  Male who presents with a chief complaint of stroke (23 Feb 2019 09:25)       Patient seen and examined at bedside. feels well, no complaints      ALLERGIES:  No Known Allergies    MEDICATIONS:  acetaminophen   Tablet .. 650 milliGRAM(s) Oral every 6 hours PRN  amLODIPine   Tablet 10 milliGRAM(s) Oral daily  aspirin  chewable 81 milliGRAM(s) Oral daily  atorvastatin 80 milliGRAM(s) Oral <User Schedule>  docusate sodium 100 milliGRAM(s) Oral two times a day  FLUoxetine 10 milliGRAM(s) Oral daily  fluticasone propionate 50 MICROgram(s)/spray Nasal Spray 1 Spray(s) Both Nostrils two times a day PRN  heparin  Injectable 5000 Unit(s) SubCutaneous every 8 hours  hydrocortisone 0.5% Ointment 1 Application(s) Topical two times a day  hydrocortisone 1% Ointment 1 Application(s) Topical every 12 hours PRN  lactulose Syrup 20 Gram(s) Oral daily PRN  losartan 100 milliGRAM(s) Oral daily  magnesium hydroxide Suspension 30 milliLiter(s) Oral daily PRN  melatonin 9 milliGRAM(s) Oral <User Schedule>  oxymetazoline 0.05% Nasal Spray 1 Spray(s) Both Nostrils every 12 hours PRN  polyethylene glycol 3350 17 Gram(s) Oral daily  saccharomyces boulardii 250 milliGRAM(s) Oral two times a day  senna 2 Tablet(s) Oral at bedtime  simethicone 80 milliGRAM(s) Chew daily PRN  sodium chloride 0.65% Nasal 1 Spray(s) Both Nostrils every 4 hours PRN    Vital Signs Last 24 Hrs  T(F): --  HR: 77 (23 Feb 2019 22:36) (77 - 77)  BP: 146/77 (23 Feb 2019 22:36) (146/77 - 146/77)  RR: 15 (23 Feb 2019 22:36) (15 - 15)  SpO2: 97% (23 Feb 2019 22:36) (97% - 97%)  I&O's Summary      PHYSICAL EXAM:  General: NAD, alert oriented x 3  Neck: Supple, No JVD  Lungs: Clear to auscultation bilaterally  Cardio: RRR, S1/S2, No murmurs  Abdomen: Soft, Nontender, Nondistended; Bowel sounds present  Extremities: No clubbing, cyanosis, or edema      LABS:    01-27 Chol 305 mg/dL  mg/dL HDL 31 mg/dL Trig 214 mg/dL    CAPILLARY BLOOD GLUCOSE    01-27 [id number] 5.6    RADIOLOGY & ADDITIONAL TESTS:    Care Discussed with Consultants/Other Providers:

## 2019-02-25 VITALS
DIASTOLIC BLOOD PRESSURE: 82 MMHG | OXYGEN SATURATION: 95 % | HEART RATE: 75 BPM | SYSTOLIC BLOOD PRESSURE: 146 MMHG | RESPIRATION RATE: 14 BRPM | TEMPERATURE: 98 F

## 2019-02-25 PROCEDURE — 99233 SBSQ HOSP IP/OBS HIGH 50: CPT

## 2019-02-25 PROCEDURE — 99239 HOSP IP/OBS DSCHRG MGMT >30: CPT

## 2019-02-25 RX ORDER — SENNA PLUS 8.6 MG/1
2 TABLET ORAL
Qty: 0 | Refills: 0 | COMMUNITY
Start: 2019-02-25

## 2019-02-25 RX ORDER — SENNA PLUS 8.6 MG/1
2 TABLET ORAL
Qty: 0 | Refills: 0 | DISCHARGE
Start: 2019-02-25

## 2019-02-25 RX ADMIN — LOSARTAN POTASSIUM 100 MILLIGRAM(S): 100 TABLET, FILM COATED ORAL at 06:41

## 2019-02-25 RX ADMIN — AMLODIPINE BESYLATE 10 MILLIGRAM(S): 2.5 TABLET ORAL at 06:41

## 2019-02-25 RX ADMIN — Medication 650 MILLIGRAM(S): at 12:21

## 2019-02-25 RX ADMIN — Medication 81 MILLIGRAM(S): at 12:20

## 2019-02-25 RX ADMIN — HEPARIN SODIUM 5000 UNIT(S): 5000 INJECTION INTRAVENOUS; SUBCUTANEOUS at 06:42

## 2019-02-25 RX ADMIN — Medication 250 MILLIGRAM(S): at 06:41

## 2019-02-25 RX ADMIN — HEPARIN SODIUM 5000 UNIT(S): 5000 INJECTION INTRAVENOUS; SUBCUTANEOUS at 16:04

## 2019-02-25 RX ADMIN — Medication 100 MILLIGRAM(S): at 06:42

## 2019-02-25 RX ADMIN — Medication 650 MILLIGRAM(S): at 13:00

## 2019-02-25 RX ADMIN — Medication 10 MILLIGRAM(S): at 12:21

## 2019-02-25 NOTE — PROGRESS NOTE ADULT - PROBLEM SELECTOR PROBLEM 2
Obstructive sleep apnea
Weakness of left side of body

## 2019-02-25 NOTE — PROGRESS NOTE ADULT - PROBLEM SELECTOR PLAN 2
CPAP at night  pulm to follow up- Dr. Royal
secondary to cva

## 2019-02-25 NOTE — PROGRESS NOTE ADULT - SUBJECTIVE AND OBJECTIVE BOX
Patient is a 63y old  Male who presents with a chief complaint of stroke (25 Feb 2019 11:06)      Patient seen and examined at bedside. feels well, no complaints     ALLERGIES:  No Known Allergies    MEDICATIONS:  acetaminophen   Tablet .. 650 milliGRAM(s) Oral every 6 hours PRN  amLODIPine   Tablet 10 milliGRAM(s) Oral daily  aspirin  chewable 81 milliGRAM(s) Oral daily  atorvastatin 80 milliGRAM(s) Oral <User Schedule>  docusate sodium 100 milliGRAM(s) Oral two times a day  FLUoxetine 10 milliGRAM(s) Oral daily  fluticasone propionate 50 MICROgram(s)/spray Nasal Spray 1 Spray(s) Both Nostrils two times a day PRN  heparin  Injectable 5000 Unit(s) SubCutaneous every 8 hours  hydrocortisone 0.5% Ointment 1 Application(s) Topical two times a day  hydrocortisone 1% Ointment 1 Application(s) Topical every 12 hours PRN  lactulose Syrup 20 Gram(s) Oral daily PRN  losartan 100 milliGRAM(s) Oral daily  magnesium hydroxide Suspension 30 milliLiter(s) Oral daily PRN  melatonin 9 milliGRAM(s) Oral <User Schedule>  oxymetazoline 0.05% Nasal Spray 1 Spray(s) Both Nostrils every 12 hours PRN  polyethylene glycol 3350 17 Gram(s) Oral daily  saccharomyces boulardii 250 milliGRAM(s) Oral two times a day  senna 2 Tablet(s) Oral at bedtime  simethicone 80 milliGRAM(s) Chew daily PRN  sodium chloride 0.65% Nasal 1 Spray(s) Both Nostrils every 4 hours PRN    Vital Signs Last 24 Hrs  T(F): 98.3 (25 Feb 2019 07:20), Max: 98.6 (24 Feb 2019 21:13)  HR: 75 (25 Feb 2019 07:20) (70 - 75)  BP: 146/82 (25 Feb 2019 07:20) (140/79 - 150/80)  RR: 14 (25 Feb 2019 07:20) (14 - 14)  SpO2: 95% (25 Feb 2019 07:20) (95% - 98%)  I&O's Summary      PHYSICAL EXAM:  General: NAD, alert oriented x 3  Neck: Supple, No JVD  Lungs: Clear to auscultation bilaterally  Cardio: RRR, S1/S2, No murmurs  Abdomen: Soft, Nontender, Nondistended; Bowel sounds present  Extremities: No clubbing, cyanosis, or edema    LABS:    01-27 Chol 305 mg/dL  mg/dL HDL 31 mg/dL Trig 214 mg/dL    CAPILLARY BLOOD GLUCOSE    01-27 [id number] 5.6    RADIOLOGY & ADDITIONAL TESTS:    Care Discussed with Consultants/Other Providers:

## 2019-02-25 NOTE — PROGRESS NOTE ADULT - PROBLEM SELECTOR PROBLEM 4
Coronary artery disease involving native coronary artery of native heart without angina pectoris
Hypertension, unspecified type

## 2019-02-25 NOTE — PROGRESS NOTE ADULT - PROBLEM SELECTOR PLAN 3
continue norvasc 10mg and losartan 100mg (on 2/16) and then added lisinopril 2.5 (on 2/18), hydralazine 25 q8h d/c'd due to dizziness? perhaps restarting at lower dose? or can consider adding lopressor.  d/c lisinopril as patient is already on max dose of ARB.   -150
Overall remains hypertensive with Norvasc and losartan alone  will add lisinopril 2.5 , monitor closely   goal <130/80
continue norvasc 10mg and losartan 100mg (on 2/16) and then added lisinopril 2.5 (on 2/18), hydralazine 25 q8h d/c'd due to dizziness? perhaps restarting at lower dose? or can consider adding lopressor.  d/c lisinopril as patient is already on max dose of ARB.   -150
continue norvasc 10mg and losartan 100mg (on 2/16) and then added lisinopril 2.5 (on 2/18), hydralazine 25 q8h d/c'd due to dizziness? perhaps restarting at lower dose? or can consider adding lopressor.  d/c lisinopril as patient is already on max dose of ARB.   -150
secondary to cva

## 2019-02-25 NOTE — PROGRESS NOTE ADULT - NSHPATTENDINGPLANDISCUSS_GEN_ALL_CORE
Patient; family; Leticia Gonzalez, DO
Carlos Kelly, DO; patient; PT
Leticia Gonzalez, ; patient; PT, OT, SLP, SW, RN
Leticia Gonzalez, DO; patient; SW
Leticia Gonzalez, DO; patient
pm&r
Dr. Barkley
Dr. Barkley
Angel Masterson DO; patient; RAMIREZ, RN, Pt, OT, SLP, neuropsychology
Angel Masterson DO; PT; patient
Angel Masterson DO; patient, RN

## 2019-02-25 NOTE — PROGRESS NOTE ADULT - PROBLEM SELECTOR PROBLEM 5
PVD (peripheral vascular disease)
High cholesterol
PVD (peripheral vascular disease)

## 2019-02-25 NOTE — PROGRESS NOTE ADULT - PROBLEM SELECTOR PLAN 5
continue ASA/Statin   no acute issues
cont statin
continue ASA/Statin   no acute issues

## 2019-02-25 NOTE — PROGRESS NOTE ADULT - PROBLEM SELECTOR PLAN 1
ASA/Statin   PT/OT as tolerated
cont asa/statin  bp needs better control

## 2019-02-25 NOTE — PROGRESS NOTE ADULT - PROBLEM SELECTOR PLAN 6
continue statin
resolved
resolved  continue intermittent monitoring

## 2019-02-25 NOTE — PROGRESS NOTE ADULT - ASSESSMENT
A/P: 63 year-old man with a PMH of HTN, morbid obesity who was found to have acute CVA with left side hemiparesis and given TPA.     #Acute CVA presenting with Left sided hemiparesis, dysarthria s/p tpa 1/26:  - Continue ASA/Lipitor 80 mg PO QHS  - Continue prozac for motor recovery. Patient does not want increased dose. continue Psychological support for adjustment symptoms  - Continue PT, OT, SLP  - ASHUTOSH pending    #Depression:  - continue prozac, supportive counseling    #Obstructive Sleep Apnea:   - Nocturnal CPAP  - O2 via NC PRN  - continue flonase and oxymetazole for nasal congestion. stable, improved    #HTN: BP ranging 140's-150's  - Norvasc 10 mg daily, Losartan 100 mg. Lisinopril 2.5mg discontinued by hospitalist on 2/24/19   - monitor RF    #GI/Bowel Mgmt:  - Colace BID, Senna, Lactulose PRN increased to 20mL 2/21    #Diet:  - Soft with thin liquids    #Insomnia:  - Melatonin 9 mg q8PM.  - Sleep hygiene.    #DVT: Heparin SC    IDT held on 2/21:  Patient with slower progress than anticipated, still with reduced hand strength and coordination, will require assistance with hygiene. Family also working on cleaning house, patient was hoarder and had needs space to mobilize in home and for equipment. +episode LOB during gait in PT this week. Due to supervision needs, assistance with hygiene, steps, recommend ASHUTOSH for OT, PT unless family is able to provide level of support required.  - Awaiting ASHUTOSH authorization. Patient and family agreeable. A/P: 63 year-old man with a PMH of HTN, morbid obesity who was found to have acute CVA with left side hemiparesis and given TPA.     #Acute CVA presenting with Left sided hemiparesis, dysarthria s/p tpa 1/26:  - Continue ASA/Lipitor 80 mg PO QHS  - Continue prozac for motor recovery and mood  - Continue PT, OT, SLP; for [facility] today    #Depression:  - continue prozac, supportive counseling.improved over last week    #Obstructive Sleep Apnea:   - Nocturnal CPAP  - O2 via NC PRN  - continue flonase and oxymetazole for nasal congestion.    #HTN:   - Norvasc 10 mg daily, Losartan 100 mg. Lisinopril 2.5mg discontinued by hospitalist on 2/24/19       #GI/Bowel Mgmt:  - Colace BID, Senna, Lactulose PRN increased to 20mL 2/21    #Diet:  - Soft with thin liquids    #Insomnia:  - Melatonin 9 mg q8PM.  - Sleep hygiene.    #DVT: Heparin SC    IDT:  -Medically cleared for transfer to [facility] [place] today when authorization obtained

## 2019-02-25 NOTE — PROGRESS NOTE ADULT - PROBLEM SELECTOR PROBLEM 3
24-Jan-2017
Uncontrolled hypertension
Dysphagia, unspecified type
Uncontrolled hypertension

## 2019-02-25 NOTE — PROGRESS NOTE ADULT - PROVIDER SPECIALTY LIST ADULT
Hospitalist
Physiatry
Pulmonology
Rehab Medicine
Physiatry
Hospitalist

## 2019-02-25 NOTE — PROGRESS NOTE ADULT - PROBLEM SELECTOR PROBLEM 6
Pure hypercholesterolemia
Hypokalemia
Pure hypercholesterolemia

## 2019-02-25 NOTE — CHART NOTE - NSCHARTNOTEFT_GEN_A_CORE
Nutrition Follow Up Note  Hospital Course (Per Electronic Medical Record):   Source: Medical Record [X] Patient [X] Family [ ] Nursing Staff [ ]     Diet: Soft (Dysphagia 3) Diet w/ Thin Liquids   Tolerates Diet Well  No Chewing/Swallowing Difficulties   No Recent Nausea, Vomiting or Diarrhea  Consumes 100% of Meals (as Per Documentation)   Educated Patient on Need for Increased Fluids/Fiber     Enteral/Parenteral Nutrition: N/A    Current Weight: 350.3lb on 2/15  Weights Currently Stable   Obtain Weights Weekly     Pertinent Medications: MEDICATIONS  (STANDING):  amLODIPine   Tablet 10 milliGRAM(s) Oral daily  aspirin  chewable 81 milliGRAM(s) Oral daily  atorvastatin 80 milliGRAM(s) Oral <User Schedule>  docusate sodium 100 milliGRAM(s) Oral two times a day  FLUoxetine 10 milliGRAM(s) Oral daily  heparin  Injectable 5000 Unit(s) SubCutaneous every 8 hours  hydrocortisone 0.5% Ointment 1 Application(s) Topical two times a day  losartan 100 milliGRAM(s) Oral daily  melatonin 9 milliGRAM(s) Oral <User Schedule>  polyethylene glycol 3350 17 Gram(s) Oral daily  saccharomyces boulardii 250 milliGRAM(s) Oral two times a day  senna 2 Tablet(s) Oral at bedtime    MEDICATIONS  (PRN):  acetaminophen   Tablet .. 650 milliGRAM(s) Oral every 6 hours PRN Mild Pain (1 - 3), Moderate Pain (4 - 6)  fluticasone propionate 50 MICROgram(s)/spray Nasal Spray 1 Spray(s) Both Nostrils two times a day PRN nasal congestion  hydrocortisone 1% Ointment 1 Application(s) Topical every 12 hours PRN Itching  lactulose Syrup 20 Gram(s) Oral daily PRN constipation  magnesium hydroxide Suspension 30 milliLiter(s) Oral daily PRN Constipation  oxymetazoline 0.05% Nasal Spray 1 Spray(s) Both Nostrils every 12 hours PRN Nasal Congestion  simethicone 80 milliGRAM(s) Chew daily PRN Gas  sodium chloride 0.65% Nasal 1 Spray(s) Both Nostrils every 4 hours PRN Nasal Congestion    Pertinent Labs:   01-27 [id number] 5.6 % 01-27 Chol 305 mg/dL<H>  mg/dL HDL 31 mg/dL<L> Trig 214 mg/dL<H>    Skin: No Pressure Ulcers     Edema: None Noted     Last BM: on 2/24    Estimated Needs:   [X] No Change since Previous Assessment    Previous Nutrition Diagnosis:   Morbidly Obese   Chewing Difficulties     Nutrition Diagnosis is [X] Ongoing - Continues on Soft (Dysphagia 3) Diet  & Educated Patient on Need for Increased Fluids/Fiber      New Nutrition Diagnosis: [X] Not Applicable    Interventions:   1. Recommend Continue Nutrition Plan of Care   2. Educated Patient on Need for Increased Fluids/Fiber     Monitoring & Evaluation:   [X] Weights   [X] PO Intake   [X] Follow Up (Per Protocol)  [X] Tolerance to Diet Prescription   [X] Other: Labs &    RD Remains Available.  Binh Wei RD

## 2019-02-25 NOTE — PROGRESS NOTE ADULT - SUBJECTIVE AND OBJECTIVE BOX
Daily Progress Note:  HPI: 63M PMH HTN (not on meds), CAYDEN (on CPAP, compliant), obesity, and HLD (not on meds) who presents to hospital with acute onset of slurred speech and Left UE paresis found to have ischemic stroke  s/p tPA now in MICU for further observation  and management.  CT head on -Old lacunar infarcts involving the right basal ganglia, left caudate head /anterior corona radiata region. MRI Brain MRI: Acute right basal ganglia infarct. Chronic bilateral lacunar infarcts. Found to have Oropharyngeal dysphagia place on Dysphagia 3 diet. Patient transferred to Acute Rehabilitation for functional recovery. (2019 19:12)    Daily Subjective: Patient seen and examined at bedside. No complaints this morning. Aware that he is accepted to [facility], pending insurance auth and may be discharged today.     REVIEW OF SYSTEMS  Constitutional: No fever, No Chills, No fatigue  Pulm: No cough,  No shortness of breath  Cardio: No chest pain, No palpitations, dizziness less often  GI:  No abdominal pain, No nausea, No vomiting, No diarrhea, No constipation, No incontinence   : No dysuria, No frequency, No hematuria, No incontinence  Neuro: + loss of strength  MSK: No joint pain, No joint swelling, No muscle pain, + Shoulder and Back pain    Vital Signs Last 24 Hrs  T(C): 36.8 (2019 07:20), Max: 37 (2019 21:13)  T(F): 98.3 (2019 07:20), Max: 98.6 (2019 21:13)  HR: 75 (2019 07:20) (70 - 75)  BP: 146/82 (2019 07:20) (140/79 - 150/80)  BP(mean): --  RR: 14 (2019 07:20) (14 - 14)  SpO2: 95% (2019 07:20) (95% - 98%)    Physical Exam:  Gen - NAD, Comfortable  Pulm - Poor inspiratory effort, No wheeze, No rhonchi, No crackles  Cardiovascular - RRR, S1S2, No m/r/g  Abdomen - Soft, NT/ND, +BS  Extremities - No C/C/E, No calf tenderness  Neuro- AAOx3; Fluent, + dysarthria; Left upper extremity 3/5 at shoulder, elbow flexion and extension, grasp  reduced gross motor coordination; trace hand swelling, finger flexion 3/5 incomplete grasp  no calf swelling +soft, NT  Tone - Hypotonia  Psychiatric - Mood stable, Affect Flat, less irritable than previous  less fatigued    Recent Labs/Imagin.9   8.0   )-----------( 242      ( 2019 08:50 )             45.7   02-21    138  |  104  |  22  ----------------------------<  104<H>  3.8   |  24  |  1.04    Ca    9.4      2019 08:29    Refused Blood draw this morning    MEDICATIONS  (STANDING):  amLODIPine   Tablet 10 milliGRAM(s) Oral daily  aspirin  chewable 81 milliGRAM(s) Oral daily  atorvastatin 80 milliGRAM(s) Oral <User Schedule>  docusate sodium 100 milliGRAM(s) Oral two times a day  FLUoxetine 10 milliGRAM(s) Oral daily  heparin  Injectable 5000 Unit(s) SubCutaneous every 8 hours  hydrocortisone 0.5% Ointment 1 Application(s) Topical two times a day  losartan 100 milliGRAM(s) Oral daily  melatonin 9 milliGRAM(s) Oral <User Schedule>  polyethylene glycol 3350 17 Gram(s) Oral daily  saccharomyces boulardii 250 milliGRAM(s) Oral two times a day  senna 2 Tablet(s) Oral at bedtime    MEDICATIONS  (PRN):  acetaminophen   Tablet .. 650 milliGRAM(s) Oral every 6 hours PRN Mild Pain (1 - 3), Moderate Pain (4 - 6)  fluticasone propionate 50 MICROgram(s)/spray Nasal Spray 1 Spray(s) Both Nostrils two times a day PRN nasal congestion  hydrocortisone 1% Ointment 1 Application(s) Topical every 12 hours PRN Itching  lactulose Syrup 20 Gram(s) Oral daily PRN constipation  magnesium hydroxide Suspension 30 milliLiter(s) Oral daily PRN Constipation  oxymetazoline 0.05% Nasal Spray 1 Spray(s) Both Nostrils every 12 hours PRN Nasal Congestion  simethicone 80 milliGRAM(s) Chew daily PRN Gas  sodium chloride 0.65% Nasal 1 Spray(s) Both Nostrils every 4 hours PRN Nasal Congestion Daily Progress Note:  HPI: 63M PMH HTN (not on meds), CAYDEN (on CPAP, compliant), obesity, and HLD (not on meds) who presents to hospital with acute onset of slurred speech and Left UE paresis found to have ischemic stroke  s/p tPA now in MICU for further observation  and management.  CT head on -Old lacunar infarcts involving the right basal ganglia, left caudate head /anterior corona radiata region. MRI Brain MRI: Acute right basal ganglia infarct. Chronic bilateral lacunar infarcts. Found to have Oropharyngeal dysphagia place on Dysphagia 3 diet. Patient transferred to Acute Rehabilitation for functional recovery. (2019 19:12)    Daily Subjective: Patient seen and examined at bedside. No complaints this morning. Aware that he is accepted to [facility], pending insurance auth and may be discharged today.   States slept much better over the weekend with roommate change. Mood overall looks improved. Refused blood work; last results from previous week stable    REVIEW OF SYSTEMS  Constitutional: No fever, No Chills, No fatigue  Pulm: No cough,  No shortness of breath  Cardio: No chest pain, No palpitations, dizziness less often  GI:  No abdominal pain, No nausea, No vomiting, No diarrhea, No constipation, No incontinence   : No dysuria, No frequency, No hematuria, No incontinence  Neuro: + loss of strength  MSK: No joint pain, No joint swelling, No muscle pain, + Shoulder and Back pain    Vital Signs Last 24 Hrs  T(C): 36.8 (2019 07:20), Max: 37 (2019 21:13)  T(F): 98.3 (2019 07:20), Max: 98.6 (2019 21:13)  HR: 75 (2019 07:20) (70 - 75)  BP: 146/82 (2019 07:20) (140/79 - 150/80)  BP(mean): --  RR: 14 (2019 07:20) (14 - 14)  SpO2: 95% (2019 07:20) (95% - 98%)    Physical Exam:  Gen - NAD, Comfortable  Pulm - Poor inspiratory effort, No wheeze, No rhonchi, No crackles  Cardiovascular - RRR, S1S2, No m/r/g  Abdomen - Soft, NT/ND, +BS  Extremities - No C/C/E, No calf tenderness  Neuro- AAOx3; Fluent, + dysarthria; Left upper extremity 3/5 at shoulder, elbow flexion and extension, grasp  reduced gross motor coordination; trace hand swelling, finger flexion 3/5 incomplete grasp  no calf swelling +soft, NT  Tone - Hypotonia  Psychiatric - Mood stable, Affect Flat, less irritable than previous  less fatigued    Recent Labs/Imagin.9   8.0   )-----------( 242      ( 2019 08:50 )             45.7   02-21    138  |  104  |  22  ----------------------------<  104<H>  3.8   |  24  |  1.04    Ca    9.4      2019 08:29    Refused Blood draw this morning    MEDICATIONS  (STANDING):  amLODIPine   Tablet 10 milliGRAM(s) Oral daily  aspirin  chewable 81 milliGRAM(s) Oral daily  atorvastatin 80 milliGRAM(s) Oral <User Schedule>  docusate sodium 100 milliGRAM(s) Oral two times a day  FLUoxetine 10 milliGRAM(s) Oral daily  heparin  Injectable 5000 Unit(s) SubCutaneous every 8 hours  hydrocortisone 0.5% Ointment 1 Application(s) Topical two times a day  losartan 100 milliGRAM(s) Oral daily  melatonin 9 milliGRAM(s) Oral <User Schedule>  polyethylene glycol 3350 17 Gram(s) Oral daily  saccharomyces boulardii 250 milliGRAM(s) Oral two times a day  senna 2 Tablet(s) Oral at bedtime    MEDICATIONS  (PRN):  acetaminophen   Tablet .. 650 milliGRAM(s) Oral every 6 hours PRN Mild Pain (1 - 3), Moderate Pain (4 - 6)  fluticasone propionate 50 MICROgram(s)/spray Nasal Spray 1 Spray(s) Both Nostrils two times a day PRN nasal congestion  hydrocortisone 1% Ointment 1 Application(s) Topical every 12 hours PRN Itching  lactulose Syrup 20 Gram(s) Oral daily PRN constipation  magnesium hydroxide Suspension 30 milliLiter(s) Oral daily PRN Constipation  oxymetazoline 0.05% Nasal Spray 1 Spray(s) Both Nostrils every 12 hours PRN Nasal Congestion  simethicone 80 milliGRAM(s) Chew daily PRN Gas  sodium chloride 0.65% Nasal 1 Spray(s) Both Nostrils every 4 hours PRN Nasal Congestion

## 2019-02-25 NOTE — PROGRESS NOTE ADULT - PROBLEM SELECTOR PLAN 4
continue ASA/Statin  better BP control
not controlled but states he gets symptomatic with medications?  will discuss with Dr. Barkley   on norvasc 10 mg   hydralazine 25 mg q12hr
reports becoming mentally foggy on certain bp mediations  SBP-150-142  on norvasc 10 mg and losartan increased to 100mg today  consider increasing losartan if pressure remains above 140
reports becoming mentally foggy on certain bp mediations  SBP-150-148  on norvasc 10 mg and losartan which was increased to 75mg 2/13  consider increasing losartan if pressure remains above 140
reports becoming mentally foggy on certain bp mediations  SBP-160-138  on norvasc 10 mg   Losartan started at 50mg will increase to 75mg today

## 2019-02-28 PROBLEM — M54.30 SCIATICA, UNSPECIFIED SIDE: Chronic | Status: ACTIVE | Noted: 2019-02-01

## 2019-02-28 PROBLEM — E78.00 PURE HYPERCHOLESTEROLEMIA, UNSPECIFIED: Chronic | Status: ACTIVE | Noted: 2019-02-01

## 2019-03-03 DIAGNOSIS — I25.10 ATHEROSCLEROTIC HEART DISEASE OF NATIVE CORONARY ARTERY WITHOUT ANGINA PECTORIS: ICD-10-CM

## 2019-03-03 DIAGNOSIS — E87.6 HYPOKALEMIA: ICD-10-CM

## 2019-03-03 DIAGNOSIS — I69.354 HEMIPLEGIA AND HEMIPARESIS FOLLOWING CEREBRAL INFARCTION AFFECTING LEFT NON-DOMINANT SIDE: ICD-10-CM

## 2019-03-03 DIAGNOSIS — Z51.89 ENCOUNTER FOR OTHER SPECIFIED AFTERCARE: ICD-10-CM

## 2019-03-03 DIAGNOSIS — Z56.0 UNEMPLOYMENT, UNSPECIFIED: ICD-10-CM

## 2019-03-03 DIAGNOSIS — B35.1 TINEA UNGUIUM: ICD-10-CM

## 2019-03-03 DIAGNOSIS — F12.99 CANNABIS USE, UNSPECIFIED WITH UNSPECIFIED CANNABIS-INDUCED DISORDER: ICD-10-CM

## 2019-03-03 DIAGNOSIS — I69.391 DYSPHAGIA FOLLOWING CEREBRAL INFARCTION: ICD-10-CM

## 2019-03-03 DIAGNOSIS — I69.311 MEMORY DEFICIT FOLLOWING CEREBRAL INFARCTION: ICD-10-CM

## 2019-03-03 DIAGNOSIS — I89.0 LYMPHEDEMA, NOT ELSEWHERE CLASSIFIED: ICD-10-CM

## 2019-03-03 DIAGNOSIS — F43.23 ADJUSTMENT DISORDER WITH MIXED ANXIETY AND DEPRESSED MOOD: ICD-10-CM

## 2019-03-03 DIAGNOSIS — B35.3 TINEA PEDIS: ICD-10-CM

## 2019-03-03 DIAGNOSIS — I69.322 DYSARTHRIA FOLLOWING CEREBRAL INFARCTION: ICD-10-CM

## 2019-03-03 DIAGNOSIS — Z79.82 LONG TERM (CURRENT) USE OF ASPIRIN: ICD-10-CM

## 2019-03-03 DIAGNOSIS — I10 ESSENTIAL (PRIMARY) HYPERTENSION: ICD-10-CM

## 2019-03-03 DIAGNOSIS — G47.00 INSOMNIA, UNSPECIFIED: ICD-10-CM

## 2019-03-03 DIAGNOSIS — R26.9 UNSPECIFIED ABNORMALITIES OF GAIT AND MOBILITY: ICD-10-CM

## 2019-03-03 DIAGNOSIS — G47.33 OBSTRUCTIVE SLEEP APNEA (ADULT) (PEDIATRIC): ICD-10-CM

## 2019-03-03 DIAGNOSIS — E78.5 HYPERLIPIDEMIA, UNSPECIFIED: ICD-10-CM

## 2019-03-03 DIAGNOSIS — R60.9 EDEMA, UNSPECIFIED: ICD-10-CM

## 2019-03-03 DIAGNOSIS — I73.9 PERIPHERAL VASCULAR DISEASE, UNSPECIFIED: ICD-10-CM

## 2019-03-03 DIAGNOSIS — E66.01 MORBID (SEVERE) OBESITY DUE TO EXCESS CALORIES: ICD-10-CM

## 2019-03-03 DIAGNOSIS — Z79.01 LONG TERM (CURRENT) USE OF ANTICOAGULANTS: ICD-10-CM

## 2019-03-03 SDOH — ECONOMIC STABILITY - INCOME SECURITY: UNEMPLOYMENT, UNSPECIFIED: Z56.0

## 2019-03-08 ENCOUNTER — INPATIENT (INPATIENT)
Facility: HOSPITAL | Age: 64
LOS: 6 days | Discharge: NURSING, REHAB OR LONG-TERM CARE | End: 2019-03-15
Attending: INTERNAL MEDICINE | Admitting: INTERNAL MEDICINE
Payer: MEDICAID

## 2019-03-08 VITALS
RESPIRATION RATE: 18 BRPM | DIASTOLIC BLOOD PRESSURE: 69 MMHG | TEMPERATURE: 97 F | SYSTOLIC BLOOD PRESSURE: 120 MMHG | HEART RATE: 95 BPM | OXYGEN SATURATION: 95 %

## 2019-03-08 DIAGNOSIS — Z90.89 ACQUIRED ABSENCE OF OTHER ORGANS: Chronic | ICD-10-CM

## 2019-03-08 DIAGNOSIS — Z90.49 ACQUIRED ABSENCE OF OTHER SPECIFIED PARTS OF DIGESTIVE TRACT: Chronic | ICD-10-CM

## 2019-03-08 DIAGNOSIS — Z98.890 OTHER SPECIFIED POSTPROCEDURAL STATES: Chronic | ICD-10-CM

## 2019-03-08 NOTE — ED ADULT NURSE NOTE - OBJECTIVE STATEMENT
[facility], reports send over here for further eval due to elevated WBC for 3 days, started on ABx for 3days ago 2/2 fever TMax 101.F nausea, reports always on CPAP at night specially when lying down, was DC few months ago Dx with CVA, HTN HLD CAYDEN Left arm residual. denies CP palpitation HA dizziness MD atbedside eval the pt. will monitor

## 2019-03-08 NOTE — ED ADULT TRIAGE NOTE - CHIEF COMPLAINT QUOTE
Pt arrives via EMS--pt sent from nursing home for elevated WBC--23.4 -pt given antibiotics x3 days--pt has no complaints

## 2019-03-09 DIAGNOSIS — D72.829 ELEVATED WHITE BLOOD CELL COUNT, UNSPECIFIED: ICD-10-CM

## 2019-03-09 DIAGNOSIS — G47.33 OBSTRUCTIVE SLEEP APNEA (ADULT) (PEDIATRIC): ICD-10-CM

## 2019-03-09 DIAGNOSIS — H91.90 UNSPECIFIED HEARING LOSS, UNSPECIFIED EAR: ICD-10-CM

## 2019-03-09 DIAGNOSIS — Z29.9 ENCOUNTER FOR PROPHYLACTIC MEASURES, UNSPECIFIED: ICD-10-CM

## 2019-03-09 DIAGNOSIS — R19.7 DIARRHEA, UNSPECIFIED: ICD-10-CM

## 2019-03-09 DIAGNOSIS — I63.9 CEREBRAL INFARCTION, UNSPECIFIED: ICD-10-CM

## 2019-03-09 DIAGNOSIS — I10 ESSENTIAL (PRIMARY) HYPERTENSION: ICD-10-CM

## 2019-03-09 DIAGNOSIS — R13.10 DYSPHAGIA, UNSPECIFIED: ICD-10-CM

## 2019-03-09 DIAGNOSIS — N17.9 ACUTE KIDNEY FAILURE, UNSPECIFIED: ICD-10-CM

## 2019-03-09 DIAGNOSIS — A41.9 SEPSIS, UNSPECIFIED ORGANISM: ICD-10-CM

## 2019-03-09 LAB
ALBUMIN SERPL ELPH-MCNC: 3.9 G/DL — SIGNIFICANT CHANGE UP (ref 3.3–5)
ALBUMIN SERPL ELPH-MCNC: 3.9 G/DL — SIGNIFICANT CHANGE UP (ref 3.3–5)
ALP SERPL-CCNC: 105 U/L — SIGNIFICANT CHANGE UP (ref 40–120)
ALP SERPL-CCNC: 94 U/L — SIGNIFICANT CHANGE UP (ref 40–120)
ALT FLD-CCNC: 36 U/L — SIGNIFICANT CHANGE UP (ref 4–41)
ALT FLD-CCNC: 56 U/L — HIGH (ref 4–41)
ANION GAP SERPL CALC-SCNC: 16 MMO/L — HIGH (ref 7–14)
ANION GAP SERPL CALC-SCNC: 18 MMO/L — HIGH (ref 7–14)
APPEARANCE UR: SIGNIFICANT CHANGE UP
AST SERPL-CCNC: 23 U/L — SIGNIFICANT CHANGE UP (ref 4–40)
AST SERPL-CCNC: 46 U/L — HIGH (ref 4–40)
B PERT DNA SPEC QL NAA+PROBE: NOT DETECTED — SIGNIFICANT CHANGE UP
BASE EXCESS BLDV CALC-SCNC: -1.2 MMOL/L — SIGNIFICANT CHANGE UP
BASOPHILS # BLD AUTO: 0.03 K/UL — SIGNIFICANT CHANGE UP (ref 0–0.2)
BASOPHILS # BLD AUTO: 0.05 K/UL — SIGNIFICANT CHANGE UP (ref 0–0.2)
BASOPHILS NFR BLD AUTO: 0.3 % — SIGNIFICANT CHANGE UP (ref 0–2)
BASOPHILS NFR BLD AUTO: 0.4 % — SIGNIFICANT CHANGE UP (ref 0–2)
BILIRUB SERPL-MCNC: 1 MG/DL — SIGNIFICANT CHANGE UP (ref 0.2–1.2)
BILIRUB SERPL-MCNC: 1.1 MG/DL — SIGNIFICANT CHANGE UP (ref 0.2–1.2)
BILIRUB UR-MCNC: NEGATIVE — SIGNIFICANT CHANGE UP
BLOOD GAS VENOUS - CREATININE: 1.35 MG/DL — HIGH (ref 0.5–1.3)
BLOOD UR QL VISUAL: SIGNIFICANT CHANGE UP
BUN SERPL-MCNC: 30 MG/DL — HIGH (ref 7–23)
BUN SERPL-MCNC: 30 MG/DL — HIGH (ref 7–23)
C DIFF TOX GENS STL QL NAA+PROBE: SIGNIFICANT CHANGE UP
C PNEUM DNA SPEC QL NAA+PROBE: NOT DETECTED — SIGNIFICANT CHANGE UP
CALCIUM SERPL-MCNC: 9.3 MG/DL — SIGNIFICANT CHANGE UP (ref 8.4–10.5)
CALCIUM SERPL-MCNC: 9.6 MG/DL — SIGNIFICANT CHANGE UP (ref 8.4–10.5)
CHLORIDE BLDV-SCNC: 104 MMOL/L — SIGNIFICANT CHANGE UP (ref 96–108)
CHLORIDE SERPL-SCNC: 100 MMOL/L — SIGNIFICANT CHANGE UP (ref 98–107)
CHLORIDE SERPL-SCNC: 96 MMOL/L — LOW (ref 98–107)
CO2 SERPL-SCNC: 20 MMOL/L — LOW (ref 22–31)
CO2 SERPL-SCNC: 21 MMOL/L — LOW (ref 22–31)
COLOR SPEC: SIGNIFICANT CHANGE UP
CREAT ?TM UR-MCNC: 173.9 MG/DL — SIGNIFICANT CHANGE UP
CREAT SERPL-MCNC: 1.24 MG/DL — SIGNIFICANT CHANGE UP (ref 0.5–1.3)
CREAT SERPL-MCNC: 1.55 MG/DL — HIGH (ref 0.5–1.3)
EOSINOPHIL # BLD AUTO: 0.05 K/UL — SIGNIFICANT CHANGE UP (ref 0–0.5)
EOSINOPHIL # BLD AUTO: 0.07 K/UL — SIGNIFICANT CHANGE UP (ref 0–0.5)
EOSINOPHIL NFR BLD AUTO: 0.4 % — SIGNIFICANT CHANGE UP (ref 0–6)
EOSINOPHIL NFR BLD AUTO: 0.6 % — SIGNIFICANT CHANGE UP (ref 0–6)
FLUAV H1 2009 PAND RNA SPEC QL NAA+PROBE: NOT DETECTED — SIGNIFICANT CHANGE UP
FLUAV H1 RNA SPEC QL NAA+PROBE: NOT DETECTED — SIGNIFICANT CHANGE UP
FLUAV H3 RNA SPEC QL NAA+PROBE: NOT DETECTED — SIGNIFICANT CHANGE UP
FLUAV SUBTYP SPEC NAA+PROBE: NOT DETECTED — SIGNIFICANT CHANGE UP
FLUBV RNA SPEC QL NAA+PROBE: NOT DETECTED — SIGNIFICANT CHANGE UP
GAS PNL BLDV: 131 MMOL/L — LOW (ref 136–146)
GLUCOSE BLDV-MCNC: 120 — HIGH (ref 70–99)
GLUCOSE SERPL-MCNC: 101 MG/DL — HIGH (ref 70–99)
GLUCOSE SERPL-MCNC: 121 MG/DL — HIGH (ref 70–99)
GLUCOSE UR-MCNC: NEGATIVE — SIGNIFICANT CHANGE UP
HADV DNA SPEC QL NAA+PROBE: NOT DETECTED — SIGNIFICANT CHANGE UP
HCO3 BLDV-SCNC: 23 MMOL/L — SIGNIFICANT CHANGE UP (ref 20–27)
HCOV PNL SPEC NAA+PROBE: SIGNIFICANT CHANGE UP
HCT VFR BLD CALC: 44.1 % — SIGNIFICANT CHANGE UP (ref 39–50)
HCT VFR BLD CALC: 47.9 % — SIGNIFICANT CHANGE UP (ref 39–50)
HCT VFR BLDV CALC: 46 % — SIGNIFICANT CHANGE UP (ref 39–51)
HGB BLD-MCNC: 14.5 G/DL — SIGNIFICANT CHANGE UP (ref 13–17)
HGB BLD-MCNC: 15.5 G/DL — SIGNIFICANT CHANGE UP (ref 13–17)
HGB BLDV-MCNC: 15 G/DL — SIGNIFICANT CHANGE UP (ref 13–17)
HMPV RNA SPEC QL NAA+PROBE: NOT DETECTED — SIGNIFICANT CHANGE UP
HPIV1 RNA SPEC QL NAA+PROBE: NOT DETECTED — SIGNIFICANT CHANGE UP
HPIV2 RNA SPEC QL NAA+PROBE: NOT DETECTED — SIGNIFICANT CHANGE UP
HPIV3 RNA SPEC QL NAA+PROBE: NOT DETECTED — SIGNIFICANT CHANGE UP
HPIV4 RNA SPEC QL NAA+PROBE: NOT DETECTED — SIGNIFICANT CHANGE UP
IMM GRANULOCYTES NFR BLD AUTO: 0.9 % — SIGNIFICANT CHANGE UP (ref 0–1.5)
IMM GRANULOCYTES NFR BLD AUTO: 2 % — HIGH (ref 0–1.5)
KETONES UR-MCNC: NEGATIVE — SIGNIFICANT CHANGE UP
LACTATE BLDV-MCNC: 2.2 MMOL/L — HIGH (ref 0.5–2)
LACTATE SERPL-SCNC: 1.1 MMOL/L — SIGNIFICANT CHANGE UP (ref 0.5–2)
LEUKOCYTE ESTERASE UR-ACNC: HIGH
LYMPHOCYTES # BLD AUTO: 0.55 K/UL — LOW (ref 1–3.3)
LYMPHOCYTES # BLD AUTO: 1.99 K/UL — SIGNIFICANT CHANGE UP (ref 1–3.3)
LYMPHOCYTES # BLD AUTO: 10 % — LOW (ref 13–44)
LYMPHOCYTES # BLD AUTO: 6.9 % — LOW (ref 13–44)
MAGNESIUM SERPL-MCNC: 1.9 MG/DL — SIGNIFICANT CHANGE UP (ref 1.6–2.6)
MCHC RBC-ENTMCNC: 29.1 PG — SIGNIFICANT CHANGE UP (ref 27–34)
MCHC RBC-ENTMCNC: 29.6 PG — SIGNIFICANT CHANGE UP (ref 27–34)
MCHC RBC-ENTMCNC: 32.4 % — SIGNIFICANT CHANGE UP (ref 32–36)
MCHC RBC-ENTMCNC: 32.9 % — SIGNIFICANT CHANGE UP (ref 32–36)
MCV RBC AUTO: 90 FL — SIGNIFICANT CHANGE UP (ref 80–100)
MCV RBC AUTO: 90 FL — SIGNIFICANT CHANGE UP (ref 80–100)
MONOCYTES # BLD AUTO: 0.43 K/UL — SIGNIFICANT CHANGE UP (ref 0–0.9)
MONOCYTES # BLD AUTO: 0.92 K/UL — HIGH (ref 0–0.9)
MONOCYTES NFR BLD AUTO: 4.6 % — SIGNIFICANT CHANGE UP (ref 2–14)
MONOCYTES NFR BLD AUTO: 5.4 % — SIGNIFICANT CHANGE UP (ref 2–14)
NEUTROPHILS # BLD AUTO: 16.78 K/UL — HIGH (ref 1.8–7.4)
NEUTROPHILS # BLD AUTO: 6.7 K/UL — SIGNIFICANT CHANGE UP (ref 1.8–7.4)
NEUTROPHILS NFR BLD AUTO: 83.8 % — HIGH (ref 43–77)
NEUTROPHILS NFR BLD AUTO: 84.7 % — HIGH (ref 43–77)
NITRITE UR-MCNC: NEGATIVE — SIGNIFICANT CHANGE UP
NRBC # FLD: 0 K/UL — LOW (ref 25–125)
NRBC # FLD: 0.02 K/UL — LOW (ref 25–125)
PCO2 BLDV: 36 MMHG — LOW (ref 41–51)
PH BLDV: 7.42 PH — SIGNIFICANT CHANGE UP (ref 7.32–7.43)
PH UR: 6 — SIGNIFICANT CHANGE UP (ref 5–8)
PHOSPHATE SERPL-MCNC: 2.4 MG/DL — LOW (ref 2.5–4.5)
PLATELET # BLD AUTO: 205 K/UL — SIGNIFICANT CHANGE UP (ref 150–400)
PLATELET # BLD AUTO: 251 K/UL — SIGNIFICANT CHANGE UP (ref 150–400)
PMV BLD: 9.5 FL — SIGNIFICANT CHANGE UP (ref 7–13)
PMV BLD: 9.6 FL — SIGNIFICANT CHANGE UP (ref 7–13)
PO2 BLDV: 35 MMHG — SIGNIFICANT CHANGE UP (ref 35–40)
POTASSIUM BLDV-SCNC: 3.5 MMOL/L — SIGNIFICANT CHANGE UP (ref 3.4–4.5)
POTASSIUM SERPL-MCNC: 3.8 MMOL/L — SIGNIFICANT CHANGE UP (ref 3.5–5.3)
POTASSIUM SERPL-MCNC: 3.9 MMOL/L — SIGNIFICANT CHANGE UP (ref 3.5–5.3)
POTASSIUM SERPL-SCNC: 3.8 MMOL/L — SIGNIFICANT CHANGE UP (ref 3.5–5.3)
POTASSIUM SERPL-SCNC: 3.9 MMOL/L — SIGNIFICANT CHANGE UP (ref 3.5–5.3)
PROT SERPL-MCNC: 7.9 G/DL — SIGNIFICANT CHANGE UP (ref 6–8.3)
PROT SERPL-MCNC: 8.3 G/DL — SIGNIFICANT CHANGE UP (ref 6–8.3)
PROT UR-MCNC: 70 — SIGNIFICANT CHANGE UP
RBC # BLD: 4.9 M/UL — SIGNIFICANT CHANGE UP (ref 4.2–5.8)
RBC # BLD: 5.32 M/UL — SIGNIFICANT CHANGE UP (ref 4.2–5.8)
RBC # FLD: 14.8 % — HIGH (ref 10.3–14.5)
RBC # FLD: 15.1 % — HIGH (ref 10.3–14.5)
RBC CASTS # UR COMP ASSIST: SIGNIFICANT CHANGE UP (ref 0–?)
RSV RNA SPEC QL NAA+PROBE: NOT DETECTED — SIGNIFICANT CHANGE UP
RV+EV RNA SPEC QL NAA+PROBE: NOT DETECTED — SIGNIFICANT CHANGE UP
SAO2 % BLDV: 65.3 % — SIGNIFICANT CHANGE UP (ref 60–85)
SODIUM SERPL-SCNC: 135 MMOL/L — SIGNIFICANT CHANGE UP (ref 135–145)
SODIUM SERPL-SCNC: 136 MMOL/L — SIGNIFICANT CHANGE UP (ref 135–145)
SODIUM UR-SCNC: 30 MMOL/L — SIGNIFICANT CHANGE UP
SP GR SPEC: 1.03 — SIGNIFICANT CHANGE UP (ref 1–1.04)
SQUAMOUS # UR AUTO: SIGNIFICANT CHANGE UP
UROBILINOGEN FLD QL: 2 — SIGNIFICANT CHANGE UP
UUN UR-MCNC: 1428.2 MG/DL — SIGNIFICANT CHANGE UP
WBC # BLD: 19.99 K/UL — HIGH (ref 3.8–10.5)
WBC # BLD: 7.92 K/UL — SIGNIFICANT CHANGE UP (ref 3.8–10.5)
WBC # FLD AUTO: 19.99 K/UL — HIGH (ref 3.8–10.5)
WBC # FLD AUTO: 7.92 K/UL — SIGNIFICANT CHANGE UP (ref 3.8–10.5)
WBC UR QL: SIGNIFICANT CHANGE UP (ref 0–?)

## 2019-03-09 PROCEDURE — 99223 1ST HOSP IP/OBS HIGH 75: CPT | Mod: GC

## 2019-03-09 PROCEDURE — 71045 X-RAY EXAM CHEST 1 VIEW: CPT | Mod: 26

## 2019-03-09 RX ORDER — ACETAMINOPHEN 500 MG
650 TABLET ORAL EVERY 6 HOURS
Qty: 0 | Refills: 0 | Status: DISCONTINUED | OUTPATIENT
Start: 2019-03-09 | End: 2019-03-15

## 2019-03-09 RX ORDER — FLUTICASONE PROPIONATE 50 MCG
1 SPRAY, SUSPENSION NASAL
Qty: 0 | Refills: 0 | Status: DISCONTINUED | OUTPATIENT
Start: 2019-03-09 | End: 2019-03-15

## 2019-03-09 RX ORDER — AMLODIPINE BESYLATE 2.5 MG/1
10 TABLET ORAL DAILY
Qty: 0 | Refills: 0 | Status: DISCONTINUED | OUTPATIENT
Start: 2019-03-09 | End: 2019-03-15

## 2019-03-09 RX ORDER — FLUOXETINE HCL 10 MG
10 CAPSULE ORAL DAILY
Qty: 0 | Refills: 0 | Status: DISCONTINUED | OUTPATIENT
Start: 2019-03-09 | End: 2019-03-15

## 2019-03-09 RX ORDER — ASPIRIN/CALCIUM CARB/MAGNESIUM 324 MG
81 TABLET ORAL DAILY
Qty: 0 | Refills: 0 | Status: DISCONTINUED | OUTPATIENT
Start: 2019-03-09 | End: 2019-03-15

## 2019-03-09 RX ORDER — NYSTATIN CREAM 100000 [USP'U]/G
1 CREAM TOPICAL
Qty: 0 | Refills: 0 | Status: DISCONTINUED | OUTPATIENT
Start: 2019-03-09 | End: 2019-03-15

## 2019-03-09 RX ORDER — SODIUM CHLORIDE 9 MG/ML
2000 INJECTION INTRAMUSCULAR; INTRAVENOUS; SUBCUTANEOUS ONCE
Qty: 0 | Refills: 0 | Status: COMPLETED | OUTPATIENT
Start: 2019-03-09 | End: 2019-03-09

## 2019-03-09 RX ORDER — PIPERACILLIN AND TAZOBACTAM 4; .5 G/20ML; G/20ML
3.38 INJECTION, POWDER, LYOPHILIZED, FOR SOLUTION INTRAVENOUS ONCE
Qty: 0 | Refills: 0 | Status: COMPLETED | OUTPATIENT
Start: 2019-03-09 | End: 2019-03-09

## 2019-03-09 RX ORDER — POTASSIUM PHOSPHATE, MONOBASIC POTASSIUM PHOSPHATE, DIBASIC 236; 224 MG/ML; MG/ML
15 INJECTION, SOLUTION INTRAVENOUS ONCE
Qty: 0 | Refills: 0 | Status: COMPLETED | OUTPATIENT
Start: 2019-03-09 | End: 2019-03-09

## 2019-03-09 RX ORDER — ATORVASTATIN CALCIUM 80 MG/1
80 TABLET, FILM COATED ORAL AT BEDTIME
Qty: 0 | Refills: 0 | Status: DISCONTINUED | OUTPATIENT
Start: 2019-03-09 | End: 2019-03-15

## 2019-03-09 RX ORDER — SOD,AMMONIUM,POTASSIUM LACTATE
1 CREAM (GRAM) TOPICAL
Qty: 0 | Refills: 0 | Status: DISCONTINUED | OUTPATIENT
Start: 2019-03-09 | End: 2019-03-12

## 2019-03-09 RX ORDER — SODIUM CHLORIDE 0.65 %
1 AEROSOL, SPRAY (ML) NASAL
Qty: 0 | Refills: 0 | Status: DISCONTINUED | OUTPATIENT
Start: 2019-03-09 | End: 2019-03-15

## 2019-03-09 RX ORDER — INFLUENZA VIRUS VACCINE 15; 15; 15; 15 UG/.5ML; UG/.5ML; UG/.5ML; UG/.5ML
0.5 SUSPENSION INTRAMUSCULAR ONCE
Qty: 0 | Refills: 0 | Status: COMPLETED | OUTPATIENT
Start: 2019-03-09 | End: 2019-03-09

## 2019-03-09 RX ORDER — ACETAMINOPHEN 500 MG
650 TABLET ORAL EVERY 6 HOURS
Qty: 0 | Refills: 0 | Status: DISCONTINUED | OUTPATIENT
Start: 2019-03-09 | End: 2019-03-09

## 2019-03-09 RX ORDER — PIPERACILLIN AND TAZOBACTAM 4; .5 G/20ML; G/20ML
3.38 INJECTION, POWDER, LYOPHILIZED, FOR SOLUTION INTRAVENOUS EVERY 8 HOURS
Qty: 0 | Refills: 0 | Status: DISCONTINUED | OUTPATIENT
Start: 2019-03-09 | End: 2019-03-11

## 2019-03-09 RX ORDER — HYDROCORTISONE 1 %
1 OINTMENT (GRAM) TOPICAL EVERY 12 HOURS
Qty: 0 | Refills: 0 | Status: DISCONTINUED | OUTPATIENT
Start: 2019-03-09 | End: 2019-03-15

## 2019-03-09 RX ORDER — CEFEPIME 1 G/1
2000 INJECTION, POWDER, FOR SOLUTION INTRAMUSCULAR; INTRAVENOUS ONCE
Qty: 0 | Refills: 0 | Status: COMPLETED | OUTPATIENT
Start: 2019-03-09 | End: 2019-03-09

## 2019-03-09 RX ORDER — LANOLIN ALCOHOL/MO/W.PET/CERES
9 CREAM (GRAM) TOPICAL AT BEDTIME
Qty: 0 | Refills: 0 | Status: DISCONTINUED | OUTPATIENT
Start: 2019-03-09 | End: 2019-03-15

## 2019-03-09 RX ORDER — SODIUM CHLORIDE 9 MG/ML
1000 INJECTION, SOLUTION INTRAVENOUS
Qty: 0 | Refills: 0 | Status: DISCONTINUED | OUTPATIENT
Start: 2019-03-09 | End: 2019-03-10

## 2019-03-09 RX ORDER — SIMETHICONE 80 MG/1
80 TABLET, CHEWABLE ORAL DAILY
Qty: 0 | Refills: 0 | Status: DISCONTINUED | OUTPATIENT
Start: 2019-03-09 | End: 2019-03-13

## 2019-03-09 RX ORDER — HEPARIN SODIUM 5000 [USP'U]/ML
7500 INJECTION INTRAVENOUS; SUBCUTANEOUS EVERY 8 HOURS
Qty: 0 | Refills: 0 | Status: DISCONTINUED | OUTPATIENT
Start: 2019-03-09 | End: 2019-03-15

## 2019-03-09 RX ADMIN — NYSTATIN CREAM 1 APPLICATION(S): 100000 CREAM TOPICAL at 21:41

## 2019-03-09 RX ADMIN — Medication 81 MILLIGRAM(S): at 13:08

## 2019-03-09 RX ADMIN — Medication 650 MILLIGRAM(S): at 08:45

## 2019-03-09 RX ADMIN — POTASSIUM PHOSPHATE, MONOBASIC POTASSIUM PHOSPHATE, DIBASIC 62.5 MILLIMOLE(S): 236; 224 INJECTION, SOLUTION INTRAVENOUS at 21:41

## 2019-03-09 RX ADMIN — SODIUM CHLORIDE 2000 MILLILITER(S): 9 INJECTION INTRAMUSCULAR; INTRAVENOUS; SUBCUTANEOUS at 01:14

## 2019-03-09 RX ADMIN — ATORVASTATIN CALCIUM 80 MILLIGRAM(S): 80 TABLET, FILM COATED ORAL at 21:40

## 2019-03-09 RX ADMIN — PIPERACILLIN AND TAZOBACTAM 200 GRAM(S): 4; .5 INJECTION, POWDER, LYOPHILIZED, FOR SOLUTION INTRAVENOUS at 13:07

## 2019-03-09 RX ADMIN — PIPERACILLIN AND TAZOBACTAM 25 GRAM(S): 4; .5 INJECTION, POWDER, LYOPHILIZED, FOR SOLUTION INTRAVENOUS at 21:41

## 2019-03-09 RX ADMIN — Medication 650 MILLIGRAM(S): at 07:26

## 2019-03-09 RX ADMIN — AMLODIPINE BESYLATE 10 MILLIGRAM(S): 2.5 TABLET ORAL at 13:10

## 2019-03-09 RX ADMIN — HEPARIN SODIUM 7500 UNIT(S): 5000 INJECTION INTRAVENOUS; SUBCUTANEOUS at 19:38

## 2019-03-09 RX ADMIN — Medication 650 MILLIGRAM(S): at 19:33

## 2019-03-09 RX ADMIN — Medication 650 MILLIGRAM(S): at 20:21

## 2019-03-09 RX ADMIN — Medication 10 MILLIGRAM(S): at 19:05

## 2019-03-09 RX ADMIN — Medication 9 MILLIGRAM(S): at 19:33

## 2019-03-09 RX ADMIN — CEFEPIME 100 MILLIGRAM(S): 1 INJECTION, POWDER, FOR SOLUTION INTRAMUSCULAR; INTRAVENOUS at 01:13

## 2019-03-09 NOTE — H&P ADULT - PROBLEM SELECTOR PLAN 1
Patient presents with elevated WBC of 23k, with diarrhea and weakly positive UA.   - Blood and urine cultures received pending results  - C diff and RVP negative. CXR without consolidations.   - Lactate now resolved  - Consider continuing cefepime Patient presents with elevated WBC of 19k, hypothermia, tachycardia. Unclear source of infection. Diarrhea and weakly positive UA.   - Blood and urine cultures received pending results  - C diff and RVP negative. CXR without consolidations.   - Lactate now resolved  - Continue zosyn  - Consider CT abdomen and pelvis if symptoms do not improve Patient presents with elevated WBC of 19k, hypothermia, tachycardia. Unclear source of infection. Diarrhea and weakly positive UA.   - Blood and urine cultures received pending results  - C diff and RVP negative. CXR without consolidations.   - Lactate now resolved  - Continue zosyn for possible complicated UTI vs colitis given diarrhea  - Pending stool PCR  - Consider CT abdomen and pelvis if diarrhea, abdominal pain or WBC ct does not improve to further assess for abscess or colitis

## 2019-03-09 NOTE — H&P ADULT - PROBLEM SELECTOR PLAN 7
Continue CPAP at night pressure 10, FiO2 28% Blood pressure currently in 150s, will continue home regimen of amlodipine 10.   - Plan to hold losartan in setting of ISABELLE

## 2019-03-09 NOTE — PHYSICAL THERAPY INITIAL EVALUATION ADULT - ADDITIONAL COMMENTS
4 steps to enter with unilateral railing to enter the home; 11 steps to basement with unilateral railing

## 2019-03-09 NOTE — H&P ADULT - PROBLEM SELECTOR PLAN 2
Unclear if infectious diarrhea vs antibiotic side effect. Had received ceftriaxone at the rehab center. Reports loose to watery stools without hematochezia.   - Stool PCR  - Consider continuing cefepime for gram negative coverage Unclear if infectious diarrhea vs antibiotic side effect. Had received ceftriaxone at the rehab center. Reports loose to watery stools without hematochezia.   - Stool PCR  - Starting zosyn  - Consider CT abdomen and pelvis if symptoms and WBC ct do not improve

## 2019-03-09 NOTE — ED PROVIDER NOTE - ATTENDING CONTRIBUTION TO CARE
HPI: 62yo M with hx of obesity, ischemic stroke (L sided residual deficits) presents with elevated WBC from nursing home. Has been feeling weak for the last 3 days, was found to have UTI and was given ceftriaxone since 3/7. Today found have elevated WBC, sent in for further eval to rule out sepsis. Pt has had fevers Tmax 101 on Wed. +diarrhea. denies cough. denies dysuria  EXAM: Obese, NAD, abd soft, no masses, no rebound, no guarding.   MDM: concern for UTI leading to elevated WBC of 20+. Pt denies symptoms but has been taking Ceftriaxone x few days and NH chart shows not improving. Will work up for UTI leading to symptoms but consider other sources of infection including Cdiff as pt has had loose stools as well with Abx use. Otherwise denies URI symptoms despite being in NH setting. Will hold RVP at this time. Reassess.

## 2019-03-09 NOTE — ED PROVIDER NOTE - OBJECTIVE STATEMENT
64yo M with hx of ischemic stroke (L sided residual deficits) presents with elevated WBC from nursing home. Has been feeling weak for the last 3 days, was found to have UTI and was given ceftriaxone since 3/7. Today found have elevated WBC, sent in for further eval to rule out sepsis. Pt has had fevers Tmax 101 on Wed. +diarrhea. denies cough. denies dysuria

## 2019-03-09 NOTE — PHYSICAL THERAPY INITIAL EVALUATION ADULT - PERTINENT HX OF CURRENT PROBLEM, REHAB EVAL
64 yo M PMHx ischemic stroke (L sided deficits), CAD, HTN, CAYDEN (on CPAP), brought in from rehab for elevated WBC ct (23.4) and + UA.  Was previously admitted in Feb1st -25th for slurred speech and LUE paresis 2/2 ischemic stroke s/p tPA who then went to MICU for observation. He was placed on dysphagia 3 diet and transferred to acute rehab.

## 2019-03-09 NOTE — H&P ADULT - PROBLEM SELECTOR PLAN 5
First reported constellation of difficulty hearing, blurred vision and generalized weakness. Currently reports symptoms have improved.   - Consider further investigation with EEG if recurs given recent stroke  - Recent TTE with grossly normal LV function First reported constellation of difficulty hearing, blurred vision and generalized weakness. Currently reports symptoms have improved. Unclear etiology.   - Recent TTE with grossly normal LV function  - Consider EEG if signs of seizure during episode Likely pre-renal in setting of diarrhea. Plan to repeat BMP today.   - Continue LR at 75/hr and encourage PO intake while having diarrhea  - Urine lytes

## 2019-03-09 NOTE — H&P ADULT - PROBLEM SELECTOR PLAN 9
Likely will need high dose SubQhep ppx  Regular diet    Gabrielle Fernandes MD  Internal Medicine, PGY-3  Spectra [phone] Recent stroke w/ L sided deficits  - Continue PT/OT  - Statin and ASA

## 2019-03-09 NOTE — PHYSICAL THERAPY INITIAL EVALUATION ADULT - PLANNED THERAPY INTERVENTIONS, PT EVAL
Patient left sitting in chair in NAD, call bell in reach, all lines intact. MARINA Dowell aware/gait training/strengthening/bed mobility training/transfer training/balance training

## 2019-03-09 NOTE — H&P ADULT - PROBLEM SELECTOR PLAN 6
Blood pressure currently in 150s, will continue home regimen of amlodipine 10.   - Plan to hold losartan in setting of ISABELLE First reported constellation of difficulty hearing, blurred vision and generalized weakness. Currently reports symptoms have improved. Unclear etiology.   - Recent TTE with grossly normal LV function  - Consider EEG if signs of seizure during episode First reported constellation of difficulty hearing and dizziness ( room spinning sensation) has now resolved, monitor for now, Unclear etiology.   - Recent TTE with grossly normal LV function First reported constellation of difficulty hearing and dizziness ( room spinning sensation) has now resolved, monitor for now, Unclear etiology.   - Recent TTE with grossly normal LV function  - Consider meclizine for vertigo

## 2019-03-09 NOTE — H&P ADULT - NSHPREVIEWOFSYSTEMS_GEN_ALL_CORE
CONSTITUTIONAL: Fluctuating temp, no chills, has had weight loss, and fatigue  EYES: Visual changes no improved  ENMT: Difficulty hearing now improved. No sinus or throat pain.   NECK: No pain or stiffness  RESPIRATORY: No shortness of breath. No cough   CARDIOVASCULAR: No chest pain or palpitations. No lower extremity swelling.   GASTROINTESTINAL: No abdominal or epigastric pain. No nausea/vomiting. + diarrhea   GENITOURINARY: No dysuria, frequency  NEUROLOGICAL: Residual L sided weakness  SKIN: No rashes   LYMPH NODES: No enlarged glands  MUSCULOSKELETAL: Back pain from position in stretcher CONSTITUTIONAL: Fluctuating temp ( here mildly hypothermic), no chills, has had weight loss, and fatigue  EYES: no blurry vision   ENMT: Difficulty hearing now improved. No sinus or throat pain.   NECK: No pain or stiffness  RESPIRATORY: No shortness of breath. No cough   CARDIOVASCULAR: No chest pain or palpitations. No lower extremity swelling.   GASTROINTESTINAL: No abdominal or epigastric pain. No nausea/vomiting. + diarrhea   GENITOURINARY: No dysuria, frequency  NEUROLOGICAL: Residual L sided weakness  SKIN: No rashes   LYMPH NODES: No enlarged glands  MUSCULOSKELETAL: Back pain from position in stretcher

## 2019-03-09 NOTE — H&P ADULT - HISTORY OF PRESENT ILLNESS
64 yo M PMHx ischemic stroke (L sided deficits), CAD, HTN, CAYDEN (on CPAP), brought in from nursing home for elevated WBC ct (23.4).     Has been feeling weak for last three days. Was found to have UTI and was given ceftriaxone on 3/7. Had received antibiotics for 3 days. Sent to ED for further work-up. Reports fever of Tmax 101 on Wed (3/6), with diarrhea. No dysuria.       While in the ED. Tlow 96.7, 80s-90s, 120-150s/60s-80s, 18, 95% on RA initially, then placed on CPAP 28. Received cefepime, tylenol and 2 L NS.     Was previously admitted in Feb1st -25th for slurred speech and LUE paresis 2/2 ischemic stroke s/p tPA who then went to MICU for observation. He was placed on dysphagia 3 diet and transferred to acute rehab. He had elevated BP and his antihypertensive regimen was titrated. He was kept on CPAP. 64 yo M PMHx ischemic stroke (L sided deficits), CAD, HTN, CAYDEN (on CPAP), brought in from rehab for elevated WBC ct (23.4).     Three days ago he had breakfast and one hour later felt weak, had difficulty with his vision and hearing, had an episode of vomiting and loose stool. These symptoms lasted about 6 hours. During this episode his blood pressure was 108/90. He denied history of migraines or seizures. He remained in bed for the last three days. Last night he was told to come to the hospital. He has felt worse these last three days. Mostly has felt generalized weakness. He feels that he can hear and see better. He felt like he has not been able to eat. He reports a sensation in his throat, like his esophagus would not open. He was not able to describe it. No nausea, vomiting, or abdominal pain. He has had 6 episodes of mixed loose/watery stools over the last three days, with today having watery stools. He reports his temperature has fluctuated during this time as well. He has had weight loss while at rehab and has been able to eat solid foods.     He denied any shortness of breath, cough, chest pain, abdominal pain, nausea, vomiting, dysuria, frequency, rashes or ulcers. While at rehab he had a UA with LE and urine WBC of 187 without bacteria. He was given ceftriaxone on 3/7.     While in the ED. Tlow 96.7, 80s-90s, 120-150s/60s-80s, 18, 95% on RA initially, then placed on CPAP 28. Received cefepime, tylenol and 2 L NS.     Was previously admitted in Feb1st -25th for slurred speech and LUE paresis 2/2 ischemic stroke s/p tPA who then went to MICU for observation. He was placed on dysphagia 3 diet and transferred to acute rehab. He had elevated BP and his antihypertensive regimen was titrated. He was kept on CPAP. 64 yo M PMHx ischemic stroke (L sided deficits), CAD, HTN, CAYDEN (on CPAP), brought in from rehab for elevated WBC ct (23.4) and + UA    Three days ago he had breakfast and one hour later felt weak, dizzy like room spinning and hearing impairment also had an episode of vomiting and loose stool. These symptoms lasted about 6 hours, after which the dizziness and the hearing loss resolved. During this episode his blood pressure was 108/90. He denied history of migraines or seizures. He remained in bed for the last three days. Last night he was told to come to the hospital. He has felt worse these last three days. Mostly has felt generalized weakness. He felt like he has not been able to eat. He reports a sensation in his throat, like his esophagus would not open. He was not able to describe it. No nausea, vomiting, or abdominal pain. He has had 6 episodes of mixed loose/watery stools over the last three days, with today having watery stools. He reports his temperature has fluctuated during this time as well. He has had weight loss while at rehab and has not been able to eat solid foods.     He denied any shortness of breath, cough, chest pain, abdominal pain, nausea, vomiting, dysuria, frequency, rashes or ulcers. While at rehab he had a UA with LE and urine WBC of 187 without bacteria. He was given ceftriaxone on 3/7.     While in the ED. Tlow 96.7, 80s-90s, 120-150s/60s-80s, 18, 95% on RA initially, then placed on CPAP 28. Received cefepime, tylenol and 2 L NS.     Was previously admitted in Feb1st -25th for slurred speech and LUE paresis 2/2 ischemic stroke s/p tPA who then went to MICU for observation. He was placed on dysphagia 3 diet and transferred to acute rehab. He had elevated BP and his antihypertensive regimen was titrated. He was kept on CPAP.

## 2019-03-09 NOTE — ED ADULT NURSE REASSESSMENT NOTE - NS ED NURSE REASSESS COMMENT FT1
received report from nite shift/ pt in rm 25 on cpap 28 ./. rate of 10/ pt sleeping at this time./ pt appears comfortable/ pt awaits medical bed
Break Coverage RN: Patient resting in bed, c/o diarrhea, placed on bedpan and had watery MD SANJIV aware stool sample sent for r/o cdiff. PAtient repositioned in bed. Wife at bedside. Will ctm.

## 2019-03-09 NOTE — H&P ADULT - ATTENDING COMMENTS
Pt seen and examined, agree with H&P done by HS, edited as appropriate, briefly this is a 62 yo M with recent ischemic stroke (L sided deficits), CAD, HTN, DARY (on CPAP), brought in from rehab for sepsis 2/2 unclear source (possible gastroenteritis vs UTI).  Exam: as above  Plan: Will start on zosyn, check stool studies/GI pcr, (cdiff neg), f/u cultures, ct abd/pelvis if no improvement, cont iv hydration for lissett, hold arb, monitor renal function, cont asa, statin for h/o cva, cont cpap for dary, dysphagia diet, swallow eval, cont hsq. Plan discussed with HS.

## 2019-03-09 NOTE — H&P ADULT - NSHPPHYSICALEXAM_GEN_ALL_CORE
Vital Signs Last 24 Hrs  T(C): 36.3 (09 Mar 2019 06:30), Max: 36.3 (09 Mar 2019 06:30)  T(F): 97.4 (09 Mar 2019 06:30), Max: 97.4 (09 Mar 2019 06:30)  HR: 84 (09 Mar 2019 10:03) (84 - 96)  BP: 152/86 (09 Mar 2019 10:03) (115/89 - 156/84)  BP(mean): --  RR: 20 (09 Mar 2019 10:03) (16 - 20)  SpO2: 99% (09 Mar 2019 10:03) (95% - 99%)  I&O's Summary      General: No acute distress, well developed, comfortable appearing  Head: Atraumatic, normocephalic  Eyes: PERRL, conjunctiva and sclera clear  ENMT: No cervical or supraclavicular lymphadenopathy, moist mucous membranes  Chest/lung: Normal appearance, no accessory muscle use, speaking full sentences, decreased breath sounds, no rales/rhonchi or wheezing, on CPAP  Cardio: Regular rate and rhythm, no murmurs, rubs or gallops. Normal S1 and S2  Abdomen: Soft, mild LUQ tenderness, nondistended, bowel sounds present, no guarding or rigidity  : No case catheter  Extremities: No edema   Neuro: Alert and oriented x3, good concentration, motor strength 5/5 throughout R side, 4 on L arm and leg  Psych: Appropriate, pleasant affect  Skin: No rashes or lesions

## 2019-03-09 NOTE — ED PROVIDER NOTE - NS ED ROS FT
ROS: denies HA, dizziness, nausea/vomiting, chest pain, SOB, diaphoresis, abdominal pain, joint pain, neuro deficits, dysuria/hematuria, rash    +weakness, f/c, diarrhea

## 2019-03-09 NOTE — H&P ADULT - NSHPLABSRESULTS_GEN_ALL_CORE
Laboratories reviewed:  WBC: 19.9, neutrophil predominant, no bands.   CMP: Carbon dioxide 21, Cr 1.55, Bili/AST/ALT within normal, lactate 2.2 ->1.1. 7.42/35/23.   UA: negative nitrite, moderate LE, WBC 25-50,   RVP negative, C diff negative  CXR: preliminary read - clear lungs Laboratories reviewed:  WBC: 19.9, neutrophil predominant, no bands.   CMP: Carbon dioxide 21, Cr 1.55, Bili/AST/ALT within normal, lactate 2.2 ->1.1. 7.42/35/23.   UA: negative nitrite, moderate LE, WBC 25-50,   RVP negative, C diff negative  CXR: Imaging reviewed, - clear lungs

## 2019-03-09 NOTE — H&P ADULT - ASSESSMENT
64 yo M PMHx ischemic stroke (L sided deficits), CAD, HTN, CAYDEN (on CPAP), brought in from rehab for sepsis 2/2 gastroenteritis 64 yo M PMHx ischemic stroke (L sided deficits), CAD, HTN, CAYDEN (on CPAP), brought in from rehab for sepsis 2/2 unclear source (possible gastroenteritis vs UTI)

## 2019-03-09 NOTE — ED PROVIDER NOTE - PHYSICAL EXAMINATION
Gen: Obese male in mild distress  Head: NCAT  HEENT: PERRL, MMM, normal conjunctiva, anicteric, neck supple  Lung: CTAB, no adventitious sounds  CV: RRR, no murmurs, rubs or gallops  Abd: soft, NTND, no rebound or guarding, no CVAT  MSK: No edema, no visible deformities  Neuro: No focal neurologic deficits. CN II-XII grossly intact. 5/5 strength and normal sensation in all extremities.  Skin: Warm and dry, no evidence of rash  Psych: normal mood and affect

## 2019-03-09 NOTE — H&P ADULT - PROBLEM SELECTOR PLAN 4
Likely pre-renal in setting of diarrhea. Plan to repeat BMP today.   - Continue LR at 75/hr and encourage PO intake while having diarrhea  - Urine lytes Reports difficulty swallowing, consider coverage for thrush Reports difficulty swallowing, will get swallow eval, cont dysphagia diet

## 2019-03-09 NOTE — H&P ADULT - PROBLEM SELECTOR PLAN 10
Likely will need high dose SubQhep ppx  Regular diet    Gabrielle Fernandes MD  Internal Medicine, PGY-3  Spectra [phone] Likely will need high dose SubQhep ppx  Dysphagia 3 diet was given last admission, pending speech and swallow    Gabrielle Fernandes MD  Internal Medicine, PGY-3  Spectra [phone]

## 2019-03-09 NOTE — H&P ADULT - PMH
CAD (Coronary Artery Disease)    High cholesterol    Hypertension    CAYDEN (Obstructive Sleep Apnea)    Peripheral Vascular Disease    Sciatica    Stroke  L sided deficits in Feb 2019

## 2019-03-09 NOTE — H&P ADULT - NSHPSOCIALHISTORY_GEN_ALL_CORE
Denies smoking, drinking or drugs. Lives alone, is  and has two children. Currently at acute rehab post stroke. Not currently working. Denies smoking, or drinking. Uses marijuana. Lives alone, is  and has two children. Currently at acute rehab post stroke. Not currently working. Denies smoking, or drinking. Uses marijuana occasionally. Lives alone, is  and has two children. Currently at acute rehab post stroke. Not currently working.

## 2019-03-09 NOTE — PHYSICAL THERAPY INITIAL EVALUATION ADULT - DISCHARGE DISPOSITION, PT EVAL
[facility] to improve functional mobility and strength and to return to baseline functional status.

## 2019-03-09 NOTE — H&P ADULT - PROBLEM SELECTOR PLAN 3
Significantly elevated WBC count likely in setting of infection.   - Consider CT abdomen/pelvis given mild abdominal pain on exam to evaluate for colitis vs abscess given degree of leukocytosis  - If no improvement after course of antibiotics consider further evaluation for abscess or malignancy

## 2019-03-09 NOTE — PHYSICAL THERAPY INITIAL EVALUATION ADULT - ACTIVE RANGE OF MOTION EXAMINATION, REHAB EVAL
Right UE Active ROM was WFL (within functional limits)/left UE limited ROM due to patient reporting shoulder bothering him since laying on ED stretcher; left UE shoulder flexion 0-45 degrees; elbow flexion -10 degrees from full ROM/bilateral  lower extremity Active ROM was WFL (within functional limits)

## 2019-03-09 NOTE — H&P ADULT - PROBLEM SELECTOR PLAN 8
Recent stroke w/ L sided deficits  - Continue PT/OT  - Statin and ASA Continue CPAP at night pressure 10, FiO2 28%

## 2019-03-10 LAB
BACTERIA UR CULT: SIGNIFICANT CHANGE UP
SPECIMEN SOURCE: SIGNIFICANT CHANGE UP

## 2019-03-10 PROCEDURE — 99232 SBSQ HOSP IP/OBS MODERATE 35: CPT

## 2019-03-10 RX ORDER — LACTOBACILLUS ACIDOPHILUS 100MM CELL
1 CAPSULE ORAL DAILY
Qty: 0 | Refills: 0 | Status: DISCONTINUED | OUTPATIENT
Start: 2019-03-10 | End: 2019-03-15

## 2019-03-10 RX ORDER — SODIUM CHLORIDE 9 MG/ML
1000 INJECTION, SOLUTION INTRAVENOUS
Qty: 0 | Refills: 0 | Status: DISCONTINUED | OUTPATIENT
Start: 2019-03-10 | End: 2019-03-14

## 2019-03-10 RX ADMIN — Medication 9 MILLIGRAM(S): at 20:07

## 2019-03-10 RX ADMIN — NYSTATIN CREAM 1 APPLICATION(S): 100000 CREAM TOPICAL at 22:12

## 2019-03-10 RX ADMIN — PIPERACILLIN AND TAZOBACTAM 25 GRAM(S): 4; .5 INJECTION, POWDER, LYOPHILIZED, FOR SOLUTION INTRAVENOUS at 22:11

## 2019-03-10 RX ADMIN — PIPERACILLIN AND TAZOBACTAM 25 GRAM(S): 4; .5 INJECTION, POWDER, LYOPHILIZED, FOR SOLUTION INTRAVENOUS at 06:21

## 2019-03-10 RX ADMIN — Medication 10 MILLIGRAM(S): at 13:23

## 2019-03-10 RX ADMIN — PIPERACILLIN AND TAZOBACTAM 25 GRAM(S): 4; .5 INJECTION, POWDER, LYOPHILIZED, FOR SOLUTION INTRAVENOUS at 13:25

## 2019-03-10 RX ADMIN — Medication 81 MILLIGRAM(S): at 13:22

## 2019-03-10 RX ADMIN — ATORVASTATIN CALCIUM 80 MILLIGRAM(S): 80 TABLET, FILM COATED ORAL at 22:13

## 2019-03-10 RX ADMIN — Medication 650 MILLIGRAM(S): at 19:59

## 2019-03-10 RX ADMIN — Medication 1 TABLET(S): at 19:53

## 2019-03-10 RX ADMIN — HEPARIN SODIUM 7500 UNIT(S): 5000 INJECTION INTRAVENOUS; SUBCUTANEOUS at 22:13

## 2019-03-10 RX ADMIN — SODIUM CHLORIDE 75 MILLILITER(S): 9 INJECTION, SOLUTION INTRAVENOUS at 17:32

## 2019-03-10 RX ADMIN — HEPARIN SODIUM 7500 UNIT(S): 5000 INJECTION INTRAVENOUS; SUBCUTANEOUS at 06:21

## 2019-03-10 RX ADMIN — AMLODIPINE BESYLATE 10 MILLIGRAM(S): 2.5 TABLET ORAL at 13:22

## 2019-03-10 RX ADMIN — HEPARIN SODIUM 7500 UNIT(S): 5000 INJECTION INTRAVENOUS; SUBCUTANEOUS at 13:24

## 2019-03-10 NOTE — OCCUPATIONAL THERAPY INITIAL EVALUATION ADULT - LUE MMT, REHAB EVAL
Shoulder Flexion Grossly 2-/5, Elbow Flexion/Extension Grossly 3-/5, Wrist/Hand Flexion/Extension Grossly 3-/5

## 2019-03-10 NOTE — OCCUPATIONAL THERAPY INITIAL EVALUATION ADULT - LIVES WITH, PROFILE
Pt. presented to [facility] from Rehab. Prior to initial hospitalization, pt. reports he lives alone in a house with 4 steps to enter. Once inside, pt. reports bedroom and bathroom are located on the main level. Per pt., he has a bathtub in his bathroom (however, pt. explains it is "not working").

## 2019-03-10 NOTE — OCCUPATIONAL THERAPY INITIAL EVALUATION ADULT - ADDITIONAL COMMENTS
Pt. presented to [facility] from Rehab. At Rehab, pt reports he benefited from 1 person assistance with ADL's and was using a cane/wheelchair for functional mobility. Prior to initial hospitalization, pt. reports he was independent with ADL's and functional mobility.

## 2019-03-10 NOTE — OCCUPATIONAL THERAPY INITIAL EVALUATION ADULT - PERTINENT HX OF CURRENT PROBLEM, REHAB EVAL
Pt is a 63 year old Male with PMHx ischemic stroke (Left sided deficits), CAD, HTN, CAYDEN (on CPAP), who has been feeling weak for the last 3 days at rehab, and was found to have UTI and was given ceftriaxone since 3/7/19. Pt sent to [facility] on 3/9/19 after having elevated WBC, to rule out sepsis.

## 2019-03-10 NOTE — OCCUPATIONAL THERAPY INITIAL EVALUATION ADULT - PLANNED THERAPY INTERVENTIONS, OT EVAL
fine motor coordination training/neuromuscular re-education/ADL retraining/bed mobility training/ROM/strengthening/transfer training/balance training

## 2019-03-10 NOTE — OCCUPATIONAL THERAPY INITIAL EVALUATION ADULT - GENERAL OBSERVATIONS, REHAB EVAL
Pt. received semisupine in bed. No acute distress. Patient agreed to evaluation from Occupational Therapist. +IV, +BIPAP/CPAP.

## 2019-03-10 NOTE — OCCUPATIONAL THERAPY INITIAL EVALUATION ADULT - MD ORDER
Occupational Therapy (OT) to evaluate and treat. Occupational Therapy (OT) to evaluate and treat. Per MARINA Dowell, pt is okay to participate in OT evaluation and perform activity as tolerated.

## 2019-03-11 ENCOUNTER — APPOINTMENT (OUTPATIENT)
Dept: PHYSICAL MEDICINE AND REHAB | Facility: CLINIC | Age: 64
End: 2019-03-11

## 2019-03-11 LAB
HAV IGM SER-ACNC: NONREACTIVE — SIGNIFICANT CHANGE UP
HBV CORE IGM SER-ACNC: NONREACTIVE — SIGNIFICANT CHANGE UP
HBV SURFACE AG SER-ACNC: NONREACTIVE — SIGNIFICANT CHANGE UP
HCV AB S/CO SERPL IA: 0.12 S/CO — SIGNIFICANT CHANGE UP (ref 0–0.79)
HCV AB SERPL-IMP: SIGNIFICANT CHANGE UP

## 2019-03-11 PROCEDURE — 99233 SBSQ HOSP IP/OBS HIGH 50: CPT

## 2019-03-11 RX ADMIN — Medication 81 MILLIGRAM(S): at 14:04

## 2019-03-11 RX ADMIN — HEPARIN SODIUM 7500 UNIT(S): 5000 INJECTION INTRAVENOUS; SUBCUTANEOUS at 07:08

## 2019-03-11 RX ADMIN — Medication 9 MILLIGRAM(S): at 22:04

## 2019-03-11 RX ADMIN — SODIUM CHLORIDE 75 MILLILITER(S): 9 INJECTION, SOLUTION INTRAVENOUS at 07:08

## 2019-03-11 RX ADMIN — NYSTATIN CREAM 1 APPLICATION(S): 100000 CREAM TOPICAL at 07:08

## 2019-03-11 RX ADMIN — HEPARIN SODIUM 7500 UNIT(S): 5000 INJECTION INTRAVENOUS; SUBCUTANEOUS at 22:04

## 2019-03-11 RX ADMIN — HEPARIN SODIUM 7500 UNIT(S): 5000 INJECTION INTRAVENOUS; SUBCUTANEOUS at 14:03

## 2019-03-11 RX ADMIN — Medication 650 MILLIGRAM(S): at 22:04

## 2019-03-11 RX ADMIN — AMLODIPINE BESYLATE 10 MILLIGRAM(S): 2.5 TABLET ORAL at 07:08

## 2019-03-11 RX ADMIN — NYSTATIN CREAM 1 APPLICATION(S): 100000 CREAM TOPICAL at 15:53

## 2019-03-11 RX ADMIN — Medication 10 MILLIGRAM(S): at 14:04

## 2019-03-11 RX ADMIN — PIPERACILLIN AND TAZOBACTAM 25 GRAM(S): 4; .5 INJECTION, POWDER, LYOPHILIZED, FOR SOLUTION INTRAVENOUS at 07:07

## 2019-03-11 RX ADMIN — ATORVASTATIN CALCIUM 80 MILLIGRAM(S): 80 TABLET, FILM COATED ORAL at 22:05

## 2019-03-11 RX ADMIN — Medication 1 TABLET(S): at 14:04

## 2019-03-11 NOTE — PROGRESS NOTE ADULT - PROBLEM SELECTOR PLAN 3
Reports difficulty swallowing, will get swallow eval, cont dysphagia diet
Reports difficulty swallowing, will get swallow eval, cont dysphagia diet

## 2019-03-11 NOTE — PROGRESS NOTE ADULT - PROBLEM SELECTOR PLAN 1
Patient presents with elevated WBC of 19k, hypothermia, tachycardia  - UA abnormal  - urine culture collected after dose of cefepime given - likely false negative  - continue empiric antibiotic with Levaquin for UTI  - CXR not c/w pnaumonia, no respiratory symptoms  - C diff negative, diarrhea resolved
Patient presents with elevated WBC of 19k, hypothermia, tachycardia. Unclear source of infection. Diarrhea and weakly positive UA.   - Blood and urine cultures received pending results  - C diff and RVP negative. CXR without consolidations.   - Continue zosyn for possible complicated UTI vs colitis given diarrhea  - Pending stool PCR  - Diarrhea and leukocytosis now resolved

## 2019-03-11 NOTE — PROGRESS NOTE ADULT - PROBLEM SELECTOR PLAN 7
Recent stroke w/ L sided deficits  - Continue PT/OT  - Statin and ASA
Recent stroke w/ L sided deficits  - Continue PT/OT  - Statin and ASA  - per PT, recommending restorative rehab

## 2019-03-11 NOTE — PROGRESS NOTE ADULT - ATTENDING COMMENTS
Monitor temperature - if afebrile overnight, will be medically stable for DC with oral antibiotic for UTI

## 2019-03-12 ENCOUNTER — RECORD ABSTRACTING (OUTPATIENT)
Age: 64
End: 2019-03-12

## 2019-03-12 ENCOUNTER — APPOINTMENT (OUTPATIENT)
Dept: NEUROLOGY | Facility: CLINIC | Age: 64
End: 2019-03-12

## 2019-03-12 ENCOUNTER — TRANSCRIPTION ENCOUNTER (OUTPATIENT)
Age: 64
End: 2019-03-12

## 2019-03-12 LAB
SPECIMEN SOURCE: SIGNIFICANT CHANGE UP
SPECIMEN SOURCE: SIGNIFICANT CHANGE UP

## 2019-03-12 PROCEDURE — 99233 SBSQ HOSP IP/OBS HIGH 50: CPT

## 2019-03-12 RX ORDER — METHOCARBAMOL 500 MG/1
1000 TABLET, FILM COATED ORAL
Qty: 0 | Refills: 0 | Status: DISCONTINUED | OUTPATIENT
Start: 2019-03-12 | End: 2019-03-15

## 2019-03-12 RX ORDER — SOD,AMMONIUM,POTASSIUM LACTATE
1 CREAM (GRAM) TOPICAL
Qty: 0 | Refills: 0 | Status: DISCONTINUED | OUTPATIENT
Start: 2019-03-12 | End: 2019-03-15

## 2019-03-12 RX ADMIN — Medication 10 MILLIGRAM(S): at 12:24

## 2019-03-12 RX ADMIN — Medication 1 APPLICATION(S): at 17:58

## 2019-03-12 RX ADMIN — Medication 9 MILLIGRAM(S): at 21:00

## 2019-03-12 RX ADMIN — METHOCARBAMOL 1000 MILLIGRAM(S): 500 TABLET, FILM COATED ORAL at 23:20

## 2019-03-12 RX ADMIN — AMLODIPINE BESYLATE 10 MILLIGRAM(S): 2.5 TABLET ORAL at 10:16

## 2019-03-12 RX ADMIN — Medication 1 TABLET(S): at 12:24

## 2019-03-12 RX ADMIN — NYSTATIN CREAM 1 APPLICATION(S): 100000 CREAM TOPICAL at 17:36

## 2019-03-12 RX ADMIN — HEPARIN SODIUM 7500 UNIT(S): 5000 INJECTION INTRAVENOUS; SUBCUTANEOUS at 17:32

## 2019-03-12 RX ADMIN — HEPARIN SODIUM 7500 UNIT(S): 5000 INJECTION INTRAVENOUS; SUBCUTANEOUS at 10:15

## 2019-03-12 RX ADMIN — Medication 81 MILLIGRAM(S): at 12:24

## 2019-03-12 RX ADMIN — ATORVASTATIN CALCIUM 80 MILLIGRAM(S): 80 TABLET, FILM COATED ORAL at 21:00

## 2019-03-12 RX ADMIN — HEPARIN SODIUM 7500 UNIT(S): 5000 INJECTION INTRAVENOUS; SUBCUTANEOUS at 21:00

## 2019-03-12 RX ADMIN — NYSTATIN CREAM 1 APPLICATION(S): 100000 CREAM TOPICAL at 10:16

## 2019-03-12 RX ADMIN — METHOCARBAMOL 1000 MILLIGRAM(S): 500 TABLET, FILM COATED ORAL at 17:59

## 2019-03-12 NOTE — PROVIDER CONTACT NOTE (OTHER) - REASON
pt refused CT abdomen, he stated that he will have it done by tomorrow 3/13, pt also c/o sore throat

## 2019-03-12 NOTE — SWALLOW BEDSIDE ASSESSMENT ADULT - SLP PERTINENT HISTORY OF CURRENT PROBLEM
64 yo M PMHx ischemic stroke (L sided deficits), CAD, HTN, CAYDEN (on CPAP), brought in from nursing home for elevated WBC ct (23.4).

## 2019-03-12 NOTE — DISCHARGE NOTE PROVIDER - NSDCCPCAREPLAN_GEN_ALL_CORE_FT
PRINCIPAL DISCHARGE DIAGNOSIS  Problem: Sepsis  Assessment and Plan of Treatment:       SECONDARY DISCHARGE DIAGNOSES  Problem: Stroke  Assessment and Plan of Treatment: continue statin    Problem: Dysphagia  Assessment and Plan of Treatment: You were evaluated by speech and swallowing therapist.   Regular diet and thin liquids    Problem: CAYDEN (Obstructive Sleep Apnea)  Assessment and Plan of Treatment:     Problem: Hypertension  Assessment and Plan of Treatment: Blood pressure stable.    Problem: ISABELLE (acute kidney injury)  Assessment and Plan of Treatment: Resolved.   Your creatinine on discharge is --------- PRINCIPAL DISCHARGE DIAGNOSIS  Problem: Sepsis  Assessment and Plan of Treatment: Sepsis seen to be caused by Urine Infection vs Diarrhea with Colitis. You were treated with ABX while inpatient. Please follow up with your primary care physician after discharge for continued monitoring and management.      SECONDARY DISCHARGE DIAGNOSES  Problem: Stroke  Assessment and Plan of Treatment: Continue on Statin and ASA. Please follow up with your primary care physician after discharge for continued monitoring and management.    Problem: Dysphagia  Assessment and Plan of Treatment: You were evaluated by speech and swallowing therapist. Regular diet and thin liquids. Please follow up with your primary care physician after discharge for continued monitoring and management.    Problem: CAYDEN (Obstructive Sleep Apnea)  Assessment and Plan of Treatment: CPAP to be continued outpatient. Please follow up with your primary care physician after discharge for continued monitoring and management.    Problem: Hypertension  Assessment and Plan of Treatment: Blood pressure stable. Continue on home dose Norvasc. Losartan held while inpatient second to ISABELLE/ Dehydration. BP monitored and noted to be stable OFF Losartan. Continue to hold Losartan and please follow up with your primary care physician after discharge for continued monitoring and management. Monitor for any visual changes, headaches or dizziness.  Monitor blood pressure regularly.    Problem: ISABELLE (acute kidney injury)  Assessment and Plan of Treatment: Resolved. Continue adequte fluid intake outpatient to prevent dehydration. Please follow up with your primary care physician after discharge for continued monitoring and management. PRINCIPAL DISCHARGE DIAGNOSIS  Diagnosis: Sepsis  Assessment and Plan of Treatment: Sepsis seen to be caused by Urine Infection vs Diarrhea with Colitis. You were treated with ABX while inpatient. Please follow up with your primary care physician after discharge for continued monitoring and management. Monitor for any further signs and symptoms of further infection, including but not limited to, fevers/chills, shortness of breath, increased heart rate, dizziness, or abrupt changes in mental status.        SECONDARY DISCHARGE DIAGNOSES  Diagnosis: ISABELLE (acute kidney injury)  Assessment and Plan of Treatment: Resolved. Continue adequte fluid intake outpatient to prevent dehydration. Please follow up with your primary care physician after discharge for continued monitoring and management.    Diagnosis: Hypertension  Assessment and Plan of Treatment: Blood pressure stable. Continue on home dose Norvasc. Losartan held while inpatient second to ISABELLE/ Dehydration. BP monitored and noted to be stable OFF Losartan. Continue to hold Losartan and please follow up with your primary care physician after discharge for continued monitoring and management. Monitor for any visual changes, headaches or dizziness.  Monitor blood pressure regularly.    Diagnosis: CAYDEN (Obstructive Sleep Apnea)  Assessment and Plan of Treatment: CPAP to be continued outpatient. Please follow up with your primary care physician after discharge for continued monitoring and management.    Diagnosis: Dysphagia  Assessment and Plan of Treatment: You were evaluated by speech and swallowing therapist. Regular diet and thin liquids. Please follow up with your primary care physician after discharge for continued monitoring and management.    Diagnosis: Stroke  Assessment and Plan of Treatment: Continue on Statin and ASA. Please follow up with your primary care physician after discharge for continued monitoring and management. PRINCIPAL DISCHARGE DIAGNOSIS  Diagnosis: Sepsis  Assessment and Plan of Treatment: Sepsis seen to be caused by Urine Infection vs Diarrhea with Colitis. You were treated with ABX while inpatient. Please follow up with your primary care physician after discharge for continued monitoring and management. Monitor for any further signs and symptoms of further infection, including but not limited to, fevers/chills, shortness of breath, increased heart rate, dizziness, or abrupt changes in mental status.  You had refused bloodwork for last few days. Please follow up outpatient at rehab and/or PCP for routine bloodwork to monitor counts and electrolytes. Replete as needed.         SECONDARY DISCHARGE DIAGNOSES  Diagnosis: ISABELLE (acute kidney injury)  Assessment and Plan of Treatment: Resolved. Continue adequte fluid intake outpatient to prevent dehydration. Please follow up with your primary care physician after discharge for continued monitoring and management.    Diagnosis: Hypertension  Assessment and Plan of Treatment: Blood pressure stable. Continue on home dose Norvasc. Losartan held while inpatient second to ISABELLE/ Dehydration. BP monitored and noted to be stable OFF Losartan. Continue to hold Losartan and please follow up with your primary care physician after discharge for continued monitoring and management. Monitor for any visual changes, headaches or dizziness.  Monitor blood pressure regularly.    Diagnosis: CAYDEN (Obstructive Sleep Apnea)  Assessment and Plan of Treatment: CPAP to be continued outpatient. Please follow up with your primary care physician after discharge for continued monitoring and management.    Diagnosis: Dysphagia  Assessment and Plan of Treatment: You were evaluated by speech and swallowing therapist. Regular diet and thin liquids. Please follow up with your primary care physician after discharge for continued monitoring and management.    Diagnosis: Stroke  Assessment and Plan of Treatment: Continue on Statin and ASA. Please follow up with your primary care physician after discharge for continued monitoring and management.

## 2019-03-12 NOTE — SWALLOW BEDSIDE ASSESSMENT ADULT - SWALLOW EVAL: RECOMMENDED FEEDING/EATING TECHNIQUES
alternate food with liquid/position upright (90 degrees)/small sips/bites/maintain upright posture during/after eating for 30 mins

## 2019-03-12 NOTE — SWALLOW BEDSIDE ASSESSMENT ADULT - COMMENTS
Patient seen slightly inclined, reported back pain and could not sit upright in bed.  Patient cleared by nursing for swallowing evaluation.

## 2019-03-12 NOTE — DISCHARGE NOTE PROVIDER - HOSPITAL COURSE
64 yo M PMHx ischemic stroke (L sided deficits), CAD, HTN, CAYDEN (on CPAP), brought in from rehab for sepsis 2/2 unclear source (possible gastroenteritis vs UTI)        Sepsis    - RVP negative     - UA abnormal    - continues to complain of loose stool/diarrhea    - febrile despite being on Levaquin    - blood cultures NTD    - urine cx NTD    - CT A/P with contrast -------     - no other localizing symptoms or exam findings        Diarrhea    Now resolved    C diff negative        Dysphagia    - speech and swallowing eval     - thin liquids, regular diet        ISABELLE    resolved w hydration    creatinine on discharge is -----------------         hypertension    Losartan held 2/2 to ISABELLE while in hospital     [facility]         CAYDEN     continue CPAP at night pressure 10, FiO2 28%.        Stroke    Recent stroke w/ L sided deficits    Statin and ASA    PT eval: recommending restorative rehab. Mr. Jeffries is a 64 YO M with PMHx of ischemic stroke (L sided deficits), CAD, HTN and CAYDEN (on CPAP), brought in from [facility] for Sepsis of unclear source (possible gastroenteritis vs UTI vs colitis). RVP negative. CXR negative. UA noted to be positive and patient complains of loose stool/ diarrhea. Blood and urine culture sent. LVQ sent and despite being on LVQ, fever spikes noted. Blood culture repeated noted to be negative. CDIFF negative. GI PCR attempted however, diarrhea noted to resolved with conservative management. CT AP performed noted with ______. Mr. Jeffries was also noted with Dysphagia and evaluated by SS who recommended patient to continue on Regular Diet with Thin Liquids. ISABELLE noted second to Dehydration and poor PO fluid intake. Fluid hydration given and PO fluid intake encouraged. Losartan held second to ISABELLE. ISABELLE resolved. BP monitored off Losartan and noted to be stable in the 120s SBP. Losrtan to be continue held and BP to be monitored outpatient by primary care physician.         CHRONIC HISTORY     # CAYDEN - Continue on CPAP 10, FiO2 28% QHS     # History of Stroke with residual L hemiplegia - Continue on Statin and ASA.         On ----- case discussed with Dr. Peña and the patient is stable for discharge. 63 M PMHx of ischemic stroke (L sided deficits), CAD, HTN and CAYDEN (on CPAP), brought in from [facility] for sepsis of unclear source (possible gastroenteritis vs UTI vs colitis). RVP negative. CXR negative. UA noted to be positive and pt complains of loose stool/ diarrhea. Blood and urine culture sent. LVQ sent and despite being on LVQ, fever spikes noted. Blood culture repeated noted to be negative. C. diff negative. GI PCR attempted however, diarrhea noted to resolved with conservative management. CT AP performed noted with no acute etiology. Pt was also noted with dysphagia and evaluated by SS who recommended pt to continue on regular diet with thin liquids. ISABELLE noted second to dehydration and poor PO fluid intake. Fluid hydration given and PO fluid intake encouraged. Losartan held second to ISABELLE. ISABELLE resolved. BP monitored off Losartan and noted to be stable in the 120s SBP. Losrtan to be continued to be held and BP to be monitored outpatient by primary care physician.         CHRONIC HISTORY     # CAYDEN - Continue on CPAP 12, FiO2 28% QHS     # History of Stroke with residual L hemiplegia - Continue on Statin and ASA.         On 3/15 case discussed with Dr. Peña and the patient is stable for discharge. 63 M PMHx of ischemic stroke (L sided deficits), CAD, HTN and CAYDEN (on CPAP), brought in from [facility] for sepsis of unclear source (possible gastroenteritis vs UTI vs colitis). RVP negative. CXR negative. UA noted to be positive and pt complains of loose stool/ diarrhea. Blood and urine culture sent. LVQ sent and despite being on LVQ, fever spikes noted. Blood culture repeated noted to be negative. C. diff negative. GI PCR attempted however, diarrhea noted to resolved with conservative management. CT AP performed noted with no acute etiology. Pt was also noted with dysphagia and evaluated by SS who recommended pt to continue on regular diet with thin liquids. ISABELLE noted second to dehydration and poor PO fluid intake. Fluid hydration given and PO fluid intake encouraged. Losartan held second to ISABELLE. ISABELLE resolved. BP monitored off Losartan and noted to be stable in the 120s SBP. Losrtan to be continued to be held and BP to be monitored outpatient by primary care physician.         CHRONIC HISTORY     # CAYDEN - Continue on CPAP 12, FiO2 28% QHS     # History of Stroke with residual L hemiplegia - Continue on Statin and ASA.         On 3/15 case discussed with Dr. Peña and the pt is stable for discharge.

## 2019-03-13 PROCEDURE — 99233 SBSQ HOSP IP/OBS HIGH 50: CPT

## 2019-03-13 PROCEDURE — 74177 CT ABD & PELVIS W/CONTRAST: CPT | Mod: 26

## 2019-03-13 RX ORDER — LIDOCAINE 4 G/100G
1 CREAM TOPICAL DAILY
Qty: 0 | Refills: 0 | Status: DISCONTINUED | OUTPATIENT
Start: 2019-03-13 | End: 2019-03-15

## 2019-03-13 RX ORDER — SIMETHICONE 80 MG/1
80 TABLET, CHEWABLE ORAL DAILY
Qty: 0 | Refills: 0 | Status: DISCONTINUED | OUTPATIENT
Start: 2019-03-13 | End: 2019-03-15

## 2019-03-13 RX ORDER — DOCUSATE SODIUM 100 MG
1 CAPSULE ORAL
Qty: 0 | Refills: 0 | COMMUNITY

## 2019-03-13 RX ADMIN — HEPARIN SODIUM 7500 UNIT(S): 5000 INJECTION INTRAVENOUS; SUBCUTANEOUS at 11:12

## 2019-03-13 RX ADMIN — Medication 650 MILLIGRAM(S): at 18:42

## 2019-03-13 RX ADMIN — Medication 1 APPLICATION(S): at 18:41

## 2019-03-13 RX ADMIN — HEPARIN SODIUM 7500 UNIT(S): 5000 INJECTION INTRAVENOUS; SUBCUTANEOUS at 20:48

## 2019-03-13 RX ADMIN — Medication 650 MILLIGRAM(S): at 19:40

## 2019-03-13 RX ADMIN — NYSTATIN CREAM 1 APPLICATION(S): 100000 CREAM TOPICAL at 18:44

## 2019-03-13 RX ADMIN — Medication 81 MILLIGRAM(S): at 11:12

## 2019-03-13 RX ADMIN — ATORVASTATIN CALCIUM 80 MILLIGRAM(S): 80 TABLET, FILM COATED ORAL at 20:48

## 2019-03-13 RX ADMIN — Medication 1 TABLET(S): at 11:12

## 2019-03-13 RX ADMIN — METHOCARBAMOL 1000 MILLIGRAM(S): 500 TABLET, FILM COATED ORAL at 11:13

## 2019-03-13 RX ADMIN — SIMETHICONE 80 MILLIGRAM(S): 80 TABLET, CHEWABLE ORAL at 11:09

## 2019-03-13 RX ADMIN — Medication 10 MILLIGRAM(S): at 11:13

## 2019-03-13 RX ADMIN — METHOCARBAMOL 1000 MILLIGRAM(S): 500 TABLET, FILM COATED ORAL at 18:41

## 2019-03-13 RX ADMIN — LIDOCAINE 1 PATCH: 4 CREAM TOPICAL at 11:08

## 2019-03-13 RX ADMIN — AMLODIPINE BESYLATE 10 MILLIGRAM(S): 2.5 TABLET ORAL at 11:16

## 2019-03-13 RX ADMIN — Medication 9 MILLIGRAM(S): at 20:48

## 2019-03-13 RX ADMIN — LIDOCAINE 1 PATCH: 4 CREAM TOPICAL at 19:45

## 2019-03-13 RX ADMIN — LIDOCAINE 1 PATCH: 4 CREAM TOPICAL at 20:47

## 2019-03-14 LAB
BACTERIA BLD CULT: SIGNIFICANT CHANGE UP
BACTERIA BLD CULT: SIGNIFICANT CHANGE UP

## 2019-03-14 PROCEDURE — 99239 HOSP IP/OBS DSCHRG MGMT >30: CPT

## 2019-03-14 RX ADMIN — Medication 9 MILLIGRAM(S): at 20:55

## 2019-03-14 RX ADMIN — AMLODIPINE BESYLATE 10 MILLIGRAM(S): 2.5 TABLET ORAL at 11:59

## 2019-03-14 RX ADMIN — NYSTATIN CREAM 1 APPLICATION(S): 100000 CREAM TOPICAL at 17:32

## 2019-03-14 RX ADMIN — HEPARIN SODIUM 7500 UNIT(S): 5000 INJECTION INTRAVENOUS; SUBCUTANEOUS at 12:00

## 2019-03-14 RX ADMIN — METHOCARBAMOL 1000 MILLIGRAM(S): 500 TABLET, FILM COATED ORAL at 17:32

## 2019-03-14 RX ADMIN — SIMETHICONE 80 MILLIGRAM(S): 80 TABLET, CHEWABLE ORAL at 12:03

## 2019-03-14 RX ADMIN — METHOCARBAMOL 1000 MILLIGRAM(S): 500 TABLET, FILM COATED ORAL at 12:01

## 2019-03-14 RX ADMIN — HEPARIN SODIUM 7500 UNIT(S): 5000 INJECTION INTRAVENOUS; SUBCUTANEOUS at 20:55

## 2019-03-14 RX ADMIN — ATORVASTATIN CALCIUM 80 MILLIGRAM(S): 80 TABLET, FILM COATED ORAL at 20:54

## 2019-03-14 RX ADMIN — Medication 1 TABLET(S): at 12:00

## 2019-03-14 RX ADMIN — Medication 81 MILLIGRAM(S): at 11:59

## 2019-03-14 RX ADMIN — LIDOCAINE 1 PATCH: 4 CREAM TOPICAL at 18:30

## 2019-03-14 RX ADMIN — Medication 10 MILLIGRAM(S): at 11:59

## 2019-03-14 RX ADMIN — Medication 1 APPLICATION(S): at 17:32

## 2019-03-15 ENCOUNTER — TRANSCRIPTION ENCOUNTER (OUTPATIENT)
Age: 64
End: 2019-03-15

## 2019-03-15 VITALS
DIASTOLIC BLOOD PRESSURE: 81 MMHG | OXYGEN SATURATION: 100 % | SYSTOLIC BLOOD PRESSURE: 140 MMHG | HEART RATE: 80 BPM | TEMPERATURE: 98 F

## 2019-03-15 PROCEDURE — 99233 SBSQ HOSP IP/OBS HIGH 50: CPT

## 2019-03-15 RX ORDER — METHOCARBAMOL 500 MG/1
2 TABLET, FILM COATED ORAL
Qty: 0 | Refills: 0 | DISCHARGE
Start: 2019-03-15

## 2019-03-15 RX ORDER — SOD,AMMONIUM,POTASSIUM LACTATE
1 CREAM (GRAM) TOPICAL
Qty: 0 | Refills: 0 | COMMUNITY

## 2019-03-15 RX ORDER — NYSTATIN CREAM 100000 [USP'U]/G
1 CREAM TOPICAL
Qty: 0 | Refills: 0 | COMMUNITY

## 2019-03-15 RX ADMIN — Medication 1 TABLET(S): at 11:54

## 2019-03-15 RX ADMIN — Medication 10 MILLIGRAM(S): at 11:54

## 2019-03-15 RX ADMIN — HEPARIN SODIUM 7500 UNIT(S): 5000 INJECTION INTRAVENOUS; SUBCUTANEOUS at 15:03

## 2019-03-15 RX ADMIN — LIDOCAINE 1 PATCH: 4 CREAM TOPICAL at 06:03

## 2019-03-15 RX ADMIN — SIMETHICONE 80 MILLIGRAM(S): 80 TABLET, CHEWABLE ORAL at 16:43

## 2019-03-15 RX ADMIN — LIDOCAINE 1 PATCH: 4 CREAM TOPICAL at 11:55

## 2019-03-15 RX ADMIN — AMLODIPINE BESYLATE 10 MILLIGRAM(S): 2.5 TABLET ORAL at 11:54

## 2019-03-15 RX ADMIN — Medication 81 MILLIGRAM(S): at 11:54

## 2019-03-15 RX ADMIN — METHOCARBAMOL 1000 MILLIGRAM(S): 500 TABLET, FILM COATED ORAL at 11:54

## 2019-03-15 RX ADMIN — LIDOCAINE 1 PATCH: 4 CREAM TOPICAL at 00:48

## 2019-03-15 RX ADMIN — LIDOCAINE 1 PATCH: 4 CREAM TOPICAL at 18:41

## 2019-03-15 NOTE — DISCHARGE NOTE NURSING/CASE MANAGEMENT/SOCIAL WORK - NSDCPEPTSTRK_GEN_ALL_CORE
Need for follow up after discharge/Prescribed medications/Stroke education booklet/Call 911 for stroke/Stroke warning signs and symptoms/Signs and symptoms of stroke/Risk factors for stroke/Stroke support groups for patients, families, and friends

## 2019-03-15 NOTE — PROGRESS NOTE ADULT - ASSESSMENT
62 yo M w recent ischemic stroke (L sided deficits), CAD, HTN, CAYDEN (on CPAP), brought in from rehab for sepsis likely 2/2 UTI given abnormal UA.    Problem/Plan - 1:  ·  Problem: Sepsis.  Plan: Patient presents with elevated WBC of 19k, hypothermia, tachycardia  - CT A/P neg for acute findings  - complete 5 days of antibiotics for clinical diagnosis of colitis, possible UTI (abnormal UA)  - afebrile, WBC normalized  - medically stable for discharge    Problem/Plan - 2:  ·  Problem: Diarrhea.  Plan: Now resolved.  C diff negative.     Problem/Plan - 3:  ·  Problem: Dysphagia.  Plan:   - SLP recs appreciate  - thin liquids, regular diet    Problem/Plan - 4:  ·  Problem: ISABELLE (acute kidney injury).  Plan: Resolved w hydration.     Problem/Plan - 5:  ·  Problem: Hypertension.  Plan: Resume Losartan.     Problem/Plan - 6:  Problem: CAYDEN (Obstructive Sleep Apnea). Plan: Continue CPAP at night pressure 10, FiO2 28%.    Problem/Plan - 7:  ·  Problem: Stroke.  Plan: Recent stroke w/ L sided deficits  - Statin and ASA      Medically stable for DC. Per last PT eval, recommended rehab, however needs repeat evaluation.    42 min discharge time.
64 yo M w recent ischemic stroke (L sided deficits), CAD, HTN, CAYDEN (on CPAP), brought in from rehab for sepsis likely 2/2 UTI given abnormal UA.
64 yo M w recent ischemic stroke (L sided deficits), CAD, HTN, CAYDEN (on CPAP), brought in from rehab for sepsis likely 2/2 UTI given abnormal UA.    Problem/Plan - 1:  ·  Problem: Sepsis.  Plan: Patient presents with elevated WBC of 19k, hypothermia, tachycardia  - CT A/P neg for acute findings  - denies diarrhea  - complete 5 days of antibiotics for clinical diagnosis of colitis, possible UTI (abnormal UA)  - afebrile, WBC normalized  - medically stable for discharge    Problem/Plan - 2:  ·  Problem: Diarrhea.  Plan: Now resolved.  C diff negative.     Problem/Plan - 3:  ·  Problem: Dysphagia.  Plan:   - SLP recs appreciate  - thin liquids, regular diet    Problem/Plan - 4:  ·  Problem: ISABELLE (acute kidney injury).  Plan: Resolved w hydration.     Problem/Plan - 5:  ·  Problem: Hypertension.  Plan: Resume Losartan.   - BP at goal    Problem/Plan - 6:  Problem: CAYDEN (Obstructive Sleep Apnea). Plan: Continue CPAP at night pressure 10, FiO2 28%.    Problem/Plan - 7:  ·  Problem: Stroke.  Plan: Recent stroke w/ L sided deficits  - Statin and ASA      Medically stable for DC. Per last PT eval, recommended rehab, however needs repeat evaluation.    42 min discharge time.
64 yo M w recent ischemic stroke (L sided deficits), CAD, HTN, CAYDEN (on CPAP), brought in from rehab for sepsis likely 2/2 UTI given abnormal UA.    Problem/Plan - 1:  ·  Problem: Sepsis.  Plan: Patient presents with elevated WBC of 19k, hypothermia, tachycardia  - UA abnormal  - continues to complain of loose stool/diarrhea  - febrile again overnight despite being on Levaquin  - f/u blood cultures  - CT A/P with contrast  - no other localizing symptoms or exam findings    Problem/Plan - 2:  ·  Problem: Diarrhea.  Plan: Now resolved.  C diff negative.     Problem/Plan - 3:  ·  Problem: Dysphagia.  Plan:   - SLP recs appreciate  - thin liquids, regular diet    Problem/Plan - 4:  ·  Problem: ISABELLE (acute kidney injury).  Plan: Resolved w hydration.     Problem/Plan - 5:  ·  Problem: Hypertension.  Plan: Resume Losartan.     Problem/Plan - 6:  Problem: CAYDEN (Obstructive Sleep Apnea). Plan: Continue CPAP at night pressure 10, FiO2 28%.    Problem/Plan - 7:  ·  Problem: Stroke.  Plan: Recent stroke w/ L sided deficits  - Continue PT/OT  - Statin and ASA  - per PT, recommending restorative rehab.
64 yo M w recent ischemic stroke (L sided deficits), CAD, HTN, CAYDEN (on CPAP), brought in from rehab for sepsis likely 2/2 UTI given abnormal UA.    Problem/Plan - 1:  ·  Problem: Sepsis.  Plan: Patient presents with elevated WBC of 19k, hypothermia, tachycardia  - UA abnormal  - continues to complain of loose stool/diarrhea  - febrile again overnight despite being on Levaquin  - f/u blood cultures  - CT A/P with contrast  - no other localizing symptoms or exam findings    Problem/Plan - 2:  ·  Problem: Diarrhea.  Plan: Now resolved.  C diff negative.     Problem/Plan - 3:  ·  Problem: Dysphagia.  Plan:   - SLP recs appreciate  - thin liquids, regular diet    Problem/Plan - 4:  ·  Problem: ISABELLE (acute kidney injury).  Plan: Resolved w hydration.     Problem/Plan - 5:  ·  Problem: Hypertension.  Plan: Resume Losartan.     Problem/Plan - 6:  Problem: CAYDEN (Obstructive Sleep Apnea). Plan: Continue CPAP at night pressure 10, FiO2 28%.    Problem/Plan - 7:  ·  Problem: Stroke.  Plan: Recent stroke w/ L sided deficits  - Continue PT/OT  - Statin and ASA  - per PT, recommending restorative rehab.
62 yo M w recent ischemic stroke (L sided deficits), CAD, HTN, CAYDEN (on CPAP), brought in from rehab for sepsis 2/2 unclear source (possible gastroenteritis vs UTI)

## 2019-03-15 NOTE — DISCHARGE NOTE NURSING/CASE MANAGEMENT/SOCIAL WORK - NSDCDPATPORTLINK_GEN_ALL_CORE
You can access the [facility] Patient Portal, offered by [facility], by registering with the following website: [email or web address]

## 2019-03-15 NOTE — PROGRESS NOTE ADULT - SUBJECTIVE AND OBJECTIVE BOX
CHIEF COMPLAINT: Patient is a 63y old  male who presents with a chief complaint of Elevated WBC Count (12 Mar 2019 18:07)      SUBJECTIVE / OVERNIGHT EVENTS:    Denies diarrhea. Denies other complaints.    MEDICATIONS  (STANDING):  amLODIPine   Tablet 10 milliGRAM(s) Oral daily  ammonium lactate 12% Lotion 1 Application(s) Topical two times a day  aspirin  chewable 81 milliGRAM(s) Oral daily  atorvastatin 80 milliGRAM(s) Oral at bedtime  FLUoxetine 10 milliGRAM(s) Oral daily  heparin  Injectable 7500 Unit(s) SubCutaneous every 8 hours  lactated ringers. 1000 milliLiter(s) (75 mL/Hr) IV Continuous <Continuous>  lactobacillus acidophilus 1 Tablet(s) Oral daily  levoFLOXacin  Tablet 500 milliGRAM(s) Oral every 24 hours  lidocaine   Patch 1 Patch Transdermal daily  melatonin 9 milliGRAM(s) Oral at bedtime  methocarbamol 1000 milliGRAM(s) Oral four times a day  nystatin Cream 1 Application(s) Topical two times a day  simethicone 80 milliGRAM(s) Chew daily    MEDICATIONS  (PRN):  acetaminophen   Tablet .. 650 milliGRAM(s) Oral every 6 hours PRN Temp greater or equal to 38C (100.4F), Mild Pain (1 - 3), Moderate Pain (4 - 6), Severe Pain (7 - 10)  fluticasone propionate 50 MICROgram(s)/spray Nasal Spray 1 Spray(s) Both Nostrils two times a day PRN nasal congestion  hydrocortisone 1% Ointment 1 Application(s) Topical every 12 hours PRN Rash and/or Itching  sodium chloride 0.65% Nasal 1 Spray(s) Both Nostrils two times a day PRN Nasal Congestion      VITALS:  T(F): 98.4 (03-13-19 @ 11:31), Max: 98.5 (03-12-19 @ 21:10)  HR: 89 (03-13-19 @ 11:31) (75 - 89)  BP: 125/75 (03-13-19 @ 11:31) (125/75 - 150/81)  RR: 17 (03-13-19 @ 11:31) (17 - 19)  SpO2: 97% (03-13-19 @ 11:31)      CAPILLARY BLOOD GLUCOSE    Output     I&O's Summary  T(F): 98.4 (03-13-19 @ 11:31), Max: 98.5 (03-12-19 @ 21:10)  HR: 89 (03-13-19 @ 11:31) (75 - 89)  BP: 125/75 (03-13-19 @ 11:31) (125/75 - 150/81)  RR: 17 (03-13-19 @ 11:31) (17 - 19)  SpO2: 97% (03-13-19 @ 11:31)    PHYSICAL EXAM:  GENERAL: morbidly obese man sitting up in chair in NAD  HEAD:  Atraumatic, Normocephalic  EYES: EOMI  NECK: Supple, No JVD  CHEST/LUNG: nonlabored breathing  HEART: nl S1/S2  ABDOMEN: nondistended, soft  EXTREMITIES:  no LE edema  PSYCH: alert, answering questions appropriately  NEUROLOGY: non-focal  SKIN: No rashes noted    LABS:                        MICROBIOLOGY:    Culture - Blood (collected 11 Mar 2019 23:54)  Source: BLOOD VENOUS  Preliminary Report (12 Mar 2019 23:51):    NO ORGANISMS ISOLATED    NO ORGANISMS ISOLATED AT 24 HOURS    Culture - Blood (collected 11 Mar 2019 23:54)  Source: BLOOD PERIPHERAL  Preliminary Report (12 Mar 2019 23:51):    NO ORGANISMS ISOLATED    NO ORGANISMS ISOLATED AT 24 HOURS          RADIOLOGY & ADDITIONAL TESTS:    Imaging Personally Reviewed:    < from: MR Angio Neck No Cont (01.31.19 @ 13:57) >  IMPRESSION:    Brain MRI: Acute right basal ganglia infarct. Chronic bilateral lacunar   infarcts.    Brain/Neck MRA: Severe stenoses of the distal bilateral vertebral   arteries and proximal basilar artery with poststenotic dilatation.    < end of copied text >        [x] Care Discussed with Consultants/Other Providers:      Mechelle Peña M.D.  Hospitalist  Pager [phone]
CHIEF COMPLAINT: Patient is a 63y old  male who presents with a chief complaint of Elevated WBC ct (10 Mar 2019 12:46)      SUBJECTIVE / OVERNIGHT EVENTS:    Reports "I'm still waking up." Denies abdominal pain. Denies cough, respiratory symptoms.    MEDICATIONS  (STANDING):  amLODIPine   Tablet 10 milliGRAM(s) Oral daily  ammonium lactate  5% Lotion 1 Application(s) Topical two times a day  aspirin  chewable 81 milliGRAM(s) Oral daily  atorvastatin 80 milliGRAM(s) Oral at bedtime  FLUoxetine 10 milliGRAM(s) Oral daily  heparin  Injectable 7500 Unit(s) SubCutaneous every 8 hours  influenza   Vaccine 0.5 milliLiter(s) IntraMuscular once  lactated ringers. 1000 milliLiter(s) (75 mL/Hr) IV Continuous <Continuous>  lactobacillus acidophilus 1 Tablet(s) Oral daily  levoFLOXacin  Tablet 500 milliGRAM(s) Oral every 24 hours  melatonin 9 milliGRAM(s) Oral at bedtime  nystatin Cream 1 Application(s) Topical two times a day    MEDICATIONS  (PRN):  acetaminophen   Tablet .. 650 milliGRAM(s) Oral every 6 hours PRN Temp greater or equal to 38C (100.4F), Mild Pain (1 - 3), Moderate Pain (4 - 6), Severe Pain (7 - 10)  fluticasone propionate 50 MICROgram(s)/spray Nasal Spray 1 Spray(s) Both Nostrils two times a day PRN nasal congestion  hydrocortisone 1% Ointment 1 Application(s) Topical every 12 hours PRN Rash and/or Itching  simethicone 80 milliGRAM(s) Chew daily PRN Gas  sodium chloride 0.65% Nasal 1 Spray(s) Both Nostrils two times a day PRN Nasal Congestion      VITALS:  T(F): 99.2 (03-11-19 @ 06:40), Max: 99.2 (03-11-19 @ 06:40)  HR: 79 (03-11-19 @ 14:03) (79 - 90)  BP: 150/64 (03-11-19 @ 06:40) (134/72 - 150/64)  RR: 18 (03-11-19 @ 06:40) (17 - 18)  SpO2: 96% (03-11-19 @ 14:03)      CAPILLARY BLOOD GLUCOSE    Output     I&O's Summary  T(F): 99.2 (03-11-19 @ 06:40), Max: 99.2 (03-11-19 @ 06:40)  HR: 79 (03-11-19 @ 14:03) (79 - 90)  BP: 150/64 (03-11-19 @ 06:40) (134/72 - 150/64)  RR: 18 (03-11-19 @ 06:40) (17 - 18)  SpO2: 96% (03-11-19 @ 14:03)    PHYSICAL EXAM:  GENERAL: morbidly obese man lying in bed on nasal BiPAP, NAD  HEAD:  Atraumatic, Normocephalic  EYES: EOMI  NECK: Supple, No JVD  CHEST/LUNG: nonlabored breathing  HEART: nl S1/S2  ABDOMEN: nondistended, soft  EXTREMITIES:  no LE edema  PSYCH: alert, answering questions appropriately  NEUROLOGY: non-focal  SKIN: No rashes noted    LABS:              15.5                 136  | 20   | 30           7.92  >-----------< 205     ------------------------< 101                   47.9                 3.9  | 100  | 1.24                                         Ca 9.6   Mg 1.9   Ph 2.4         TPro  8.3  /  Alb  3.9      TBili  1.0  /  DBili  x         AST  46  /  ALT  56            AlkPhos  105                    MICROBIOLOGY:    Culture - Urine (collected 09 Mar 2019 02:42)  Source: URINE MIDSTREAM  Final Report (10 Mar 2019 09:00):    NO GROWTH AT 24 HOURS    Culture - Blood (collected 09 Mar 2019 02:42)  Source: BLOOD VENOUS  Preliminary Report (11 Mar 2019 02:40):    NO ORGANISMS ISOLATED    NO ORGANISMS ISOLATED AT 48 HRS.    Culture - Blood (collected 09 Mar 2019 02:42)  Source: BLOOD PERIPHERAL  Preliminary Report (11 Mar 2019 02:40):    NO ORGANISMS ISOLATED    NO ORGANISMS ISOLATED AT 48 HRS.          RADIOLOGY & ADDITIONAL TESTS:    Imaging Personally Reviewed:    < from: Xray Chest 1 View AP/PA (03.09.19 @ 01:40) >  IMPRESSION:  Right CP angle incompletely imaged. Sharp left CP angle. Clear remaining   visualized lungs. No pneumothorax.    Cardiac and mediastinal silhouettes within normal limits for the   projection.    Trachea midline.    Unremarkable osseous structures.    < end of copied text >        [x] Care Discussed with Consultants/Other Providers:      Mechelle ePña M.D.  Hospitalist  Pager [phone]
CHIEF COMPLAINT: Patient is a 63y old  male who presents with a chief complaint of Elevated WBC ct (11 Mar 2019 14:46)      SUBJECTIVE / OVERNIGHT EVENTS:    Reports loose BM overnight. Had fever overnight. Denies SOB. Denies cough. Denies urinary symptoms.    MEDICATIONS  (STANDING):  amLODIPine   Tablet 10 milliGRAM(s) Oral daily  ammonium lactate 12% Lotion 1 Application(s) Topical two times a day  aspirin  chewable 81 milliGRAM(s) Oral daily  atorvastatin 80 milliGRAM(s) Oral at bedtime  FLUoxetine 10 milliGRAM(s) Oral daily  heparin  Injectable 7500 Unit(s) SubCutaneous every 8 hours  influenza   Vaccine 0.5 milliLiter(s) IntraMuscular once  lactated ringers. 1000 milliLiter(s) (75 mL/Hr) IV Continuous <Continuous>  lactobacillus acidophilus 1 Tablet(s) Oral daily  levoFLOXacin  Tablet 500 milliGRAM(s) Oral every 24 hours  melatonin 9 milliGRAM(s) Oral at bedtime  methocarbamol 1000 milliGRAM(s) Oral four times a day  nystatin Cream 1 Application(s) Topical two times a day    MEDICATIONS  (PRN):  acetaminophen   Tablet .. 650 milliGRAM(s) Oral every 6 hours PRN Temp greater or equal to 38C (100.4F), Mild Pain (1 - 3), Moderate Pain (4 - 6), Severe Pain (7 - 10)  fluticasone propionate 50 MICROgram(s)/spray Nasal Spray 1 Spray(s) Both Nostrils two times a day PRN nasal congestion  hydrocortisone 1% Ointment 1 Application(s) Topical every 12 hours PRN Rash and/or Itching  simethicone 80 milliGRAM(s) Chew daily PRN Gas  sodium chloride 0.65% Nasal 1 Spray(s) Both Nostrils two times a day PRN Nasal Congestion      VITALS:  T(F): 98.9 (03-12-19 @ 13:14), Max: 101.5 (03-11-19 @ 22:02)  HR: 73 (03-12-19 @ 13:14) (62 - 86)  BP: 145/84 (03-12-19 @ 13:14) (139/63 - 161/90)  RR: 17 (03-12-19 @ 13:14) (17 - 20)  SpO2: 98% (03-12-19 @ 13:14)  Height (cm): 180.34 (21:43)    CAPILLARY BLOOD GLUCOSE    Output   Height (cm): 180.34 (21:43)  I&O's Summary  T(F): 98.9 (03-12-19 @ 13:14), Max: 101.5 (03-11-19 @ 22:02)  HR: 73 (03-12-19 @ 13:14) (62 - 86)  BP: 145/84 (03-12-19 @ 13:14) (139/63 - 161/90)  RR: 17 (03-12-19 @ 13:14) (17 - 20)  SpO2: 98% (03-12-19 @ 13:14)    PHYSICAL EXAM:  GENERAL: morbidly obese man lying in bed, NAD  HEAD:  Atraumatic, Normocephalic  EYES: EOMI  NECK: Supple, No JVD  CHEST/LUNG: nonlabored breathing  HEART: nl S1/S2  ABDOMEN: nondistended, soft, nontender to deep palpation  EXTREMITIES:  no LE edema  PSYCH: alert, answering questions appropriately  NEUROLOGY: non-focal  SKIN: No rashes noted, no lesions/rash on back, sacral area    LABS:                        MICROBIOLOGY:        RADIOLOGY & ADDITIONAL TESTS:    Imaging Personally Reviewed:    < from: Xray Chest 1 View AP/PA (03.09.19 @ 01:40) >  IMPRESSION:  Right CP angle incompletely imaged. Sharp left CP angle. Clear remaining   visualized lungs. No pneumothorax.    Cardiac and mediastinal silhouettes within normal limits for the   projection.    Trachea midline.    Unremarkable osseous structures.    < end of copied text >        [x] Care Discussed with Consultants/Other Providers:      Mechelle Peña M.D.  Hospitalist  Pager [phone]
CHIEF COMPLAINT: Patient is a 63y old  male who presents with a chief complaint of Elevated WBC ct (13 Mar 2019 15:22)      SUBJECTIVE / OVERNIGHT EVENTS:    Per nursing staff, patient has been refusing labs. Also had refused with PT before.    This morning patient lying in bed, towel covering his face. Appears comfortable, however appears somnolent - asking for provider to return to see him later.    MEDICATIONS  (STANDING):  amLODIPine   Tablet 10 milliGRAM(s) Oral daily  ammonium lactate 12% Lotion 1 Application(s) Topical two times a day  aspirin  chewable 81 milliGRAM(s) Oral daily  atorvastatin 80 milliGRAM(s) Oral at bedtime  FLUoxetine 10 milliGRAM(s) Oral daily  heparin  Injectable 7500 Unit(s) SubCutaneous every 8 hours  lactated ringers. 1000 milliLiter(s) (75 mL/Hr) IV Continuous <Continuous>  lactobacillus acidophilus 1 Tablet(s) Oral daily  levoFLOXacin  Tablet 500 milliGRAM(s) Oral every 24 hours  lidocaine   Patch 1 Patch Transdermal daily  melatonin 9 milliGRAM(s) Oral at bedtime  methocarbamol 1000 milliGRAM(s) Oral four times a day  nystatin Cream 1 Application(s) Topical two times a day  simethicone 80 milliGRAM(s) Chew daily    MEDICATIONS  (PRN):  acetaminophen   Tablet .. 650 milliGRAM(s) Oral every 6 hours PRN Temp greater or equal to 38C (100.4F), Mild Pain (1 - 3), Moderate Pain (4 - 6), Severe Pain (7 - 10)  fluticasone propionate 50 MICROgram(s)/spray Nasal Spray 1 Spray(s) Both Nostrils two times a day PRN nasal congestion  hydrocortisone 1% Ointment 1 Application(s) Topical every 12 hours PRN Rash and/or Itching  sodium chloride 0.65% Nasal 1 Spray(s) Both Nostrils two times a day PRN Nasal Congestion      VITALS:  T(F): 97.7 (03-13-19 @ 20:59), Max: 98.3 (03-13-19 @ 14:20)  HR: 86 (03-14-19 @ 11:58) (70 - 86)  BP: 147/86 (03-14-19 @ 11:58) (131/70 - 147/86)  RR: 18 (03-13-19 @ 20:59) (18 - 20)  SpO2: 96% (03-14-19 @ 11:28)      CAPILLARY BLOOD GLUCOSE    Output     I&O's Summary  T(F): 97.7 (03-13-19 @ 20:59), Max: 98.3 (03-13-19 @ 14:20)  HR: 86 (03-14-19 @ 11:58) (70 - 86)  BP: 147/86 (03-14-19 @ 11:58) (131/70 - 147/86)  RR: 18 (03-13-19 @ 20:59) (18 - 20)  SpO2: 96% (03-14-19 @ 11:28)    PHYSICAL EXAM:  GENERAL: NAD, well-developed  HEAD:  Atraumatic, Normocephalic  EYES: EOMI  NECK: Supple, No JVD  CHEST/LUNG: nonlabored breathing  HEART: nl S1/S2  ABDOMEN: nondistended, soft  EXTREMITIES:  no LE edema  PSYCH: alert, answering questions appropriately  NEUROLOGY: non-focal  SKIN: No rashes noted    LABS:                        MICROBIOLOGY:    Culture - Blood (collected 11 Mar 2019 23:54)  Source: BLOOD VENOUS  Preliminary Report (13 Mar 2019 23:51):    NO ORGANISMS ISOLATED    NO ORGANISMS ISOLATED AT 48 HRS.    Culture - Blood (collected 11 Mar 2019 23:54)  Source: BLOOD PERIPHERAL  Preliminary Report (13 Mar 2019 23:51):    NO ORGANISMS ISOLATED    NO ORGANISMS ISOLATED AT 48 HRS.          RADIOLOGY & ADDITIONAL TESTS:    Imaging Personally Reviewed:    < from: CT Abdomen and Pelvis w/ IV Cont (03.13.19 @ 15:31) >  IMPRESSION:     No CT evidence of acute intra-abdominal pathology.    < end of copied text >        [x] Care Discussed with Consultants/Other Providers:      Mechelle Peña M.D.  Hospitalist  Pager [phone]
CHIEF COMPLAINT: Patient is a 63y old  male who presents with a chief complaint of Elevated WBC ct (14 Mar 2019 12:41)      SUBJECTIVE / OVERNIGHT EVENTS:    Denies diarrhea. Denies SOB. "I'm just waking up."    MEDICATIONS  (STANDING):  amLODIPine   Tablet 10 milliGRAM(s) Oral daily  ammonium lactate 12% Lotion 1 Application(s) Topical two times a day  aspirin  chewable 81 milliGRAM(s) Oral daily  atorvastatin 80 milliGRAM(s) Oral at bedtime  FLUoxetine 10 milliGRAM(s) Oral daily  heparin  Injectable 7500 Unit(s) SubCutaneous every 8 hours  lactobacillus acidophilus 1 Tablet(s) Oral daily  levoFLOXacin  Tablet 500 milliGRAM(s) Oral every 24 hours  lidocaine   Patch 1 Patch Transdermal daily  melatonin 9 milliGRAM(s) Oral at bedtime  methocarbamol 1000 milliGRAM(s) Oral four times a day  nystatin Cream 1 Application(s) Topical two times a day  simethicone 80 milliGRAM(s) Chew daily    MEDICATIONS  (PRN):  acetaminophen   Tablet .. 650 milliGRAM(s) Oral every 6 hours PRN Temp greater or equal to 38C (100.4F), Mild Pain (1 - 3), Moderate Pain (4 - 6), Severe Pain (7 - 10)  fluticasone propionate 50 MICROgram(s)/spray Nasal Spray 1 Spray(s) Both Nostrils two times a day PRN nasal congestion  hydrocortisone 1% Ointment 1 Application(s) Topical every 12 hours PRN Rash and/or Itching  sodium chloride 0.65% Nasal 1 Spray(s) Both Nostrils two times a day PRN Nasal Congestion      VITALS:  T(F): 97.3 (03-15-19 @ 11:51), Max: 98.8 (03-14-19 @ 21:53)  HR: 66 (03-15-19 @ 11:51) (62 - 86)  BP: 142/81 (03-15-19 @ 11:51) (137/87 - 148/96)  RR: 19 (03-15-19 @ 11:51) (18 - 19)  SpO2: 99% (03-15-19 @ 11:51)      CAPILLARY BLOOD GLUCOSE    Output     I&O's Summary  T(F): 97.3 (03-15-19 @ 11:51), Max: 98.8 (03-14-19 @ 21:53)  HR: 66 (03-15-19 @ 11:51) (62 - 86)  BP: 142/81 (03-15-19 @ 11:51) (137/87 - 148/96)  RR: 19 (03-15-19 @ 11:51) (18 - 19)  SpO2: 99% (03-15-19 @ 11:51)    PHYSICAL EXAM:  GENERAL: morbidly obese, disheveled man lying in bed, NAD, on nasal bipap  HEAD:  Atraumatic, Normocephalic  EYES: EOMI  NECK: Supple, No JVD  CHEST/LUNG: nonlabored breathing  HEART: nl S1/S2  ABDOMEN: nondistended, soft  EXTREMITIES:  no LE edema  PSYCH: alert, answering questions appropriately  NEUROLOGY: non-focal  SKIN: No rashes noted    LABS:                        MICROBIOLOGY:        RADIOLOGY & ADDITIONAL TESTS:    Imaging Personally Reviewed:    < from: CT Abdomen and Pelvis w/ IV Cont (03.13.19 @ 15:31) >  BONES: Multilevel degenerative change.    < end of copied text >        [x] Care Discussed with Consultants/Other Providers:      Mechelle Peña M.D.  Hospitalist  Pager [phone]
Patient is a 63y old  Male who presents with a chief complaint of Elevated WBC ct (09 Mar 2019 09:14)    HPI: Pt up in bed. C/o generalized weakness. Does not feel like eating.     Vital Signs Last 24 Hrs  T(C): 37.4 (10 Mar 2019 05:01), Max: 37.6 (09 Mar 2019 14:16)  T(F): 99.3 (10 Mar 2019 05:01), Max: 99.6 (09 Mar 2019 14:16)  HR: 85 (10 Mar 2019 10:17) (85 - 90)  BP: 144/92 (10 Mar 2019 05:01) (114/99 - 145/95)  BP(mean): --  RR: 22 (10 Mar 2019 05:01) (20 - 22)  SpO2: 98% (10 Mar 2019 10:17) (98% - 99%)                          15.5   7.92  )-----------( 205      ( 09 Mar 2019 17:44 )             47.9     03-09    136  |  100  |  30<H>  ----------------------------<  101<H>  3.9   |  20<L>  |  1.24    Ca    9.6      09 Mar 2019 17:44  Phos  2.4     03-09  Mg     1.9     03-09    TPro  8.3  /  Alb  3.9  /  TBili  1.0  /  DBili  x   /  AST  46<H>  /  ALT  56<H>  /  AlkPhos  105  03-09    MEDICATIONS  (STANDING):  amLODIPine   Tablet 10 milliGRAM(s) Oral daily  ammonium lactate  5% Lotion 1 Application(s) Topical two times a day  aspirin  chewable 81 milliGRAM(s) Oral daily  atorvastatin 80 milliGRAM(s) Oral at bedtime  FLUoxetine 10 milliGRAM(s) Oral daily  heparin  Injectable 7500 Unit(s) SubCutaneous every 8 hours  influenza   Vaccine 0.5 milliLiter(s) IntraMuscular once  lactated ringers. 1000 milliLiter(s) (75 mL/Hr) IV Continuous <Continuous>  melatonin 9 milliGRAM(s) Oral at bedtime  nystatin Cream 1 Application(s) Topical two times a day  piperacillin/tazobactam IVPB. 3.375 Gram(s) IV Intermittent every 8 hours    MEDICATIONS  (PRN):  acetaminophen   Tablet .. 650 milliGRAM(s) Oral every 6 hours PRN Temp greater or equal to 38C (100.4F), Mild Pain (1 - 3), Moderate Pain (4 - 6), Severe Pain (7 - 10)  fluticasone propionate 50 MICROgram(s)/spray Nasal Spray 1 Spray(s) Both Nostrils two times a day PRN nasal congestion  hydrocortisone 1% Ointment 1 Application(s) Topical every 12 hours PRN Rash and/or Itching  simethicone 80 milliGRAM(s) Chew daily PRN Gas  sodium chloride 0.65% Nasal 1 Spray(s) Both Nostrils two times a day PRN Nasal Congestion

## 2019-03-16 LAB
BACTERIA BLD CULT: SIGNIFICANT CHANGE UP
BACTERIA BLD CULT: SIGNIFICANT CHANGE UP

## 2019-03-29 NOTE — SWALLOW BEDSIDE ASSESSMENT ADULT - SWALLOW EVAL: DIAGNOSIS
Lab orders entered. Patient due for yearly labs.     Call to patient and let him know lab orders were placed, left detailed message.   Patient presents with functional oropharyngeal stage swallowing mechanism characterized by adequate oral containment, slow chewing for solid with ability to break down solid on the right side of the oral cavity (preference for chewing at this time due to left sided facial weakness), adequate bolus manipulation and transport. There is laryngeal elevation upon palpation, initiation of the pharyngeal swallow. There were no overt signs of impaired airway protection.

## 2019-04-05 PROBLEM — I63.9 CEREBRAL INFARCTION, UNSPECIFIED: Chronic | Status: ACTIVE | Noted: 2019-03-09

## 2019-04-18 ENCOUNTER — APPOINTMENT (OUTPATIENT)
Dept: INTERNAL MEDICINE | Facility: CLINIC | Age: 64
End: 2019-04-18
Payer: MEDICAID

## 2019-04-18 VITALS
BODY MASS INDEX: 44.1 KG/M2 | WEIGHT: 315 LBS | HEART RATE: 79 BPM | TEMPERATURE: 97.3 F | OXYGEN SATURATION: 96 % | HEIGHT: 71 IN

## 2019-04-18 DIAGNOSIS — J18.9 PNEUMONIA, UNSPECIFIED ORGANISM: ICD-10-CM

## 2019-04-18 DIAGNOSIS — K59.00 CONSTIPATION, UNSPECIFIED: ICD-10-CM

## 2019-04-18 DIAGNOSIS — M19.049 PRIMARY OSTEOARTHRITIS, UNSPECIFIED HAND: ICD-10-CM

## 2019-04-18 DIAGNOSIS — Z80.3 FAMILY HISTORY OF MALIGNANT NEOPLASM OF BREAST: ICD-10-CM

## 2019-04-18 DIAGNOSIS — Z00.00 ENCOUNTER FOR GENERAL ADULT MEDICAL EXAMINATION W/OUT ABNORMAL FINDINGS: ICD-10-CM

## 2019-04-18 DIAGNOSIS — Z82.3 FAMILY HISTORY OF STROKE: ICD-10-CM

## 2019-04-18 DIAGNOSIS — F41.9 ANXIETY DISORDER, UNSPECIFIED: ICD-10-CM

## 2019-04-18 PROCEDURE — 93000 ELECTROCARDIOGRAM COMPLETE: CPT

## 2019-04-18 PROCEDURE — 36415 COLL VENOUS BLD VENIPUNCTURE: CPT

## 2019-04-18 PROCEDURE — G0444 DEPRESSION SCREEN ANNUAL: CPT

## 2019-04-18 PROCEDURE — 99386 PREV VISIT NEW AGE 40-64: CPT | Mod: 25

## 2019-04-22 ENCOUNTER — NON-APPOINTMENT (OUTPATIENT)
Age: 64
End: 2019-04-22

## 2019-04-22 VITALS — DIASTOLIC BLOOD PRESSURE: 68 MMHG | SYSTOLIC BLOOD PRESSURE: 132 MMHG

## 2019-04-22 PROBLEM — M19.049 OSTEOARTHRITIS, HAND: Status: ACTIVE | Noted: 2019-04-22

## 2019-04-22 PROBLEM — Z82.3 FAMILY HISTORY OF CEREBROVASCULAR ACCIDENT (CVA): Status: ACTIVE | Noted: 2019-04-22

## 2019-04-22 PROBLEM — K59.00 CONSTIPATION: Status: ACTIVE | Noted: 2019-04-22

## 2019-04-22 PROBLEM — F41.9 ANXIETY: Status: ACTIVE | Noted: 2019-04-22

## 2019-04-22 PROBLEM — Z80.3 FAMILY HISTORY OF MALIGNANT NEOPLASM OF FEMALE BREAST: Status: ACTIVE | Noted: 2019-04-22

## 2019-04-22 PROBLEM — J18.9 PNEUMONIA: Status: ACTIVE | Noted: 2019-04-22

## 2019-04-22 RX ORDER — CHOLECALCIFEROL (VITAMIN D3) 25 MCG
TABLET,CHEWABLE ORAL
Refills: 0 | Status: ACTIVE | COMMUNITY
Start: 2019-04-22

## 2019-04-22 RX ORDER — GLUCOSAMINE HCL/CHONDROITIN SU 500-400 MG
3 CAPSULE ORAL
Refills: 0 | Status: ACTIVE | COMMUNITY
Start: 2019-04-22

## 2019-04-22 NOTE — HISTORY OF PRESENT ILLNESS
[FreeTextEntry1] : The patient is here for an initial visit.  He comes in with his wife. [de-identified] : The patient had a CVA on 1/26/19 and was admitted to [facility].  He was found to have vertebral artery stenosis.  He has left sided weakness. [facility].  He developed a fever there and he was readmitted to the hospital.  The source was not clear but it resolved.  He was then transferred to [facility]  and he was there about 18 days and he has now been home for 7-10 days.\par \par The CVA was ischemic and he received TPA.  \par \par He lives with his wife now although prior to this event they were living separately.  He has two children.  He is not working.  He doesn't smoke cigarettes or drink ETOH.  He smokes occasional marijuana. \par \par He has a history of HTN and hyperlipidemia.  He has CAYDEN on CPAP.  He has anxiety.

## 2019-04-22 NOTE — HEALTH RISK ASSESSMENT
[No falls in past year] : Patient reported no falls in the past year [] : No [1] : 2) Feeling down, depressed, or hopeless for several days (1) [[id number]] : 2 [Reports changes in hearing] : Reports no changes in hearing [Reports changes in vision] : Reports no changes in vision [Reports changes in dental health] : Reports no changes in dental health [de-identified] : needs some assistance

## 2019-04-22 NOTE — PHYSICAL EXAM
[No Acute Distress] : no acute distress [Well Nourished] : well nourished [Well Developed] : well developed [Well-Appearing] : well-appearing [PERRL] : pupils equal round and reactive to light [Normal Sclera/Conjunctiva] : normal sclera/conjunctiva [Normal Outer Ear/Nose] : the outer ears and nose were normal in appearance [EOMI] : extraocular movements intact [Normal Oropharynx] : the oropharynx was normal [Supple] : supple [No JVD] : no jugular venous distention [Thyroid Normal, No Nodules] : the thyroid was normal and there were no nodules present [No Lymphadenopathy] : no lymphadenopathy [Clear to Auscultation] : lungs were clear to auscultation bilaterally [No Respiratory Distress] : no respiratory distress  [Normal Rate] : normal rate  [No Accessory Muscle Use] : no accessory muscle use [Normal S1, S2] : normal S1 and S2 [Regular Rhythm] : with a regular rhythm [No Carotid Bruits] : no carotid bruits [No Murmur] : no murmur heard [No Varicosities] : no varicosities [No Abdominal Bruit] : a ~M bruit was not heard ~T in the abdomen [Pedal Pulses Present] : the pedal pulses are present [No Extremity Clubbing/Cyanosis] : no extremity clubbing/cyanosis [No Edema] : there was no peripheral edema [Soft] : abdomen soft [No Palpable Aorta] : no palpable aorta [Non-distended] : non-distended [Non Tender] : non-tender [No Masses] : no abdominal mass palpated [No HSM] : no HSM [Normal Posterior Cervical Nodes] : no posterior cervical lymphadenopathy [Normal Bowel Sounds] : normal bowel sounds [No CVA Tenderness] : no CVA  tenderness [Normal Anterior Cervical Nodes] : no anterior cervical lymphadenopathy [No Joint Swelling] : no joint swelling [No Spinal Tenderness] : no spinal tenderness [Grossly Normal Strength/Tone] : grossly normal strength/tone [Normal Gait] : normal gait [No Rash] : no rash [Coordination Grossly Intact] : coordination grossly intact [No Focal Deficits] : no focal deficits [Normal Affect] : the affect was normal [Deep Tendon Reflexes (DTR)] : deep tendon reflexes were 2+ and symmetric [Normal Insight/Judgement] : insight and judgment were intact [de-identified] : obese [de-identified] : There is left sided weakness and he walks with a cane.

## 2019-04-22 NOTE — ASSESSMENT
[FreeTextEntry1] : His medical history was reviewed and hospital admission reviewed.  He is s/p a basal ganglia CVA and he has left sided weakness.  He has severe B/L vertebral artery stenosis.  He has completed inpatient rehab and is now home.  He was advised to continue outpatient therapy and he was given prescriptions for OT and PT.  He was advised to follow-up with a neurologist and we helped him make an appointment.  He is on ASA 81 mg.\par \par The blood pressure is fairly good now at 132/68.  \par \par He should see a cardiologist to assist with management. \par \par He is now on Atorvastatin 80.  LIpids had previously been very elevated.  Check lipids.  Discussed diet and exercise.  \par \par He can use Diazepam PRN for anxiety.  I-stop checked. He is on Fluoxetine.  \par \par He will eventually need a screening colonoscopy which he has never had.  \par \par Check a PSA- routine.  \par \par He takes an excessive amount of Ibuprofen for pain- up to 1200 mg at a time and 2400 mg or more in a day.  He was advised to stop this.  \par \par Check liver enzymes which had been elevated.  \par \par Follow-up one month.

## 2019-04-24 LAB
ALBUMIN SERPL ELPH-MCNC: 4.2 G/DL
ALP BLD-CCNC: 110 U/L
ALT SERPL-CCNC: 26 U/L
ANION GAP SERPL CALC-SCNC: 17 MMOL/L
AST SERPL-CCNC: 25 U/L
BASOPHILS # BLD AUTO: 0.02 K/UL
BASOPHILS NFR BLD AUTO: 0.3 %
BILIRUB SERPL-MCNC: 0.5 MG/DL
BUN SERPL-MCNC: 19 MG/DL
CALCIUM SERPL-MCNC: 9.4 MG/DL
CHLORIDE SERPL-SCNC: 101 MMOL/L
CHOLEST SERPL-MCNC: 204 MG/DL
CHOLEST/HDLC SERPL: 5.4 RATIO
CO2 SERPL-SCNC: 23 MMOL/L
CREAT SERPL-MCNC: 0.85 MG/DL
EOSINOPHIL # BLD AUTO: 0.11 K/UL
EOSINOPHIL NFR BLD AUTO: 1.4 %
ESTIMATED AVERAGE GLUCOSE: 108 MG/DL
GLUCOSE SERPL-MCNC: 104 MG/DL
HBA1C MFR BLD HPLC: 5.4 %
HCT VFR BLD CALC: 43.7 %
HCV AB SER QL: NONREACTIVE
HCV S/CO RATIO: 0.09 S/CO
HDLC SERPL-MCNC: 38 MG/DL
HGB BLD-MCNC: 13.7 G/DL
IMM GRANULOCYTES NFR BLD AUTO: 0.5 %
LDLC SERPL CALC-MCNC: 128 MG/DL
LYMPHOCYTES # BLD AUTO: 1.76 K/UL
LYMPHOCYTES NFR BLD AUTO: 22.6 %
MAN DIFF?: NORMAL
MCHC RBC-ENTMCNC: 29.7 PG
MCHC RBC-ENTMCNC: 31.4 GM/DL
MCV RBC AUTO: 94.8 FL
MONOCYTES # BLD AUTO: 0.37 K/UL
MONOCYTES NFR BLD AUTO: 4.8 %
NEUTROPHILS # BLD AUTO: 5.48 K/UL
NEUTROPHILS NFR BLD AUTO: 70.4 %
PLATELET # BLD AUTO: 229 K/UL
POTASSIUM SERPL-SCNC: 3.9 MMOL/L
PROT SERPL-MCNC: 7 G/DL
PSA SERPL-MCNC: 1.04 NG/ML
RBC # BLD: 4.61 M/UL
RBC # FLD: 15.3 %
SODIUM SERPL-SCNC: 141 MMOL/L
T4 FREE SERPL-MCNC: 1 NG/DL
TRIGL SERPL-MCNC: 190 MG/DL
TSH SERPL-ACNC: 2.35 UIU/ML
WBC # FLD AUTO: 7.78 K/UL

## 2019-05-01 ENCOUNTER — MEDICATION RENEWAL (OUTPATIENT)
Age: 64
End: 2019-05-01

## 2019-05-01 RX ORDER — ASPIRIN ENTERIC COATED TABLETS 81 MG 81 MG/1
81 TABLET, DELAYED RELEASE ORAL
Qty: 90 | Refills: 3 | Status: ACTIVE | COMMUNITY
Start: 2019-04-22 | End: 1900-01-01

## 2019-05-01 RX ORDER — DOCUSATE SODIUM 100 MG/1
100 CAPSULE ORAL 3 TIMES DAILY
Qty: 270 | Refills: 3 | Status: ACTIVE | COMMUNITY
Start: 2019-04-22 | End: 1900-01-01

## 2019-05-20 ENCOUNTER — APPOINTMENT (OUTPATIENT)
Dept: INTERNAL MEDICINE | Facility: CLINIC | Age: 64
End: 2019-05-20

## 2019-05-30 ENCOUNTER — APPOINTMENT (OUTPATIENT)
Dept: NEUROLOGY | Facility: CLINIC | Age: 64
End: 2019-05-30
Payer: MEDICAID

## 2019-05-30 PROCEDURE — 93880 EXTRACRANIAL BILAT STUDY: CPT

## 2019-05-30 PROCEDURE — 93888 INTRACRANIAL LIMITED STUDY: CPT

## 2019-06-04 ENCOUNTER — APPOINTMENT (OUTPATIENT)
Dept: INTERNAL MEDICINE | Facility: CLINIC | Age: 64
End: 2019-06-04
Payer: MEDICAID

## 2019-06-04 VITALS
HEART RATE: 85 BPM | OXYGEN SATURATION: 95 % | SYSTOLIC BLOOD PRESSURE: 150 MMHG | DIASTOLIC BLOOD PRESSURE: 100 MMHG | TEMPERATURE: 98.2 F

## 2019-06-04 DIAGNOSIS — Z60.2 PROBLEMS RELATED TO LIVING ALONE: ICD-10-CM

## 2019-06-04 DIAGNOSIS — Z56.0 UNEMPLOYMENT, UNSPECIFIED: ICD-10-CM

## 2019-06-04 PROCEDURE — 99214 OFFICE O/P EST MOD 30 MIN: CPT

## 2019-06-04 SDOH — ECONOMIC STABILITY - INCOME SECURITY: UNEMPLOYMENT, UNSPECIFIED: Z56.0

## 2019-06-04 SDOH — SOCIAL STABILITY - SOCIAL INSECURITY: PROBLEMS RELATED TO LIVING ALONE: Z60.2

## 2019-06-05 ENCOUNTER — APPOINTMENT (OUTPATIENT)
Dept: NEUROLOGY | Facility: CLINIC | Age: 64
End: 2019-06-05
Payer: MEDICAID

## 2019-06-05 VITALS
HEIGHT: 71 IN | HEART RATE: 70 BPM | SYSTOLIC BLOOD PRESSURE: 131 MMHG | BODY MASS INDEX: 44.1 KG/M2 | DIASTOLIC BLOOD PRESSURE: 76 MMHG | WEIGHT: 315 LBS

## 2019-06-05 PROBLEM — Z60.2 LIVING ALONE: Status: ACTIVE | Noted: 2019-06-05

## 2019-06-05 PROBLEM — Z56.0 UNEMPLOYED: Status: ACTIVE | Noted: 2019-06-05

## 2019-06-05 PROCEDURE — 99215 OFFICE O/P EST HI 40 MIN: CPT

## 2019-06-06 VITALS — SYSTOLIC BLOOD PRESSURE: 130 MMHG | DIASTOLIC BLOOD PRESSURE: 85 MMHG

## 2019-06-06 NOTE — PHYSICAL EXAM
[No Acute Distress] : no acute distress [No Respiratory Distress] : no respiratory distress  [Well Developed] : well developed [Well Nourished] : well nourished [Normal Rate] : normal rate  [Clear to Auscultation] : lungs were clear to auscultation bilaterally [No Accessory Muscle Use] : no accessory muscle use [Normal S1, S2] : normal S1 and S2 [Pedal Pulses Present] : the pedal pulses are present [Regular Rhythm] : with a regular rhythm [No Edema] : there was no peripheral edema [Soft] : abdomen soft [Non Tender] : non-tender [No HSM] : no HSM [Non-distended] : non-distended [Coordination Grossly Intact] : coordination grossly intact [No Joint Swelling] : no joint swelling [de-identified] : Overweight male NAD

## 2019-06-06 NOTE — HISTORY OF PRESENT ILLNESS
[de-identified] : The patient comes in with his wife to follow-up.  He notes some cloudiness in his consciousness in the morning.  He is having fatigue and he doesn't sleep well at times.  He has used Melatonin but it isn't helping much.  No chest pain or dyspnea.  His appetite is fine.  No GI symptoms.

## 2019-06-06 NOTE — ASSESSMENT
[FreeTextEntry1] : The patient is s/p a CVA with some residual effects.  The feeling of cloudiness occurred with the CVA and it is likely the explanation.  He is following up with his neurologist and he has an appointment for tomorrow.  He should continue PT and OT and he has a mild speech difficulty and speech therapy was also advised.  He is on ASA.\par \par He didn't pursue seeing a cardiologist and it was again advised.  He had an abnormal EKG at the last visit.  He agrees to go now.  \par \par There are some social challenges and he was referred to [facility] for assistance.  He met with them here after the visit today.  \par \par He is taking the statin and compliance was encouraged.  He isn't fasting so he will return for fasting lipids.   Discussed diet and exercise.  Could consider adding Zetia.\par \par The BP is slightly elevated.  He isn't taking the HCTZ and he was advised to restart it at 12.5 mg.  Continue the Amlodipine.\par \par We discussed the sleep.  Will avoid the stronger prescription options for now.  He can use Melatonin or try Tylenol PM PRN.  He was also advised to have a sleep evaluation.  He has a history of CAYDEN and this could be a factor in his symptoms.  He has seen Dr. Pinon in the past.  \par \par He can use Tylenol PRN for the pain.  He can use Ibuprofen but cautiously.

## 2019-06-20 ENCOUNTER — RX RENEWAL (OUTPATIENT)
Age: 64
End: 2019-06-20

## 2019-07-09 ENCOUNTER — APPOINTMENT (OUTPATIENT)
Dept: NEUROLOGY | Facility: CLINIC | Age: 64
End: 2019-07-09
Payer: MEDICAID

## 2019-07-09 VITALS
WEIGHT: 315 LBS | DIASTOLIC BLOOD PRESSURE: 95 MMHG | HEIGHT: 71 IN | HEART RATE: 71 BPM | BODY MASS INDEX: 44.1 KG/M2 | SYSTOLIC BLOOD PRESSURE: 163 MMHG

## 2019-07-09 DIAGNOSIS — I63.9 CEREBRAL INFARCTION, UNSPECIFIED: ICD-10-CM

## 2019-07-09 PROCEDURE — 99215 OFFICE O/P EST HI 40 MIN: CPT

## 2019-07-10 PROCEDURE — 80048 BASIC METABOLIC PNL TOTAL CA: CPT

## 2019-07-10 PROCEDURE — 84443 ASSAY THYROID STIM HORMONE: CPT

## 2019-07-10 PROCEDURE — 97167 OT EVAL HIGH COMPLEX 60 MIN: CPT

## 2019-07-10 PROCEDURE — 92523 SPEECH SOUND LANG COMPREHEN: CPT

## 2019-07-10 PROCEDURE — 36600 WITHDRAWAL OF ARTERIAL BLOOD: CPT

## 2019-07-10 PROCEDURE — 97112 NEUROMUSCULAR REEDUCATION: CPT

## 2019-07-10 PROCEDURE — 94660 CPAP INITIATION&MGMT: CPT

## 2019-07-10 PROCEDURE — 84100 ASSAY OF PHOSPHORUS: CPT

## 2019-07-10 PROCEDURE — 80053 COMPREHEN METABOLIC PANEL: CPT

## 2019-07-10 PROCEDURE — 85027 COMPLETE CBC AUTOMATED: CPT

## 2019-07-10 PROCEDURE — 97535 SELF CARE MNGMENT TRAINING: CPT

## 2019-07-10 PROCEDURE — 86803 HEPATITIS C AB TEST: CPT

## 2019-07-10 PROCEDURE — 82803 BLOOD GASES ANY COMBINATION: CPT

## 2019-07-10 PROCEDURE — 92610 EVALUATE SWALLOWING FUNCTION: CPT

## 2019-07-10 PROCEDURE — 97110 THERAPEUTIC EXERCISES: CPT

## 2019-07-10 PROCEDURE — 92507 TX SP LANG VOICE COMM INDIV: CPT

## 2019-07-10 PROCEDURE — 84480 ASSAY TRIIODOTHYRONINE (T3): CPT

## 2019-07-10 PROCEDURE — 83735 ASSAY OF MAGNESIUM: CPT

## 2019-07-10 PROCEDURE — 97530 THERAPEUTIC ACTIVITIES: CPT

## 2019-07-10 PROCEDURE — 97116 GAIT TRAINING THERAPY: CPT

## 2019-07-10 PROCEDURE — 84436 ASSAY OF TOTAL THYROXINE: CPT

## 2019-07-15 ENCOUNTER — RECORD ABSTRACTING (OUTPATIENT)
Age: 64
End: 2019-07-15

## 2019-07-15 NOTE — PHYSICAL EXAM
[General Appearance - Alert] : alert [General Appearance - In No Acute Distress] : in no acute distress [Oriented To Time, Place, And Person] : oriented to person, place, and time [Impaired Insight] : insight and judgment were intact [Affect] : the affect was normal [Person] : oriented to person [Place] : oriented to place [Time] : oriented to time [Short Term Intact] : short term memory intact [Concentration Intact] : normal concentrating ability [Visual Intact] : visual attention was ~T not ~L decreased [Naming Objects] : no difficulty naming common objects [Repeating Phrases] : no difficulty repeating a phrase [Writing A Sentence] : no difficulty writing a sentence [Comprehension] : comprehension intact [Fluency] : fluency intact [Cranial Nerves Optic (II)] : visual acuity intact bilaterally,  visual fields full to confrontation, pupils equal round and reactive to light [Cranial Nerves Oculomotor (III)] : extraocular motion intact [Cranial Nerves Trigeminal (V)] : facial sensation intact symmetrically [Cranial Nerves Vestibulocochlear (VIII)] : hearing was intact bilaterally [Cranial Nerves Facial (VII)] : face symmetrical [Cranial Nerves Accessory (XI - Cranial And Spinal)] : head turning and shoulder shrug symmetric [Cranial Nerves Glossopharyngeal (IX)] : tongue and palate midline [Cranial Nerves Hypoglossal (XII)] : there was no tongue deviation with protrusion [Motor Tone] : muscle tone was normal in all four extremities [No Muscle Atrophy] : normal bulk in all four extremities [Motor Strength] : muscle strength was normal in all four extremities [Paresis Pronator Drift Left-Sided] : a left-sided pronator drift was present [Motor Strength Upper Extremities Left] : there was weakness of the left upper extremity [Motor Strength Lower Extremities Left] : there was weakness of the left lower extremity [5] : ankle dorsiflexion 5/5 [4] : ankle dorsiflexion 4/5 [Sensation Tactile Decrease] : light touch was intact [Abnormal Walk] : normal gait [Balance] : balance was intact [2+] : Ankle jerk left 2+ [Sclera] : the sclera and conjunctiva were normal [PERRL With Normal Accommodation] : pupils were equal in size, round, reactive to light, with normal accommodation [Paresis Pronator Drift Right-Sided] : no pronator drift on the right [Motor Strength Upper Extremities Right] : strength was normal in the right upper extremity [Motor Strength Lower Extremities Right] : strength was normal in the right lower extremity [Past-pointing] : there was no past-pointing [Tremor] : no tremor present [Dysdiadochokinesia On The Right] : not present on the ride side [Coordination - Dysmetria Impaired Finger-to-Nose Right Only] : not present on the ride side [Coordination Dysmetria Impaired Heel-Shin Using Right Heel] : not present on the ride side [Plantar Reflex Right Only] : normal on the right [Plantar Reflex Left Only] : normal on the left [FreeTextEntry8] : left sided dysmetria

## 2019-07-15 NOTE — REASON FOR VISIT
[Post Hospitalization] : a post hospitalization visit [Spouse] : spouse [Family Member] : family member [FreeTextEntry1] : stroke

## 2019-07-15 NOTE — HISTORY OF PRESENT ILLNESS
[FreeTextEntry1] : Mr. Jeffries is a 63 year old male PMHx HTN (not on meds at time of event), CAYDEN (on CPAP, compliant), obesity, and HLD (not on meds at time of event) who presents today for post hospitalization follow up after a right corona radiata infarct on 1/26/2019.\par \par He has circadian rhythm disorder; sleeps 10-12 hours. No new neurological symptoms. His speech is slurred, more at times when he is tired.\par \par The patient is a 62 yo M with a PMH of HTN (not on meds), CAYDEN (on CPAP, compliant), obesity, and HLD (not on meds) who presents to hospital with acute onset of slurred speech and LUE paresis. Per chart review: "Patient states he woke up on 1/26 at 5pm at his baseline and began to change his clothes utilizing both hands and was speaking normally at the time. After about 10 minutes of changing, patient fell over while tying his shoelaces. When patient got up, he tried to reach for a tissue with his left arm and was unable to lift it. Patient states that his left arm felt weak for a "few minutes" before returning back to normal however, he noticed that he was slurring his words." Upon arrival, code stroke was called. The pts symptoms rapidly improved without intervention, however at approx [id number] the pts neuro status changed acutely and he developed acute LUE and LLE paresis as well as slurred speech and L sided facial droop. The patient was re-evaluated by the neuro team and per chart review: "19:46pm: Interval examination demonstrates +Moderate dysarthria. LUE 1/5 strength. LLE 3/5 strength. New NIHSS 8. tPA discussed with patient and family as patient within extended window for tPA and patient amenable. Aggressive blood pressure control initiated. tPA given at 8:54pm. BP at time of tPA administration 177/95."\par At approximately 2100. The patient was fully alert and oriented. His wife and two sons were at the bedside. He had visible left sided facial droop and was slurring his speech. In addition, he was unable to move his LUE, and his LLE was 1/5-2/5. The patient expressed detailed understanding of what was going on. The patient was started on a nicardipine drip and his BP was in the 160s. \par \par \par CT head 1.26.19\par No acute intracranial hemorrhage, brain edema, midline shift, mass \par effect, or hydrocephalus. \par \par CTA head and neck 1.26.19\par Areas of severe stenosis with poststenotic dilatation involving the \par distal right and left vertebral arteries as well as the proximal basilar \par artery. Differential considerations include vasculitis as well as \par vertebral artery dissections. MRI/MRA suggested for further evaluation.\par \par No significant stenosis or occlusion of the major proximal branches of \par the [place].\par \par CT head 1.27.19\par Old lacunar infarcts are again seen as described above\par \par TTE 1.27.19\par 1. Endocardium not well visualized; grossly normal left\par ventricular systolic function. Endocardial visualization\par enhanced with intravenous injection of echo contrast\par (Definity).\par 2. The right ventricle is not visualized.\par \par MRI/MRA head/neck 1.31.19\par Brain MRI: Acute right basal ganglia infarct. Chronic bilateral lacunar \par infarcts.\par \par Brain/Neck MRA: Severe stenoses of the distal bilateral vertebral \par arteries and proximal basilar artery with poststenotic dilatation. \par \par CT Abdomen 3/2019\par No CT evidence of acute intra-abdominal pathology.\par \par Today he denies any new or worsening neurological signs or symptoms of stroke, TIA, and/or bleeding. He is reportedly compliant with his medication regimen and denies any adverse reactions. Denies any history of DVT, PE. He is now after going to three different rehab centers. He remains with left sided weakness, mild dysarthria, and fatigue. Overall he believes he is improving daily. He states that when he wakes up every morning he feels a "fogginess" for about two hours but states that when he smokes cannabis it shortens the duration \par

## 2019-07-15 NOTE — DISCUSSION/SUMMARY
[Antithrombotic therapy with ___] : antithrombotic therapy with  [unfilled] [Patient encouraged to discuss with Primary MD] : I encouraged the patient to discuss these important issues with ~his/her~ primary care doctor [Intensive Blood Pressure Control] : intensive blood pressure control [Lipid Lowering Therapy] : lipid lowering therapy [Goals and Counseling] : I have reviewed the goals of stroke risk factor modification. I counseled the patient on measures to reduce stroke risk, including the importance of medication compliance, risk factor control, exercise, healthy diet and avoidance of smoking. I reviewed stroke warning signs and symptoms and appropriate actions to take if such occur. [FreeTextEntry1] : Impression:\par 1.Left hemiparesis w/ dysarthria likely 2/2 right corona radiata infarct seen on repeat cth, 2/2 small vessel disease;\par 2.  Vertebrobasilar high grade stenosis (intracranial large artery stenosis) is asymptomatic, and unrelated to his acute stroke. \par \par - Continue ASA 81 mg once daily for secondary stroke prevention\par - Continue to monitor BP at home and notify PCP/Cardiology for readings >140/90. Educated on medication compliance and BP monitoring to prevent secondary stroke and systemic problems \par - Continue Speech therapy/Physical therapy/Occupational therapy as directed.\par  \par - Continue statin therapy for secondary stroke prevention \par - Follow up with PCP for statin management and primary care needs such as regular blood work including monitoring of HbA1c (5.6) and cholesterol profile (goal LDL <70), to modify vascular risk factors \par - Carotid Doppler's and TCD to be obtain with next visit- mild carotid disease bilaterally.\par - Educated on stroke/TIA signs/bleeding signs and symptoms and to call 911 if experiencing any of these symptoms\par - Discussed with patient marijuana cessation but patient not willing to discuss at this time. \par

## 2019-07-26 VITALS — OXYGEN SATURATION: 98 % | HEART RATE: 73 BPM | DIASTOLIC BLOOD PRESSURE: 86 MMHG | SYSTOLIC BLOOD PRESSURE: 140 MMHG

## 2019-08-06 ENCOUNTER — EMERGENCY (EMERGENCY)
Facility: HOSPITAL | Age: 64
LOS: 1 days | Discharge: HOME | End: 2019-08-06
Attending: EMERGENCY MEDICINE | Admitting: EMERGENCY MEDICINE
Payer: MEDICAID

## 2019-08-06 VITALS
RESPIRATION RATE: 18 BRPM | HEART RATE: 76 BPM | OXYGEN SATURATION: 99 % | SYSTOLIC BLOOD PRESSURE: 143 MMHG | TEMPERATURE: 98 F | DIASTOLIC BLOOD PRESSURE: 87 MMHG

## 2019-08-06 DIAGNOSIS — Z90.89 ACQUIRED ABSENCE OF OTHER ORGANS: Chronic | ICD-10-CM

## 2019-08-06 DIAGNOSIS — Z90.49 ACQUIRED ABSENCE OF OTHER SPECIFIED PARTS OF DIGESTIVE TRACT: Chronic | ICD-10-CM

## 2019-08-06 DIAGNOSIS — Z98.890 OTHER SPECIFIED POSTPROCEDURAL STATES: Chronic | ICD-10-CM

## 2019-08-06 LAB
ALBUMIN SERPL ELPH-MCNC: 4.5 G/DL — SIGNIFICANT CHANGE UP (ref 3.3–5)
ALP SERPL-CCNC: 110 U/L — SIGNIFICANT CHANGE UP (ref 40–120)
ALT FLD-CCNC: 21 U/L — SIGNIFICANT CHANGE UP (ref 4–41)
ANION GAP SERPL CALC-SCNC: 16 MMO/L — HIGH (ref 7–14)
AST SERPL-CCNC: 20 U/L — SIGNIFICANT CHANGE UP (ref 4–40)
BASOPHILS # BLD AUTO: 0.03 K/UL — SIGNIFICANT CHANGE UP (ref 0–0.2)
BASOPHILS NFR BLD AUTO: 0.3 % — SIGNIFICANT CHANGE UP (ref 0–2)
BILIRUB SERPL-MCNC: 0.9 MG/DL — SIGNIFICANT CHANGE UP (ref 0.2–1.2)
BUN SERPL-MCNC: 16 MG/DL — SIGNIFICANT CHANGE UP (ref 7–23)
CALCIUM SERPL-MCNC: 9.4 MG/DL — SIGNIFICANT CHANGE UP (ref 8.4–10.5)
CHLORIDE SERPL-SCNC: 101 MMOL/L — SIGNIFICANT CHANGE UP (ref 98–107)
CO2 SERPL-SCNC: 22 MMOL/L — SIGNIFICANT CHANGE UP (ref 22–31)
CREAT SERPL-MCNC: 1 MG/DL — SIGNIFICANT CHANGE UP (ref 0.5–1.3)
EOSINOPHIL # BLD AUTO: 0.03 K/UL — SIGNIFICANT CHANGE UP (ref 0–0.5)
EOSINOPHIL NFR BLD AUTO: 0.3 % — SIGNIFICANT CHANGE UP (ref 0–6)
GLUCOSE SERPL-MCNC: 93 MG/DL — SIGNIFICANT CHANGE UP (ref 70–99)
HCT VFR BLD CALC: 45.5 % — SIGNIFICANT CHANGE UP (ref 39–50)
HGB BLD-MCNC: 15.1 G/DL — SIGNIFICANT CHANGE UP (ref 13–17)
IMM GRANULOCYTES NFR BLD AUTO: 0.3 % — SIGNIFICANT CHANGE UP (ref 0–1.5)
LYMPHOCYTES # BLD AUTO: 1.98 K/UL — SIGNIFICANT CHANGE UP (ref 1–3.3)
LYMPHOCYTES # BLD AUTO: 21.5 % — SIGNIFICANT CHANGE UP (ref 13–44)
MAGNESIUM SERPL-MCNC: 2 MG/DL — SIGNIFICANT CHANGE UP (ref 1.6–2.6)
MCHC RBC-ENTMCNC: 28.7 PG — SIGNIFICANT CHANGE UP (ref 27–34)
MCHC RBC-ENTMCNC: 33.2 % — SIGNIFICANT CHANGE UP (ref 32–36)
MCV RBC AUTO: 86.5 FL — SIGNIFICANT CHANGE UP (ref 80–100)
MONOCYTES # BLD AUTO: 0.45 K/UL — SIGNIFICANT CHANGE UP (ref 0–0.9)
MONOCYTES NFR BLD AUTO: 4.9 % — SIGNIFICANT CHANGE UP (ref 2–14)
NEUTROPHILS # BLD AUTO: 6.71 K/UL — SIGNIFICANT CHANGE UP (ref 1.8–7.4)
NEUTROPHILS NFR BLD AUTO: 72.7 % — SIGNIFICANT CHANGE UP (ref 43–77)
NRBC # FLD: 0 K/UL — SIGNIFICANT CHANGE UP (ref 0–0)
PHOSPHATE SERPL-MCNC: 2.7 MG/DL — SIGNIFICANT CHANGE UP (ref 2.5–4.5)
PLATELET # BLD AUTO: 237 K/UL — SIGNIFICANT CHANGE UP (ref 150–400)
PMV BLD: 9.1 FL — SIGNIFICANT CHANGE UP (ref 7–13)
POTASSIUM SERPL-MCNC: 3.6 MMOL/L — SIGNIFICANT CHANGE UP (ref 3.5–5.3)
POTASSIUM SERPL-SCNC: 3.6 MMOL/L — SIGNIFICANT CHANGE UP (ref 3.5–5.3)
PROT SERPL-MCNC: 8.4 G/DL — HIGH (ref 6–8.3)
RBC # BLD: 5.26 M/UL — SIGNIFICANT CHANGE UP (ref 4.2–5.8)
RBC # FLD: 13.2 % — SIGNIFICANT CHANGE UP (ref 10.3–14.5)
SODIUM SERPL-SCNC: 139 MMOL/L — SIGNIFICANT CHANGE UP (ref 135–145)
TROPONIN T, HIGH SENSITIVITY: 14 NG/L — SIGNIFICANT CHANGE UP (ref ?–14)
WBC # BLD: 9.23 K/UL — SIGNIFICANT CHANGE UP (ref 3.8–10.5)
WBC # FLD AUTO: 9.23 K/UL — SIGNIFICANT CHANGE UP (ref 3.8–10.5)

## 2019-08-06 PROCEDURE — 99283 EMERGENCY DEPT VISIT LOW MDM: CPT

## 2019-08-06 RX ORDER — SODIUM CHLORIDE 9 MG/ML
500 INJECTION INTRAMUSCULAR; INTRAVENOUS; SUBCUTANEOUS ONCE
Refills: 0 | Status: COMPLETED | OUTPATIENT
Start: 2019-08-06 | End: 2019-08-06

## 2019-08-06 RX ADMIN — SODIUM CHLORIDE 500 MILLILITER(S): 9 INJECTION INTRAMUSCULAR; INTRAVENOUS; SUBCUTANEOUS at 22:32

## 2019-08-06 NOTE — ED PROVIDER NOTE - ATTENDING CONTRIBUTION TO CARE
I performed a face-to-face evaluation of the patient and performed a history and physical examination. I agree with the history and physical examination.    Krysta: Older man, h/o CVA, over-exerted himself today doing heavy house work. Got lightheaded. The handy men he was working with called EMS. Got better quickly w/o intervention. Check EKG, trop, Hb. If unremarkable, dc home.

## 2019-08-06 NOTE — ED PROVIDER NOTE - PROGRESS NOTE DETAILS
SHARDA Farooq- Patient advised to stay for 2nd trop/cxr but declined, asking to be dc home. Discussed with attending who stated patient could be dc home at this time. Pt states he will be able to follow up with his PCP. Strict return instructions given. All questions and concerns addressed. Strict return instructions given.

## 2019-08-06 NOTE — ED PROVIDER NOTE - PHYSICAL EXAMINATION
Vital signs reviewed.   CONSTITUTIONAL: Well-appearing; well-nourished; in no apparent distress. Non-toxic appearing.   HEAD: Normocephalic, atraumatic.  EYES: PERRL, EOM intact, conjunctiva and sclera WNL.  ENT: normal nose; no rhinorrhea;   NECK/LYMPH: Supple; non-tender;   CARD: Normal S1, S2; no murmurs noted   RESP: Normal chest excursion with respiration; breath sounds clear and equal bilaterally; no wheezes, rhonchi, or rales.  ABD/GI: soft, non-distended; non-tender  EXT/MS: moves all extremities; distal pulses are normal, no pedal edema.  SKIN: Normal for age and race;   NEURO: Awake, alert, oriented x 3, no gross deficits,  no motor or sensory deficit noted.  PSYCH: Normal mood; appropriate affect.

## 2019-08-06 NOTE — ED CLERICAL - NS ED CLERK NOTE PRE-ARRIVAL INFORMATION; ADDITIONAL PRE-ARRIVAL INFORMATION
This patient is eligible for (or currently enrolled in) an outpatient care management program available through [employer]. This program can coordinate outpatient follow up and assist the patient in accessing a variety of outpatient resources.  If discharged from the ED, the patient will be contacted to see if any additional resources are needed.                                                                                    Please call the Nurse Clinical Call Center at [phone] with any questions or for assistance in discharge planning.

## 2019-08-06 NOTE — ED PROVIDER NOTE - OBJECTIVE STATEMENT
HPI: Patient is a 63 y.o male w recent ischemic stroke (L sided deficits), CAD, HTN, CAYDEN (on CPAP) who presents to ED BIBA for episode of lightheadedness today. As per patient states that he was at his house and there were some workers over helping take care of washing machine. Admits he was doing some lifting and then when he was going back up basement stairs he began to feel lightheaded and generally weak. Admits he sat himself down and closed his eyes, but the workers decided to call 911. Pt states EMS gave him some water and oxygen and he felt much better. Pt at this time states he feels back to baseline, does not currently feel lightheaded. Denies cp, sob, headaches, room spinning, m/v/d, abdominal pain, urinary complaints, fevers, chills or any other complaints. Pt has PCP and cardiologist that he follows with.

## 2019-08-06 NOTE — ED ADULT TRIAGE NOTE - CHIEF COMPLAINT QUOTE
pt bib ems, neighbor called 911 because he was c/o feeling lightheaded after walking up a flight of stairs, per ems patient is hoarding and his apartment is filthy with areas that are impassible, patient denies any complaints of pain or discomfort, no dizziness, cp or sob pmh cva in January with residual weakness to left hand. f/s 126 pta.

## 2019-08-06 NOTE — ED PROVIDER NOTE - NSFOLLOWUPINSTRUCTIONS_ED_ALL_ED_FT
Patient advised to follow up with PRIMARY CARE DOCTOR IN 24-48 HOURS and told to return to the emergency department immediately for any new or concerning symptoms such as weakness, dizziness, numbness or tingling, chest pain, shortness of breath OR ANY OTHER COMPLAINTS. Patient agrees with plan.      Advance activity as tolerated.  Continue all previously prescribed medications as directed unless otherwise instructed.  Follow up with your primary care physician in 48-72 hours- bring copies of your results.  Return to the ER for worsening or persistent symptoms, and/or ANY NEW OR CONCERNING SYMPTOMS. If you have issues obtaining follow up, please call: [phone] to obtain a doctor or specialist who takes your insurance in your area.  You may call [phone] to make an appointment with the internal medicine clinic.

## 2019-08-06 NOTE — ED PROVIDER NOTE - NSDCPRINTRESULTS_ED_ALL_ED
Patient requests all Lab and Radiology Results on their Discharge Instructions
[email or web address]

## 2019-08-06 NOTE — ED PROVIDER NOTE - CLINICAL SUMMARY MEDICAL DECISION MAKING FREE TEXT BOX
Krysta: Older man, h/o CVA, over-exerted himself today doing heavy house work. Got lightheaded. The handy men he was working with called EMS. Got better quickly w/o intervention. Check EKG, trop, Hb. If unremarkable, dc home.

## 2019-08-09 NOTE — ED ADULT NURSE NOTE - CAS EDP DISCH DISPOSITION ADMI
Return to the ED for any new or worsening symptoms  Take your medication as prescribed  Keep wound clean and dry   Advance activity as tolerated ICU

## 2019-08-13 ENCOUNTER — APPOINTMENT (OUTPATIENT)
Dept: INTERNAL MEDICINE | Facility: CLINIC | Age: 64
End: 2019-08-13
Payer: MEDICAID

## 2019-08-13 VITALS
WEIGHT: 311 LBS | TEMPERATURE: 98.4 F | OXYGEN SATURATION: 96 % | HEART RATE: 78 BPM | HEIGHT: 71 IN | BODY MASS INDEX: 43.54 KG/M2

## 2019-08-13 PROCEDURE — 99496 TRANSJ CARE MGMT HIGH F2F 7D: CPT | Mod: 25

## 2019-08-13 PROCEDURE — 36415 COLL VENOUS BLD VENIPUNCTURE: CPT

## 2019-08-16 VITALS — SYSTOLIC BLOOD PRESSURE: 128 MMHG | DIASTOLIC BLOOD PRESSURE: 80 MMHG

## 2019-08-16 LAB
CHOLEST SERPL-MCNC: 160 MG/DL
CHOLEST/HDLC SERPL: 4.4 RATIO
HDLC SERPL-MCNC: 36 MG/DL
LDLC SERPL CALC-MCNC: 99 MG/DL
TRIGL SERPL-MCNC: 123 MG/DL

## 2019-08-16 NOTE — HISTORY OF PRESENT ILLNESS
[FreeTextEntry2] : The patient comes in to follow-up the ER visit.  He had been in his house and a washing machine was being installed.  He helped to lift it which was very difficult.  Shortly after that he tried to go upstairs but had trouble- he felt tired, woozy, dizzy, lightheaded.  A fried was there and he didn't like the way the patient looked- he didn't lose consciousness by his report but he may not have been fully responsive.  The friend called EMS without the patient's knowledge.  When EMS came the patient felt ok and initially refused to go to the ER but was convinced to go.  He was taken to [facility] and evaluated and discharged.  \par \par The patient now feels fine and has no complaints.  He denies chest pain, dyspnea, palpitations, LOC.

## 2019-08-16 NOTE — ASSESSMENT
[FreeTextEntry1] : The patient had a brief episode in which he might have had an altered mental status which occurred after a heavy physical exertion.  The symptoms all resolved and he was seen and discharged from the ER.  He appears stable now without complaints.  He was advised to not overdo it in the future.  Continue PT and OT.  Call PRN for recurrence.\par \par The BP is fine at 128/80.  HCTZ had been advised but the patient isn't taking it.  He can hold off for now.  \par \par He had been advised to see a cardiologist previously which he hasn't done.  It was again advised and he agrees. \par \par CBC, metabolic and troponin from the ER ok.  \par \par Check lipids on Atorvastatin 80 mg. \par

## 2019-09-27 ENCOUNTER — APPOINTMENT (OUTPATIENT)
Dept: NEUROLOGY | Facility: CLINIC | Age: 64
End: 2019-09-27
Payer: MEDICAID

## 2019-09-27 VITALS
HEART RATE: 67 BPM | BODY MASS INDEX: 42.98 KG/M2 | WEIGHT: 307 LBS | HEIGHT: 71 IN | DIASTOLIC BLOOD PRESSURE: 85 MMHG | SYSTOLIC BLOOD PRESSURE: 135 MMHG

## 2019-09-27 VITALS
HEIGHT: 71 IN | SYSTOLIC BLOOD PRESSURE: 135 MMHG | HEART RATE: 67 BPM | WEIGHT: 307 LBS | DIASTOLIC BLOOD PRESSURE: 85 MMHG | BODY MASS INDEX: 42.98 KG/M2

## 2019-09-27 DIAGNOSIS — F32.9 MAJOR DEPRESSIVE DISORDER, SINGLE EPISODE, UNSPECIFIED: ICD-10-CM

## 2019-09-27 PROCEDURE — 99215 OFFICE O/P EST HI 40 MIN: CPT

## 2019-09-27 NOTE — REVIEW OF SYSTEMS
[Feeling Tired] : feeling tired [Depression] : depression [As Noted in HPI] : as noted in HPI [Negative] : Endocrine [FreeTextEntry8] : slow stream [de-identified] : unclear degree of "depression". Denied feeling hopeless or tearful

## 2019-09-27 NOTE — HISTORY OF PRESENT ILLNESS
[FreeTextEntry1] : Thank You for referring this 63-year-old\par  \par Summary from Dr. Linn's note dated 7/9/19-with modification\par \par He Presented to hospital on 1/26/19 with acute onset of slurred speech and LUE paresis. He fell over while tying his shoelaces. When he got up, he tried to reach for a tissue with his left arm and was unable to lift it. He was slurring his words. The pts symptoms rapidly improved without intervention, however subsequently he developed acute severe left hemiparesis and slurred speech, and he was treated with IV TPA. \par \par CT head 1.26.19\par Unremarkable\par \par CTA head and neck 1.26.19\par Areas of severe stenosis with poststenotic dilatation involving the \par distal right and left vertebral arteries as well as the proximal basilar \par artery. Differential considerations include vasculitis as well as \par vertebral artery dissections.\par \par TTE 1.27.19\par unremarkable\par \par MRI/MRA head/neck 1.31.19\par Brain MRI: Acute right basal ganglia infarct. Chronic bilateral lacunar \par infarcts.\par \par Brain/Neck MRA: Severe stenoses of the distal bilateral vertebral \par arteries and proximal basilar artery with poststenotic dilatation. \par \par Today he denies any new or worsening neurological signs or symptoms.  He states that when he wakes up every morning he feels a "fogginess" for about two hours but states that when he smokes cannabis it shortens the duration.\par \par 9/27/19. He Came to the Office Today. He reports no new focal neurologic symptoms. He is independent in activities of daily living. He feels "sad" and possibly "depressed" since his stroke.

## 2019-09-27 NOTE — PHYSICAL EXAM
[General Appearance - In No Acute Distress] : in no acute distress [General Appearance - Alert] : alert [Oriented To Time, Place, And Person] : oriented to person, place, and time [Impaired Insight] : insight and judgment were intact [Affect] : the affect was normal [PERRL With Normal Accommodation] : pupils were equal in size, round, reactive to light, with normal accommodation [Sclera] : the sclera and conjunctiva were normal [Extraocular Movements] : extraocular movements were intact [Oropharynx] : the oropharynx was normal [Outer Ear] : the ears and nose were normal in appearance [Neck Appearance] : the appearance of the neck was normal [Thyroid Diffuse Enlargement] : the thyroid was not enlarged [Neck Cervical Mass (___cm)] : no neck mass was observed [Jugular Venous Distention Increased] : there was no jugular-venous distention [Thyroid Nodule] : there were no palpable thyroid nodules [Heart Rate And Rhythm] : heart rate was normal and rhythm regular [Heart Sounds] : normal S1 and S2 [Auscultation Breath Sounds / Voice Sounds] : lungs were clear to auscultation bilaterally [Heart Sounds Gallop] : no gallops [Murmurs] : no murmurs [Edema] : there was no peripheral edema [Heart Sounds Pericardial Friction Rub] : no pericardial rub [Arterial Pulses Carotid] : carotid pulses were normal with no bruits [No CVA Tenderness] : no ~M costovertebral angle tenderness [Veins - Varicosity Changes] : there were no varicosital changes [No Spinal Tenderness] : no spinal tenderness [Abnormal Walk] : normal gait [Nail Clubbing] : no clubbing  or cyanosis of the fingernails [Musculoskeletal - Swelling] : no joint swelling seen [Skin Color & Pigmentation] : normal skin color and pigmentation [Motor Tone] : muscle strength and tone were normal [Skin Turgor] : normal skin turgor [] : no rash [FreeTextEntry1] : brownish discoloration of distal feet and toes bilaterally.

## 2019-10-01 ENCOUNTER — OUTPATIENT (OUTPATIENT)
Dept: OUTPATIENT SERVICES | Facility: HOSPITAL | Age: 64
LOS: 1 days | End: 2019-10-01
Payer: MEDICAID

## 2019-10-01 DIAGNOSIS — Z90.49 ACQUIRED ABSENCE OF OTHER SPECIFIED PARTS OF DIGESTIVE TRACT: Chronic | ICD-10-CM

## 2019-10-01 DIAGNOSIS — Z90.89 ACQUIRED ABSENCE OF OTHER ORGANS: Chronic | ICD-10-CM

## 2019-10-01 DIAGNOSIS — Z98.890 OTHER SPECIFIED POSTPROCEDURAL STATES: Chronic | ICD-10-CM

## 2019-10-28 DIAGNOSIS — Z76.89 PERSONS ENCOUNTERING HEALTH SERVICES IN OTHER SPECIFIED CIRCUMSTANCES: ICD-10-CM

## 2019-10-28 DIAGNOSIS — Z71.89 OTHER SPECIFIED COUNSELING: ICD-10-CM

## 2019-12-26 NOTE — REVIEW OF SYSTEMS
- - - [As Noted in HPI] : as noted in HPI [Negative] : Heme/Lymph [Sleep Disturbances] : no sleep disturbances [Anxiety] : no anxiety [Depression] : no depression

## 2020-01-16 NOTE — PROGRESS NOTE ADULT - PROBLEM SELECTOR PROBLEM 2
Diarrhea
O-Z Plasty Text: The defect edges were debeveled with a #15 scalpel blade.  Given the location of the defect, shape of the defect and the proximity to free margins an O-Z plasty (double transposition flap) was deemed most appropriate.  Using a sterile surgical marker, the appropriate transposition flaps were drawn incorporating the defect and placing the expected incisions within the relaxed skin tension lines where possible.    The area thus outlined was incised deep to adipose tissue with a #15 scalpel blade.  The skin margins were undermined to an appropriate distance in all directions utilizing iris scissors.  Hemostasis was achieved with electrocautery.  The flaps were then transposed into place, one clockwise and the other counterclockwise, and anchored with interrupted buried subcutaneous sutures.
Diarrhea

## 2020-02-27 ENCOUNTER — RX RENEWAL (OUTPATIENT)
Age: 65
End: 2020-02-27

## 2020-03-25 RX ORDER — FLUTICASONE PROPIONATE 50 UG/1
50 SPRAY, METERED NASAL
Qty: 1 | Refills: 3 | Status: ACTIVE | COMMUNITY
Start: 2020-03-25 | End: 1900-01-01

## 2020-04-02 ENCOUNTER — APPOINTMENT (OUTPATIENT)
Dept: NEUROLOGY | Facility: CLINIC | Age: 65
End: 2020-04-02

## 2020-05-12 ENCOUNTER — RX RENEWAL (OUTPATIENT)
Age: 65
End: 2020-05-12

## 2020-06-08 ENCOUNTER — RX RENEWAL (OUTPATIENT)
Age: 65
End: 2020-06-08

## 2020-08-06 NOTE — ED PROVIDER NOTE - CADM POA URETHRAL CATHETER
Medication:    Outpatient Medications Marked as Taking for the 8/6/20 encounter (Refill) with Aleksandr Devine MD   Medication Sig Dispense Refill   • amphetamine-dextroamphetamine (ADDERALL) 30 MG tablet Take 1 tablet by mouth 2 times daily. 60 tablet 0       Message to Prescriber:     [x] Pharmacy has been verified.    [] Patient completely out of medication (*Route encounter as high priority if checked)    [x] Patient informed refill request can take up to 3 business days to be processed    Patient currently has follow up appointment scheduled       No

## 2020-09-08 ENCOUNTER — NON-APPOINTMENT (OUTPATIENT)
Age: 65
End: 2020-09-08

## 2020-09-08 ENCOUNTER — APPOINTMENT (OUTPATIENT)
Dept: INTERNAL MEDICINE | Facility: CLINIC | Age: 65
End: 2020-09-08
Payer: MEDICAID

## 2020-09-08 VITALS
WEIGHT: 270 LBS | TEMPERATURE: 97.7 F | HEART RATE: 77 BPM | HEIGHT: 71 IN | OXYGEN SATURATION: 96 % | BODY MASS INDEX: 37.8 KG/M2

## 2020-09-08 DIAGNOSIS — I10 ESSENTIAL (PRIMARY) HYPERTENSION: ICD-10-CM

## 2020-09-08 DIAGNOSIS — G47.33 OBSTRUCTIVE SLEEP APNEA (ADULT) (PEDIATRIC): ICD-10-CM

## 2020-09-08 DIAGNOSIS — N52.9 MALE ERECTILE DYSFUNCTION, UNSPECIFIED: ICD-10-CM

## 2020-09-08 PROCEDURE — 93000 ELECTROCARDIOGRAM COMPLETE: CPT | Mod: 59

## 2020-09-08 PROCEDURE — G0439: CPT

## 2020-09-08 PROCEDURE — 36415 COLL VENOUS BLD VENIPUNCTURE: CPT

## 2020-09-08 RX ORDER — BLOOD-GLUCOSE METER
KIT MISCELLANEOUS
Qty: 1 | Refills: 0 | Status: DISCONTINUED | OUTPATIENT
Start: 2019-06-20 | End: 2020-09-08

## 2020-09-08 RX ORDER — DOCUSATE SODIUM 100 MG/1
100 CAPSULE, LIQUID FILLED ORAL
Qty: 90 | Refills: 11 | Status: DISCONTINUED | COMMUNITY
Start: 2020-06-08 | End: 2020-09-08

## 2020-09-09 RX ORDER — TADALAFIL 20 MG/1
20 TABLET ORAL
Qty: 30 | Refills: 3 | Status: ACTIVE | COMMUNITY
Start: 2020-09-08 | End: 1900-01-01

## 2020-09-18 VITALS — DIASTOLIC BLOOD PRESSURE: 82 MMHG | SYSTOLIC BLOOD PRESSURE: 125 MMHG

## 2020-09-18 LAB
25(OH)D3 SERPL-MCNC: 9.1 NG/ML
ALBUMIN SERPL ELPH-MCNC: 4.3 G/DL
ALP BLD-CCNC: 117 U/L
ALT SERPL-CCNC: 30 U/L
ANION GAP SERPL CALC-SCNC: 16 MMOL/L
AST SERPL-CCNC: 21 U/L
BASOPHILS # BLD AUTO: 0.03 K/UL
BASOPHILS NFR BLD AUTO: 0.4 %
BILIRUB SERPL-MCNC: 0.9 MG/DL
BUN SERPL-MCNC: 20 MG/DL
CALCIUM SERPL-MCNC: 8.9 MG/DL
CHLORIDE SERPL-SCNC: 103 MMOL/L
CHOLEST SERPL-MCNC: 172 MG/DL
CHOLEST/HDLC SERPL: 4.1 RATIO
CO2 SERPL-SCNC: 21 MMOL/L
CREAT SERPL-MCNC: 1.1 MG/DL
EOSINOPHIL # BLD AUTO: 0.13 K/UL
EOSINOPHIL NFR BLD AUTO: 1.9 %
ESTIMATED AVERAGE GLUCOSE: 108 MG/DL
GLUCOSE SERPL-MCNC: 97 MG/DL
HBA1C MFR BLD HPLC: 5.4 %
HCT VFR BLD CALC: 45.7 %
HDLC SERPL-MCNC: 42 MG/DL
HGB BLD-MCNC: 14.7 G/DL
IMM GRANULOCYTES NFR BLD AUTO: 0.1 %
LDLC SERPL CALC-MCNC: 114 MG/DL
LYMPHOCYTES # BLD AUTO: 2.11 K/UL
LYMPHOCYTES NFR BLD AUTO: 30.8 %
MAN DIFF?: NORMAL
MCHC RBC-ENTMCNC: 29.3 PG
MCHC RBC-ENTMCNC: 32.2 GM/DL
MCV RBC AUTO: 91.2 FL
MONOCYTES # BLD AUTO: 0.38 K/UL
MONOCYTES NFR BLD AUTO: 5.6 %
NEUTROPHILS # BLD AUTO: 4.18 K/UL
NEUTROPHILS NFR BLD AUTO: 61.2 %
NT-PROBNP SERPL-MCNC: 82 PG/ML
PLATELET # BLD AUTO: 211 K/UL
POTASSIUM SERPL-SCNC: 3.8 MMOL/L
PROT SERPL-MCNC: 7 G/DL
PSA SERPL-MCNC: 0.81 NG/ML
RBC # BLD: 5.01 M/UL
RBC # FLD: 13.7 %
SARS-COV-2 IGG SERPL IA-ACNC: <0.1 INDEX
SARS-COV-2 IGG SERPL QL IA: NEGATIVE
SODIUM SERPL-SCNC: 140 MMOL/L
T4 FREE SERPL-MCNC: 1.1 NG/DL
TRIGL SERPL-MCNC: 79 MG/DL
TSH SERPL-ACNC: 1.87 UIU/ML
VIT B12 SERPL-MCNC: 552 PG/ML
WBC # FLD AUTO: 6.84 K/UL

## 2020-09-18 NOTE — HISTORY OF PRESENT ILLNESS
[FreeTextEntry1] : The patient is here for a routine visit. He comes in with his wife. [de-identified] : He says his diet is ok and he has lost 70 pounds. He is not active.\par \par His speech is worse. \par \par  No chest pain or dyspnea.

## 2020-09-18 NOTE — PHYSICAL EXAM
[No Acute Distress] : no acute distress [Well Nourished] : well nourished [Well Developed] : well developed [Well-Appearing] : well-appearing [Normal Sclera/Conjunctiva] : normal sclera/conjunctiva [PERRL] : pupils equal round and reactive to light [EOMI] : extraocular movements intact [Normal Outer Ear/Nose] : the outer ears and nose were normal in appearance [Normal Oropharynx] : the oropharynx was normal [No JVD] : no jugular venous distention [No Lymphadenopathy] : no lymphadenopathy [Supple] : supple [Thyroid Normal, No Nodules] : the thyroid was normal and there were no nodules present [No Respiratory Distress] : no respiratory distress  [No Accessory Muscle Use] : no accessory muscle use [Clear to Auscultation] : lungs were clear to auscultation bilaterally [Normal Rate] : normal rate  [Regular Rhythm] : with a regular rhythm [Normal S1, S2] : normal S1 and S2 [No Murmur] : no murmur heard [No Carotid Bruits] : no carotid bruits [No Abdominal Bruit] : a ~M bruit was not heard ~T in the abdomen [No Varicosities] : no varicosities [Pedal Pulses Present] : the pedal pulses are present [No Edema] : there was no peripheral edema [No Palpable Aorta] : no palpable aorta [No Extremity Clubbing/Cyanosis] : no extremity clubbing/cyanosis [Soft] : abdomen soft [Non Tender] : non-tender [Non-distended] : non-distended [No Masses] : no abdominal mass palpated [No HSM] : no HSM [Normal Bowel Sounds] : normal bowel sounds [Normal Posterior Cervical Nodes] : no posterior cervical lymphadenopathy [Normal Anterior Cervical Nodes] : no anterior cervical lymphadenopathy [No CVA Tenderness] : no CVA  tenderness [No Spinal Tenderness] : no spinal tenderness [No Joint Swelling] : no joint swelling [Grossly Normal Strength/Tone] : grossly normal strength/tone [No Rash] : no rash [Coordination Grossly Intact] : coordination grossly intact [No Focal Deficits] : no focal deficits [Normal Gait] : normal gait [Normal Affect] : the affect was normal [Normal Insight/Judgement] : insight and judgment were intact [de-identified] : Obese male, NAD.  Speech garbled and difficult to understand

## 2020-09-18 NOTE — HEALTH RISK ASSESSMENT
[No] : No [No falls in past year] : Patient reported no falls in the past year [Fully functional (bathing, dressing, toileting, transferring, walking, feeding)] : Fully functional (bathing, dressing, toileting, transferring, walking, feeding) [Fully functional (using the telephone, shopping, preparing meals, housekeeping, doing laundry, using] : Fully functional and needs no help or supervision to perform IADLs (using the telephone, shopping, preparing meals, housekeeping, doing laundry, using transportation, managing medications and managing finances) [] : No [Reports changes in hearing] : Reports no changes in hearing [Reports changes in vision] : Reports no changes in vision [Reports changes in dental health] : Reports no changes in dental health

## 2020-09-18 NOTE — ASSESSMENT
[FreeTextEntry1] : He is doing better in terms of his weight and diet.  The BP is ok at 125/82.  Check lipids.\par \par His speech is worse and he would benefit from speech and swallow therapy and it was advised.  He was also advised to see his neurologist- he is overdue.\par \par He didn't see a cardiologist last year which had been advised and it was again advised.  Check a BNP- note he has congestion when he lies down.\par \par He declines the flu vaccine.\par \par Check a COVID-19 antibody.\par \par Check a PSA and other blood tests.  \par \par He requests Cialis which he cuts in half.  \par \par He uses CPAP but he has not had any evaluation in a long time and he was advised to follow-up with a pulmonologist.

## 2020-10-21 ENCOUNTER — APPOINTMENT (OUTPATIENT)
Dept: INTERNAL MEDICINE | Facility: CLINIC | Age: 65
End: 2020-10-21

## 2020-10-29 ENCOUNTER — APPOINTMENT (OUTPATIENT)
Dept: PULMONOLOGY | Facility: CLINIC | Age: 65
End: 2020-10-29

## 2020-11-11 ENCOUNTER — APPOINTMENT (OUTPATIENT)
Dept: NEUROLOGY | Facility: CLINIC | Age: 65
End: 2020-11-11
Payer: MEDICAID

## 2020-11-11 VITALS — TEMPERATURE: 97.8 F

## 2020-11-11 DIAGNOSIS — R47.1 DYSARTHRIA AND ANARTHRIA: ICD-10-CM

## 2020-11-11 DIAGNOSIS — I63.9 CEREBRAL INFARCTION, UNSPECIFIED: ICD-10-CM

## 2020-11-11 PROCEDURE — 99072 ADDL SUPL MATRL&STAF TM PHE: CPT

## 2020-11-11 PROCEDURE — 99213 OFFICE O/P EST LOW 20 MIN: CPT

## 2020-11-11 PROCEDURE — 93880 EXTRACRANIAL BILAT STUDY: CPT

## 2020-11-16 ENCOUNTER — APPOINTMENT (OUTPATIENT)
Dept: PULMONOLOGY | Facility: CLINIC | Age: 65
End: 2020-11-16
Payer: MEDICAID

## 2020-11-16 VITALS
BODY MASS INDEX: 37.1 KG/M2 | DIASTOLIC BLOOD PRESSURE: 94 MMHG | HEIGHT: 71 IN | SYSTOLIC BLOOD PRESSURE: 149 MMHG | WEIGHT: 265 LBS | RESPIRATION RATE: 18 BRPM | TEMPERATURE: 97.8 F | HEART RATE: 102 BPM

## 2020-11-16 DIAGNOSIS — R06.83 SNORING: ICD-10-CM

## 2020-11-16 DIAGNOSIS — E66.9 OBESITY, UNSPECIFIED: ICD-10-CM

## 2020-11-16 DIAGNOSIS — G47.33 OBSTRUCTIVE SLEEP APNEA (ADULT) (PEDIATRIC): ICD-10-CM

## 2020-11-16 DIAGNOSIS — G47.31 PRIMARY CENTRAL SLEEP APNEA: ICD-10-CM

## 2020-11-16 PROCEDURE — 99205 OFFICE O/P NEW HI 60 MIN: CPT | Mod: GC

## 2020-11-16 PROCEDURE — 99072 ADDL SUPL MATRL&STAF TM PHE: CPT

## 2020-11-16 PROCEDURE — 99215 OFFICE O/P EST HI 40 MIN: CPT | Mod: GC

## 2020-11-16 NOTE — HISTORY OF PRESENT ILLNESS
[Obstructive Sleep Apnea] : obstructive sleep apnea [Snoring] : snoring [Witnessed Apneas] : witnessed sleep apnea [Frequent Nocturnal Awakening] : frequent nocturnal awakening [ESS: ___] : ESS score [unfilled] [Unintentional Sleep While Inactive] : unintentional sleep while inactive [Awakes Unrefreshed] : awakening unrefreshed [Awakening With Dry Mouth] : awakening with dry mouth [To Bed: ___] : ~he/she~ goes to bed at [unfilled] [CPAP: ___ cmH2O] : CPAP: [unfilled] cmH2O [FreeTextEntry1] : The patient is a 65 year old man with a history of CAYDEN, CVA with residual hemiparesis, HTN, HLD presenting to establish care in sleep clinic again\par  initially diagnosed in 2007. At that time, split diagnostic/CPAP titration polysomnography was performed. The apnea index was 63 per hour of sleep and CPAP of 16 cm of water pressure was found to effectively treat his obstructive sleep apnea. He notes that he uses CPAP on a daily basis for the entirety of the night with continued resolution of sleep apnea related symptomatology. He reports the CPAP device is old and not functioning well at this point noting rainout of water in the tubing and decreased efficacy of the device. He presents for renewal of his CPAP equipment. \par The patient reports that since his cerebrovascular accident his sleep-wake schedule has been reversed typically sleeping at 6 AM  and staying up for most of the night.  During his sleep.  He reports 2-3 awakenings but denies difficulty returning to sleep. The patient feels fatigued when he wakes up. He sometimes has dry mouth when he wakes up. He continues to sleep most of the time during the day. He goes to sleep around 6 AM and wakes up around 1-2 PM. he reports sometimes sleeping 8-16 hours.  [Daytime Somnolence] : no daytime somnolence [Unintentional Sleep while Active] : no unintentional sleep while active [Awakes with Headache] : no headache upon awakening [Recent  Weight Gain] : no recent weight gain

## 2020-11-16 NOTE — PHYSICAL EXAM
[Normal Appearance] : normal appearance [Heart Rate And Rhythm] : heart rate was normal and rhythm regular [Heart Sounds] : normal S1 and S2 [Heart Sounds Gallop] : no gallops [Murmurs] : no murmurs [Heart Sounds Pericardial Friction Rub] : no pericardial rub [] : no respiratory distress [Respiration, Rhythm And Depth] : normal respiratory rhythm and effort [Auscultation Breath Sounds / Voice Sounds] : lungs were clear to auscultation bilaterally [Nail Clubbing] : no clubbing of the fingernails [Cyanosis, Localized] : no localized cyanosis [Oriented To Time, Place, And Person] : oriented to person, place, and time [Affect] : the affect was normal [Normal Conjunctiva] : the conjunctiva exhibited no abnormalities [Eyelids - No Xanthelasma] : the eyelids demonstrated no xanthelasmas [Low Lying Soft Palate] : low lying soft palate [Enlarged Base of the Tongue] : enlargement of the base of the tongue [FreeTextEntry1] : left hemiparesis

## 2020-11-16 NOTE — REVIEW OF SYSTEMS
[EDS: ESS=____] : daytime somnolence: ESS=[unfilled] [Nasal Congestion] : nasal congestion [Snoring] : snoring [Obesity] : obesity [Difficulty Maintaining Sleep] : difficulty maintaining sleep [Witnessed Apneas] : no witnessed apnea [Shortness Of Breath] : no shortness of breath [A.M. Dry Mouth] : no a.m. dry mouth [Chest Pain] : no chest pain [Anemia] : no anemia [A.M. Headache] : no headache present upon awakening [Leg Dysesthesias] : no leg dysesthesias [Sleep Paralysis] : no sleep paralysis [Heartburn] : no heartburn [Nocturia] : no nocturia [Difficulty Initiating Sleep] : no difficulty falling asleep

## 2020-11-16 NOTE — ASSESSMENT
[FreeTextEntry1] : The patient is a 65 year old man with a history of CAYDEN, CVA with residual left sided weakness and dysarthria presents to sleep clinic to establish care.\par \par #CAYDEN\par This patient was initially diagnosed in 2007 with split diagnostic/CPAP titration polysomnography.  The diagnostic portion of the study demonstrated severe degree obstructive sleep apnea with 63 disordered breathing events noted per hour.  CPAP titration portion of the study demonstrated efficacy of CPAP at 16 cm of water pressure.  He has not followed up but has been utilizing his CPAP nightly since then.  However since his initial assessment he had a cerebrovascular accident with impact on his speech and swallowing.  Therefore the degree of sleep disordered breathing and the nature of sleep disordered breathing may have changed.  In particular, central sleep apnea may become prevalent since the cerebrovascular accident.  He also notes that he is no longer obtaining benefit from CPAP therapy.  At this point, it is essential to adequately treat his sleep disordered breathing to mitigate cerebrovascular risk.  To this end it is important to determine the degree and type of sleep disordered breathing.  If significant positive central apneas noted therapy other than CPAP may be needed.  Therefore for further assessment I recommended that he undergo in laboratory polysomnographic testing with transcutaneous CO2 monitoring given his class II obesity.  This will be done as part of a split diagnostic/Pap titration study.  The second part of the study will be utilized for appropriate Pap titration.  To accommodate the patient's sleep schedule he will have to undergo daytime testing as his best sleep is from 6 AM until 1-2 PM.  We will make arrangements for the schedule for polysomnographic testing at the [facility] sleep disorder center.\par \par The ramifications of sleep apnea and its potential therapeutic modalities were discussed with the patient. \par \par \par #Dysphagia\par - patient complaining of coughing on saliva, unclear if worsening dysphagia, recommended follow up for speech and swallow reassessment.  He states that [facility] and will [facility] up with [facility].

## 2020-11-16 NOTE — REASON FOR VISIT
[Initial Eval - Existing Diagnosis] : an initial evaluation of an existing diagnosis [Sleep Apnea] : sleep apnea [Spouse] : spouse

## 2020-12-03 ENCOUNTER — APPOINTMENT (OUTPATIENT)
Dept: MRI IMAGING | Facility: CLINIC | Age: 65
End: 2020-12-03

## 2020-12-03 ENCOUNTER — OUTPATIENT (OUTPATIENT)
Dept: OUTPATIENT SERVICES | Facility: HOSPITAL | Age: 65
LOS: 1 days | End: 2020-12-03

## 2020-12-03 DIAGNOSIS — Z98.890 OTHER SPECIFIED POSTPROCEDURAL STATES: Chronic | ICD-10-CM

## 2020-12-03 DIAGNOSIS — I63.9 CEREBRAL INFARCTION, UNSPECIFIED: ICD-10-CM

## 2020-12-03 DIAGNOSIS — Z90.49 ACQUIRED ABSENCE OF OTHER SPECIFIED PARTS OF DIGESTIVE TRACT: Chronic | ICD-10-CM

## 2020-12-03 DIAGNOSIS — Z90.89 ACQUIRED ABSENCE OF OTHER ORGANS: Chronic | ICD-10-CM

## 2020-12-05 DIAGNOSIS — Z01.818 ENCOUNTER FOR OTHER PREPROCEDURAL EXAMINATION: ICD-10-CM

## 2020-12-06 ENCOUNTER — APPOINTMENT (OUTPATIENT)
Dept: DISASTER EMERGENCY | Facility: CLINIC | Age: 65
End: 2020-12-06

## 2020-12-07 ENCOUNTER — APPOINTMENT (OUTPATIENT)
Dept: NEUROLOGY | Facility: CLINIC | Age: 65
End: 2020-12-07

## 2020-12-07 LAB — SARS-COV-2 N GENE NPH QL NAA+PROBE: NOT DETECTED

## 2020-12-09 ENCOUNTER — OUTPATIENT (OUTPATIENT)
Dept: OUTPATIENT SERVICES | Facility: HOSPITAL | Age: 65
LOS: 1 days | End: 2020-12-09
Payer: MEDICAID

## 2020-12-09 ENCOUNTER — APPOINTMENT (OUTPATIENT)
Dept: SLEEP CENTER | Facility: CLINIC | Age: 65
End: 2020-12-09
Payer: MEDICAID

## 2020-12-09 DIAGNOSIS — Z98.890 OTHER SPECIFIED POSTPROCEDURAL STATES: Chronic | ICD-10-CM

## 2020-12-09 DIAGNOSIS — Z90.49 ACQUIRED ABSENCE OF OTHER SPECIFIED PARTS OF DIGESTIVE TRACT: Chronic | ICD-10-CM

## 2020-12-09 DIAGNOSIS — Z90.89 ACQUIRED ABSENCE OF OTHER ORGANS: Chronic | ICD-10-CM

## 2020-12-09 PROCEDURE — 95811 POLYSOM 6/>YRS CPAP 4/> PARM: CPT

## 2020-12-09 PROCEDURE — 95811 POLYSOM 6/>YRS CPAP 4/> PARM: CPT | Mod: 26

## 2020-12-10 DIAGNOSIS — G47.33 OBSTRUCTIVE SLEEP APNEA (ADULT) (PEDIATRIC): ICD-10-CM

## 2020-12-14 ENCOUNTER — NON-APPOINTMENT (OUTPATIENT)
Age: 65
End: 2020-12-14

## 2020-12-14 ENCOUNTER — APPOINTMENT (OUTPATIENT)
Dept: NEUROLOGY | Facility: CLINIC | Age: 65
End: 2020-12-14

## 2021-03-01 NOTE — PROGRESS NOTE ADULT - SUBJECTIVE AND OBJECTIVE BOX
INTERVAL SUBJECTIVE & REVIEW OF SYMPTOMS:  Chart reviewed. Patient seen at bedside. Seated in chair  Frustrated about therapy schedule/being woken up early for shower- Discussed weekend schedule and support provided    REVIEW OF SYSTEMS  [  x ] Constitutional WNL  [  x ] Cardio WNL  [   ] Resp WNL  [x   ] GI WNL  [   ]  WNL    Vital Signs Last 24 Hrs  T(C): 36.7 (18 Feb 2019 08:07), Max: 36.7 (18 Feb 2019 08:07)  T(F): 98 (18 Feb 2019 08:07), Max: 98 (18 Feb 2019 08:07)  HR: 77 (18 Feb 2019 08:07) (76 - 78)  BP: 142/84 (18 Feb 2019 08:07) (134/80 - 150/90)  BP(mean): --  RR: 14 (18 Feb 2019 08:07) (14 - 18)  SpO2: 98% (18 Feb 2019 08:07) (98% - 98%)          PHYSICAL EXAM  General: NAD, obese male,   Cardio: S1S2  Resp:CLEAR   Abdomen: soft, obese  Extrem: no edema  neuro:aaox3, no dysarthria or aphasia , left hemiparesis                          14.9   8.0   )-----------( 242      ( 18 Feb 2019 08:50 )             45.7     02-18    138  |  102  |  24<H>  ----------------------------<  121<H>  3.7   |  24  |  1.01    Ca    9.5      18 Feb 2019 08:50          MEDICATIONS  (STANDING):  amLODIPine   Tablet 10 milliGRAM(s) Oral daily  aspirin  chewable 81 milliGRAM(s) Oral daily  atorvastatin 80 milliGRAM(s) Oral <User Schedule>  docusate sodium 100 milliGRAM(s) Oral two times a day  FLUoxetine 10 milliGRAM(s) Oral daily  heparin  Injectable 5000 Unit(s) SubCutaneous every 8 hours  hydrocortisone 0.5% Ointment 1 Application(s) Topical two times a day  lisinopril 2.5 milliGRAM(s) Oral daily  losartan 100 milliGRAM(s) Oral daily  melatonin 9 milliGRAM(s) Oral <User Schedule>  polyethylene glycol 3350 17 Gram(s) Oral daily    MEDICATIONS  (PRN):  acetaminophen   Tablet .. 650 milliGRAM(s) Oral every 6 hours PRN Mild Pain (1 - 3), Moderate Pain (4 - 6)  fluticasone propionate 50 MICROgram(s)/spray Nasal Spray 1 Spray(s) Both Nostrils two times a day PRN nasal congestion  hydrocortisone 1% Ointment 1 Application(s) Topical every 12 hours PRN Itching  lactulose Syrup 10 Gram(s) Oral daily PRN constipation  magnesium hydroxide Suspension 30 milliLiter(s) Oral daily PRN Constipation  simethicone 80 milliGRAM(s) Chew daily PRN Gas  sodium chloride 0.65% Nasal 1 Spray(s) Both Nostrils every 4 hours PRN Nasal Congestion        A/P:      63 year-old man with a PMH of HTN, morbid obesity who was found to have acute ?MCA territory infarct with left side hemiparesis and given TPA.     Acute CVA presenting with Left sided hemiparesis, dysarthria s/p tpa 1/26:  - Continue ASA/Lipitor   - Continue prozac for motor recovery    Obstructive Sleep Apnea: - Nocturnal CPAP, O2 via NC PRN, - pulmonary consult noted     HTN:  norvasc,   losartan     GI/Bowel Mgmt:- Colace, miralax,  Lactulose PRN, simethicone prn     Diet:- Soft with thin liquids    DVT prophylaxis:- Heparin SC    Pain: tylenol prn     Sleep: on melatonin Scribe Attestation (For Scribes USE Only)... Attending Attestation (For Attendings USE Only).../Scribe Attestation (For Scribes USE Only)...

## 2021-03-08 ENCOUNTER — APPOINTMENT (OUTPATIENT)
Dept: NEUROLOGY | Facility: CLINIC | Age: 66
End: 2021-03-08

## 2021-03-09 ENCOUNTER — RX RENEWAL (OUTPATIENT)
Age: 66
End: 2021-03-09

## 2021-03-09 RX ORDER — AMLODIPINE BESYLATE 10 MG/1
10 TABLET ORAL
Qty: 90 | Refills: 3 | Status: ACTIVE | COMMUNITY
Start: 2019-04-22 | End: 1900-01-01

## 2021-03-29 ENCOUNTER — APPOINTMENT (OUTPATIENT)
Dept: INTERNAL MEDICINE | Facility: CLINIC | Age: 66
End: 2021-03-29
Payer: MEDICAID

## 2021-03-29 VITALS
HEART RATE: 89 BPM | BODY MASS INDEX: 36.82 KG/M2 | TEMPERATURE: 97.8 F | HEIGHT: 71 IN | WEIGHT: 263 LBS | OXYGEN SATURATION: 98 %

## 2021-03-29 VITALS — DIASTOLIC BLOOD PRESSURE: 80 MMHG | SYSTOLIC BLOOD PRESSURE: 125 MMHG

## 2021-03-29 DIAGNOSIS — I65.09 OCCLUSION AND STENOSIS OF UNSPECIFIED VERTEBRAL ARTERY: ICD-10-CM

## 2021-03-29 DIAGNOSIS — I10 ESSENTIAL (PRIMARY) HYPERTENSION: ICD-10-CM

## 2021-03-29 DIAGNOSIS — E78.5 HYPERLIPIDEMIA, UNSPECIFIED: ICD-10-CM

## 2021-03-29 DIAGNOSIS — I63.213 CEREBRAL INFARCTION DUE TO UNSPECIFIED OCCLUSION OR STENOSIS OF BILATERAL VERTEBRAL ARTERIES: ICD-10-CM

## 2021-03-29 PROCEDURE — 99214 OFFICE O/P EST MOD 30 MIN: CPT | Mod: 25

## 2021-03-29 PROCEDURE — 99072 ADDL SUPL MATRL&STAF TM PHE: CPT

## 2021-03-29 NOTE — ASSESSMENT
[FreeTextEntry1] : The patient appears to be stable.  We discussed diet, exercise and continued weight loss efforts.  Check lipids on Atorvastatin.  The BP is good at 125/80.  He should see a cardiologist routinely.\par \par He has seen his neurologist.  An MRI was advised given the worse speech but the patient was unable to do it.  He will follow-up with his neurologist.\par \par He hasn't had the COVID-19 vaccine which was advised.  He asked if he should have it and he was told emphatically yes.  He already has an appointment.  \par \par He requests a skin cream but he doesn't remember the name and he will call back with it.

## 2021-03-29 NOTE — HISTORY OF PRESENT ILLNESS
[FreeTextEntry1] : The patient is here for a routine visit.  [de-identified] : He has no new complaints.  he is watching his diet and has lost another nine pounds since the last visit here.  He tries to be active.\par \par He saw his neurologist in the fall. \par \par He sees his pulmonologist, Dr. Pinon, and the CAYDEN is treated with CPAP.\par \par His speech has deteriorated and he cannot speak now in an understandable way.  He uses his phone to communicate.

## 2021-03-29 NOTE — PHYSICAL EXAM
[Normal] : soft, non-tender, non-distended, no masses palpated, no HSM and normal bowel sounds [de-identified] : trace-1+ edema B/L

## 2021-04-01 PROCEDURE — G9005: CPT

## 2021-05-17 ENCOUNTER — RX RENEWAL (OUTPATIENT)
Age: 66
End: 2021-05-17

## 2021-05-17 RX ORDER — ASPIRIN 81 MG/1
81 TABLET, COATED ORAL
Qty: 30 | Refills: 11 | Status: ACTIVE | COMMUNITY
Start: 2020-05-12 | End: 1900-01-01

## 2021-05-20 ENCOUNTER — RX RENEWAL (OUTPATIENT)
Age: 66
End: 2021-05-20

## 2021-05-20 RX ORDER — ATORVASTATIN CALCIUM 80 MG/1
80 TABLET, FILM COATED ORAL
Qty: 30 | Refills: 11 | Status: ACTIVE | COMMUNITY
Start: 2019-04-22 | End: 1900-01-01

## 2021-05-20 RX ORDER — FLUOXETINE HYDROCHLORIDE 10 MG/1
10 TABLET ORAL DAILY
Qty: 30 | Refills: 11 | Status: ACTIVE | COMMUNITY
Start: 2019-04-22 | End: 1900-01-01

## 2021-06-01 ENCOUNTER — NON-APPOINTMENT (OUTPATIENT)
Age: 66
End: 2021-06-01

## 2021-06-02 ENCOUNTER — NON-APPOINTMENT (OUTPATIENT)
Age: 66
End: 2021-06-02

## 2021-07-06 ENCOUNTER — NON-APPOINTMENT (OUTPATIENT)
Age: 66
End: 2021-07-06

## 2021-07-19 ENCOUNTER — NON-APPOINTMENT (OUTPATIENT)
Age: 66
End: 2021-07-19

## 2021-07-19 LAB
ALBUMIN SERPL ELPH-MCNC: 4.3 G/DL
ALP BLD-CCNC: 119 U/L
ALT SERPL-CCNC: 25 U/L
ANION GAP SERPL CALC-SCNC: 11 MMOL/L
AST SERPL-CCNC: 29 U/L
BASOPHILS # BLD AUTO: 0.03 K/UL
BASOPHILS NFR BLD AUTO: 0.5 %
BILIRUB SERPL-MCNC: 0.3 MG/DL
BUN SERPL-MCNC: 20 MG/DL
CALCIUM SERPL-MCNC: 9.1 MG/DL
CHLORIDE SERPL-SCNC: 103 MMOL/L
CHOLEST SERPL-MCNC: 164 MG/DL
CO2 SERPL-SCNC: 23 MMOL/L
CREAT SERPL-MCNC: 0.95 MG/DL
EOSINOPHIL # BLD AUTO: 0.11 K/UL
EOSINOPHIL NFR BLD AUTO: 1.9 %
ESTIMATED AVERAGE GLUCOSE: 103 MG/DL
GLUCOSE SERPL-MCNC: 101 MG/DL
HBA1C MFR BLD HPLC: 5.2 %
HCT VFR BLD CALC: 45.3 %
HDLC SERPL-MCNC: 51 MG/DL
HGB BLD-MCNC: 14.3 G/DL
IMM GRANULOCYTES NFR BLD AUTO: 0.5 %
LDLC SERPL CALC-MCNC: 100 MG/DL
LYMPHOCYTES # BLD AUTO: 1.98 K/UL
LYMPHOCYTES NFR BLD AUTO: 34.1 %
MAN DIFF?: NORMAL
MCHC RBC-ENTMCNC: 28.7 PG
MCHC RBC-ENTMCNC: 31.6 GM/DL
MCV RBC AUTO: 90.8 FL
MONOCYTES # BLD AUTO: 0.29 K/UL
MONOCYTES NFR BLD AUTO: 5 %
NEUTROPHILS # BLD AUTO: 3.36 K/UL
NEUTROPHILS NFR BLD AUTO: 58 %
NONHDLC SERPL-MCNC: 113 MG/DL
PLATELET # BLD AUTO: 244 K/UL
POTASSIUM SERPL-SCNC: 4.4 MMOL/L
PROT SERPL-MCNC: 6.8 G/DL
RBC # BLD: 4.99 M/UL
RBC # FLD: 13.6 %
SODIUM SERPL-SCNC: 138 MMOL/L
TRIGL SERPL-MCNC: 63 MG/DL
WBC # FLD AUTO: 5.8 K/UL

## 2021-07-23 ENCOUNTER — APPOINTMENT (OUTPATIENT)
Dept: INTERNAL MEDICINE | Facility: CLINIC | Age: 66
End: 2021-07-23

## 2021-12-06 NOTE — PROGRESS NOTE ADULT - SUBJECTIVE AND OBJECTIVE BOX
Conveyed COVID results to mother-Ijeoma, questions answered and addressed. Patient to complete Augmentin and follow up with PCP if symptoms persist.   Patient is a 63y old  Male who presents with a chief complaint of stroke (13 Feb 2019 09:37)      HPI:  63 year-old Man with a PMH of HTN (not on meds), CAYDEN (on CPAP, compliant), obesity, and HLD (not on meds) who presents to hospital with acute onset of slurred speech and Left UE paresis found to have ischemic stroke  s/p tPA now in MICU for further observation  and management.  CT head on 1/27-Old lacunar infarcts involving the right basal ganglia, left caudate head /anterior corona radiata region. MRI Brain MRI: Acute right basal ganglia infarct. Chronic bilateral lacunar infarcts. Found to have Oropharyngeal dysphagia place on Dysphagia 3 diet. Patient transferred to Acute Rehabilitation for functional recovery. (01 Feb 2019 19:12)      PAST MEDICAL & SURGICAL HISTORY:  Sciatica  High cholesterol  Hypertension  Peripheral Vascular Disease  CAYDEN (Obstructive Sleep Apnea)  CAD (Coronary Artery Disease)  History of cholecystectomy  History of tonsillectomy  S/P ACL repair      MEDICATIONS  (STANDING):  amLODIPine   Tablet 10 milliGRAM(s) Oral daily  aspirin  chewable 81 milliGRAM(s) Oral daily  atorvastatin 80 milliGRAM(s) Oral at bedtime  docusate sodium 100 milliGRAM(s) Oral two times a day  FLUoxetine 10 milliGRAM(s) Oral daily  heparin  Injectable 5000 Unit(s) SubCutaneous every 8 hours  losartan 75 milliGRAM(s) Oral daily  melatonin 9 milliGRAM(s) Oral <User Schedule>  polyethylene glycol 3350 17 Gram(s) Oral daily    MEDICATIONS  (PRN):  acetaminophen   Tablet .. 650 milliGRAM(s) Oral every 6 hours PRN Mild Pain (1 - 3), Moderate Pain (4 - 6)  fluticasone propionate 50 MICROgram(s)/spray Nasal Spray 1 Spray(s) Both Nostrils two times a day PRN nasal congestion  hydrocortisone 1% Ointment 1 Application(s) Topical every 12 hours PRN Itching  sodium chloride 0.65% Nasal 1 Spray(s) Both Nostrils every 4 hours PRN Nasal Congestion      Allergies    No Known Allergies    Intolerances          VITALS  63y  Vital Signs Last 24 Hrs  T(C): 36.7 (13 Feb 2019 07:59), Max: 36.7 (12 Feb 2019 20:38)  T(F): 98.1 (13 Feb 2019 07:59), Max: 98.1 (13 Feb 2019 07:59)  HR: 78 (13 Feb 2019 07:59) (78 - 85)  BP: 150/90 (13 Feb 2019 07:59) (150/90 - 160/85)  BP(mean): --  RR: 14 (13 Feb 2019 07:59) (14 - 14)  SpO2: 100% (13 Feb 2019 07:59) (98% - 100%)  Daily     Daily         RECENT LABS:                          15.1   9.5   )-----------( 271      ( 11 Feb 2019 13:55 )             45.0     02-11    135  |  101  |  22  ----------------------------<  127<H>  4.5   |  20<L>  |  0.84    Ca    9.1      11 Feb 2019 13:00                CAPILLARY BLOOD GLUCOSE

## 2022-01-22 ENCOUNTER — INPATIENT (INPATIENT)
Facility: HOSPITAL | Age: 67
LOS: 12 days | Discharge: NURSING, REHAB OR LONG-TERM CARE | End: 2022-02-04
Attending: HOSPITALIST | Admitting: HOSPITALIST
Payer: MEDICAID

## 2022-01-22 VITALS
HEIGHT: 71 IN | HEART RATE: 109 BPM | DIASTOLIC BLOOD PRESSURE: 119 MMHG | RESPIRATION RATE: 16 BRPM | SYSTOLIC BLOOD PRESSURE: 170 MMHG | OXYGEN SATURATION: 99 %

## 2022-01-22 DIAGNOSIS — Z90.89 ACQUIRED ABSENCE OF OTHER ORGANS: Chronic | ICD-10-CM

## 2022-01-22 DIAGNOSIS — Z90.49 ACQUIRED ABSENCE OF OTHER SPECIFIED PARTS OF DIGESTIVE TRACT: Chronic | ICD-10-CM

## 2022-01-22 DIAGNOSIS — Z98.890 OTHER SPECIFIED POSTPROCEDURAL STATES: Chronic | ICD-10-CM

## 2022-01-22 LAB
ALBUMIN SERPL ELPH-MCNC: 3.9 G/DL — SIGNIFICANT CHANGE UP (ref 3.3–5)
ALP SERPL-CCNC: 105 U/L — SIGNIFICANT CHANGE UP (ref 40–120)
ALT FLD-CCNC: 20 U/L — SIGNIFICANT CHANGE UP (ref 4–41)
ANION GAP SERPL CALC-SCNC: 13 MMOL/L — SIGNIFICANT CHANGE UP (ref 7–14)
APTT BLD: 34.7 SEC — SIGNIFICANT CHANGE UP (ref 27–36.3)
AST SERPL-CCNC: 26 U/L — SIGNIFICANT CHANGE UP (ref 4–40)
BASOPHILS # BLD AUTO: 0.04 K/UL — SIGNIFICANT CHANGE UP (ref 0–0.2)
BASOPHILS NFR BLD AUTO: 0.4 % — SIGNIFICANT CHANGE UP (ref 0–2)
BILIRUB SERPL-MCNC: 0.8 MG/DL — SIGNIFICANT CHANGE UP (ref 0.2–1.2)
BLOOD GAS VENOUS COMPREHENSIVE RESULT: SIGNIFICANT CHANGE UP
BUN SERPL-MCNC: 18 MG/DL — SIGNIFICANT CHANGE UP (ref 7–23)
CALCIUM SERPL-MCNC: 9.5 MG/DL — SIGNIFICANT CHANGE UP (ref 8.4–10.5)
CHLORIDE SERPL-SCNC: 98 MMOL/L — SIGNIFICANT CHANGE UP (ref 98–107)
CO2 SERPL-SCNC: 25 MMOL/L — SIGNIFICANT CHANGE UP (ref 22–31)
CREAT SERPL-MCNC: 0.67 MG/DL — SIGNIFICANT CHANGE UP (ref 0.5–1.3)
EOSINOPHIL # BLD AUTO: 0.16 K/UL — SIGNIFICANT CHANGE UP (ref 0–0.5)
EOSINOPHIL NFR BLD AUTO: 1.4 % — SIGNIFICANT CHANGE UP (ref 0–6)
GLUCOSE SERPL-MCNC: 109 MG/DL — HIGH (ref 70–99)
HCT VFR BLD CALC: 46.3 % — SIGNIFICANT CHANGE UP (ref 39–50)
HGB BLD-MCNC: 15.1 G/DL — SIGNIFICANT CHANGE UP (ref 13–17)
IANC: 8.21 K/UL — SIGNIFICANT CHANGE UP (ref 1.5–8.5)
IMM GRANULOCYTES NFR BLD AUTO: 0.4 % — SIGNIFICANT CHANGE UP (ref 0–1.5)
INR BLD: 1.22 RATIO — HIGH (ref 0.88–1.16)
LYMPHOCYTES # BLD AUTO: 1.95 K/UL — SIGNIFICANT CHANGE UP (ref 1–3.3)
LYMPHOCYTES # BLD AUTO: 17.5 % — SIGNIFICANT CHANGE UP (ref 13–44)
MCHC RBC-ENTMCNC: 29.3 PG — SIGNIFICANT CHANGE UP (ref 27–34)
MCHC RBC-ENTMCNC: 32.6 GM/DL — SIGNIFICANT CHANGE UP (ref 32–36)
MCV RBC AUTO: 89.7 FL — SIGNIFICANT CHANGE UP (ref 80–100)
MONOCYTES # BLD AUTO: 0.76 K/UL — SIGNIFICANT CHANGE UP (ref 0–0.9)
MONOCYTES NFR BLD AUTO: 6.8 % — SIGNIFICANT CHANGE UP (ref 2–14)
NEUTROPHILS # BLD AUTO: 8.21 K/UL — HIGH (ref 1.8–7.4)
NEUTROPHILS NFR BLD AUTO: 73.5 % — SIGNIFICANT CHANGE UP (ref 43–77)
NRBC # BLD: 0 /100 WBCS — SIGNIFICANT CHANGE UP
NRBC # FLD: 0 K/UL — SIGNIFICANT CHANGE UP
PLATELET # BLD AUTO: 231 K/UL — SIGNIFICANT CHANGE UP (ref 150–400)
POTASSIUM SERPL-MCNC: 4.1 MMOL/L — SIGNIFICANT CHANGE UP (ref 3.5–5.3)
POTASSIUM SERPL-SCNC: 4.1 MMOL/L — SIGNIFICANT CHANGE UP (ref 3.5–5.3)
PROT SERPL-MCNC: 7.9 G/DL — SIGNIFICANT CHANGE UP (ref 6–8.3)
PROTHROM AB SERPL-ACNC: 13.9 SEC — HIGH (ref 10.6–13.6)
RBC # BLD: 5.16 M/UL — SIGNIFICANT CHANGE UP (ref 4.2–5.8)
RBC # FLD: 13.7 % — SIGNIFICANT CHANGE UP (ref 10.3–14.5)
SODIUM SERPL-SCNC: 136 MMOL/L — SIGNIFICANT CHANGE UP (ref 135–145)
TROPONIN T, HIGH SENSITIVITY RESULT: 18 NG/L — SIGNIFICANT CHANGE UP
WBC # BLD: 11.16 K/UL — HIGH (ref 3.8–10.5)
WBC # FLD AUTO: 11.16 K/UL — HIGH (ref 3.8–10.5)

## 2022-01-22 PROCEDURE — 70496 CT ANGIOGRAPHY HEAD: CPT | Mod: 26,MA

## 2022-01-22 PROCEDURE — 0042T: CPT

## 2022-01-22 PROCEDURE — 99285 EMERGENCY DEPT VISIT HI MDM: CPT

## 2022-01-22 PROCEDURE — 71045 X-RAY EXAM CHEST 1 VIEW: CPT | Mod: 26

## 2022-01-22 PROCEDURE — 70498 CT ANGIOGRAPHY NECK: CPT | Mod: 26,MA

## 2022-01-22 PROCEDURE — 70450 CT HEAD/BRAIN W/O DYE: CPT | Mod: 26,59,MA

## 2022-01-22 RX ORDER — ACETAMINOPHEN 500 MG
1000 TABLET ORAL ONCE
Refills: 0 | Status: COMPLETED | OUTPATIENT
Start: 2022-01-22 | End: 2022-01-22

## 2022-01-22 NOTE — ED PROVIDER NOTE - CLINICAL SUMMARY MEDICAL DECISION MAKING FREE TEXT BOX
67yo male p/w headache, transient blurry vision, dizziness and need for rehab placement as well as generalized back pain. flew in from [place] today. No new neuro deficits, no spinal TTP. Likely muscular injury to back. Code stroke without acute bleed. Parotid lesion will need further eval inpatient. Will admit.
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
## 2022-01-22 NOTE — ED PROVIDER NOTE - PHYSICAL EXAMINATION
Physical Exam:  Gen: NAD, AOx3, non-toxic appearing, napkin in mouth (chronic   Head: NCAT  HEENT: EOMI, PEERLA, normal conjunctiva, tongue midline, oral mucosa moist  Lung: CTAB, no respiratory distress, no wheezes/rhonchi/rales B/L  CV: RRR, no murmurs, rubs or gallops, distal pulses 2+ b/l  Abd: soft, NT, ND, no guarding, no rigidity, no rebound tenderness, no CVA tenderness   MSK: no visible deformities, ROM normal in UE/LE, no back TTP  Neuro: 4/5 strength LUE, 5/5 strength other extremities, aphasic, no facial droop,   Skin: Warm, well perfused, no rash  Psych: normal affect, calm

## 2022-01-22 NOTE — ED PROVIDER NOTE - PROGRESS NOTE DETAILS
Adriana MADISON: Received call from Maria G Jeffries (patient's wife, ) - who states patient was in [place], hospitalized with COVID but needs rehab. He has [place] Medicaid, not accepted by [place] so came back to [place] for rehab.
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
## 2022-01-22 NOTE — ED ADULT NURSE NOTE - OBJECTIVE STATEMENT
Pt received to ct scan area, code stroke called. Pt arrives, mostly non-verbal, with b/l LE, weakness, weaker on R side. Unable to obtain complete medical history due to patient not talking at this time. Per pt friend, pt was picked up from airport at 18:30, pt had began feeling lightheaded and dizzy right before and during flight from [place]. Pt is able to follow most commands, but is unable to provide any verbal responses at this time. Pt was recently hospitalized for Covid, a was supposed to go to rehab after but was unable to due to problem with insurance. 20g placed in R arm, labs drawn as ordered. Ct scan completed. Pt is outside the window to receive tpa, therefor not a candidate for tpa. Report given to primary RN Pt received to ct scan area, code stroke called. Pt arrives, mostly non-verbal, with b/l LE, weakness, weaker on R side. Unable to obtain complete medical history due to patient not talking at this time. Per pt friend, pt was picked up from airport at 18:30, pt had began feeling lightheaded and dizzy right before and during flight from [place]. Pt is able to follow most commands, but is unable to provide any verbal responses at this time. Previous CVA in 2019 with L sided residual weakness. Pt was recently hospitalized for Covid, and was supposed to go to rehab after but was unable to due to problem with insurance. 20g placed in R arm, labs drawn as ordered. Ct scan completed. Pt is outside the window to receive tpa, therefore not a candidate for tpa. Report given to primary RN, Alex Coburn.

## 2022-01-22 NOTE — CONSULT NOTE ADULT - ATTENDING COMMENTS
A/P  He primarily has constitutional symptoms with no new focal deficits by his report.   Doubt stroke but because he has risk factors we agree with MRI brain but the patient is saying that he cannot lie flat and will not be able to do it.    Further work and management as above.     Thank you A/P  He primarily has constitutional symptoms with no new focal deficits by his report.   Doubt stroke but because he has risk factors we agree with MRI brain but the patient is saying that he cannot lie flat and will not be able to do it.    Further work and management as above.     Thank you    Please call us back for any further questions.

## 2022-01-22 NOTE — ED PROVIDER NOTE - ATTENDING CONTRIBUTION TO CARE
Patient is a 67 yo M with history of HTN, high cholesterol, CAD, CAYDEN, PVD, sciatica, hx of CVA with left sided deficits here for evaluation of headache and dizziness. Patient cannot speak. He is able to answer yes and no questions. Per my questioning, he reports 5 hours of headache and dizziness and feeling like he has increased right sided weakness. He is from [place], traveled today. Denies chest pain, abdominal pain, fevers, vomiting, diarrhea.     VS noted  Gen. no acute distress, Non toxic   HEENT: EOMI, mmm  Lungs: CTAB/L no C/ W /R   CVS: RRR   Abd; Soft non tender, non distended   Ext: no edema  Skin: no rash  Neuro: awake, alert, follows commands, RUE good  strength 5/5, RLE 5/5, able to move LLE and LUE (LUE appears contracted).   a/p: CODE STROKE called from triage - neuro evaluating patient at bedside at CT Scanner. Attempted to call sister: Maria G Jeffries at [phone] and left a message. Will get CT imaging and labs given hx of CVA, limited history and concern for RLE weakness. TIA is on differential.   - Adriana MADISON Patient is a 67 yo M with history of HTN, high cholesterol, CAD, CAYDEN, PVD, sciatica, hx of CVA with left sided deficits here for evaluation of headache and dizziness. Patient cannot speak. He is able to answer yes and no questions. Per my questioning, he reports 5 hours of headache and dizziness and feeling like he has increased right sided weakness. He is from [place], traveled today. Denies chest pain, abdominal pain, fevers, vomiting, diarrhea.     VS noted  Gen. no acute distress, Non toxic   HEENT: EOMI, mmm  Lungs: CTAB/L no C/ W /R   CVS: RRR   Abd; Soft non tender, non distended   Ext: no edema  Skin: no rash  Neuro: awake, alert, follows commands, RUE good  strength 5/5, RLE 5/5, able to move LLE and LUE (LUE appears contracted).   a/p: CODE STROKE called from triage - neuro evaluating patient at bedside at CT Scanner. Attempted to call wife: Maria G Jeffries at [phone] and left a message. Will get CT imaging and labs given hx of CVA, limited history and concern for RLE weakness. TIA is on differential.   - Adriana MADISON Patient is a 65 yo M with history of HTN, high cholesterol, CAD, CAYDEN, PVD, sciatica, hx of CVA with left sided deficits here for evaluation of headache and dizziness. Patient cannot speak. He is able to answer yes and no questions. Per my questioning, he reports 5 hours of headache and dizziness and feeling like he has increased right sided weakness. He is from [place], traveled today. Denies chest pain, abdominal pain, fevers, vomiting, diarrhea.     VS noted  Gen. no acute distress, Non toxic   HEENT: EOMI, mmm  Spine: No midline C-T-L spine tenderness, no step offs  Lungs: CTAB/L no C/ W /R   CVS: RRR   Abd; Soft non tender, non distended   Ext: no edema  Skin: no rash  Neuro: awake, alert, follows commands, RUE good  strength 5/5, RLE 5/5, able to move LLE and LUE (LUE appears contracted).   a/p: CODE STROKE called from triage - neuro evaluating patient at bedside at CT Scanner. Attempted to call wife: Maria G Jeffries at [phone] and left a message. Will get CT imaging and labs given hx of CVA, limited history and concern for RLE weakness. TIA is on differential.   - Adriana MADISON

## 2022-01-22 NOTE — ED ADULT TRIAGE NOTE - CHIEF COMPLAINT QUOTE
alert oriented aphasic  coming from airport arrived from [place] ambulatory with cane c/o generalized weakness started on plane  was at baseline at 1500 also c/o HA dizziness and blurry vision which started at 1500 and started suddenly has hx CVA with left weakness in 2019  HTN

## 2022-01-22 NOTE — CONSULT NOTE ADULT - ASSESSMENT
***    Recommendations:  Imaging  [] MRI brain w/o contrast  [] TTE    Meds  [] Continue home aspirin 81MG PO daily (ASA 300MG [place] daily if patient fails dysphagia screen)  [] Atorvastatin 80MG QHS, titrate to LDL<70  [] DVT prophylaxis     Labs  [] HbA1C and Lipid Panel.    Other  [] Telemonitoring; Neurochecks and Vital signs Q4H  [] Permissive HTN up to 220/120 mmHg for first 24 hours after symptom onset followed by gradual normotension.   [] BGM goal <180, avoid hypoglyemia  [] NPO until clears dysphagia screen, otherwise swallow evaluation  [] PT/OT eval  [] Fall, aspiration precautions  [] Stroke education provided     Case discussed with telestroke attending, Dr. Linn.  66 y.o. M with a h/o HTN, HLD, prior stroke (01/2019, w/residual L hemiparesis) presenting on 1/22/22 with c/o blurry vision, dizziness, and generalized weakness. LKN 15:00. Patient was visiting his sister in [place] and was recently hospitalized for COVID-19 infection, but was unable to be placed in rehab due to residency status, prompting his return to [place]. VS on presentation significant for /119, . On exam patient is mute but communicates by writing and paper and with pre-written cgqi-ac-mzfgum messages. Residual deficits from prior stroke noted. Labs notable for WBC 11.16, lactate 2.7 on VBG, (+) SARS-Cov-2. CTH w/o demonstrates chronic R thalamocapsular infarct. CTA head/neck limited in eval but do not report flow limiting or occlusion. However heterogenous R parotid lesion associated w/ enlarged R ECA branch noted on CTA neck.    Impression: Blurry vision, dizziness, generalized weakness in the setting of recent COVID-19 infection and hospitalization likely multifactorial secondary to infectious etiology and deconditioning, vs less likely acute ischemic stroke.    Recommendations:  Imaging/Labs  [] MRI brain w/o contrast  [] TTE  [] HbA1C and Lipid Panel  [] UA, Ucx  [] Eval of R parotid lesion as per primary team    Meds  [] Continue home aspirin 81MG PO daily (ASA 300MG [place] daily if patient fails dysphagia screen)  [] Atorvastatin 80MG QHS, titrate to LDL<70  [] DVT prophylaxis     Other  [] Telemonitoring; Neurochecks and Vital signs Q4H  [] Permissive HTN up to 220/120 mmHg for first 24 hours after symptom onset followed by gradual normotension.   [] BGM goal <180, avoid hypoglyemia  [] NPO until clears dysphagia screen, otherwise swallow evaluation  [] PT/OT eval  [] Fall, aspiration precautions  [] Stroke education provided     Case discussed with telestroke attending, Dr. Linn.  66 y.o. RH M with a h/o HTN, HLD, prior stroke (01/2019, w/residual L hemiparesis) presenting on 1/22/22 with c/o blurry vision, dizziness, and generalized weakness. LKN 15:00. Patient was visiting his sister in [place] and was recently hospitalized for COVID-19 infection, but was unable to be placed in rehab due to residency status, prompting his return to [place]. VS on presentation significant for /119, . On exam patient is mute but communicates by writing and paper and with pre-written ffbk-ex-azqkri messages. Residual deficits from prior stroke noted. Labs notable for WBC 11.16, lactate 2.7 on VBG, (+) SARS-Cov-2. CTH w/o demonstrates chronic R thalamocapsular infarct. CTA head/neck limited in eval but do not report flow limiting or occlusion. However heterogenous R parotid lesion associated w/ enlarged R ECA branch noted on CTA neck.    Impression: Blurry vision, dizziness, generalized weakness in the setting of recent COVID-19 infection and hospitalization likely multifactorial secondary to infectious etiology and deconditioning, vs less likely acute ischemic stroke.    Recommendations:  Imaging/Labs  [] MRI brain w/o contrast  [] TTE  [] HbA1C and Lipid Panel  [] UA, Ucx  [] Eval of R parotid lesion as per primary team    Meds  [] Continue home aspirin 81MG PO daily (ASA 300MG [place] daily if patient fails dysphagia screen)  [] Atorvastatin 80MG QHS, titrate to LDL<70  [] DVT prophylaxis     Other  [] Telemonitoring; Neurochecks and Vital signs Q4H  [] Permissive HTN up to 220/120 mmHg for first 24 hours after symptom onset followed by gradual normotension.   [] BGM goal <180, avoid hypoglyemia  [] NPO until clears dysphagia screen, otherwise swallow evaluation  [] PT/OT eval  [] Fall, aspiration precautions  [] Stroke education provided     Case discussed with telestroke attending, Dr. Linn.

## 2022-01-22 NOTE — ED PROVIDER NOTE - NSICDXPASTSURGICALHX_GEN_ALL_CORE_FT
PAST SURGICAL HISTORY:  History of cholecystectomy     History of tonsillectomy     S/P ACL repair

## 2022-01-22 NOTE — ED PROVIDER NOTE - NSICDXPASTMEDICALHX_GEN_ALL_CORE_FT
PAST MEDICAL HISTORY:  CAD (Coronary Artery Disease)     High cholesterol     Hypertension     CAYDEN (Obstructive Sleep Apnea)     Peripheral Vascular Disease     Sciatica     Stroke L sided deficits in Feb 2019

## 2022-01-22 NOTE — ED PROVIDER NOTE - NSICDXFAMILYHX_GEN_ALL_CORE_FT
FAMILY HISTORY:  Father  Still living? Unknown  Family history of cerebrovascular accident (CVA) in father, Age at diagnosis: Age Unknown

## 2022-01-23 DIAGNOSIS — I10 ESSENTIAL (PRIMARY) HYPERTENSION: ICD-10-CM

## 2022-01-23 DIAGNOSIS — Z29.9 ENCOUNTER FOR PROPHYLACTIC MEASURES, UNSPECIFIED: ICD-10-CM

## 2022-01-23 DIAGNOSIS — R42 DIZZINESS AND GIDDINESS: ICD-10-CM

## 2022-01-23 DIAGNOSIS — U07.1 COVID-19: ICD-10-CM

## 2022-01-23 DIAGNOSIS — E78.5 HYPERLIPIDEMIA, UNSPECIFIED: ICD-10-CM

## 2022-01-23 DIAGNOSIS — I25.10 ATHEROSCLEROTIC HEART DISEASE OF NATIVE CORONARY ARTERY WITHOUT ANGINA PECTORIS: ICD-10-CM

## 2022-01-23 DIAGNOSIS — K11.9 DISEASE OF SALIVARY GLAND, UNSPECIFIED: ICD-10-CM

## 2022-01-23 DIAGNOSIS — R51.9 HEADACHE, UNSPECIFIED: ICD-10-CM

## 2022-01-23 DIAGNOSIS — G47.33 OBSTRUCTIVE SLEEP APNEA (ADULT) (PEDIATRIC): ICD-10-CM

## 2022-01-23 DIAGNOSIS — I63.9 CEREBRAL INFARCTION, UNSPECIFIED: ICD-10-CM

## 2022-01-23 LAB
FLUAV AG NPH QL: SIGNIFICANT CHANGE UP
FLUBV AG NPH QL: SIGNIFICANT CHANGE UP
RSV RNA NPH QL NAA+NON-PROBE: SIGNIFICANT CHANGE UP
SARS-COV-2 RNA SPEC QL NAA+PROBE: DETECTED

## 2022-01-23 PROCEDURE — 99222 1ST HOSP IP/OBS MODERATE 55: CPT

## 2022-01-23 RX ORDER — ACETAMINOPHEN 500 MG
650 TABLET ORAL EVERY 4 HOURS
Refills: 0 | Status: DISCONTINUED | OUTPATIENT
Start: 2022-01-23 | End: 2022-01-23

## 2022-01-23 RX ORDER — ENOXAPARIN SODIUM 100 MG/ML
40 INJECTION SUBCUTANEOUS EVERY 12 HOURS
Refills: 0 | Status: DISCONTINUED | OUTPATIENT
Start: 2022-01-23 | End: 2022-02-04

## 2022-01-23 RX ORDER — CYCLOBENZAPRINE HYDROCHLORIDE 10 MG/1
1 TABLET, FILM COATED ORAL
Qty: 0 | Refills: 0 | DISCHARGE

## 2022-01-23 RX ORDER — CHLORHEXIDINE GLUCONATE 213 G/1000ML
1 SOLUTION TOPICAL DAILY
Refills: 0 | Status: DISCONTINUED | OUTPATIENT
Start: 2022-01-23 | End: 2022-02-04

## 2022-01-23 RX ORDER — SACCHAROMYCES BOULARDII 250 MG
1 POWDER IN PACKET (EA) ORAL
Qty: 0 | Refills: 0 | DISCHARGE

## 2022-01-23 RX ORDER — ALBUTEROL 90 UG/1
2 AEROSOL, METERED ORAL EVERY 6 HOURS
Refills: 0 | Status: DISCONTINUED | OUTPATIENT
Start: 2022-01-23 | End: 2022-02-04

## 2022-01-23 RX ORDER — ACETAMINOPHEN 500 MG
650 TABLET ORAL EVERY 4 HOURS
Refills: 0 | Status: DISCONTINUED | OUTPATIENT
Start: 2022-01-23 | End: 2022-01-24

## 2022-01-23 RX ORDER — ATORVASTATIN CALCIUM 80 MG/1
1 TABLET, FILM COATED ORAL
Qty: 0 | Refills: 0 | DISCHARGE

## 2022-01-23 RX ORDER — DOCUSATE SODIUM 100 MG
1 CAPSULE ORAL
Qty: 0 | Refills: 0 | DISCHARGE

## 2022-01-23 RX ORDER — SOD,AMMONIUM,POTASSIUM LACTATE
1 CREAM (GRAM) TOPICAL
Qty: 0 | Refills: 0 | DISCHARGE

## 2022-01-23 RX ORDER — SODIUM CHLORIDE 0.65 %
1 AEROSOL, SPRAY (ML) NASAL
Refills: 0 | Status: DISCONTINUED | OUTPATIENT
Start: 2022-01-23 | End: 2022-02-04

## 2022-01-23 RX ORDER — NYSTATIN CREAM 100000 [USP'U]/G
1 CREAM TOPICAL
Qty: 0 | Refills: 0 | DISCHARGE

## 2022-01-23 RX ORDER — ASPIRIN/CALCIUM CARB/MAGNESIUM 324 MG
300 TABLET ORAL DAILY
Refills: 0 | Status: DISCONTINUED | OUTPATIENT
Start: 2022-01-23 | End: 2022-01-24

## 2022-01-23 RX ORDER — FLUOXETINE HCL 10 MG
1 CAPSULE ORAL
Qty: 0 | Refills: 0 | DISCHARGE

## 2022-01-23 RX ORDER — SODIUM CHLORIDE 9 MG/ML
1000 INJECTION, SOLUTION INTRAVENOUS
Refills: 0 | Status: DISCONTINUED | OUTPATIENT
Start: 2022-01-23 | End: 2022-01-24

## 2022-01-23 RX ORDER — SODIUM CHLORIDE 0.65 %
2 AEROSOL, SPRAY (ML) NASAL
Qty: 0 | Refills: 0 | DISCHARGE

## 2022-01-23 RX ADMIN — Medication 400 MILLIGRAM(S): at 00:08

## 2022-01-23 RX ADMIN — Medication 1000 MILLIGRAM(S): at 00:47

## 2022-01-23 RX ADMIN — Medication 1 SPRAY(S): at 22:15

## 2022-01-23 NOTE — H&P ADULT - CONSTITUTIONAL COMMENTS
Is This A New Presentation, Or A Follow-Up?: Nasal Aesthetic Deformity Can communicate by facial gestures.

## 2022-01-23 NOTE — ED ADULT NURSE REASSESSMENT NOTE - NS ED NURSE REASSESS COMMENT FT1
pt. non-verbal at this time. repeat BP performed as per MD Daniel. pt. states he has 10/10 generalized back pain. MD daniel aware, orders to follow.
report received from Facilitator RN. pt. remains A&Ox3, awake and resting. pt. non-verbal at this time. non-verbal indicators of pain present. respirations even and unlabored. pending X-ray
pt. reports an improvement in headache after due medication.
pt. refusing to change into hospital socks. pt. reports no improvement in pain after due medication. pt. to be transferred to the floor.

## 2022-01-23 NOTE — H&P ADULT - PROBLEM SELECTOR PLAN 3
Seen on CTA neck.  F/U MRI with contrast of the neck. Seen on CTA neck.  F/U MRI with contrast of the neck.  F/U ENT consult. Emailed,

## 2022-01-23 NOTE — H&P ADULT - PROBLEM SELECTOR PLAN 1
Seen by neuro for stroke eval.   NIHSS= 6  Cardiac monitor.  F/U #2 CE, lipid panel, TSH, A1C.  Failed bedside dysphagia screen.  NPO including meds.  Give ASA ND  Aspiration precautions.   F/U S & S  Gentle IVF with LR @ 75ml/ hour   Neuro checks Q4*. If acute change get CT head.  Fall, Aspiration, safety, seizure precautions.  MRI Brain w/wo   TTE & Bubble study  Permissive HTN to 220/110 x 24 hours from onset of symptoms Less likely acute, had CVA in the past   Seen by neuro for stroke eval.   NIHSS= 6  Cardiac monitor.  F/U #2 CE, lipid panel, TSH, A1C.  Failed bedside dysphagia screen.  NPO including meds.  Give ASA OH  Aspiration precautions.   F/U S & S  Gentle IVF with LR @ 75ml/ hour   Neuro checks Q4*. If acute change get CT head.  Fall, Aspiration, safety, seizure precautions.  MRI Brain w/wo   TTE & Bubble study  Permissive HTN to 220/110 x 24 hours from onset of symptoms

## 2022-01-23 NOTE — H&P ADULT - PROBLEM SELECTOR PLAN 2
Air borne precautions.  Currently not in acute hypoxic respiratory distress with R = 19b/ min, SPO2= 95% ra   F/U inflammatory markers.  If D Dimer> 500 give full A/C.  Give O2 via NC and titrate as per guidelines.  Give Tylenol OR   Pro Air inhaler PRN.

## 2022-01-23 NOTE — H&P ADULT - PROBLEM SELECTOR PLAN 4
NIHSS= 6  Old stroke with Aphasia and L UE weakness.   Continue BP control.  ASA IL  Hold Statin while NPO.   F/U PT eval BP = 148/ 87  Permissive HTN for 24 hours from onset of symptoms.   Graduate to normotension # 15:00 12/23.
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
## 2022-01-23 NOTE — PHYSICAL THERAPY INITIAL EVALUATION ADULT - PERTINENT HX OF CURRENT PROBLEM, REHAB EVAL
Patient is 66 year old male admitted with history of CVA with aphasia and LUE weakness, HTN, Hyperlipidemia, CAYDEN on CPAP, CAD, presenting with 5 hours of generalized headache, blurry vision and generalized weakness.

## 2022-01-23 NOTE — H&P ADULT - PROBLEM SELECTOR PLAN 9
VTE with Lovenox 40 mg sub cut BID.  Fall, Aspiration, safety and seizure precaution.  Neuro, PT, OT, P M & R, S &S eval.

## 2022-01-23 NOTE — H&P ADULT - NSRESEARCHGRANTASSESS_GEN_A_CORE
Problem: At Risk for Falls  Goal: # Patient does not fall  Outcome: Outcome Met, Continue evaluating goal progress toward completion  Pt did not fall on shift.  Will continue to monitor.       <<--- Click to launch IMPROVE-DD VTE Assessment

## 2022-01-23 NOTE — H&P ADULT - NSHPLABSRESULTS_GEN_ALL_CORE
CT HEAD No acute intracranial hemorrhage. Chronic bilateral lacunar infarctions and chronic microvascular ischemic changes. Pansinus mucosal disease.    CT PERFUSION: Perfusion with increased Tmax throughout a non-vascular territory distribution with significant motion noted during study, findings likely artifactual in nature.    CTA BRAIN: No diagnostic evaluation of intracranial circulation.    CTA NECK: Patent proximal cervical vasculature. Nondiagnostic evaluation of cervical vascular distal to the carotid bifurcations. Heterogeneous right parotid lesion associated with enlarged right ECA branch. Differential includes benign or malignant neoplasm and vascular malformation. Recommend contrast enhanced MRI of the neck for complete evaluation.    Findings and recommendations were discussed with Dr. Brewer by Dr. Lopez at 11:13 PM on 1/22/2022 who indicated that the communication   was understood.    CXR preliminary : Clear    EKG NSR 76b/ min, TWI V1 to V6, AVL, QT/ QTC= 390/ 438  TROP =18  vPH= 7.39  vLactate= 2.7  COVID Detected.                           15.1   11.16 )-----------( 231      ( 22 Jan 2022 22:25 )             46.3   01-22    136  |  98  |  18  ----------------------------<  109<H>  4.1   |  25  |  0.67    Ca    9.5      22 Jan 2022 22:25    TPro  7.9  /  Alb  3.9  /  TBili  0.8  /  DBili  x   /  AST  26  /  ALT  20  /  AlkPhos  105  01-22

## 2022-01-23 NOTE — H&P ADULT - ATTENDING COMMENTS
Patient refused to questioned and examined by the attending. However, I reviewed the labs, imaging, and consult notes. Agree with the above plan Patient refused to be questioned and examined by the attending. However, I reviewed the labs, imaging, and consult notes. Agree with the above plan

## 2022-01-23 NOTE — H&P ADULT - PROBLEM SELECTOR PLAN 8
Trop - 18  F/U #2 CE  Give Asa OK.   F/U TTE VTE with Lovenox 40 mg sub cut BID.  Fall, Aspiration, safety and seizure precaution.  Neuro, PT, OT, P M & R, S &S eval.

## 2022-01-23 NOTE — H&P ADULT - ASSESSMENT
66M, h/o CVA with aphasia and LUE weakness, HTN, Hyperlipidemia, CAYDEN on CPAP, CAD, presenting with 5 hours of generalized headache, blurry vision, generalized weakness and a right parotid lesion seen on CTA neck   neuro consult appreciated

## 2022-01-24 LAB
ANION GAP SERPL CALC-SCNC: 13 MMOL/L — SIGNIFICANT CHANGE UP (ref 7–14)
BUN SERPL-MCNC: 20 MG/DL — SIGNIFICANT CHANGE UP (ref 7–23)
CALCIUM SERPL-MCNC: 9.3 MG/DL — SIGNIFICANT CHANGE UP (ref 8.4–10.5)
CHLORIDE SERPL-SCNC: 103 MMOL/L — SIGNIFICANT CHANGE UP (ref 98–107)
CO2 SERPL-SCNC: 25 MMOL/L — SIGNIFICANT CHANGE UP (ref 22–31)
CREAT SERPL-MCNC: 0.81 MG/DL — SIGNIFICANT CHANGE UP (ref 0.5–1.3)
GLUCOSE SERPL-MCNC: 90 MG/DL — SIGNIFICANT CHANGE UP (ref 70–99)
HCT VFR BLD CALC: 44.4 % — SIGNIFICANT CHANGE UP (ref 39–50)
HGB BLD-MCNC: 13.9 G/DL — SIGNIFICANT CHANGE UP (ref 13–17)
MAGNESIUM SERPL-MCNC: 2.1 MG/DL — SIGNIFICANT CHANGE UP (ref 1.6–2.6)
MCHC RBC-ENTMCNC: 28.4 PG — SIGNIFICANT CHANGE UP (ref 27–34)
MCHC RBC-ENTMCNC: 31.3 GM/DL — LOW (ref 32–36)
MCV RBC AUTO: 90.8 FL — SIGNIFICANT CHANGE UP (ref 80–100)
NRBC # BLD: 0 /100 WBCS — SIGNIFICANT CHANGE UP
NRBC # FLD: 0 K/UL — SIGNIFICANT CHANGE UP
PHOSPHATE SERPL-MCNC: 3.3 MG/DL — SIGNIFICANT CHANGE UP (ref 2.5–4.5)
PLATELET # BLD AUTO: 235 K/UL — SIGNIFICANT CHANGE UP (ref 150–400)
POTASSIUM SERPL-MCNC: 3.6 MMOL/L — SIGNIFICANT CHANGE UP (ref 3.5–5.3)
POTASSIUM SERPL-SCNC: 3.6 MMOL/L — SIGNIFICANT CHANGE UP (ref 3.5–5.3)
RBC # BLD: 4.89 M/UL — SIGNIFICANT CHANGE UP (ref 4.2–5.8)
RBC # FLD: 13.4 % — SIGNIFICANT CHANGE UP (ref 10.3–14.5)
SODIUM SERPL-SCNC: 141 MMOL/L — SIGNIFICANT CHANGE UP (ref 135–145)
WBC # BLD: 7.56 K/UL — SIGNIFICANT CHANGE UP (ref 3.8–10.5)
WBC # FLD AUTO: 7.56 K/UL — SIGNIFICANT CHANGE UP (ref 3.8–10.5)

## 2022-01-24 PROCEDURE — 99232 SBSQ HOSP IP/OBS MODERATE 35: CPT

## 2022-01-24 PROCEDURE — 99233 SBSQ HOSP IP/OBS HIGH 50: CPT

## 2022-01-24 PROCEDURE — 99222 1ST HOSP IP/OBS MODERATE 55: CPT

## 2022-01-24 RX ORDER — OXYCODONE HYDROCHLORIDE 5 MG/1
5 TABLET ORAL EVERY 6 HOURS
Refills: 0 | Status: DISCONTINUED | OUTPATIENT
Start: 2022-01-24 | End: 2022-01-24

## 2022-01-24 RX ORDER — ASPIRIN/CALCIUM CARB/MAGNESIUM 324 MG
81 TABLET ORAL DAILY
Refills: 0 | Status: DISCONTINUED | OUTPATIENT
Start: 2022-01-24 | End: 2022-02-04

## 2022-01-24 RX ORDER — CEFAZOLIN SODIUM 1 G
500 VIAL (EA) INJECTION EVERY 8 HOURS
Refills: 0 | Status: DISCONTINUED | OUTPATIENT
Start: 2022-01-24 | End: 2022-01-25

## 2022-01-24 RX ORDER — ACETAMINOPHEN 500 MG
650 TABLET ORAL EVERY 6 HOURS
Refills: 0 | Status: DISCONTINUED | OUTPATIENT
Start: 2022-01-24 | End: 2022-02-04

## 2022-01-24 RX ORDER — AMLODIPINE BESYLATE 2.5 MG/1
10 TABLET ORAL DAILY
Refills: 0 | Status: DISCONTINUED | OUTPATIENT
Start: 2022-01-24 | End: 2022-02-04

## 2022-01-24 RX ORDER — ACETAMINOPHEN 500 MG
1000 TABLET ORAL ONCE
Refills: 0 | Status: COMPLETED | OUTPATIENT
Start: 2022-01-24 | End: 2022-01-24

## 2022-01-24 RX ORDER — FLUOXETINE HCL 10 MG
10 CAPSULE ORAL DAILY
Refills: 0 | Status: DISCONTINUED | OUTPATIENT
Start: 2022-01-24 | End: 2022-02-04

## 2022-01-24 RX ORDER — INFLUENZA VIRUS VACCINE 15; 15; 15; 15 UG/.5ML; UG/.5ML; UG/.5ML; UG/.5ML
0.5 SUSPENSION INTRAMUSCULAR ONCE
Refills: 0 | Status: COMPLETED | OUTPATIENT
Start: 2022-01-24 | End: 2022-01-24

## 2022-01-24 RX ORDER — ATORVASTATIN CALCIUM 80 MG/1
80 TABLET, FILM COATED ORAL AT BEDTIME
Refills: 0 | Status: DISCONTINUED | OUTPATIENT
Start: 2022-01-24 | End: 2022-02-04

## 2022-01-24 RX ORDER — CYCLOBENZAPRINE HYDROCHLORIDE 10 MG/1
10 TABLET, FILM COATED ORAL THREE TIMES A DAY
Refills: 0 | Status: DISCONTINUED | OUTPATIENT
Start: 2022-01-24 | End: 2022-02-04

## 2022-01-24 RX ADMIN — Medication 81 MILLIGRAM(S): at 13:31

## 2022-01-24 RX ADMIN — Medication 400 MILLIGRAM(S): at 20:28

## 2022-01-24 RX ADMIN — CYCLOBENZAPRINE HYDROCHLORIDE 10 MILLIGRAM(S): 10 TABLET, FILM COATED ORAL at 13:31

## 2022-01-24 NOTE — OCCUPATIONAL THERAPY INITIAL EVALUATION ADULT - RANGE OF MOTION EXAMINATION, UPPER EXTREMITY
Left Active ROM: Shoulder 0-60, elbow: very limited, approx 90 degrees at rest./Right UE Active ROM was WFL (within functional limits)/bilateral UE Passive ROM was WFL  (within functional limits)

## 2022-01-24 NOTE — OCCUPATIONAL THERAPY INITIAL EVALUATION ADULT - ADDITIONAL COMMENTS
Pt reports ambulating with cane at baseline. Living situation unclear at this time. Please see Care Coordinator notes when available.

## 2022-01-24 NOTE — CONSULT NOTE ADULT - PROBLEM SELECTOR RECOMMENDATION 9
1. pain management as per primary team  2. Possibly related to right parotitis, would recommend warm compresses, MASH ( massage) of gland tid, sialogogues, and hydration. Will discuss benefit of IV Abx with attending.  3. Will Discuss with ENT attending, CT is currently no able to be reviewed. Please have images uploaded so ENT attending can review and decide if lesion is in need of Bx or if it can be treated as a parotitis. 1. pain management as per primary team  2. Possibly related to right parotitis, would recommend warm compresses, MASH ( massage) of gland tid, sialogogues, and hydration. Consider starting IV Ancef.   3. Will Discuss with ENT attending, CT is currently no able to be reviewed. Please have images uploaded so ENT attending can review.  4. When patient is stable medically would recommend a Dedicated CT study to evaluate Parotid gland, if any pathology is noted then FNA would then be recommended.

## 2022-01-24 NOTE — PROGRESS NOTE ADULT - PROBLEM SELECTOR PLAN 3
Seen on CTA neck though images cannot be reviewed?   [ ] ENT c/s   [ ] Radiology to upload images   - Patient refusing any further imaging

## 2022-01-24 NOTE — CONSULT NOTE ADULT - SUBJECTIVE AND OBJECTIVE BOX
Patient is a 66y old  Male who presents with a chief complaint of HA dizziness and blurry vision (23 Jan 2022 02:33)      HPI:     66M, h/o CVA with aphasia and LUE weakness, HTN, Hyperlipidemia, CAYDEN on CPAP, CAD, presenting with 5 hours of generalized headache, blurry vision and generalized weakness. Patient called as code stroke upon arrival. Per chart patient was admitted for covid in [facility] but since he is from [place] was not eligible for rehab placement there so had to fly back here to get rehab placement.  Patient reports he ambulates with a cane at baseline but became more weak since his flight back to [place].    On isolation precautions for covid.    REVIEW OF SYSTEMS  +aphasia  +weakness    PAST MEDICAL & SURGICAL HISTORY  CAD (Coronary Artery Disease)    CAYDEN (Obstructive Sleep Apnea)    Peripheral Vascular Disease    Hypertension    High cholesterol    Sciatica    Stroke     S/P ACL repair    History of tonsillectomy    History of cholecystectomy      SOCIAL HISTORY  Smoking - Denied  EtOH - Denied   Drugs - Denied    FUNCTIONAL HISTORY  Lives alone in private home  Independent with cane at baseline    CURRENT FUNCTIONAL STATUS  pending    FAMILY HISTORY   Family history of cerebrovascular accident (CVA) in father (Father)        RECENT LABS/IMAGING  CBC Full  -  ( 22 Jan 2022 22:25 )  WBC Count : 11.16 K/uL  RBC Count : 5.16 M/uL  Hemoglobin : 15.1 g/dL  Hematocrit : 46.3 %  Platelet Count - Automated : 231 K/uL  Mean Cell Volume : 89.7 fL  Mean Cell Hemoglobin : 29.3 pg  Mean Cell Hemoglobin Concentration : 32.6 gm/dL  Auto Neutrophil # : 8.21 K/uL  Auto Lymphocyte # : 1.95 K/uL  Auto Monocyte # : 0.76 K/uL  Auto Eosinophil # : 0.16 K/uL  Auto Basophil # : 0.04 K/uL  Auto Neutrophil % : 73.5 %  Auto Lymphocyte % : 17.5 %  Auto Monocyte % : 6.8 %  Auto Eosinophil % : 1.4 %  Auto Basophil % : 0.4 %    01-22    136  |  98  |  18  ----------------------------<  109<H>  4.1   |  25  |  0.67    Ca    9.5      22 Jan 2022 22:25    TPro  7.9  /  Alb  3.9  /  TBili  0.8  /  DBili  x   /  AST  26  /  ALT  20  /  AlkPhos  105  01-22        VITALS  T(C): 36.6 (01-23-22 @ 22:49), Max: 36.9 (01-23-22 @ 18:49)  HR: 70 (01-24-22 @ 07:54) (70 - 87)  BP: 155/100 (01-23-22 @ 22:49) (145/91 - 155/100)  RR: 19 (01-23-22 @ 22:49) (19 - 19)  SpO2: 97% (01-24-22 @ 07:54) (95% - 100%)  Wt(kg): --    ALLERGIES  No Known Allergies      MEDICATIONS   acetaminophen  Suppository .. 650 milliGRAM(s) Rectal every 4 hours PRN  ALBUTerol    90 MICROgram(s) HFA Inhaler 2 Puff(s) Inhalation every 6 hours PRN  aspirin Suppository 300 milliGRAM(s) Rectal daily  chlorhexidine 2% Cloths 1 Application(s) Topical daily  enoxaparin Injectable 40 milliGRAM(s) SubCutaneous every 12 hours  lactated ringers. 1000 milliLiter(s) IV Continuous <Continuous>  sodium chloride 0.65% Nasal 1 Spray(s) Both Nostrils two times a day PRN      ----------------------------------------------------------------------------------------  PHYSICAL EXAM  Constitutional - NAD, Comfortable  HEENT - NCAT, EOMI  Chest - no respiratory distress  Cardiovascular - RRR, S1S2   Abdomen -  Soft, NTND  Extremities - No C/C/E, No calf tenderness   Neurologic Exam -                    Cognitive - Awake, Alert, AAO to self, place, date, year, situation     Communication - aphasic     Cranial Nerves - CN 2-12 intact     Motor - No focal deficits                    LEFT    UE - ShAB 2/5, EF 2/5, EE 2/5, WE 2/5,  2/5                    RIGHT UE - ShAB 4/5, EF 4/5, EE 4/5, WE 4/5,  4/5                    LEFT    LE - HF 4+/5, KE 4+/5, DF 5/5, PF 5/5                    RIGHT LE - HF 4+/5, KE 4+/5, DF 5/5, PF 5/5        Sensory - impaired to light touch left hand      Balance - WNL Static  Psychiatric - Mood stable, Affect WNL  ----------------------------------------------------------------------------------------  ASSESSMENT/PLAN    Pain -  DVT PPX -   Rehab -  Patient is a 66y old  Male who presents with a chief complaint of HA dizziness and blurry vision (23 Jan 2022 02:33)      HPI:     66M, h/o CVA with aphasia and LUE weakness, HTN, Hyperlipidemia, CAYDEN on CPAP, CAD, presenting with 5 hours of generalized headache, blurry vision and generalized weakness. Patient called as code stroke upon arrival. Per chart patient was admitted for covid in [facility] but since he is from [place] was not eligible for rehab placement there so had to fly back here to get rehab placement.  Patient reports he ambulates with a cane at baseline but became more weak since his flight back to [place].    On isolation precautions for covid.    REVIEW OF SYSTEMS  +aphasia  +weakness    PAST MEDICAL & SURGICAL HISTORY  CAD (Coronary Artery Disease)    CAYDEN (Obstructive Sleep Apnea)    Peripheral Vascular Disease    Hypertension    High cholesterol    Sciatica    Stroke     S/P ACL repair    History of tonsillectomy    History of cholecystectomy      SOCIAL HISTORY  Smoking - Denied  EtOH - Denied   Drugs - Denied    FUNCTIONAL HISTORY  Lives alone in private home  Independent with cane at baseline    CURRENT FUNCTIONAL STATUS  pending    FAMILY HISTORY   Family history of cerebrovascular accident (CVA) in father (Father)        RECENT LABS/IMAGING  CBC Full  -  ( 22 Jan 2022 22:25 )  WBC Count : 11.16 K/uL  RBC Count : 5.16 M/uL  Hemoglobin : 15.1 g/dL  Hematocrit : 46.3 %  Platelet Count - Automated : 231 K/uL  Mean Cell Volume : 89.7 fL  Mean Cell Hemoglobin : 29.3 pg  Mean Cell Hemoglobin Concentration : 32.6 gm/dL  Auto Neutrophil # : 8.21 K/uL  Auto Lymphocyte # : 1.95 K/uL  Auto Monocyte # : 0.76 K/uL  Auto Eosinophil # : 0.16 K/uL  Auto Basophil # : 0.04 K/uL  Auto Neutrophil % : 73.5 %  Auto Lymphocyte % : 17.5 %  Auto Monocyte % : 6.8 %  Auto Eosinophil % : 1.4 %  Auto Basophil % : 0.4 %    01-22    136  |  98  |  18  ----------------------------<  109<H>  4.1   |  25  |  0.67    Ca    9.5      22 Jan 2022 22:25    TPro  7.9  /  Alb  3.9  /  TBili  0.8  /  DBili  x   /  AST  26  /  ALT  20  /  AlkPhos  105  01-22        VITALS  T(C): 36.6 (01-23-22 @ 22:49), Max: 36.9 (01-23-22 @ 18:49)  HR: 70 (01-24-22 @ 07:54) (70 - 87)  BP: 155/100 (01-23-22 @ 22:49) (145/91 - 155/100)  RR: 19 (01-23-22 @ 22:49) (19 - 19)  SpO2: 97% (01-24-22 @ 07:54) (95% - 100%)  Wt(kg): --    ALLERGIES  No Known Allergies      MEDICATIONS   acetaminophen  Suppository .. 650 milliGRAM(s) Rectal every 4 hours PRN  ALBUTerol    90 MICROgram(s) HFA Inhaler 2 Puff(s) Inhalation every 6 hours PRN  aspirin Suppository 300 milliGRAM(s) Rectal daily  chlorhexidine 2% Cloths 1 Application(s) Topical daily  enoxaparin Injectable 40 milliGRAM(s) SubCutaneous every 12 hours  lactated ringers. 1000 milliLiter(s) IV Continuous <Continuous>  sodium chloride 0.65% Nasal 1 Spray(s) Both Nostrils two times a day PRN      ----------------------------------------------------------------------------------------  PHYSICAL EXAM  Constitutional - NAD, Comfortable  HEENT - NCAT, EOMI  Chest - no respiratory distress  Cardiovascular - RRR, S1S2   Abdomen -  Soft, NTND  Extremities - No C/C/E, No calf tenderness   Neurologic Exam -                    Cognitive - Awake, Alert, AAO to self, place, date, year, situation     Communication - aphasic     Cranial Nerves - CN 2-12 intact     Motor -                    LEFT    UE - ShAB 2/5, EF 2/5, EE 2/5, WE 2/5,  2/5                    RIGHT UE - ShAB 4/5, EF 4/5, EE 4/5, WE 4/5,  4/5                    LEFT    LE - HF 4+/5, KE 4+/5, DF 5/5, PF 5/5                    RIGHT LE - HF 4+/5, KE 4+/5, DF 5/5, PF 5/5        Sensory - impaired to light touch left hand      Balance - WNL Static  Psychiatric - Mood stable, Affect WNL  ----------------------------------------------------------------------------------------  ASSESSMENT/PLAN  65 yo m pmh cva with aphasia, LUE weakness, presented for weakness and headache  patient was not cooperative with bedside PT yesterday.  Patient uses communication device to communicate, reports his goal is to go to rehab. Discussed with him that we will monitor participation with bedside therapy in order to facilitate rehab placement  -recommend OT evaluation if patient agreeable to participate  turn and position q 2 to prevent skin breakdown  cleared for soft and bite sized diet with mildly thick liquids  Pain -tylenol  DVT PPX - lovenox  Rehab - anticipate inpatient rehab (acute vs [facility]) depending on participation, cooperation and tolerance.  Will monitor for participation.

## 2022-01-24 NOTE — PROGRESS NOTE ADULT - PROBLEM SELECTOR PLAN 2
Reportedly COVID positive in [place] requiring admission. On home CPAP settings   [ ] OSH records   [ ] D-dimer- if >2ULN, will start full AC   - Lovenox 40 BID   - Supportive care PRN

## 2022-01-24 NOTE — CONSULT NOTE ADULT - SUBJECTIVE AND OBJECTIVE BOX
66 year old male with a history of PVD, HTN, CAYDEN, CVA presented to [facility] with LUE weakness and Aphasia. CT head and CTA neck was performed. Incidental finding of benign vs malignant lesion was noted. Patient now c/o 5/10 right jaw pain radiating to right forehead with swelling for one week. Denies any fevers, chills, rigors, cough.      PAST MEDICAL & SURGICAL HISTORY:  CAD (Coronary Artery Disease)    CAYDEN (Obstructive Sleep Apnea)    Peripheral Vascular Disease    Hypertension    High cholesterol    Sciatica    Stroke  L sided deficits in Feb 2019    S/P ACL repair    History of tonsillectomy    History of cholecystectomy      Allergies    No Known Allergies    Intolerances      MEDICATIONS  (STANDING):  aspirin enteric coated 81 milliGRAM(s) Oral daily  atorvastatin 80 milliGRAM(s) Oral at bedtime  chlorhexidine 2% Cloths 1 Application(s) Topical daily  enoxaparin Injectable 40 milliGRAM(s) SubCutaneous every 12 hours  FLUoxetine 10 milliGRAM(s) Oral daily  lactated ringers. 1000 milliLiter(s) (75 mL/Hr) IV Continuous <Continuous>    MEDICATIONS  (PRN):  acetaminophen     Tablet .. 650 milliGRAM(s) Oral every 6 hours PRN Temp greater or equal to 38C (100.4F), Mild Pain (1 - 3), Moderate Pain (4 - 6)  ALBUTerol    90 MICROgram(s) HFA Inhaler 2 Puff(s) Inhalation every 6 hours PRN Shortness of Breath and/or Wheezing  cyclobenzaprine 10 milliGRAM(s) Oral three times a day PRN Muscle Spasm  oxyCODONE    IR 5 milliGRAM(s) Oral every 6 hours PRN Severe Pain (7 - 10)  sodium chloride 0.65% Nasal 1 Spray(s) Both Nostrils two times a day PRN Nasal Congestion        ROS:   ENT: all negative except as noted in HPI   CV: denies palpitations  Pulm: denies SOB, cough, hemoptysis  GI: denies change in apetite, indigestion, n/v  : denies pertinent urinary symptoms, urgency  Neuro: denies numbness/tingling, loss of sensation  Psych: denies anxiety  MS: denies muscle weakness, instability  Heme: denies easy bruising or bleeding  Endo: denies heat/cold intolerance, excessive sweating  Vascular: denies LE edema    Vital Signs Last 24 Hrs  T(C): 36.6 (23 Jan 2022 22:49), Max: 36.9 (23 Jan 2022 18:49)  T(F): 97.9 (23 Jan 2022 22:49), Max: 98.5 (23 Jan 2022 18:49)  HR: 61 (24 Jan 2022 11:03) (61 - 87)  BP: 155/100 (23 Jan 2022 22:49) (145/91 - 155/100)  BP(mean): --  RR: 19 (23 Jan 2022 22:49) (19 - 19)  SpO2: 93% (24 Jan 2022 11:03) (93% - 100%)                          15.1   11.16 )-----------( 231      ( 22 Jan 2022 22:25 )             46.3    01-22    136  |  98  |  18  ----------------------------<  109<H>  4.1   |  25  |  0.67    Ca    9.5      22 Jan 2022 22:25    TPro  7.9  /  Alb  3.9  /  TBili  0.8  /  DBili  x   /  AST  26  /  ALT  20  /  AlkPhos  105  01-22   PT/INR - ( 22 Jan 2022 22:25 )   PT: 13.9 sec;   INR: 1.22 ratio         PTT - ( 22 Jan 2022 22:25 )  PTT:34.7 sec    PHYSICAL EXAM:  Gen: NAD  Skin: No rashes, bruises, or lesions  Head: Normocephalic, Atraumatic  Face: no edema, erythema, or fluctuance. Right parotid gland swelling and pain, unable to milk purulent discharge from gland.   Eyes: no scleral injection  Ears: Right - ear canal clear, TM intact without effusion or erythema. No evidence of any fluid drainage. No mastoid tenderness, erythema, or ear bulging            Left - ear canal clear, TM intact without effusion or erythema. No evidence of any fluid drainage. No mastoid tenderness, erythema, or ear bulging  Nose: Nares bilaterally patent, no discharge  Mouth: No Stridor / Drooling / Trismus.  Mucosa moist, tongue/uvula midline, oropharynx clear  Neck: Flat, supple, no lymphadenopathy, trachea midline, no masses  Lymphatic: No lymphadenopathy  Resp: breathing easily, no stridor  CV: no peripheral edema/cyanosis  GI: nondistended   Peripheral vascular: no JVD or edema  Neuro: facial nerve intact, no facial droop      IMAGING/ADDITIONAL STUDIES:   IMPRESSION:    Markedly limited study secondary to poor contrast opacification and motion during perfusion study.    CT PERFUSION: Perfusion with increased Tmax throughout a non-vascular territory distribution with significant motion noted during study, findings likely artifactual in nature.    CTA BRAIN: No diagnostic evaluation of intracranial circulation.    CTA NECK: Patent proximal cervical vasculature. Nondiagnostic evaluation of cervical vascular distal to the carotid bifurcations.    Heterogeneous right parotid lesion associated with enlarged right ECA branch. Differential includes benign or malignant neoplasm and vascular malformation. Recommend contrast enhanced MRI of the neck for complete evaluation.    Findings and recommendations were discussed with Dr. Brewer by Dr. Lopez at 11:13 PM on 1/22/2022 who indicated that the communication was understood.

## 2022-01-24 NOTE — PROVIDER CONTACT NOTE (OTHER) - REASON
pt refused vital signs and neuro check and lovenox for the am pt refused vital signs and neuro check and lovenox for the am, and am labs

## 2022-01-24 NOTE — PATIENT PROFILE ADULT - PUBLIC BENEFITS
Patient lives with mian Bhardwaj who states patient is starting to experience weakness and hot flashes  again so she does want to treat the infection. She does not think she will be able to swallow the doxy and wants the injection ordered to be given at the [facility]. Spoke with [facility]. They do not have an RN who can give the injection. She suggested [facility], spoke with [facility] and they currently do not have the staff to do injections either.   Benton is willing to drive to [place] now.   no

## 2022-01-24 NOTE — PROVIDER CONTACT NOTE (OTHER) - SITUATION
pt refused vital signs and neuro check and lovenox for the am pt refused vital signs and neuro check and lovenox for the am and am labs

## 2022-01-24 NOTE — PROGRESS NOTE ADULT - SUBJECTIVE AND OBJECTIVE BOX
Merlin Mathew, MD   Hospitalist  Pager #[phone]    PROGRESS NOTE:     Patient is a 66y old  Male who presents with a chief complaint of HA dizziness and blurry vision (24 Jan 2022 12:41)      SUBJECTIVE / OVERNIGHT EVENTS: Patient refused labs, medications, vitals, PT evaluation.   Patient used text to audio services to communicate   Patient reports he is unable and unwilling to undergo MRI or repeat CT scan, does not want to lie down for testing. Unwilling to trial period of laying flat. Reports he wants to go to rehab.   Came to the hospital because he felt his LE were weak while he was on the plane returning to [place]. Feels they are still not as strong as they once were.   Currently refusing labs, vitals but now reports he will see PT.     ADDITIONAL REVIEW OF SYSTEMS:    MEDICATIONS  (STANDING):  amLODIPine   Tablet 10 milliGRAM(s) Oral daily  aspirin enteric coated 81 milliGRAM(s) Oral daily  atorvastatin 80 milliGRAM(s) Oral at bedtime  chlorhexidine 2% Cloths 1 Application(s) Topical daily  enoxaparin Injectable 40 milliGRAM(s) SubCutaneous every 12 hours  FLUoxetine 10 milliGRAM(s) Oral daily  lactated ringers. 1000 milliLiter(s) (75 mL/Hr) IV Continuous <Continuous>    MEDICATIONS  (PRN):  acetaminophen     Tablet .. 650 milliGRAM(s) Oral every 6 hours PRN Temp greater or equal to 38C (100.4F), Mild Pain (1 - 3), Moderate Pain (4 - 6)  ALBUTerol    90 MICROgram(s) HFA Inhaler 2 Puff(s) Inhalation every 6 hours PRN Shortness of Breath and/or Wheezing  cyclobenzaprine 10 milliGRAM(s) Oral three times a day PRN Muscle Spasm  oxyCODONE    IR 5 milliGRAM(s) Oral every 6 hours PRN Severe Pain (7 - 10)  sodium chloride 0.65% Nasal 1 Spray(s) Both Nostrils two times a day PRN Nasal Congestion      CAPILLARY BLOOD GLUCOSE        I&O's Summary      PHYSICAL EXAM:  Vital Signs Last 24 Hrs  T(C): 36.2 (24 Jan 2022 16:00), Max: 36.9 (23 Jan 2022 18:49)  T(F): 97.2 (24 Jan 2022 16:00), Max: 98.5 (23 Jan 2022 18:49)  HR: 99 (24 Jan 2022 16:12) (61 - 99)  BP: 158/88 (24 Jan 2022 16:00) (145/91 - 158/88)  BP(mean): --  RR: 18 (24 Jan 2022 16:00) (18 - 19)  SpO2: 94% (24 Jan 2022 16:12) (93% - 100%)    CONSTITUTIONAL: NAD, well-developed male   RESPIRATORY: Normal respiratory effort; lungs are clear to auscultation bilaterally on nasal CPAP   CARDIOVASCULAR: Regular rate and rhythm, normal S1 and S2, no murmur/rub/gallop; No lower extremity edema; Peripheral pulses are 2+ bilaterally  ABDOMEN: Nontender to palpation, normoactive bowel sounds, no rebound/guarding; No hepatosplenomegaly  MUSCLOSKELETAL: 4/5 strength in LE, 5/5 strength in RUE, 0/5 in LUE- chronic   PSYCH: A+O to person, place, and time;     LABS:                        13.9   7.56  )-----------( 235      ( 24 Jan 2022 13:37 )             44.4     01-24    141  |  103  |  20  ----------------------------<  90  3.6   |  25  |  0.81    Ca    9.3      24 Jan 2022 13:37  Phos  3.3     01-24  Mg     2.10     01-24    TPro  7.9  /  Alb  3.9  /  TBili  0.8  /  DBili  x   /  AST  26  /  ALT  20  /  AlkPhos  105  01-22    PT/INR - ( 22 Jan 2022 22:25 )   PT: 13.9 sec;   INR: 1.22 ratio         PTT - ( 22 Jan 2022 22:25 )  PTT:34.7 sec            RADIOLOGY & ADDITIONAL TESTS:  Results Reviewed:   Imaging Personally Reviewed:  Electrocardiogram Personally Reviewed:    COORDINATION OF CARE:  Care Discussed with Consultants/Other Providers [Y/N]:  Prior or Outpatient Records Reviewed [Y/N]:

## 2022-01-24 NOTE — OCCUPATIONAL THERAPY INITIAL EVALUATION ADULT - PERTINENT HX OF CURRENT PROBLEM, REHAB EVAL
66 y.o. RH M with a h/o HTN, HLD, prior stroke (01/2019, w/residual L hemiparesis) presenting on 1/22/22 with c/o blurry vision, dizziness, and generalized weakness. LKN 15:00. Patient was visiting his sister in [place] and was recently hospitalized for COVID-19 infection, but was unable to be placed in rehab due to residency status, prompting his return to [place]. 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

## 2022-01-24 NOTE — PROGRESS NOTE ADULT - PROBLEM SELECTOR PLAN 1
Hx of stroke in the past p/w LE weakness, dizziness. Per neuro- less concerned for acute CVA. NIHSS 6 on presentation   Patient currently refusing MRI or repeat CT head,  understands need for repeat imaging and continues to refuse.   - Cont ASA, statin   [ ] TSH, A1C, TTE, U/A   - Passed SLP evaluation   [ ] PT/OT eval

## 2022-01-24 NOTE — PATIENT PROFILE ADULT - DO YOU FEEL UNSAFE AT HOME, WORK, OR SCHOOL?
Pt's mom returned call to [employer]. Stated that she would like to schedule OP consult to get assistance with putting baby to breast. Baby was born [birth year] at [facility] due to heart anomaly. Baby was transferred to [facility] PICU after birth. Baby has been home for about 1 wk now. Mom has been pumping with Spectra S2 breast pump. Pumps about 6x/day & gets about 40oz/day. Lots of praise & reassurance provided. Baby has been bottle feeding fortified EBM 24 donald per mom. Unsure how long she needs to continue to fortify EBM. Had appt with ped yesterday. Weight was 7# 8oz. D/c weight about 1 wk ago was 7# 1oz per mom. Baby takes about 2-3oz from bottle each fdg. Has appt with cardiologist on Fri 1/14/22 per mom. Discussed tips to get baby to breast. Encouraged skin to skin. Scheduled OP consult for tomorrow 1/12/22 at 1400. Fees discussed. Informed of location for consult. Questions answered. Verbalized understanding.    no

## 2022-01-24 NOTE — PATIENT PROFILE ADULT - FALL HARM RISK - HARM RISK INTERVENTIONS

## 2022-01-25 DIAGNOSIS — F32.9 MAJOR DEPRESSIVE DISORDER, SINGLE EPISODE, UNSPECIFIED: ICD-10-CM

## 2022-01-25 PROCEDURE — 99223 1ST HOSP IP/OBS HIGH 75: CPT

## 2022-01-25 PROCEDURE — 99233 SBSQ HOSP IP/OBS HIGH 50: CPT

## 2022-01-25 RX ORDER — HALOPERIDOL DECANOATE 100 MG/ML
0.5 INJECTION INTRAMUSCULAR EVERY 6 HOURS
Refills: 0 | Status: DISCONTINUED | OUTPATIENT
Start: 2022-01-25 | End: 2022-01-27

## 2022-01-25 RX ORDER — HALOPERIDOL DECANOATE 100 MG/ML
0.5 INJECTION INTRAMUSCULAR EVERY 6 HOURS
Refills: 0 | Status: DISCONTINUED | OUTPATIENT
Start: 2022-01-25 | End: 2022-01-25

## 2022-01-25 RX ORDER — SIMETHICONE 80 MG/1
80 TABLET, CHEWABLE ORAL ONCE
Refills: 0 | Status: DISCONTINUED | OUTPATIENT
Start: 2022-01-25 | End: 2022-02-04

## 2022-01-25 RX ORDER — SUCRALFATE 1 G
1 TABLET ORAL ONCE
Refills: 0 | Status: DISCONTINUED | OUTPATIENT
Start: 2022-01-25 | End: 2022-02-04

## 2022-01-25 RX ADMIN — CHLORHEXIDINE GLUCONATE 1 APPLICATION(S): 213 SOLUTION TOPICAL at 11:46

## 2022-01-25 RX ADMIN — Medication 1 SPRAY(S): at 12:48

## 2022-01-25 RX ADMIN — Medication 1000 MILLIGRAM(S): at 06:14

## 2022-01-25 NOTE — PHYSICAL THERAPY INITIAL EVALUATION ADULT - MANUAL MUSCLE TESTING RESULTS, REHAB EVAL
right UE and bilateral LE grossly 3/5. Left UE not assessed.
Patient is not cooperative/not tested due to

## 2022-01-25 NOTE — PROGRESS NOTE ADULT - SUBJECTIVE AND OBJECTIVE BOX
Merlin Mathew, MD   Hospitalist  Pager #[phone]    PROGRESS NOTE:     Patient is a 66y old  Male who presents with a chief complaint of HA dizziness and blurry vision (24 Jan 2022 16:42)      SUBJECTIVE / OVERNIGHT EVENTS: No overnight events   Patient continues to refuse MRI/CT though when asked why, patient repeatedly says "I want to die" Dsicussed parotid gland lesion and recommendation to undergo MRI neck which patient refuses and then says to "leave him alone" so he can die. Patient reports he is more depressed then writer can imagine and he does not want to live anymore. His family turned his back on him and is not involved in his life anymore.     ADDITIONAL REVIEW OF SYSTEMS:    MEDICATIONS  (STANDING):  amLODIPine   Tablet 10 milliGRAM(s) Oral daily  aspirin enteric coated 81 milliGRAM(s) Oral daily  atorvastatin 80 milliGRAM(s) Oral at bedtime  chlorhexidine 2% Cloths 1 Application(s) Topical daily  enoxaparin Injectable 40 milliGRAM(s) SubCutaneous every 12 hours  FLUoxetine 10 milliGRAM(s) Oral daily  influenza   Vaccine 0.5 milliLiter(s) IntraMuscular once    MEDICATIONS  (PRN):  acetaminophen     Tablet .. 650 milliGRAM(s) Oral every 6 hours PRN Temp greater or equal to 38C (100.4F), Mild Pain (1 - 3), Moderate Pain (4 - 6)  ALBUTerol    90 MICROgram(s) HFA Inhaler 2 Puff(s) Inhalation every 6 hours PRN Shortness of Breath and/or Wheezing  cyclobenzaprine 10 milliGRAM(s) Oral three times a day PRN Muscle Spasm  oxyCODONE    IR 5 milliGRAM(s) Oral every 6 hours PRN Severe Pain (7 - 10)  sodium chloride 0.65% Nasal 1 Spray(s) Both Nostrils two times a day PRN Nasal Congestion      CAPILLARY BLOOD GLUCOSE        I&O's Summary      PHYSICAL EXAM:  Vital Signs Last 24 Hrs  T(C): 36.1 (25 Jan 2022 11:46), Max: 36.2 (24 Jan 2022 16:00)  T(F): 96.9 (25 Jan 2022 11:46), Max: 97.2 (24 Jan 2022 16:00)  HR: 83 (25 Jan 2022 11:46) (67 - 100)  BP: 147/101 (25 Jan 2022 11:46) (147/101 - 158/88)  BP(mean): --  RR: 19 (25 Jan 2022 11:46) (18 - 19)  SpO2: 98% (25 Jan 2022 11:46) (94% - 98%)    CONSTITUTIONAL: NAD, well-developed male   RESPIRATORY: Normal respiratory effort; lungs are clear to auscultation bilaterally on nasal CPAP   CARDIOVASCULAR: Regular rate and rhythm, normal S1 and S2, no murmur/rub/gallop; No lower extremity edema; Peripheral pulses are 2+ bilaterally  ABDOMEN: Nontender to palpation, normoactive bowel sounds, no rebound/guarding; No hepatosplenomegaly  MUSCULOSKELETAL: 4/5 strength in LE, 5/5 strength in RUE, 0/5 in LUE- chronic   PSYCH: A+O to person, place, and time;       LABS:                        13.9   7.56  )-----------( 235      ( 24 Jan 2022 13:37 )             44.4     01-24    141  |  103  |  20  ----------------------------<  90  3.6   |  25  |  0.81    Ca    9.3      24 Jan 2022 13:37  Phos  3.3     01-24  Mg     2.10     01-24                  RADIOLOGY & ADDITIONAL TESTS:  Results Reviewed:   Imaging Personally Reviewed:  Electrocardiogram Personally Reviewed:    COORDINATION OF CARE:  Care Discussed with Consultants/Other Providers [Y/N]:  Prior or Outpatient Records Reviewed [Y/N]:

## 2022-01-25 NOTE — BH CONSULTATION LIAISON ASSESSMENT NOTE - NSBHCONSULTRECOMMENDOTHER_PSY_A_CORE FT
Spoke with  wife Maria G [phone] (Petrona is patient sister- [phone],) As per wife - patient has a hx of making statements of wanting to die. 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 She states patient was very angry about this admission and consistently states he would not hurt himself as he is [employer], believes in heaven and hell and "wants to be with his parents when he dies."  She france believe he would "give up" but denies he would harm himself due to his Islam.  Wife states recently patient had COVID in [place], quarantined, was hospitalized. Wanted to go to rehab, but had [place] medicaid and was denied. He made it back to [place] where she thinks he may have lives at home (their home is empty). He must have been in contact with his sister as his sisters best friends  brought him to [facility].  She was shocked ot hear this as patient and sister had a recent falling out which she believes brought him to this depression.

## 2022-01-25 NOTE — BH CONSULTATION LIAISON ASSESSMENT NOTE - CASE SUMMARY
Chart reviewed. Agree with above assessment. Limited information due to pt's speech impediment and behavioral withdrawal. Selective with responses. Limited collateral. Likely significant capacity deficits 2/2 both depression and lack of willingness to engage in full assessment. Agree with preliminary recs (1:1). Will see 1/26/2021 as well.

## 2022-01-25 NOTE — PHYSICAL THERAPY INITIAL EVALUATION ADULT - PATIENT PROFILE REVIEW, REHAB EVAL
yes
Activity - OOB with assistance. Spoke with Meena CASTAÑEDA RN, pt. ok to be seen for PT Evaluation./yes

## 2022-01-25 NOTE — PHYSICAL THERAPY INITIAL EVALUATION ADULT - GENERAL OBSERVATIONS, REHAB EVAL
Patient seen semi supine in bed, connected to BIPAP.
Consult received, chart reviewed. Patient received in bed, no apparent distress, +tele, +pulse ox.

## 2022-01-25 NOTE — BH CONSULTATION LIAISON ASSESSMENT NOTE - NSBHCHARTREVIEWLAB_PSY_A_CORE FT
13.9   7.56  )-----------( 235      ( 24 Jan 2022 13:37 )             44.4   01-24    141  |  103  |  20  ----------------------------<  90  3.6   |  25  |  0.81    Ca    9.3      24 Jan 2022 13:37  Phos  3.3     01-24  Mg     2.10     01-24

## 2022-01-25 NOTE — PHYSICAL THERAPY INITIAL EVALUATION ADULT - REFERRING PHYSICIAN, REHAB EVAL
2021       Arnol Muse MD  [address]  [address]  [facility] [address]  Via In Basket      Patient: Xu Kerr   YOB: 1936   Date of Visit: 2021       Dear Dr. Muse:    Thank you for referring Xu Kerr to me for evaluation. Below are my notes for this visit with him.    If you have questions, please do not hesitate to call me. I look forward to following your patient along with you.      Sincerely,        Mila Syed MD        CC: No Recipients  Mila Syed MD  2021 11:56 AM  Signed       Cardiovascular Clinic Note  [facility] [address]  [address]  [address]  [facility] [address]  Dept Phone: [phone]      Xu Kerr  : 1936  PCP: Arnol Muse MD  Reason for Office Visit:   Chief Complaint   Patient presents with   • Follow-up     3 month f/u.   • Shortness of Breath   • Edema         History of Present Illness:  Dear Dr. Arnol Muse MD, We had the pleasure of seeing Xu Kerr in the [facility]. Thank you for allowing me to see this patient in the office. As you know, this is a 85 year old male who presents with the chief complaint of   Chief Complaint   Patient presents with   • Follow-up     3 month f/u.   • Shortness of Breath   • Edema   .  Patient presents for 3-month follow-up.  Since I last saw him there has been no oncological changes.  He does report dyspnea on exertion now which she thinks is new since I saw him last.  He still has his chronic lightheadedness and dizziness.  No syncope.  Using his walker due to his neuropathy.  No chest pain or palpitations.  Some lower extremity edema but currently controlled.  No PND or orthopnea.  No cough.    Review of Systems:  A medically appropriate Review of Systems was performed for this visit and is negative except for HPI.     Physical Exam:  Visit Vitals  /52 (BP Location: LUE - Left  upper extremity, Patient Position: Sitting, Cuff Size: Large Adult)   Pulse 78   Ht 6' 2\" (1.88 m)   Wt 115.3 kg (254 lb 4.8 oz)   BMI 32.65 kg/m²     Wt Readings from Last 4 Encounters:   12/09/21 115.3 kg (254 lb 4.8 oz)   11/18/21 114.3 kg (252 lb)   08/31/21 117.5 kg (259 lb)   05/17/21 115.7 kg (255 lb)     Vital signs and previous weights were reviewed during today's visit.    Gen: no acute distress, AOx3  Chronically ill using a walker  Neck: Supple, no JVP, no carotid bruit  CV: RRR, S1, S2, No G/R/M  Chest: Lungs BCTA, non-labored respirations   Ext: warm, well perfused, no LE edema    ALLERGIES:   Allergen Reactions   • Sulfa Antibiotics Other (See Comments)     CLASS       Current Medications    ACYCLOVIR (ZOVIRAX) 400 MG TABLET    TAKE ONE TABLET BY MOUTH TWICE DAILY    ALLOPURINOL (ZYLOPRIM) 100 MG TABLET    Take 2 tablets (200 mg total) by mouth daily.    ASPIRIN 81 MG TABLET    1 daily    CARVEDILOL (COREG) 6.25 MG TABLET    TAKE TWO TABLETS EVERY morning AND TWO TABLET EVERY evening    CHOLECALCIFEROL (VITAMIN D3) 1000 UNITS TABLET        DOCUSATE SODIUM (COLACE) 100 MG CAPSULE    Take 100 mg by mouth.    FOLIC ACID (FOLATE) 1 MG TABLET    one tablet twice daily    GABAPENTIN (NEURONTIN) 100 MG CAPSULE    Take 1 capsule by mouth 2 times daily.    LATANOPROST (XALATAN) 0.005 % OPHTHALMIC SOLUTION    INSTILL ONE DROP INTO BOTH EYES TWO TIMES A DAY    MULTIPLE VITAMINS-MINERALS (CENTRUM SILVER) TABLET        OMEGA 3 1000 MG CAPSULE    one daily    PRAVASTATIN (PRAVACHOL) 40 MG TABLET    TAKE ONE TABLET BY MOUTH IN THE EVENING    PYRIDOXINE HCL (VITAMIN B6) 200 MG TAB    100mg twice daily    SODIUM FLUORIDE 5000 PPM 1.1 % DENTAL PASTE        TAMSULOSIN (FLOMAX) 0.4 MG CAP    TAKE ONE CAPSULE BY MOUTH AT BEDTIME    TRAMADOL (ULTRAM) 50 MG TABLET    one tablet every four hours as needed       Xu's Allergies and Medications were reviewed with the patient and updated during today's visit. In addition, I  discussed medication dosage, usage, goals of therapy, and side effects with him.    Cardiovascular Diagnostic Studies:    LAST EKG:  No results found for this or any previous visit (from the past 4464 hour(s)).    LAST ECHO/ECHO STRESS:  No results found for this or any previous visit.      CATH REPORT:  No results found for this or any previous visit.    STRESS TEST:   No results found for this or any previous visit.    NUCLEAR STRESS TEST:   Results for orders placed or performed during the hospital encounter of 21   Stress Test with Myocardial Perfusion    Impression    *[facility]*  [address] [place] [id number]  Phone [phone]  Myocardial Perfusion Imaging    Regadenoson (Lexiscan)    Rest/Stress    Patient: Xu Kerr    Study Date/Time:       May 4 2021 1:01PM  MRN:     [id number]            FIN#:                  [id number]  :     1936         Ht/Wt:                 188cm 113.2kg  Age:     84                 BSA/BMI:               2.46m^2 32kg/m^2  Gender:  M                  Baseline BP:           128 / 86     Referring Physician:  Mila Syed MD     Attending Physician:  Mila Syed MD  Diagnostic Physician: Mila Syed MD  Technologist:         Mary Kay Avery  Nurse:                James Ray    --------------------------------------------------------------------------    --------------------------------------------------------------------------  Indications:  Hypertension.    --------------------------------------------------------------------------  Study Conclusions    Summary:    1. Myocardial perfusion imaging: There is a medium-sized, moderately     severe, fixed defect involving the mid inferior wall(s), consistent     with infarction.  2. Gated SPECT: The calculated resting left ventricular ejection fraction     is 44%. The calculated left ventricular ejection fraction during stress      is 49%. LV global systolic function is depressed. There is hypokinesis     involving the mid inferior wall of the left ventricle.  3. Stress ECG conclusions: The stress ECG is normal.  4. Baseline ECG: Normal sinus rhythm with first degree AV block. Right     bundle branch block.  Impressions:    1. Abnormal nuclear perfusion study.  2. There is evidence of myocardial infarction in the inferior wall.  3. Abnormal ejection fraction.    --------------------------------------------------------------------------  Study data:   Nuclear components:    Rest/stress imaging.  Study status:  Routine.  Location:  Stress laboratory.  Patient status:  Outpatient.  Consent:  The risks, benefits, and alternatives to the procedure were  explained to the patient.  Study completion:  The patient tolerated the  procedure well. There were no complications.    --------------------------------------------------------------------------  Procedure data:  Initial setup. The patient was brought to the laboratory.  A baseline ECG was recorded. Intravenous access was obtained. Surface ECG  leads and manual cuff blood pressure measurements were monitored.  Regadenoson (Lexiscan) stress test. Regadenoson (Lexiscan) was  administered by intravenous bolus, followed by a 5ml saline flush. The  total dose was 0.4mgover 10.00sec.    --------------------------------------------------------------------------  Isotope administration:    +-------------+----------------+----------------+  !Stage        !Rest            !Stress          !  +-------------+----------------+----------------+  !Agent        !Tc-99m sestamibi!Tc-99m sestamibi!  +-------------+----------------+----------------+  !Injected dose!8.3mCi          !25mCi           !  +-------------+----------------+----------------+  !Route        !IV              !IV              !  +-------------+----------------+----------------+  Image properties:   Gated imaging was performed.  Discharge:  The  patient left the laboratory in stable condition.    --------------------------------------------------------------------------  Baseline ECG:  Normal sinus rhythm with first degree AV block. Right  bundle branch block.    --------------------------------------------------------------------------  Cardiac stress table:    +-----------------------+--+-----------+------------+--------------------+  !Stage                  !HR!BP         !Symptoms    !Comments            !  +-----------------------+--+-----------+------------+--------------------+  !Baseline               !66!128/86     !No symptoms.!Patient and staff   !  !                       !  !(100)      !            !with PPE per        !  !                       !  !           !            !COVID-19 protocol.  !  +-----------------------+--+-----------+------------+--------------------+  !Regadenoson (Lexiscan);!93!102/60 (74)!------------!--------------------!  !2 min                  !  !           !            !                    !  +-----------------------+--+-----------+------------+--------------------+  !Recovery; 0 sec        !93!-----------!------------!--------------------!  +-----------------------+--+-----------+------------+--------------------+  !Recovery; 2 min        !93!120/66 (84)!------------!--------------------!  +-----------------------+--+-----------+------------+--------------------+  !Recovery; 4 min        !85!118/70 (   !------------!--------------------!  !                       !  !86)        !            !                    !  +-----------------------+--+-----------+------------+--------------------+  !Recovery; 6 min        !79!-----------!------------!--------------------!  +-----------------------+--+-----------+------------+--------------------+  Stress results:  There is a normal resting blood pressure with an  appropriate response to stress.  Stress ECG:   The stress ECG is  normal.    --------------------------------------------------------------------------  Myocardial perfusion imaging:   There is a medium-sized, moderately  severe, fixed defect involving the mid inferior wall(s), consistent with  infarction.  Gated SPECT:   The calculated resting left ventricular ejection fraction  is 44%. The calculated left ventricular ejection fraction during stress is  49%. LV global systolic function is depressed.   There is hypokinesis  involving the mid inferior wall of the left ventricle.  Prepared and electronically signed by:    Mila Syed MD  05/04/2021 15:34     US VASC CAROTID DUPLEX    (Results Pending)   XR CHEST PA AND LATERAL 2 VIEWS    (Results Pending)   NM LUNG VENTILATION AND PERFUSION    (Results Pending)        Labs:        Invalid input(s): BMP, AGAP, FST1          Patient Active Problem List   Diagnosis   • Coronary artery calcification seen on CT scan   • CAD (coronary artery disease), native coronary artery   • Carotid artery stenosis   • Encounter for follow-up examination after completed treatment for malignant neoplasm   • Hyperglycemia, unspecified   • Hypertension, benign   • Vitamin D deficiency   • DLBCL (diffuse large B cell lymphoma) (CMS/HCC)   • Mixed hyperlipidemia   • Bilateral carotid artery stenosis   • Stage 3 chronic kidney disease (CMS/HCC)   • Pure hypercholesterolemia     Assessment/plan:         1.   Cardio-Oncology follow up: Recurrence of his large B cell lymphoma.  Status post EPOCH therapy. bone marrow transplant.  Now with evolving MDS and risk for AML.  Followed by Dr. Anderson   Last echo with strain showed normal LV function strain remaining abnormal at -16%.  Ideally I would have him on both carvedilol and lisinopril.  However his renal function precludes ACE inhibitors or ARB's.  Therefore we discontinued lisinopril.  Continue carvedilol.    2.   Carotid disease: Stable on ultrasound 2020 16 to 49% bilaterally.  Repeat in  2022  3.   Abnormal ultrafast CT from 2013: Recent stress test April 2021 showed previous inferior infarction.  On previous stress test it was felt to be diaphragmatic attenuation.  Ejection fraction 44% and 49% with stress.  Essentially unchanged from previous stress test..  Of note, he had an Ultrafast CT score back in 2013 showing a score of 90 in the LAD and 200 in the RCA, 32 in the circumflex and 18 in the PDA.  Continue baby aspirin and statin.   4.   Dyslipidemia: Previously at goal in September.   5.   Chronic kidney disease: Followed by his nephrologist at [place].  Creatinine 2.2 in September  6.  Hyperglycemia: Repeat A1c now   7.   Hypotension: Did not tolerate midodrine.  Continue to monitor  8.  Mild LV dysfunction: 44 and 49% on stress test and 50% on echo.  Try and increase carvedilol to 12.5 mg twice a day.  Monitor for hypotension or dizziness.  Recheck now  9.  Dyspnea with exertion: We will repeat echo now to reassess LV function, chest x-ray and VQ scan           The patient's previous laboratory and cardiac diagnostic testing listed below has been reviewed and was utilized to establish my current plan of care.  Previous outside notes were reviewed and taken into consideration when deciding further treatment.    Orders listed below    I personally reviewed: Labs  Discussed with: Patient    Orders Placed During This Encounter:  Orders Placed This Encounter   • XR CHEST PA AND LATERAL 2 VIEWS   • NM LUNG VENTILATION AND PERFUSION   • Lipid Panel With Reflex   • Glycohemoglobin   • TRANSTHORACIC ECHO (TTE) COMPLETE W/ W/O IMAGING AGENT   • US Carotid Duplex        Return to Clinic: Return in about 3 months (around 3/9/2022) for CARDIO ONCOLOGY, 30 MINUTE VISIT. Patient instructed to have all ordered testing prior to follow-up visit.     Thank you Arnol Muse MD for allowing me to see this patient in our office. Please call our office with any questions. Correspondence will be sent to your  office.    Mila Syed MD  [facility]  [facility]                Bill Darby

## 2022-01-25 NOTE — PHYSICAL THERAPY INITIAL EVALUATION ADULT - CRITERIA FOR SKILLED THERAPEUTIC INTERVENTIONS
Patient at this time, not cooperative. Patient will not be placed on inhouse PT services.
impairments found/rehab potential/therapy frequency/predicted duration of therapy intervention/anticipated discharge recommendation

## 2022-01-25 NOTE — BH CONSULTATION LIAISON ASSESSMENT NOTE - SUMMARY
Patient is a 66M, admitted with 5 hours of generalized headache, blurry vision, generalized weakness and a right parotid lesion seen on CTA neck. Patient comes from home, lives alone, has support from sister as per chart review.  form wife, does not live with wife or children. Patient has hx of at least 1 inpt admission in 2013 at [facility] for SI with plan to either shot self or jump in front of train as to which he did not proceed with. Patient was a pt at [facility] at that time. Also noted to be a hoarder. On Prozac 10mg for "brain restoration". Psychiatry called as patient with SI and depression.    PLAN  - patient with statements of wanting to die - will not discuss if having active plan or intent - START on CO at this time  - CONTINUE home Prozac 10mg - denies this was prescribed for mood - declines alteration in doses   - PRN for agitation: Haldol 0.5mg q6hrs  -- antipsychotics can only be given if qtc < 500   - patient does not comply with full assessment, does not engage in conversation about refusing treatment - education ongoing, conversation with patient and primary team should be ongoing as needed  -- ongoing attempts to reach collateral

## 2022-01-25 NOTE — PROGRESS NOTE ADULT - PROBLEM SELECTOR PLAN 2
Hx of stroke in the past p/w LE weakness, dizziness. Per neuro- less concerned for acute CVA. NIHSS 6 on presentation   Patient currently refusing MRI or repeat CT head,  understands need for repeat imaging and continues to refuse.   - Cont ASA, statin   [ ] TSH, A1C, TTE, U/A- pt refusing labs   - Passed SLP evaluation   - PT: rehab

## 2022-01-25 NOTE — BH CONSULTATION LIAISON ASSESSMENT NOTE - PERSONAL COLLATERAL RELATIONSHIP
attempted to reach wilson - number not in service - found number from 2019  note- no answer [phone]

## 2022-01-25 NOTE — PROGRESS NOTE ADULT - PROBLEM SELECTOR PLAN 4
Seen on CTA neck  - ENT: Recommend MRI for further evaluation, lower suspicion for infection   [ ] MRI neck- pt currently refusing   - Patient refusing any further imaging

## 2022-01-25 NOTE — PHYSICAL THERAPY INITIAL EVALUATION ADULT - LEVEL OF INDEPENDENCE: SIT/STAND, REHAB EVAL
To be assessed. Deferred at this time as pt. removed tele leads and pulse ox when sitting at edge of bed. RN made aware.

## 2022-01-25 NOTE — BH CONSULTATION LIAISON ASSESSMENT NOTE - VIOLENCE RISK FACTORS:
Affective dysregulation/Noncompliance with treatment/Community stressors that increase the risk of destabilization

## 2022-01-25 NOTE — PHYSICAL THERAPY INITIAL EVALUATION ADULT - FOLLOWS COMMANDS/ANSWERS QUESTIONS, REHAB EVAL
Pt. nonverbal, used text to audio on phone to communicate./100% of the time/able to follow single-step instructions

## 2022-01-25 NOTE — BH CONSULTATION LIAISON ASSESSMENT NOTE - CURRENT MEDICATION
MEDICATIONS  (STANDING):  amLODIPine   Tablet 10 milliGRAM(s) Oral daily  aspirin enteric coated 81 milliGRAM(s) Oral daily  atorvastatin 80 milliGRAM(s) Oral at bedtime  chlorhexidine 2% Cloths 1 Application(s) Topical daily  enoxaparin Injectable 40 milliGRAM(s) SubCutaneous every 12 hours  FLUoxetine 10 milliGRAM(s) Oral daily  influenza   Vaccine 0.5 milliLiter(s) IntraMuscular once    MEDICATIONS  (PRN):  acetaminophen     Tablet .. 650 milliGRAM(s) Oral every 6 hours PRN Temp greater or equal to 38C (100.4F), Mild Pain (1 - 3), Moderate Pain (4 - 6)  ALBUTerol    90 MICROgram(s) HFA Inhaler 2 Puff(s) Inhalation every 6 hours PRN Shortness of Breath and/or Wheezing  cyclobenzaprine 10 milliGRAM(s) Oral three times a day PRN Muscle Spasm  oxyCODONE    IR 5 milliGRAM(s) Oral every 6 hours PRN Severe Pain (7 - 10)  sodium chloride 0.65% Nasal 1 Spray(s) Both Nostrils two times a day PRN Nasal Congestion

## 2022-01-25 NOTE — BH CONSULTATION LIAISON ASSESSMENT NOTE - HPI (INCLUDE ILLNESS QUALITY, SEVERITY, DURATION, TIMING, CONTEXT, MODIFYING FACTORS, ASSOCIATED SIGNS AND SYMPTOMS)
Patient is a 66M, admitted with 5 hours of generalized headache, blurry vision, generalized weakness and a right parotid lesion seen on CTA neck. Patient comes from home, lives alone, has support from sister as per chart review.  form wife, does not live with wife or children. Patient has hx of at least 1 inpt admission in 2013 at [facility] for SI with plan to either shot self or jump in front of train as to which he did not proceed with. Patient was a pt at [facility] at that time. Also noted to be a hoarder. On Prozac 10mg for "brain restoration". Psychiatry called as patient with SI and depression.    Patient was seen and assessed at bedside. Patient is alert, oriented, calm. He communicates via typing on his phone as he has aphasia. Patient states he is "more depressed than normal days".  He then states "I want to go to rehab. I want to die". patient was asked what depression feels like for him and he responds" no comment". Asked if he wants to hurt himself and he states "no comment". Patient asked in varying ways if he has any active thoughts of suicidality or homicidality and he does not reply with a direct answer. Attempted to tease out if patient wants to die vs has active thoughts of self harm without success at this time due to patient lack of engagement in interview. Patient was asked what we can do for him as to which he answers" call dr. Rivas".   He does deny AH or VH.     Patient has been refusing treatment such as medication, testing. Patient does not engage with provider on reasoning for his action.   Attempted to call family without success.

## 2022-01-25 NOTE — PROGRESS NOTE ADULT - PROBLEM SELECTOR PLAN 1
Patient reporting he wants to die and refusing all inpatient evaluation as he would prefer to just die. Concerned depression is affecting capacity to make medical decisions.   - Patient reporting family is no longer involved, have made multiple attempts to contact family without success   [ ] Psychiatry consult   - Cont home Fluoxetine

## 2022-01-25 NOTE — PHYSICAL THERAPY INITIAL EVALUATION ADULT - PERTINENT HX OF CURRENT PROBLEM, REHAB EVAL
Pt. admitted for generalized headache, blurry vision, generalized weakness and a right parotid lesion seen on CTA neck.

## 2022-01-25 NOTE — BH CONSULTATION LIAISON ASSESSMENT NOTE - RISK ASSESSMENT
risk: male gender, hx of SI, hx depression, lives alone, medical complications    patient is currently at acute risk  will not engage in conversation about protective factors

## 2022-01-25 NOTE — BH CONSULTATION LIAISON ASSESSMENT NOTE - NSBHCHARTREVIEWVS_PSY_A_CORE FT
Vital Signs Last 24 Hrs  T(C): 36.1 (25 Jan 2022 11:46), Max: 36.2 (24 Jan 2022 16:00)  T(F): 96.9 (25 Jan 2022 11:46), Max: 97.2 (24 Jan 2022 16:00)  HR: 83 (25 Jan 2022 11:46) (67 - 100)  BP: 147/101 (25 Jan 2022 11:46) (147/101 - 158/88)  BP(mean): --  RR: 19 (25 Jan 2022 11:46) (18 - 19)  SpO2: 98% (25 Jan 2022 11:46) (94% - 98%)

## 2022-01-25 NOTE — PHYSICAL THERAPY INITIAL EVALUATION ADULT - ADDITIONAL COMMENTS
Patient is not cooperative, refused to answer.
Per documentation, pt. lives alone and ambulated with use of cane.    Pt. was left in bed post PT Evaluation, no apparent distress, call bell within reach. RN made aware.
Viral Illness, Adult  Viruses are tiny germs that can get into a person's body and cause illness. There are many different types of viruses, and they cause many types of illness. Viral illnesses can range from mild to severe. They can affect various parts of the body.    Common illnesses that are caused by a virus include colds and the flu. Viral illnesses also include serious conditions such as HIV/AIDS (human immunodeficiency virus/acquired immunodeficiency syndrome). A few viruses have been linked to certain cancers.    What are the causes?  Many types of viruses can cause illness. Viruses invade cells in your body, multiply, and cause the infected cells to malfunction or die. When the cell dies, it releases more of the virus. When this happens, you develop symptoms of the illness, and the virus continues to spread to other cells. If the virus takes over the function of the cell, it can cause the cell to divide and grow out of control, as is the case when a virus causes cancer.    Different viruses get into the body in different ways. You can get a virus by:    Swallowing food or water that is contaminated with the virus.  Breathing in droplets that have been coughed or sneezed into the air by an infected person.  Touching a surface that has been contaminated with the virus and then touching your eyes, nose, or mouth.  Being bitten by an insect or animal that carries the virus.  Having sexual contact with a person who is infected with the virus.  Being exposed to blood or fluids that contain the virus, either through an open cut or during a transfusion.    If a virus enters your body, your body's defense system (immune system) will try to fight the virus. You may be at higher risk for a viral illness if your immune system is weak.    What are the signs or symptoms?  Symptoms vary depending on the type of virus and the location of the cells that it invades. Common symptoms of the main types of viral illnesses include:    Cold and flu viruses     Fever.  Headache.  Sore throat.  Muscle aches.  Nasal congestion.  Cough.  Digestive system (gastrointestinal) viruses     Fever.  Abdominal pain.  Nausea.  Diarrhea.  Liver viruses (hepatitis)     Loss of appetite.  Tiredness.  Yellowing of the skin (jaundice).  Brain and spinal cord viruses     Fever.  Headache.  Stiff neck.  Nausea and vomiting.  Confusion or sleepiness.  Skin viruses     Warts.  Itching.  Rash.  Sexually transmitted viruses     Discharge.  Swelling.  Redness.  Rash.  How is this treated?  Viruses can be difficult to treat because they live within cells. Antibiotic medicines do not treat viruses because these drugs do not get inside cells. Treatment for a viral illness may include:    Resting and drinking plenty of fluids.  Medicines to relieve symptoms. These can include over-the-counter medicine for pain and fever, medicines for cough or congestion, and medicines to relieve diarrhea.  Antiviral medicines. These drugs are available only for certain types of viruses. They may help reduce flu symptoms if taken early. There are also many antiviral medicines for hepatitis and HIV/AIDS.    Some viral illnesses can be prevented with vaccinations. A common example is the flu shot.    Follow these instructions at home:  Medicines     Image   Take over-the-counter and prescription medicines only as told by your health care provider.  If you were prescribed an antiviral medicine, take it as told by your health care provider. Do not stop taking the medicine even if you start to feel better.  Be aware of when antibiotics are needed and when they are not needed. Antibiotics do not treat viruses. If your health care provider thinks that you may have a bacterial infection as well as a viral infection, you may get an antibiotic.    Do not ask for an antibiotic prescription if you have been diagnosed with a viral illness. That will not make your illness go away faster.  Frequently taking antibiotics when they are not needed can lead to antibiotic resistance. When this develops, the medicine no longer works against the bacteria that it normally fights.    General instructions     Drink enough fluids to keep your urine clear or pale yellow.  Rest as much as possible.  Return to your normal activities as told by your health care provider. Ask your health care provider what activities are safe for you.  Keep all follow-up visits as told by your health care provider. This is important.  How is this prevented?  ImageTake these actions to reduce your risk of viral infection:    Eat a healthy diet and get enough rest.  Wash your hands often with soap and water. This is especially important when you are in public places. If soap and water are not available, use hand .  Avoid close contact with friends and family who have a viral illness.  If you travel to areas where viral gastrointestinal infection is common, avoid drinking water or eating raw food.  Keep your immunizations up to date. Get a flu shot every year as told by your health care provider.  Do not share toothbrushes, nail clippers, razors, or needles with other people.  Always practice safe sex.    Contact a health care provider if:  You have symptoms of a viral illness that do not go away.  Your symptoms come back after going away.  Your symptoms get worse.  Get help right away if:  You have trouble breathing.  You have a severe headache or a stiff neck.  You have severe vomiting or abdominal pain.  This information is not intended to replace advice given to you by your health care provider. Make sure you discuss any questions you have with your health care provider.

## 2022-01-26 LAB — SARS-COV-2 RNA SPEC QL NAA+PROBE: SIGNIFICANT CHANGE UP

## 2022-01-26 PROCEDURE — 99232 SBSQ HOSP IP/OBS MODERATE 35: CPT

## 2022-01-26 PROCEDURE — 99233 SBSQ HOSP IP/OBS HIGH 50: CPT

## 2022-01-26 RX ORDER — HYDRALAZINE HCL 50 MG
5 TABLET ORAL ONCE
Refills: 0 | Status: COMPLETED | OUTPATIENT
Start: 2022-01-26 | End: 2022-01-26

## 2022-01-26 RX ORDER — ACETAMINOPHEN 500 MG
975 TABLET ORAL ONCE
Refills: 0 | Status: DISCONTINUED | OUTPATIENT
Start: 2022-01-26 | End: 2022-02-04

## 2022-01-26 RX ORDER — SIMETHICONE 80 MG/1
80 TABLET, CHEWABLE ORAL THREE TIMES A DAY
Refills: 0 | Status: DISCONTINUED | OUTPATIENT
Start: 2022-01-26 | End: 2022-02-04

## 2022-01-26 RX ADMIN — Medication 30 MILLILITER(S): at 21:20

## 2022-01-26 NOTE — PROGRESS NOTE ADULT - PROBLEM SELECTOR PLAN 1
Patient reporting he wants to die and refusing all inpatient evaluation as he would prefer to just die. Concerned depression is affecting capacity to make medical decisions.   - Patient reporting family is no longer involved, have made multiple attempts to contact family without success   - Psychiatry following   - Can d/c 1:1, will need further psychiatric evaluation   - Cont home Fluoxetine

## 2022-01-26 NOTE — PROGRESS NOTE ADULT - SUBJECTIVE AND OBJECTIVE BOX
Merlin Mathew, MD   Hospitalist  Pager #[phone]    PROGRESS NOTE:     Patient is a 66y old  Male who presents with a chief complaint of HA dizziness and blurry vision (26 Jan 2022 11:36)      SUBJECTIVE / OVERNIGHT EVENTS: Patient refusing labs, vitals, medications   On interview, patient is frustrated as he feels no one is paying him attention 2/2 aphasia, is frustrated that staff do not know details of care- he cannot use straws, how he wants to be given food/drink.   Reports he is "more depressed than I will ever know" but he is [employer] and cannot take his life by his own hands, only by God's. When asked to engage in capacity evaluation to refuse MRI, labs, medications, will not engage and continues to report "im good, thanks" Refusing labs     ADDITIONAL REVIEW OF SYSTEMS:    MEDICATIONS  (STANDING):  amLODIPine   Tablet 10 milliGRAM(s) Oral daily  aspirin enteric coated 81 milliGRAM(s) Oral daily  atorvastatin 80 milliGRAM(s) Oral at bedtime  chlorhexidine 2% Cloths 1 Application(s) Topical daily  enoxaparin Injectable 40 milliGRAM(s) SubCutaneous every 12 hours  FLUoxetine 10 milliGRAM(s) Oral daily  influenza   Vaccine 0.5 milliLiter(s) IntraMuscular once  simethicone 80 milliGRAM(s) Chew once  sucralfate 1 Gram(s) Oral once    MEDICATIONS  (PRN):  acetaminophen     Tablet .. 650 milliGRAM(s) Oral every 6 hours PRN Temp greater or equal to 38C (100.4F), Mild Pain (1 - 3), Moderate Pain (4 - 6)  ALBUTerol    90 MICROgram(s) HFA Inhaler 2 Puff(s) Inhalation every 6 hours PRN Shortness of Breath and/or Wheezing  cyclobenzaprine 10 milliGRAM(s) Oral three times a day PRN Muscle Spasm  haloperidol     Tablet 0.5 milliGRAM(s) Oral every 6 hours PRN agitation  haloperidol    Injectable 0.5 milliGRAM(s) IntraMuscular every 6 hours PRN Agitation  haloperidol    Injectable 0.5 milliGRAM(s) IV Push every 6 hours PRN agitation  oxyCODONE    IR 5 milliGRAM(s) Oral every 6 hours PRN Severe Pain (7 - 10)  sodium chloride 0.65% Nasal 1 Spray(s) Both Nostrils two times a day PRN Nasal Congestion      CAPILLARY BLOOD GLUCOSE        I&O's Summary      PHYSICAL EXAM:  Vital Signs Last 24 Hrs  T(C): --  T(F): --  HR: 71 (26 Jan 2022 03:50) (71 - 77)  BP: --  BP(mean): --  RR: --  SpO2: 98% (26 Jan 2022 03:50) (98% - 98%)    CONSTITUTIONAL: NAD, well-developed obese male   RESPIRATORY: Normal respiratory effort; lungs are clear to auscultation bilaterally on nasal CPAP.   CARDIOVASCULAR: Regular rate and rhythm, normal S1 and S2, no murmur/rub/gallop; No lower extremity edema; Peripheral pulses are 2+ bilaterally  ABDOMEN: Nontender to palpation, normoactive bowel sounds, no rebound/guarding; No hepatosplenomegaly  MUSCULOSKELETAL no clubbing or cyanosis of digits; no joint swelling or tenderness to palpation  PSYCH: Alert, communicating through text to speech fareed on device, able to move RUE, lower ext,   LABS:                      RADIOLOGY & ADDITIONAL TESTS:  Results Reviewed:   Imaging Personally Reviewed:  Electrocardiogram Personally Reviewed:    COORDINATION OF CARE:  Care Discussed with Consultants/Other Providers [Y/N]:  Prior or Outpatient Records Reviewed [Y/N]:

## 2022-01-26 NOTE — BH CONSULTATION LIAISON PROGRESS NOTE - NSBHASSESSMENTFT_PSY_ALL_CORE
Patient is a 66M, admitted with 5 hours of generalized headache, blurry vision, generalized weakness and a right parotid lesion seen on CTA neck. Patient comes from home, lives alone, has support from sister as per chart review.  form wife, does not live with wife or children. Patient has hx of at least 1 inpt admission in 2013 at [facility] for SI with plan to either shot self or jump in front of train as to which he did not proceed with. Patient was a pt at [facility] at that time. Also noted to be a hoarder. On Prozac 10mg for "brain restoration". Psychiatry called as patient with SI and depression. Presents with vague hopelessness/SI and anger at family/healthcare, with perseverative request to die in rehab setting.     PLAN  - Discontinue CO; there is no evidence that the patient intends to harm himself actively. Suggest ES nonetheless if possible given high risk  - Would consult / per patient preferences  - Regarding capacity, it is unclear what he needs imminently (any assessments/labs?) aside from his usual trach/breathing care. Each of his medications should be assessed closely to see if there is imminent danger or risk involved in discontinuation, or if there are high stakes in such a decision (for Prozac, there is no such risk and so the stakes are low and his refusal may be enough to evidence capacity). He has not requested to cancel life-sustaining care such as breathing treatments. Perhaps palliative consult for GOC discussion is warranted.   - CONTINUE home Prozac 10mg - denies this was prescribed for mood - declines alteration in doses   - PRN for agitation: Haldol 0.5mg q6hrs  - Will continue to follow.

## 2022-01-26 NOTE — PROGRESS NOTE ADULT - SUBJECTIVE AND OBJECTIVE BOX
Patient is a 66y old  Male who presents with a chief complaint of HA dizziness and blurry vision (25 Jan 2022 13:49)    Interval history:    overnight refused vital signs, medications.   requests to sit in wheelchair  on isolation for covid  on continuous observation for SI per behavioral health    REVIEW OF SYSTEMS  +aphasic, communicates with tablet  +weakness    PAST MEDICAL & SURGICAL HISTORY  CAD (Coronary Artery Disease)    CAYDEN (Obstructive Sleep Apnea)    Peripheral Vascular Disease    Hypertension    High cholesterol    Sciatica    Stroke    No significant past surgical history    S/P ACL repair    History of tonsillectomy    History of cholecystectomy      CURRENT FUNCTIONAL STATUS  1/25   Bed Mobility: Sit to Supine:     · Level of Dawson	moderate assist (50% patients effort)  · Physical Assist/Nonphysical Assist	2 person assist; nonverbal cues (demo/gestures); verbal cues    Bed Mobility: Supine to Sit:     · Level of Dawson	moderate assist (50% patients effort)  · Physical Assist/Nonphysical Assist	2 person assist; nonverbal cues (demo/gestures); verbal cues    Bed Mobility Analysis:     · Impairments Contributing to Impaired Bed Mobility	impaired balance; decreased strength    Transfer: Sit to Stand:     · Level of Dawson	To be assessed. Deferred at this time as pt. removed tele leads and pulse ox when sitting at edge of bed. RN made aware.    Gait Skills:     · Level of Dawson	To be assessed.      FAMILY HISTORY   Family history of cerebrovascular accident (CVA) in father (Father)        RECENT LABS/IMAGING  CBC Full  -  ( 24 Jan 2022 13:37 )  WBC Count : 7.56 K/uL  RBC Count : 4.89 M/uL  Hemoglobin : 13.9 g/dL  Hematocrit : 44.4 %  Platelet Count - Automated : 235 K/uL  Mean Cell Volume : 90.8 fL  Mean Cell Hemoglobin : 28.4 pg  Mean Cell Hemoglobin Concentration : 31.3 gm/dL  Auto Neutrophil # : x  Auto Lymphocyte # : x  Auto Monocyte # : x  Auto Eosinophil # : x  Auto Basophil # : x  Auto Neutrophil % : x  Auto Lymphocyte % : x  Auto Monocyte % : x  Auto Eosinophil % : x  Auto Basophil % : x    01-24    141  |  103  |  20  ----------------------------<  90  3.6   |  25  |  0.81    Ca    9.3      24 Jan 2022 13:37  Phos  3.3     01-24  Mg     2.10     01-24          VITALS  T(C): 36.1 (01-25-22 @ 11:46), Max: 36.1 (01-25-22 @ 11:46)  HR: 71 (01-26-22 @ 03:50) (71 - 83)  BP: 147/101 (01-25-22 @ 11:46) (147/101 - 147/101)  RR: 19 (01-25-22 @ 11:46) (19 - 19)  SpO2: 98% (01-26-22 @ 03:50) (98% - 98%)  Wt(kg): --    ALLERGIES  No Known Allergies      MEDICATIONS   acetaminophen     Tablet .. 650 milliGRAM(s) Oral every 6 hours PRN  ALBUTerol    90 MICROgram(s) HFA Inhaler 2 Puff(s) Inhalation every 6 hours PRN  amLODIPine   Tablet 10 milliGRAM(s) Oral daily  aspirin enteric coated 81 milliGRAM(s) Oral daily  atorvastatin 80 milliGRAM(s) Oral at bedtime  chlorhexidine 2% Cloths 1 Application(s) Topical daily  cyclobenzaprine 10 milliGRAM(s) Oral three times a day PRN  enoxaparin Injectable 40 milliGRAM(s) SubCutaneous every 12 hours  FLUoxetine 10 milliGRAM(s) Oral daily  haloperidol     Tablet 0.5 milliGRAM(s) Oral every 6 hours PRN  haloperidol    Injectable 0.5 milliGRAM(s) IntraMuscular every 6 hours PRN  haloperidol    Injectable 0.5 milliGRAM(s) IV Push every 6 hours PRN  influenza   Vaccine 0.5 milliLiter(s) IntraMuscular once  oxyCODONE    IR 5 milliGRAM(s) Oral every 6 hours PRN  simethicone 80 milliGRAM(s) Chew once  sodium chloride 0.65% Nasal 1 Spray(s) Both Nostrils two times a day PRN  sucralfate 1 Gram(s) Oral once      ----------------------------------------------------------------------------------------  PHYSICAL EXAM  Constitutional - NAD, Comfortable  HEENT - +cpap  Chest - no respiratory distress  Cardiovascular - RRR, S1S2   Abdomen -  Soft, NTND  Extremities - No C/C/E, No calf tenderness   Neurologic Exam -                    Cognitive - Awake, Alert      Communication - aphasic     Cranial Nerves - CN 2-12 intact     Motor -                    LEFT    UE - ShAB 2/5, EF 2/5, EE 2/5, WE 2/5,  2/5                    RIGHT UE - ShAB 4/5, EF 4/5, EE 4/5, WE 4/5,  4/5                    LEFT    LE - HF 4+/5, KE 4+/5, DF 5/5, PF 5/5                    RIGHT LE - HF 4+/5, KE 4+/5, DF 5/5, PF 5/5        Sensory - impaired to light touch left hand      Balance - WNL Static  Psychiatric - Mood stable, Affect WNL  ----------------------------------------------------------------------------------------  ASSESSMENT/PLAN  67 yo m pmh cva with aphasia, LUE weakness, presented for weakness and headache  -continue bedside therapy as tolerates  -OOB as tolerates  -turn and position q 2 to prevent skin breakdown  -cleared for soft and bite sized diet with mildly thick liquids  Pain -tylenol  DVT PPX - lovenox  Rehab - anticipate subacute inpatient rehab when medically cleared. Will monitor for participation.

## 2022-01-27 ENCOUNTER — TRANSCRIPTION ENCOUNTER (OUTPATIENT)
Age: 67
End: 2022-01-27

## 2022-01-27 PROCEDURE — 99231 SBSQ HOSP IP/OBS SF/LOW 25: CPT

## 2022-01-27 PROCEDURE — 99232 SBSQ HOSP IP/OBS MODERATE 35: CPT

## 2022-01-27 NOTE — BH CONSULTATION LIAISON PROGRESS NOTE - NSBHASSESSMENTFT_PSY_ALL_CORE
Patient is a 66M, admitted with 5 hours of generalized headache, blurry vision, generalized weakness and a right parotid lesion seen on CTA neck. Patient comes from home, lives alone, has support from sister as per chart review.  form wife, does not live with wife or children. Patient has hx of at least 1 inpt admission in 2013 at [facility] for SI with plan to either shot self or jump in front of train as to which he did not proceed with. Patient was a pt at [facility] at that time. Also noted to be a hoarder. On Prozac 10mg for "brain restoration". Psychiatry called as patient with SI and depression. Presents with vague hopelessness/SI and anger at family/healthcare, with perseverative request to die in rehab setting.    1/27: patient seen, alert to self, refuses to utilize communication device to engage in interview, agrees to nod head to answer questions, he nods no to si, no to ah/vh, yes to looking forward to rehab. Staff report patient uncooperative with care and refused to engage with physical therapy today     PLAN  - Discontinue CO; there is no evidence that the patient intends to harm himself actively. Suggest ES nonetheless if possible given high risk  - Would consult / per patient preferences  - Regarding capacity, it is unclear what he needs imminently (any assessments/labs?) aside from his usual trach/breathing care. Each of his medications should be assessed closely to see if there is imminent danger or risk involved in discontinuation, or if there are high stakes in such a decision (for Prozac, there is no such risk and so the stakes are low and his refusal may be enough to evidence capacity). He has not requested to cancel life-sustaining care such as breathing treatments. Perhaps palliative consult for GOC discussion is warranted.   - CONTINUE home Prozac 10mg - denies this was prescribed for mood - declines alteration in doses   - PRN for agitation: Haldol 0.5mg q6hrs  - Will continue to follow.

## 2022-01-27 NOTE — BH CONSULTATION LIAISON PROGRESS NOTE - NSBHFUPINTERVALHXFT_PSY_A_CORE
Chart reviewed. No prn's given per chart review. Patient seen, lying supine with CPAP in place and eye mask on, he refuses to engage writer with communications device and becomes irritated with tactile stimuli. He agrees to answer questions only by nodding his head. He nods no to suicidal thoughts, nods no to ah/vh, nods yes to looking forward to going to rehab. Staff nurse reports patient refused to engaged physical therapy today as well as all care/VS/meds/labs. In Covid isolation with 
Chart reviewed. Pt has been refusing all care/VS/meds/labs. In Covid isolation with 1:1. On exam he communicates via device. Oriented well, attentive. Irritable, frustrated with both current healthcare providers and his family ("they are done with me"). He does not overtly state he will kill himself or has any plan to hurt himself, and instead is rather perseverative about going to [facility] ("because the nurses there are professional."). He says he wants God to take him away, to "show him the universe," etc but denies any desire to take actions himself. He is open to seeing a  if there is one available. When asked about depression, he says it is "unfixable" and is uninterested in taking medications for this. He is rather rigid/inflexible in his thinking. Denies psychosis/AVH.

## 2022-01-27 NOTE — DISCHARGE NOTE PROVIDER - NSDCMRMEDTOKEN_GEN_ALL_CORE_FT
acetaminophen 325 mg oral tablet: 2 tab(s) orally every 6 hours, As needed, Mild Pain (1 - 3), Moderate Pain (4 - 6)  amLODIPine 10 mg oral tablet: 1 tab(s) orally once a day  aspirin 81 mg oral tablet, chewable: 1 tab(s) orally once a day  atorvastatin 80 mg oral tablet: 1 tab(s) orally once a day  CPAP 12, FiO2 28:   Flexeril 10 mg oral tablet: 1 tab(s) orally 3 times a day, As Needed  oxyCODONE 7.5 mg oral tablet: 1 tab(s) orally every 6 hours, As Needed  PROzac 10 mg oral tablet: 1 tab(s) orally once a day   acetaminophen 325 mg oral tablet: 2 tab(s) orally every 6 hours, As needed, Temp greater or equal to 38C (100.4F), Mild Pain (1 - 3), Moderate Pain (4 - 6)  albuterol 90 mcg/inh inhalation aerosol: 2 puff(s) inhaled every 6 hours, As needed, Shortness of Breath and/or Wheezing  amLODIPine 10 mg oral tablet: 1 tab(s) orally once a day  aspirin 81 mg oral tablet, chewable: 1 tab(s) orally once a day  atorvastatin 80 mg oral tablet: 1 tab(s) orally once a day  CPAP 12, FiO2 28:   Flexeril 10 mg oral tablet: 1 tab(s) orally 3 times a day, As Needed  oxyCODONE 5 mg oral tablet: 1 tab(s) orally every 6 hours, As Needed for severe pain.  PROzac 10 mg oral tablet: 1 tab(s) orally once a day  simethicone 80 mg oral tablet, chewable: 1 tab(s) orally 3 times a day, As needed, Gas

## 2022-01-27 NOTE — PROGRESS NOTE ADULT - SUBJECTIVE AND OBJECTIVE BOX
Merlin Mathew, MD   Hospitalist  Pager #[phone]    PROGRESS NOTE:     Patient is a 66y old  Male who presents with a chief complaint of HA dizziness and blurry vision (26 Jan 2022 13:57)      SUBJECTIVE / OVERNIGHT EVENTS: Patient continues to refused medications, vitals, labs and other interventions   Patient refused interview today, shook head no when asked to engage in interview but allowed examination     ADDITIONAL REVIEW OF SYSTEMS:    MEDICATIONS  (STANDING):  amLODIPine   Tablet 10 milliGRAM(s) Oral daily  aspirin enteric coated 81 milliGRAM(s) Oral daily  atorvastatin 80 milliGRAM(s) Oral at bedtime  chlorhexidine 2% Cloths 1 Application(s) Topical daily  enoxaparin Injectable 40 milliGRAM(s) SubCutaneous every 12 hours  FLUoxetine 10 milliGRAM(s) Oral daily  influenza   Vaccine 0.5 milliLiter(s) IntraMuscular once  simethicone 80 milliGRAM(s) Chew once  sucralfate 1 Gram(s) Oral once    MEDICATIONS  (PRN):  acetaminophen     Tablet .. 650 milliGRAM(s) Oral every 6 hours PRN Temp greater or equal to 38C (100.4F), Mild Pain (1 - 3), Moderate Pain (4 - 6)  acetaminophen    Suspension .. 975 milliGRAM(s) Oral once PRN Mild Pain (1 - 3), Moderate Pain (4 - 6), Severe Pain (7 - 10)  ALBUTerol    90 MICROgram(s) HFA Inhaler 2 Puff(s) Inhalation every 6 hours PRN Shortness of Breath and/or Wheezing  aluminum hydroxide/magnesium hydroxide/simethicone Suspension 30 milliLiter(s) Oral every 4 hours PRN Dyspepsia  cyclobenzaprine 10 milliGRAM(s) Oral three times a day PRN Muscle Spasm  oxyCODONE    IR 5 milliGRAM(s) Oral every 6 hours PRN Severe Pain (7 - 10)  simethicone 80 milliGRAM(s) Chew three times a day PRN Gas  sodium chloride 0.65% Nasal 1 Spray(s) Both Nostrils two times a day PRN Nasal Congestion      CAPILLARY BLOOD GLUCOSE        I&O's Summary      PHYSICAL EXAM:  Vital Signs Last 24 Hrs  T(C): 36.3 (26 Jan 2022 20:58), Max: 36.3 (26 Jan 2022 20:58)  T(F): 97.4 (26 Jan 2022 20:58), Max: 97.4 (26 Jan 2022 20:58)  HR: 76 (27 Jan 2022 11:26) (75 - 87)  BP: 159/105 (26 Jan 2022 20:58) (159/105 - 159/105)  BP(mean): --  RR: 18 (26 Jan 2022 20:58) (18 - 18)  SpO2: 96% (27 Jan 2022 11:26) (95% - 99%)    CONSTITUTIONAL: NAD, well-developed obese male   RESPIRATORY: Normal respiratory effort; lungs are clear to auscultation bilaterally on nasal CPAP.   CARDIOVASCULAR: Regular rate and rhythm, normal S1 and S2, no murmur/rub/gallop; No lower extremity edema; Peripheral pulses are 2+ bilaterally  ABDOMEN: Nontender to palpation, normoactive bowel sounds, no rebound/guarding; No hepatosplenomegaly  MUSCULOSKELETAL no clubbing or cyanosis of digits; no joint swelling or tenderness to palpation  PSYCH: Alert, communicating through text to speech fareed on device, able to move RUE, lower ext,     LABS:            RADIOLOGY & ADDITIONAL TESTS:  Results Reviewed:   Imaging Personally Reviewed:  Electrocardiogram Personally Reviewed:    COORDINATION OF CARE:  Care Discussed with Consultants/Other Providers [Y/N]:  Prior or Outpatient Records Reviewed [Y/N]:

## 2022-01-27 NOTE — BH CONSULTATION LIAISON PROGRESS NOTE - NSBHCHARTREVIEWVS_PSY_A_CORE FT
Vital Signs Last 24 Hrs  T(C): --  T(F): --  HR: 71 (26 Jan 2022 03:50) (71 - 77)  BP: --  BP(mean): --  RR: --  SpO2: 98% (26 Jan 2022 03:50) (98% - 98%)
Vital Signs Last 24 Hrs  T(C): 36.3 (26 Jan 2022 20:58), Max: 36.3 (26 Jan 2022 20:58)  T(F): 97.4 (26 Jan 2022 20:58), Max: 97.4 (26 Jan 2022 20:58)  HR: 76 (27 Jan 2022 11:26) (75 - 87)  BP: 159/105 (26 Jan 2022 20:58) (159/105 - 159/105)  BP(mean): --  RR: 18 (26 Jan 2022 20:58) (18 - 18)  SpO2: 96% (27 Jan 2022 11:26) (95% - 99%)

## 2022-01-27 NOTE — PROVIDER CONTACT NOTE (OTHER) - REASON
Patient is refusing for vitals to be taken, refusing 10pm medication and refusing tele monitoring, and also refusing neuro checks

## 2022-01-27 NOTE — PROGRESS NOTE ADULT - PROBLEM SELECTOR PLAN 1
Patient reporting he wants to die and refusing all inpatient evaluation as he would prefer to just die. Patient reporting he is not suicidal, is [employer] and cannot take his life by his own hand, God can only take his life.   - Patient reporting family is no longer involved  - Psychiatry following   - Cont home Fluoxetine  - Can refuse current therapy/workup as it will not lead to imminent death

## 2022-01-27 NOTE — BH CONSULTATION LIAISON PROGRESS NOTE - CURRENT MEDICATION
MEDICATIONS  (STANDING):  amLODIPine   Tablet 10 milliGRAM(s) Oral daily  aspirin enteric coated 81 milliGRAM(s) Oral daily  atorvastatin 80 milliGRAM(s) Oral at bedtime  chlorhexidine 2% Cloths 1 Application(s) Topical daily  enoxaparin Injectable 40 milliGRAM(s) SubCutaneous every 12 hours  FLUoxetine 10 milliGRAM(s) Oral daily  influenza   Vaccine 0.5 milliLiter(s) IntraMuscular once  simethicone 80 milliGRAM(s) Chew once  sucralfate 1 Gram(s) Oral once    MEDICATIONS  (PRN):  acetaminophen     Tablet .. 650 milliGRAM(s) Oral every 6 hours PRN Temp greater or equal to 38C (100.4F), Mild Pain (1 - 3), Moderate Pain (4 - 6)  ALBUTerol    90 MICROgram(s) HFA Inhaler 2 Puff(s) Inhalation every 6 hours PRN Shortness of Breath and/or Wheezing  cyclobenzaprine 10 milliGRAM(s) Oral three times a day PRN Muscle Spasm  haloperidol     Tablet 0.5 milliGRAM(s) Oral every 6 hours PRN agitation  haloperidol    Injectable 0.5 milliGRAM(s) IntraMuscular every 6 hours PRN Agitation  haloperidol    Injectable 0.5 milliGRAM(s) IV Push every 6 hours PRN agitation  oxyCODONE    IR 5 milliGRAM(s) Oral every 6 hours PRN Severe Pain (7 - 10)  sodium chloride 0.65% Nasal 1 Spray(s) Both Nostrils two times a day PRN Nasal Congestion  
MEDICATIONS  (STANDING):  amLODIPine   Tablet 10 milliGRAM(s) Oral daily  aspirin enteric coated 81 milliGRAM(s) Oral daily  atorvastatin 80 milliGRAM(s) Oral at bedtime  chlorhexidine 2% Cloths 1 Application(s) Topical daily  enoxaparin Injectable 40 milliGRAM(s) SubCutaneous every 12 hours  FLUoxetine 10 milliGRAM(s) Oral daily  influenza   Vaccine 0.5 milliLiter(s) IntraMuscular once  simethicone 80 milliGRAM(s) Chew once  sucralfate 1 Gram(s) Oral once    MEDICATIONS  (PRN):  acetaminophen     Tablet .. 650 milliGRAM(s) Oral every 6 hours PRN Temp greater or equal to 38C (100.4F), Mild Pain (1 - 3), Moderate Pain (4 - 6)  acetaminophen    Suspension .. 975 milliGRAM(s) Oral once PRN Mild Pain (1 - 3), Moderate Pain (4 - 6), Severe Pain (7 - 10)  ALBUTerol    90 MICROgram(s) HFA Inhaler 2 Puff(s) Inhalation every 6 hours PRN Shortness of Breath and/or Wheezing  aluminum hydroxide/magnesium hydroxide/simethicone Suspension 30 milliLiter(s) Oral every 4 hours PRN Dyspepsia  cyclobenzaprine 10 milliGRAM(s) Oral three times a day PRN Muscle Spasm  haloperidol     Tablet 0.5 milliGRAM(s) Oral every 6 hours PRN agitation  haloperidol    Injectable 0.5 milliGRAM(s) IntraMuscular every 6 hours PRN Agitation  haloperidol    Injectable 0.5 milliGRAM(s) IV Push every 6 hours PRN agitation  oxyCODONE    IR 5 milliGRAM(s) Oral every 6 hours PRN Severe Pain (7 - 10)  simethicone 80 milliGRAM(s) Chew three times a day PRN Gas  sodium chloride 0.65% Nasal 1 Spray(s) Both Nostrils two times a day PRN Nasal Congestion

## 2022-01-27 NOTE — BH CONSULTATION LIAISON PROGRESS NOTE - MSE UNSTRUCTURED FT
On exam he communicates via device. Oriented well, attentive. Irritable, frustrated with both current healthcare providers and his family ("they are done with me"). He does not overtly state he will kill himself or has any plan to hurt himself, and instead is rather perseverative about going to [facility] ("because the nurses there are professional."). He says he wants God to take him away, to "show him the universe," etc but denies any desire to take actions himself. He is open to seeing a  if there is one available. When asked about depression, he says it is "unfixable" and is uninterested in taking medications for this. He is rather rigid/inflexible in his thinking. Denies psychosis/AVH.
Patient refuses to communicate with writer via device. He is lying supine with CPAP in place as well as eye mask covering his eyes, he appears irritable. He nods no to suicidal thoughts, no to ah/vh, yes to looking forward to going to rehab.

## 2022-01-27 NOTE — DISCHARGE NOTE PROVIDER - CARE PROVIDER_API CALL
Dipak Beltran)  Internal Medicine  [address], [address]  [place] [address]  Phone: [phone]  Fax: [phone]  Follow Up Time:

## 2022-01-27 NOTE — PROVIDER CONTACT NOTE (MEDICATION) - SITUATION
[None] : Patient does not have any barriers to medication adherence patient refusing aspirin, chlorhexidine wipes and fluoxetine.

## 2022-01-27 NOTE — DISCHARGE NOTE PROVIDER - HOSPITAL COURSE
This is Mr. Jeffries, a 66 year old man with a PMHx SI (previous admission at [facility]), mood disorder, CVA with aphasia and LUE weakness, HTN, HLD, CAYDEN on CPAP, CAD who was admitted on 1/22/2022 for generalized headache, blurry vision, and generalized weakness.     Upon evaluation in the emergency department for generalized weakness and headache R/O CVA a right parotid lesion seen on CTA neck. He was evaluated by ENT. Recommended that futher studies be done. However, patient's treatment and management was complicated by suicidal ideation on 1/25/2022. Patient refused MRI brain, MRI Neck and TTE with bubble. He was evaluated by Psychiatry and deemed to have the capacity to refuse tests. Per psych, patient does not require psychiatric hospitalization and can be discharged to rehab.    Incidentally found to be COVID-19 positive on 1/22/2022. Patient was on room air. Tested negative on 1/26/2022.    Patient is medically cleared and to be discharged to rehab. Advised to follow up with PCP, psychiatry, and ENT. Continue aspirin, statin, and fluoxetine.        This is Mr. Jeffries, a 66 year old man with a PMHx SI (previous admission at [facility]), mood disorder, CVA with aphasia and LUE weakness, HTN, HLD, CAYDEN on CPAP, CAD who was admitted on 1/22/2022 for generalized headache, blurry vision, and generalized weakness.     Upon evaluation in the emergency department for generalized weakness and headache R/O CVA a right parotid lesion seen on CTA neck. He was evaluated by ENT. Recommended that futher studies be done. However, patient's treatment and management was complicated by suicidal ideation on 1/25/2022. Patient refused MRI brain, MRI Neck and TTE with bubble. He was evaluated by Psychiatry and deemed to have the capacity to refuse tests. Per psych, patient does not require psychiatric hospitalization and can be discharged to rehab.    Incidentally found to be COVID-19 positive on 1/22/2022. Patient was on room air. Tested negative on 1/26/2022. He was given a PCR Swab on 2/03/22 and tested positive. Possibly due to residual viral shedding or false negative. Patient does not show severe symptoms.     Patient is medically cleared and to be discharged to rehab. Advised to follow up with PCP, psychiatry, and ENT. Continue aspirin, statin, and fluoxetine.        This is Mr. Jeffries, a 66 year old man with a PMHx SI (previous admission at [facility]), mood disorder, CVA with aphasia and LUE weakness, HTN, HLD, CAYDEN on CPAP, CAD who was admitted on 1/22/2022 for generalized headache, blurry vision, and generalized weakness.     Upon evaluation in the emergency department for generalized weakness and headache. CTH showed a chronic R thalamocapsular infarct and patient refused further imaging. CTA neck also incidentally revealed a right parotid lesion for which he was evaluated by ENT. ENT recommended that further studies be done (MRI head/neck, echo) however patient refused. Hospital course complicated by suicidal ideation on 1/25/2022. He was  evaluated by Psychiatry and deemed to have the capacity to refuse tests. Per psych, patient does not require psychiatric hospitalization and can be discharged to rehab.    Incidentally found to be COVID-19 positive on 1/22/2022. Patient remained on room air throughout hospital stay. Tested negative on 1/26/2022. He was given a PCR Swab on 2/03/22 and tested positive. Possibly due to residual viral shedding or false negative. Patient does not show any symptoms.     Patient is medically cleared and to be discharged to rehab. Advised to follow up with PCP, psychiatry, and ENT. Continue aspirin, statin, and fluoxetine.

## 2022-01-27 NOTE — DISCHARGE NOTE PROVIDER - NSFOLLOWUPCLINICS_GEN_ALL_ED_FT
[facility] ENT  ENT  [address], [address]  [place] [address]  Phone: [phone]  Fax:     [facility] Psychiatry  Psychiatry  [phone] [place] [address]  Phone: [phone]  Fax:

## 2022-01-27 NOTE — DISCHARGE NOTE PROVIDER - NSDCCPCAREPLAN_GEN_ALL_CORE_FT
PRINCIPAL DISCHARGE DIAGNOSIS  Diagnosis: Dizziness  Assessment and Plan of Treatment: You were admitted to the hospital for dizzness, headache, and weakness. You refused MRI and echocardiogram testing and were not able to be further evaluated.   Please follow up with PCP and cardiologist.         SECONDARY DISCHARGE DIAGNOSES  Diagnosis: 2019 novel coronavirus disease (COVID-19)  Assessment and Plan of Treatment: You were incidentally found to be positive for COVID-19 upon admission but are COVID-19 negative as of 1/26/2022.  No supplemental oxygen was needed during your stay.   Please follow up with PCP.    Diagnosis: Major depression  Assessment and Plan of Treatment: You were evaluated by Psychiatry. Please follow up with Psychiatrist and continue to take Fluoxitine 10mg.    Diagnosis: Lesion of parotid gland  Assessment and Plan of Treatment: You were incidentally found to have a lesion of the parotid gland on a CT scan of your neck. ENT saw you in the hospital and recommended you get an MRI to get more detailed information on what was found. You refused this test.   Please follow up with PCP and ENT after discharge. Continue taking Amlodipine 10mg at home as directed.     PRINCIPAL DISCHARGE DIAGNOSIS  Diagnosis: Dizziness  Assessment and Plan of Treatment: You were admitted to the hospital for dizzness, headache, and weakness. You refused MRI and echocardiogram testing and were not able to be further evaluated.   Please follow up with PCP and cardiologist.   Continue taking Amlodipine 10mg at home as directed.        SECONDARY DISCHARGE DIAGNOSES  Diagnosis: 2019 novel coronavirus disease (COVID-19)  Assessment and Plan of Treatment: You were incidentally found to be positive for COVID-19 upon admission but are COVID-19 negative as of 1/26/2022.  No supplemental oxygen was needed during your stay.   Please follow up with PCP.    Diagnosis: Major depression  Assessment and Plan of Treatment: You were evaluated by Psychiatry. Please follow up with Psychiatrist and continue to take Fluoxitine 10mg.    Diagnosis: Lesion of parotid gland  Assessment and Plan of Treatment: You were incidentally found to have a lesion of the parotid gland on a CT scan of your neck. ENT saw you in the hospital and recommended you get an MRI to get more detailed information on what was found. You refused this test.   Please follow up with PCP and ENT after discharge.     PRINCIPAL DISCHARGE DIAGNOSIS  Diagnosis: Dizziness  Assessment and Plan of Treatment: You were admitted to the hospital for dizzness, headache, and weakness. You refused MRI and echocardiogram testing and were not able to be further evaluated.   Please follow up with PCP and cardiologist.   Continue taking Amlodipine 10mg at home as directed.        SECONDARY DISCHARGE DIAGNOSES  Diagnosis: 2019 novel coronavirus disease (COVID-19)  Assessment and Plan of Treatment: You were incidentally found to be positive for COVID-19 upon admission.  No supplemental oxygen was needed during your stay.   Please follow up with PCP.  Please self quarantine at home for 10 days from discharge.   Please call ahead if you have an appointment with your doctor to inform them of your COVID positive status. Always wear a facemask at home.   If you have any worsening breathing, faster breathing or trouble with breathing call 911 immediately or your healthcare provider ahead of time and wear facemask before being seen by medical personel.   Please follow state and local rules and regulations regarding coming off of isolation.      Diagnosis: Major depression  Assessment and Plan of Treatment: You were evaluated by Psychiatry. Please follow up with Psychiatrist and continue to take Fluoxitine 10mg.    Diagnosis: Lesion of parotid gland  Assessment and Plan of Treatment: You were incidentally found to have a lesion of the parotid gland on a CT scan of your neck. ENT saw you in the hospital and recommended you get an MRI to get more detailed information on what was found. You refused this test.   Please follow up with PCP and ENT after discharge.

## 2022-01-28 DIAGNOSIS — R10.9 UNSPECIFIED ABDOMINAL PAIN: ICD-10-CM

## 2022-01-28 PROCEDURE — 99232 SBSQ HOSP IP/OBS MODERATE 35: CPT

## 2022-01-28 NOTE — PROGRESS NOTE ADULT - PROBLEM SELECTOR PLAN 4
Reportedly COVID positive in [place] requiring admission. On home CPAP settings   [ ] D-dimer- pt refusing   - Lovenox 40 BID   - Supportive care PRN

## 2022-01-28 NOTE — SWALLOW BEDSIDE ASSESSMENT ADULT - SWALLOW EVAL: PROGNOSIS
DX CONTINUED: 5. Moderate-severe pharyngeal dysphagia for minced and moist and thin liquid textures marked by suspected delayed initiation of pharyngeal swallow trigger with + hyolaryngeal elevation noted upon digital palpation with cough suggestive of airway penetration.

## 2022-01-28 NOTE — PROVIDER CONTACT NOTE (OTHER) - SITUATION
Patient refusing scheduled medications, telemetry monitoring, and VS despite patient education and encouragement

## 2022-01-28 NOTE — PROGRESS NOTE ADULT - PROBLEM SELECTOR PLAN 2
Patient reporting he wants to die and refusing all inpatient evaluation as he would prefer to just die. Patient reporting he is not suicidal, is [employer] and cannot take his life by his own hand, God can only take his life.   - Patient reporting family is no longer involved  - Psychiatry following   - Cont home Fluoxetine  - Can refuse current therapy/workup as it will not lead to imminent death

## 2022-01-28 NOTE — PROGRESS NOTE ADULT - SUBJECTIVE AND OBJECTIVE BOX
Merlin Mathew, MD   Hospitalist  Pager #[phone]    PROGRESS NOTE:     Patient is a 66y old  Male who presents with a chief complaint of HA dizziness and blurry vision (27 Jan 2022 15:39)      SUBJECTIVE / OVERNIGHT EVENTS: Patient refused labs, meds, vitals   Patient reporting RLQ abdominal pain, thinks he pulled a muscle and has gas from not eating well. Wants to only eat beef and potatoes, no other meat     ADDITIONAL REVIEW OF SYSTEMS:    MEDICATIONS  (STANDING):  amLODIPine   Tablet 10 milliGRAM(s) Oral daily  aspirin enteric coated 81 milliGRAM(s) Oral daily  atorvastatin 80 milliGRAM(s) Oral at bedtime  chlorhexidine 2% Cloths 1 Application(s) Topical daily  enoxaparin Injectable 40 milliGRAM(s) SubCutaneous every 12 hours  FLUoxetine 10 milliGRAM(s) Oral daily  influenza   Vaccine 0.5 milliLiter(s) IntraMuscular once  simethicone 80 milliGRAM(s) Chew once  sucralfate 1 Gram(s) Oral once    MEDICATIONS  (PRN):  acetaminophen     Tablet .. 650 milliGRAM(s) Oral every 6 hours PRN Temp greater or equal to 38C (100.4F), Mild Pain (1 - 3), Moderate Pain (4 - 6)  acetaminophen    Suspension .. 975 milliGRAM(s) Oral once PRN Mild Pain (1 - 3), Moderate Pain (4 - 6), Severe Pain (7 - 10)  ALBUTerol    90 MICROgram(s) HFA Inhaler 2 Puff(s) Inhalation every 6 hours PRN Shortness of Breath and/or Wheezing  aluminum hydroxide/magnesium hydroxide/simethicone Suspension 30 milliLiter(s) Oral every 4 hours PRN Dyspepsia  cyclobenzaprine 10 milliGRAM(s) Oral three times a day PRN Muscle Spasm  oxyCODONE    IR 5 milliGRAM(s) Oral every 6 hours PRN Severe Pain (7 - 10)  simethicone 80 milliGRAM(s) Chew three times a day PRN Gas  sodium chloride 0.65% Nasal 1 Spray(s) Both Nostrils two times a day PRN Nasal Congestion      CAPILLARY BLOOD GLUCOSE        I&O's Summary      PHYSICAL EXAM:  Vital Signs Last 24 Hrs  T(C): --  T(F): --  HR: 85 (28 Jan 2022 11:15) (74 - 91)  BP: --  BP(mean): --  RR: --  SpO2: 97% (28 Jan 2022 11:15) (96% - 100%)    CONSTITUTIONAL: NAD, well-developed obese male   RESPIRATORY: Normal respiratory effort; lungs are clear to auscultation bilaterally on nasal CPAP.   CARDIOVASCULAR: Regular rate and rhythm, normal S1 and S2, no murmur/rub/gallop; No lower extremity edema; Peripheral pulses are 2+ bilaterally  ABDOMEN: +RLQ TTP otherwise, Nontender to palpation, normoactive bowel sounds, no rebound/guarding; No hepatosplenomegaly  MUSCULOSKELETAL no clubbing or cyanosis of digits; no joint swelling or tenderness to palpation  PSYCH: Alert, communicating through text to speech fareed on device, able to move RUE, lower ext,     LABS:                      RADIOLOGY & ADDITIONAL TESTS:  Results Reviewed:   Imaging Personally Reviewed:  Electrocardiogram Personally Reviewed:    COORDINATION OF CARE:  Care Discussed with Consultants/Other Providers [Y/N]:  Prior or Outpatient Records Reviewed [Y/N]:

## 2022-01-28 NOTE — PROGRESS NOTE ADULT - PROBLEM SELECTOR PLAN 1
Abdominal pain, hypoactive BS though patient is having bowel movements, no nausea. Non-toxic appearing and appears comfortable at rest. Patient refusing vitals, labs or abdominal Xray. Just requesting medication for gas and change in diet   - Encouraged patient to re-consider labs and imaging   - Simethicone PRN   - Dietary preferences updated

## 2022-01-28 NOTE — PROGRESS NOTE ADULT - PROBLEM SELECTOR PLAN 10
VTE with Lovenox 40 mg BID.  Fall, Aspiration, safety and seizure precaution.  Dispo: Rehab, medically stable

## 2022-01-28 NOTE — PROGRESS NOTE ADULT - PROBLEM SELECTOR PLAN 3
Hx of stroke in the past p/w LE weakness, dizziness. Per neuro- less concerned for acute CVA. NIHSS 6 on presentation   Patient currently refusing MRI or repeat CT head,  understands need for repeat imaging and continues to refuse.   - Cont ASA, statin   [ ] TSH, A1C, TTE, U/A- pt refusing labs   - Passed SLP evaluation   - PT: rehab    # Dysphagia   - Soft and bite sized food + mildly thick liquids   [ ] SLP re-evaluation

## 2022-01-29 PROCEDURE — 99232 SBSQ HOSP IP/OBS MODERATE 35: CPT

## 2022-01-29 NOTE — PROGRESS NOTE ADULT - PROBLEM SELECTOR PLAN 3
Hx of stroke in the past p/w LE weakness, dizziness. Per neuro- less concerned for acute CVA. NIHSS 6 on presentation   Patient currently refusing MRI or repeat CT head,  understands need for repeat imaging and continues to refuse.   - Cont ASA, statin   [ ] TSH, A1C, TTE, U/A- pt refusing labs   - Passed SLP evaluation   - PT: rehab    # Dysphagia   - Soft and bite sized food + mildly thick liquids   - Cont same diet per SLP evaluation   - Pt notified of recommendations, encouraged to take PO

## 2022-01-29 NOTE — PROGRESS NOTE ADULT - PROBLEM SELECTOR PLAN 2
Patient reporting he wants to die and refusing all inpatient evaluation as he would prefer to just die. Patient reporting he is not suicidal, is [employer] and cannot take his life by his own hand, God can only take his life.   - Patient reporting family is no longer involved  - Psychiatry following   - Cont home Fluoxetine  - Can refuse current therapy/workup as it will not lead to imminent death

## 2022-01-29 NOTE — PROGRESS NOTE ADULT - SUBJECTIVE AND OBJECTIVE BOX
Merlin Mathew, MD   Hospitalist  Pager #[phone]    PROGRESS NOTE:     Patient is a 66y old  Male who presents with a chief complaint of HA dizziness and blurry vision (28 Jan 2022 13:17)      SUBJECTIVE / OVERNIGHT EVENTS: Refused vitals, medications, labs   Patient requesting regular food and thin liquids. Is not happy with the diet he has and wants beef + potatoes. Feels like he is being starved and requesting IV nutrition.     ADDITIONAL REVIEW OF SYSTEMS:    MEDICATIONS  (STANDING):  amLODIPine   Tablet 10 milliGRAM(s) Oral daily  aspirin enteric coated 81 milliGRAM(s) Oral daily  atorvastatin 80 milliGRAM(s) Oral at bedtime  chlorhexidine 2% Cloths 1 Application(s) Topical daily  enoxaparin Injectable 40 milliGRAM(s) SubCutaneous every 12 hours  FLUoxetine 10 milliGRAM(s) Oral daily  influenza   Vaccine 0.5 milliLiter(s) IntraMuscular once  simethicone 80 milliGRAM(s) Chew once  sucralfate 1 Gram(s) Oral once    MEDICATIONS  (PRN):  acetaminophen     Tablet .. 650 milliGRAM(s) Oral every 6 hours PRN Temp greater or equal to 38C (100.4F), Mild Pain (1 - 3), Moderate Pain (4 - 6)  acetaminophen    Suspension .. 975 milliGRAM(s) Oral once PRN Mild Pain (1 - 3), Moderate Pain (4 - 6), Severe Pain (7 - 10)  ALBUTerol    90 MICROgram(s) HFA Inhaler 2 Puff(s) Inhalation every 6 hours PRN Shortness of Breath and/or Wheezing  aluminum hydroxide/magnesium hydroxide/simethicone Suspension 30 milliLiter(s) Oral every 4 hours PRN Dyspepsia  cyclobenzaprine 10 milliGRAM(s) Oral three times a day PRN Muscle Spasm  oxyCODONE    IR 5 milliGRAM(s) Oral every 6 hours PRN Severe Pain (7 - 10)  simethicone 80 milliGRAM(s) Chew three times a day PRN Gas  sodium chloride 0.65% Nasal 1 Spray(s) Both Nostrils two times a day PRN Nasal Congestion      CAPILLARY BLOOD GLUCOSE        I&O's Summary      PHYSICAL EXAM:  Vital Signs Last 24 Hrs  T(C): --  T(F): --  HR: 92 (29 Jan 2022 11:12) (86 - 95)  BP: --  BP(mean): --  RR: --  SpO2: 95% (29 Jan 2022 11:12) (95% - 98%)    CONSTITUTIONAL: NAD, well-developed obese male   RESPIRATORY: Normal respiratory effort; lungs are clear to auscultation bilaterally on nasal CPAP.   CARDIOVASCULAR: Regular rate and rhythm, normal S1 and S2, no murmur/rub/gallop; No lower extremity edema; Peripheral pulses are 2+ bilaterally  ABDOMEN: +RLQ TTP otherwise, Nontender to palpation, normoactive bowel sounds, no rebound/guarding; No hepatosplenomegaly  MUSCULOSKELETAL no clubbing or cyanosis of digits; no joint swelling or tenderness to palpation  PSYCH: Alert, communicating through text to speech fareed on device, able to move RUE, lower ext,     LABS:                      RADIOLOGY & ADDITIONAL TESTS:  Results Reviewed:   Imaging Personally Reviewed:  Electrocardiogram Personally Reviewed:    COORDINATION OF CARE:  Care Discussed with Consultants/Other Providers [Y/N]:  Prior or Outpatient Records Reviewed [Y/N]:

## 2022-01-30 PROCEDURE — 99232 SBSQ HOSP IP/OBS MODERATE 35: CPT

## 2022-01-30 NOTE — PROGRESS NOTE ADULT - PROBLEM SELECTOR PLAN 1
Abdominal pain, hypoactive BS though patient is having bowel movements, no nausea. Non-toxic appearing and appears comfortable at rest. Patient refusing vitals, labs or abdominal Xray. Just requesting medication for gas and change in diet   - Encouraged patient to re-consider labs and imaging daily   - Simethicone PRN   - Dietary preferences updated

## 2022-01-30 NOTE — PROGRESS NOTE ADULT - SUBJECTIVE AND OBJECTIVE BOX
Merlin Mathew, MD   Hospitalist  Pager #[phone]    PROGRESS NOTE:     Patient is a 66y old  Male who presents with a chief complaint of HA dizziness and blurry vision (29 Jan 2022 14:50)      SUBJECTIVE / OVERNIGHT EVENTS: No overnight events    ADDITIONAL REVIEW OF SYSTEMS:    MEDICATIONS  (STANDING):  amLODIPine   Tablet 10 milliGRAM(s) Oral daily  aspirin enteric coated 81 milliGRAM(s) Oral daily  atorvastatin 80 milliGRAM(s) Oral at bedtime  chlorhexidine 2% Cloths 1 Application(s) Topical daily  enoxaparin Injectable 40 milliGRAM(s) SubCutaneous every 12 hours  FLUoxetine 10 milliGRAM(s) Oral daily  influenza   Vaccine 0.5 milliLiter(s) IntraMuscular once  simethicone 80 milliGRAM(s) Chew once  sucralfate 1 Gram(s) Oral once    MEDICATIONS  (PRN):  acetaminophen     Tablet .. 650 milliGRAM(s) Oral every 6 hours PRN Temp greater or equal to 38C (100.4F), Mild Pain (1 - 3), Moderate Pain (4 - 6)  acetaminophen    Suspension .. 975 milliGRAM(s) Oral once PRN Mild Pain (1 - 3), Moderate Pain (4 - 6), Severe Pain (7 - 10)  ALBUTerol    90 MICROgram(s) HFA Inhaler 2 Puff(s) Inhalation every 6 hours PRN Shortness of Breath and/or Wheezing  aluminum hydroxide/magnesium hydroxide/simethicone Suspension 30 milliLiter(s) Oral every 4 hours PRN Dyspepsia  cyclobenzaprine 10 milliGRAM(s) Oral three times a day PRN Muscle Spasm  oxyCODONE    IR 5 milliGRAM(s) Oral every 6 hours PRN Severe Pain (7 - 10)  simethicone 80 milliGRAM(s) Chew three times a day PRN Gas  sodium chloride 0.65% Nasal 1 Spray(s) Both Nostrils two times a day PRN Nasal Congestion      CAPILLARY BLOOD GLUCOSE        I&O's Summary    30 Jan 2022 07:01  -  30 Jan 2022 16:34  --------------------------------------------------------  IN: 0 mL / OUT: 400 mL / NET: -400 mL        PHYSICAL EXAM:  Vital Signs Last 24 Hrs  T(C): --  T(F): --  HR: 88 (30 Jan 2022 11:31) (88 - 94)  BP: --  BP(mean): --  RR: --  SpO2: 100% (30 Jan 2022 11:31) (96% - 100%)    CONSTITUTIONAL: NAD, well-developed obese male   RESPIRATORY: Normal respiratory effort; lungs are clear to auscultation bilaterally on nasal CPAP.   CARDIOVASCULAR: Regular rate and rhythm, normal S1 and S2, no murmur/rub/gallop; No lower extremity edema; Peripheral pulses are 2+ bilaterally  ABDOMEN: +RLQ TTP otherwise, Nontender to palpation, normoactive bowel sounds, no rebound/guarding; No hepatosplenomegaly  MUSCULOSKELETAL no clubbing or cyanosis of digits; no joint swelling or tenderness to palpation  PSYCH: Alert, communicating through text to speech fareed on device, able to move RUE, lower ext,   LABS:                      RADIOLOGY & ADDITIONAL TESTS:  Results Reviewed:   Imaging Personally Reviewed:  Electrocardiogram Personally Reviewed:    COORDINATION OF CARE:  Care Discussed with Consultants/Other Providers [Y/N]:  Prior or Outpatient Records Reviewed [Y/N]:

## 2022-01-30 NOTE — PROVIDER CONTACT NOTE (EICU) - ASSESSMENT
pt appears to be A&Ox4, however pt is uncooperative with a full neuro assessment. pt is nonverbal and uses an application on his phone with a text to voice feature to communicate. pt refused vital signs, PM meds and parts of his neuro assessment. when asked why pt is refusing, he typed "my choice". pt does not appear to be in any distress at this time. pt requested IV to be removed as he stated it was leaking, however no leaking was witnessed by this RN. IV was removed per pts request. pt is refusing new IV access.

## 2022-01-30 NOTE — PROGRESS NOTE ADULT - PROBLEM SELECTOR PLAN 2
Patient reporting he wants to die and refusing all inpatient evaluation as he would prefer to just die. Patient reporting he is not suicidal, is Orthodoxy and cannot take his life by his own hand, God can only take his life.   - Patient reporting family is no longer involved  - Psychiatry following   - Cont home Fluoxetine  - Can refuse current therapy/workup as it will not lead to imminent death

## 2022-01-30 NOTE — PROVIDER CONTACT NOTE (EICU) - ACTION/TREATMENT ORDERED:
provider made aware. states pt has hx of refusing vitals and meds. states that day team can attempt at a new IV provider made aware. states pt has hx of refusing vitals and meds and telemetry monitoring. states that day team can attempt at a new IV

## 2022-01-30 NOTE — PROGRESS NOTE ADULT - NSPROGADDITIONALINFOA_GEN_ALL_CORE
Medically cleared for discharge to rehab
Medically and psychiatrically cleared for discharge to rehab
Dispo plannign to rehab
D/w Dr. Payne- patient can refuse labs/imaging, cleared from psychiatric perspective   As patient is refusing workup and is interested in rehab placement, will start dispo planning to rehab
Dispo plannign to rehab

## 2022-01-30 NOTE — PROVIDER CONTACT NOTE (EICU) - SITUATION
pt is refusing vitals, meds, parts of neuro assessment and IV placement pt is refusing vitals, meds, parts of neuro assessment, IV placement, and telemetry monitoring

## 2022-01-31 LAB — SARS-COV-2 RNA SPEC QL NAA+PROBE: SIGNIFICANT CHANGE UP

## 2022-01-31 PROCEDURE — 99232 SBSQ HOSP IP/OBS MODERATE 35: CPT

## 2022-01-31 RX ADMIN — CYCLOBENZAPRINE HYDROCHLORIDE 10 MILLIGRAM(S): 10 TABLET, FILM COATED ORAL at 18:10

## 2022-01-31 NOTE — PROGRESS NOTE ADULT - PROBLEM SELECTOR PLAN 2
Patient reporting he wants to die and refusing all inpatient evaluation as he would prefer to just die. Patient reporting he is not suicidal, is Jehovah's witness and cannot take his life by his own hand, God can only take his life.   - Patient reporting family is no longer involved  - Psychiatry following   - Cont home Fluoxetine  - Can refuse current therapy/workup as it will not lead to imminent death Patient reporting he wants to die and refusing all inpatient evaluation as he would prefer to just die. Patient reporting he is not suicidal, is Hindu and cannot take his life by his own hand, God can only take his life.   - Patient reporting family is no longer involved  -SI on 1/25, no longer has SI therefore switched from 1:1 to enhanced supervision per psych, cleared by psych for DC. Has capacity to refuse tests if not life threatening   - Psychiatry following   - Cont home Fluoxetine  - Can refuse current therapy/workup as it will not lead to imminent death

## 2022-01-31 NOTE — PROGRESS NOTE ADULT - PROBLEM SELECTOR PLAN 3
Hx of stroke in the past p/w LE weakness, dizziness. Per neuro- less concerned for acute CVA. NIHSS 6 on presentation   Patient currently refusing MRI or repeat CT head,  understands need for repeat imaging and continues to refuse.   - Cont ASA, statin   [ ] TSH, A1C, TTE, U/A- pt refusing labs   - Passed SLP evaluation   - PT: rehab    # Dysphagia   - Soft and bite sized food + mildly thick liquids   - Cont same diet per SLP evaluation   - Pt notified of recommendations, encouraged to take PO Hx of stroke in the past p/w LE weakness, dizziness.   Per neuro- less concerned for acute CVA. NIHSS 6 on presentation   Admission  CTH w/o demonstrates chronic R thalamocapsular infarct.   CTA head/neck limited in eval but do not report flow limiting or occlusion.   Patient currently refusing MRI or repeat CT head,  understands need for repeat imaging and continues to refuse.   -On  ASA, statin- pt refusing meds   [ ] TSH, A1C, TTE, U/A- pt refusing labs   - Passed SLP evaluation   - PT: rehab    # Dysphagia   - Soft and bite sized food + mildly thick liquids   - Cont same diet per SLP evaluation   - Pt notified of recommendations, encouraged to take PO

## 2022-01-31 NOTE — PROGRESS NOTE ADULT - PROBLEM SELECTOR PLAN 10
VTE with Lovenox 40 mg BID.  Fall, Aspiration, safety and seizure precaution.  Dispo: Rehab, medically stable VTE with Lovenox 40 mg BID.  Fall, Aspiration, safety and seizure precaution.  Dispo: Rehab  On asking pt who I should contact for collateral info, he shrugged his shoulders and appeared upset  Attempted to call wife Maria G Jeffries - fax machine tone   Called sister Petrona who lives in [place]- As per sister, pt was with her in [place] and returned to [place] to get a rehab facility. As per Petrona, pt has a horrible relationship with his wife and feels the wife abandoned him.   Pt has 2 sons, one of whom lives with sister Petrona. Sister feels she should be his point of contact but will text pt and confirm with him. Sister aware pt is refusing tests, vitals, labs, medications. She stated pt can be very frustrating

## 2022-01-31 NOTE — PROGRESS NOTE ADULT - SUBJECTIVE AND OBJECTIVE BOX
Inocencia Fam  Hospitalist  Pager- [phone]      PROGRESS NOTE:     Patient is a 66y old  Male who presents with a chief complaint of HA dizziness and blurry vision (29 Jan 2022 14:50)      SUBJECTIVE / OVERNIGHT EVENTS: No overnight events    ADDITIONAL REVIEW OF SYSTEMS:    MEDICATIONS  (STANDING):  amLODIPine   Tablet 10 milliGRAM(s) Oral daily  aspirin enteric coated 81 milliGRAM(s) Oral daily  atorvastatin 80 milliGRAM(s) Oral at bedtime  chlorhexidine 2% Cloths 1 Application(s) Topical daily  enoxaparin Injectable 40 milliGRAM(s) SubCutaneous every 12 hours  FLUoxetine 10 milliGRAM(s) Oral daily  influenza   Vaccine 0.5 milliLiter(s) IntraMuscular once  simethicone 80 milliGRAM(s) Chew once  sucralfate 1 Gram(s) Oral once    MEDICATIONS  (PRN):  acetaminophen     Tablet .. 650 milliGRAM(s) Oral every 6 hours PRN Temp greater or equal to 38C (100.4F), Mild Pain (1 - 3), Moderate Pain (4 - 6)  acetaminophen    Suspension .. 975 milliGRAM(s) Oral once PRN Mild Pain (1 - 3), Moderate Pain (4 - 6), Severe Pain (7 - 10)  ALBUTerol    90 MICROgram(s) HFA Inhaler 2 Puff(s) Inhalation every 6 hours PRN Shortness of Breath and/or Wheezing  aluminum hydroxide/magnesium hydroxide/simethicone Suspension 30 milliLiter(s) Oral every 4 hours PRN Dyspepsia  cyclobenzaprine 10 milliGRAM(s) Oral three times a day PRN Muscle Spasm  oxyCODONE    IR 5 milliGRAM(s) Oral every 6 hours PRN Severe Pain (7 - 10)  simethicone 80 milliGRAM(s) Chew three times a day PRN Gas  sodium chloride 0.65% Nasal 1 Spray(s) Both Nostrils two times a day PRN Nasal Congestion      CAPILLARY BLOOD GLUCOSE        I&O's Summary    30 Jan 2022 07:01  -  30 Jan 2022 16:34  --------------------------------------------------------  IN: 0 mL / OUT: 400 mL / NET: -400 mL        PHYSICAL EXAM:    Vital Signs Last 24 Hrs  T(C): --  T(F): --  HR: 90 (31 Jan 2022 07:29) (88 - 94)  BP: --  BP(mean): --  RR: --  SpO2: 97% (31 Jan 2022 07:29) (97% - 100%)    CONSTITUTIONAL: NAD, well-developed obese male   RESPIRATORY: Normal respiratory effort; lungs are clear to auscultation bilaterally on nasal CPAP.   CARDIOVASCULAR: Regular rate and rhythm, normal S1 and S2, no murmur/rub/gallop; No lower extremity edema; Peripheral pulses are 2+ bilaterally  ABDOMEN: +RLQ TTP otherwise, Nontender to palpation, normoactive bowel sounds, no rebound/guarding; No hepatosplenomegaly  MUSCULOSKELETAL no clubbing or cyanosis of digits; no joint swelling or tenderness to palpation  PSYCH: Alert, communicating through text to speech fareed on device, able to move RUE, lower ext,   LABS:                      RADIOLOGY & ADDITIONAL TESTS:  Results Reviewed:   Imaging Personally Reviewed:  Electrocardiogram Personally Reviewed:    COORDINATION OF CARE:  Care Discussed with Consultants/Other Providers [Y/N]:  Prior or Outpatient Records Reviewed [Y/N]:   Inocencia Fam  Hospitalist  Pager- [phone]      PROGRESS NOTE:     Patient is a 66y old  Male who presents with a chief complaint of HA dizziness and blurry vision (29 Jan 2022 14:50)      SUBJECTIVE / OVERNIGHT EVENTS:Pt seen and examined by me   Non verbal, communicates via  writing and [id number] software on his phone  Refusing tests, vitals- States he is being ignored in this hospital     ADDITIONAL REVIEW OF SYSTEMS:    MEDICATIONS  (STANDING):  amLODIPine   Tablet 10 milliGRAM(s) Oral daily  aspirin enteric coated 81 milliGRAM(s) Oral daily  atorvastatin 80 milliGRAM(s) Oral at bedtime  chlorhexidine 2% Cloths 1 Application(s) Topical daily  enoxaparin Injectable 40 milliGRAM(s) SubCutaneous every 12 hours  FLUoxetine 10 milliGRAM(s) Oral daily  influenza   Vaccine 0.5 milliLiter(s) IntraMuscular once  simethicone 80 milliGRAM(s) Chew once  sucralfate 1 Gram(s) Oral once    MEDICATIONS  (PRN):  acetaminophen     Tablet .. 650 milliGRAM(s) Oral every 6 hours PRN Temp greater or equal to 38C (100.4F), Mild Pain (1 - 3), Moderate Pain (4 - 6)  acetaminophen    Suspension .. 975 milliGRAM(s) Oral once PRN Mild Pain (1 - 3), Moderate Pain (4 - 6), Severe Pain (7 - 10)  ALBUTerol    90 MICROgram(s) HFA Inhaler 2 Puff(s) Inhalation every 6 hours PRN Shortness of Breath and/or Wheezing  aluminum hydroxide/magnesium hydroxide/simethicone Suspension 30 milliLiter(s) Oral every 4 hours PRN Dyspepsia  cyclobenzaprine 10 milliGRAM(s) Oral three times a day PRN Muscle Spasm  oxyCODONE    IR 5 milliGRAM(s) Oral every 6 hours PRN Severe Pain (7 - 10)  simethicone 80 milliGRAM(s) Chew three times a day PRN Gas  sodium chloride 0.65% Nasal 1 Spray(s) Both Nostrils two times a day PRN Nasal Congestion      CAPILLARY BLOOD GLUCOSE        I&O's Summary    30 Jan 2022 07:01  -  30 Jan 2022 16:34  --------------------------------------------------------  IN: 0 mL / OUT: 400 mL / NET: -400 mL        PHYSICAL EXAM:    Vital Signs Last 24 Hrs  T(C): --  T(F): --  HR: 90 (31 Jan 2022 07:29) (88 - 94)  BP: --  BP(mean): --  RR: --  SpO2: 97% (31 Jan 2022 07:29) (97% - 100%)    CONSTITUTIONAL: NAD, well-developed obese male   RESPIRATORY: Normal respiratory effort; lungs are clear to auscultation bilaterally on nasal CPAP.   CARDIOVASCULAR: Regular rate and rhythm, normal S1 and S2, no murmur/rub/gallop; No lower extremity edema; Peripheral pulses are 2+ bilaterally  ABDOMEN: +RLQ TTP otherwise, Nontender to palpation, normoactive bowel sounds, no rebound/guarding; No hepatosplenomegaly  MUSCULOSKELETAL no clubbing or cyanosis of digits; no joint swelling or tenderness to palpation  PSYCH: Alert, communicating through text to speech fareed on device, able to move RUE, lower ext,   LABS:                      RADIOLOGY & ADDITIONAL TESTS:  Results Reviewed:   Imaging Personally Reviewed:  Electrocardiogram Personally Reviewed:    COORDINATION OF CARE:  Care Discussed with Consultants/Other Providers [Y/N]:  Prior or Outpatient Records Reviewed [Y/N]:

## 2022-01-31 NOTE — PROVIDER CONTACT NOTE (OTHER) - REASON
Patient uncooperative and refusing Medication and Vital signs. Thalidomide Counseling: I discussed with the patient the risks of thalidomide including but not limited to birth defects, anxiety, weakness, chest pain, dizziness, cough and severe allergy.

## 2022-02-01 PROCEDURE — 99232 SBSQ HOSP IP/OBS MODERATE 35: CPT

## 2022-02-01 RX ADMIN — Medication 30 MILLILITER(S): at 18:24

## 2022-02-01 NOTE — PROGRESS NOTE ADULT - PROBLEM SELECTOR PLAN 10
VTE with Lovenox 40 mg BID.  Fall, Aspiration, safety and seizure precaution.  Dispo: Rehab  On asking pt who I should contact for collateral info, he shrugged his shoulders and appeared upset  Attempted to call wife Maria G Jeffries - fax machine tone   1/31- Called sister Petrona who lives in [place]- As per sister, pt was with her in [place] and returned to [place] to get a rehab facility. As per Petrona, pt has a horrible relationship with his wife and feels the wife abandoned him.   Pt has 2 sons, one of whom lives with sister Petrona. Sister feels she should be his point of contact but will text pt and confirm with him. Sister aware pt is refusing tests, vitals, labs, medications. She stated pt can be very frustrating VTE with Lovenox 40 mg BID.  Fall, Aspiration, safety and seizure precaution.  Dispo: Rehab  On asking pt who I should contact for collateral info, he shrugged his shoulders and appeared upset  Attempted to call wife Maria G Jeffries - fax machine tone   1/31- Called sister Petrona who lives in [place]- As per sister, pt was with her in [place] and returned to [place] to get a rehab facility. As per Petrona, pt has a horrible relationship with his wife and feels the wife abandoned him.   Pt has 2 sons, one of whom lives with sister Petrona. Sister feels she should be his point of contact but will text pt and confirm with him. Sister aware pt is refusing tests, vitals, labs, medications. She stated pt can be very frustrating  2/1- Updated Petrona

## 2022-02-01 NOTE — PROGRESS NOTE ADULT - PROBLEM SELECTOR PLAN 1
Abdominal pain, hypoactive BS though patient is having bowel movements, no nausea.   Non-toxic appearing and appears comfortable at rest.   Patient refusing vitals, labs or abdominal Xray. Just requesting medication for gas and change in diet   - Encouraged patient to re-consider labs and imaging daily   - Simethicone PRN   - Dietary preferences updated Abdominal pain- not complaining of further pain   Non-toxic appearing and appears comfortable at rest ,having bowel movements  Patient refusing vitals, labs or abdominal Xray. Just requesting medication for gas and change in diet   Encouraged patient to re-consider labs and imaging daily   Simethicone PRN   Dietary preferences updated

## 2022-02-01 NOTE — PROGRESS NOTE ADULT - PROBLEM SELECTOR PLAN 4
Reportedly COVID positive in [place] requiring admission. On home CPAP settings   [ ] D-dimer- pt refusing   - Lovenox 40 BID   - Supportive care PRN Reportedly COVID positive in [place] requiring admission. On home CPAP settings   [ ] D-dimer- pt refusing    Lovenox 40 BID   Supportive care PRN

## 2022-02-01 NOTE — PROGRESS NOTE ADULT - PROBLEM SELECTOR PLAN 5
Seen on CTA neck  - ENT: Recommend MRI for further evaluation, lower suspicion for infection   [ ] MRI neck- pt currently refusing   - Patient refusing any further imaging Seen on CTA neck  ENT: Recommend MRI for further evaluation, lower suspicion for infection   [ ] MRI neck- pt currently refusing   Patient refusing any further imaging

## 2022-02-01 NOTE — PROGRESS NOTE ADULT - SUBJECTIVE AND OBJECTIVE BOX
Inocencia Fam  Hospitalist  Pager- [phone]      PROGRESS NOTE:     Patient is a 66y old  Male who presents with a chief complaint of HA dizziness and blurry vision (29 Jan 2022 14:50)      SUBJECTIVE / OVERNIGHT EVENTS:Pt seen and examined by me   Non verbal, communicates via  writing and [id number] software on his phone  Refusing tests, vitals- States he is being ignored in this hospital     ADDITIONAL REVIEW OF SYSTEMS:      MEDICATIONS  (STANDING):  amLODIPine   Tablet 10 milliGRAM(s) Oral daily  aspirin enteric coated 81 milliGRAM(s) Oral daily  atorvastatin 80 milliGRAM(s) Oral at bedtime  chlorhexidine 2% Cloths 1 Application(s) Topical daily  enoxaparin Injectable 40 milliGRAM(s) SubCutaneous every 12 hours  FLUoxetine 10 milliGRAM(s) Oral daily  influenza   Vaccine 0.5 milliLiter(s) IntraMuscular once  simethicone 80 milliGRAM(s) Chew once  sucralfate 1 Gram(s) Oral once    MEDICATIONS  (PRN):  acetaminophen     Tablet .. 650 milliGRAM(s) Oral every 6 hours PRN Temp greater or equal to 38C (100.4F), Mild Pain (1 - 3), Moderate Pain (4 - 6)  acetaminophen    Suspension .. 975 milliGRAM(s) Oral once PRN Mild Pain (1 - 3), Moderate Pain (4 - 6), Severe Pain (7 - 10)  ALBUTerol    90 MICROgram(s) HFA Inhaler 2 Puff(s) Inhalation every 6 hours PRN Shortness of Breath and/or Wheezing  aluminum hydroxide/magnesium hydroxide/simethicone Suspension 30 milliLiter(s) Oral every 4 hours PRN Dyspepsia  cyclobenzaprine 10 milliGRAM(s) Oral three times a day PRN Muscle Spasm  simethicone 80 milliGRAM(s) Chew three times a day PRN Gas  sodium chloride 0.65% Nasal 1 Spray(s) Both Nostrils two times a day PRN Nasal Congestion      CAPILLARY BLOOD GLUCOSE        I&O's Summary    30 Jan 2022 07:01  -  30 Jan 2022 16:34  --------------------------------------------------------  IN: 0 mL / OUT: 400 mL / NET: -400 mL        PHYSICAL EXAM:    Vital Signs Last 24 Hrs  T(C): --  T(F): --  HR: 88 (01 Feb 2022 08:03) (85 - 95)  BP: --  BP(mean): --  RR: 19 (01 Feb 2022 08:03) (19 - 19)  SpO2: 96% (01 Feb 2022 08:03) (95% - 96%)    CONSTITUTIONAL: NAD, well-developed obese male   RESPIRATORY: Normal respiratory effort; lungs are clear to auscultation bilaterally on nasal CPAP.   CARDIOVASCULAR: Regular rate and rhythm, normal S1 and S2, no murmur/rub/gallop; No lower extremity edema; Peripheral pulses are 2+ bilaterally  ABDOMEN: +RLQ TTP otherwise, Nontender to palpation, normoactive bowel sounds, no rebound/guarding; No hepatosplenomegaly  MUSCULOSKELETAL no clubbing or cyanosis of digits; no joint swelling or tenderness to palpation  PSYCH: Alert, communicating through text to speech fareed on device, able to move RUE, lower ext,     LABS:                      RADIOLOGY & ADDITIONAL TESTS:  Results Reviewed:   Imaging Personally Reviewed:  Electrocardiogram Personally Reviewed:    COORDINATION OF CARE:  Care Discussed with Consultants/Other Providers [Y/N]:  Prior or Outpatient Records Reviewed [Y/N]:   Inocencia Fam  Hospitalist  Pager- [phone]      PROGRESS NOTE:     Patient is a 66y old  Male who presents with a chief complaint of HA dizziness and blurry vision (29 Jan 2022 14:50)      SUBJECTIVE / OVERNIGHT EVENTS:Pt seen and examined by me   Non verbal, communicates via  writing and [id number] software on his phone  Not engaging in interview     ADDITIONAL REVIEW OF SYSTEMS:      MEDICATIONS  (STANDING):  amLODIPine   Tablet 10 milliGRAM(s) Oral daily  aspirin enteric coated 81 milliGRAM(s) Oral daily  atorvastatin 80 milliGRAM(s) Oral at bedtime  chlorhexidine 2% Cloths 1 Application(s) Topical daily  enoxaparin Injectable 40 milliGRAM(s) SubCutaneous every 12 hours  FLUoxetine 10 milliGRAM(s) Oral daily  influenza   Vaccine 0.5 milliLiter(s) IntraMuscular once  simethicone 80 milliGRAM(s) Chew once  sucralfate 1 Gram(s) Oral once    MEDICATIONS  (PRN):  acetaminophen     Tablet .. 650 milliGRAM(s) Oral every 6 hours PRN Temp greater or equal to 38C (100.4F), Mild Pain (1 - 3), Moderate Pain (4 - 6)  acetaminophen    Suspension .. 975 milliGRAM(s) Oral once PRN Mild Pain (1 - 3), Moderate Pain (4 - 6), Severe Pain (7 - 10)  ALBUTerol    90 MICROgram(s) HFA Inhaler 2 Puff(s) Inhalation every 6 hours PRN Shortness of Breath and/or Wheezing  aluminum hydroxide/magnesium hydroxide/simethicone Suspension 30 milliLiter(s) Oral every 4 hours PRN Dyspepsia  cyclobenzaprine 10 milliGRAM(s) Oral three times a day PRN Muscle Spasm  simethicone 80 milliGRAM(s) Chew three times a day PRN Gas  sodium chloride 0.65% Nasal 1 Spray(s) Both Nostrils two times a day PRN Nasal Congestion      CAPILLARY BLOOD GLUCOSE        I&O's Summary    30 Jan 2022 07:01  -  30 Jan 2022 16:34  --------------------------------------------------------  IN: 0 mL / OUT: 400 mL / NET: -400 mL        PHYSICAL EXAM:    Vital Signs Last 24 Hrs  T(C): --  T(F): --  HR: 88 (01 Feb 2022 08:03) (85 - 95)  BP: --  BP(mean): --  RR: 19 (01 Feb 2022 08:03) (19 - 19)  SpO2: 96% (01 Feb 2022 08:03) (95% - 96%)    CONSTITUTIONAL: NAD, well-developed obese male   RESPIRATORY: Normal respiratory effort; lungs are clear to auscultation bilaterally on nasal CPAP.   CARDIOVASCULAR: Regular rate and rhythm, normal S1 and S2, no murmur/rub/gallop; No lower extremity edema; Peripheral pulses are 2+ bilaterally  ABDOMEN: +RLQ TTP otherwise, Nontender to palpation, normoactive bowel sounds, no rebound/guarding; No hepatosplenomegaly  MUSCULOSKELETAL no clubbing or cyanosis of digits; no joint swelling or tenderness to palpation  PSYCH: Alert, communicating through text to speech fareed on device, able to move RUE, lower ext,     LABS:                      RADIOLOGY & ADDITIONAL TESTS:  Results Reviewed:   Imaging Personally Reviewed:  Electrocardiogram Personally Reviewed:    COORDINATION OF CARE:  Care Discussed with Consultants/Other Providers [Y/N]:  Prior or Outpatient Records Reviewed [Y/N]:

## 2022-02-01 NOTE — PROGRESS NOTE ADULT - PROBLEM SELECTOR PLAN 2
02/14/20 Pt called and asked if he can remove cardiac monitor. Per verbal order, Dr Martins stated he can remove monitor. Pt voiced understanding and will mail it in. Cintia [phone] pm   Patient reporting he wants to die and refusing all inpatient evaluation as he would prefer to just die. Patient reporting he is not suicidal, is Jew and cannot take his life by his own hand, God can only take his life.   - Patient reporting family is no longer involved  -SI on 1/25, no longer has SI therefore switched from 1:1 to enhanced supervision per psych, cleared by psych for DC. Has capacity to refuse tests if not life threatening   - Psychiatry following   - Cont home Fluoxetine  - Can refuse current therapy/workup as it will not lead to imminent death Patient reporting he wants to die and refusing all inpatient evaluation as he would prefer to just die. Patient reporting he is not suicidal, is Taoism and cannot take his life by his own hand, God can only take his life.   Patient reporting family is no longer involved  SI on 1/25, no longer has SI therefore switched from 1:1 to enhanced supervision per psych until discharge, cleared by psych for DC.   As per psych pt has capacity to refuse various medications/imaging tests at this time.  On home Fluoxetine which pt is refusing

## 2022-02-01 NOTE — PROGRESS NOTE ADULT - PROBLEM SELECTOR PLAN 3
Hx of stroke in the past p/w LE weakness, dizziness.   Per neuro- less concerned for acute CVA. NIHSS 6 on presentation   Admission  CTH w/o demonstrates chronic R thalamocapsular infarct.   CTA head/neck limited in eval but do not report flow limiting or occlusion.   Patient currently refusing MRI or repeat CT head,  understands need for repeat imaging and continues to refuse.   -On  ASA, statin- pt refusing meds   [ ] TSH, A1C, TTE, U/A- pt refusing labs   - Passed SLP evaluation   - PT: rehab    # Dysphagia   - Soft and bite sized food + mildly thick liquids   - Cont same diet per SLP evaluation   - Pt notified of recommendations, encouraged to take PO Hx of stroke in the past p/w LE weakness, dizziness.  Per neuro- less concerned for acute CVA. NIHSS 6 on presentation   Admission  CTH w/o demonstrates chronic R thalamocapsular infarct.   CTA head/neck limited in eval but do not report flow limiting or occlusion.   Patient currently refusing MRI or repeat CT head,  understands need for repeat imaging and continues to refuse.   On  ASA, statin- pt refusing meds   TSH, A1C, TTE, U/A- pt refusing labs   Passed SLP evaluation   PT: rehab    # Dysphagia   - Soft and bite sized food + mildly thick liquids   - Cont same diet per SLP evaluation   - Pt notified of recommendations, encouraged to take PO

## 2022-02-02 PROCEDURE — 99232 SBSQ HOSP IP/OBS MODERATE 35: CPT

## 2022-02-02 NOTE — PROGRESS NOTE ADULT - PROBLEM SELECTOR PLAN 4
Reportedly COVID positive in [place] requiring admission. On home CPAP settings   [ ] D-dimer- pt refusing    Lovenox 40 BID   Supportive care PRN

## 2022-02-02 NOTE — DIETITIAN INITIAL EVALUATION ADULT. - PROBLEM SELECTOR PLAN 4
BP = 148/ 87  Permissive HTN for 24 hours from onset of symptoms.   Graduate to normotension # 15:00 12/23.

## 2022-02-02 NOTE — SWALLOW BEDSIDE ASSESSMENT ADULT - SPECIFY REASON(S)
To reassess swallow function
To reassess swallow
To reassess swallow function
to assess swallow function
To re-assess swallow mechanism
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
## 2022-02-02 NOTE — SWALLOW BEDSIDE ASSESSMENT ADULT - ASR SWALLOW RECOMMEND DIAG
Objective testing NOT warranted at this time given patient exhibits overt signs.
Objective testing NOT warranted given clinical presentation as stated above.

## 2022-02-02 NOTE — DIETITIAN INITIAL EVALUATION ADULT. - PROBLEM SELECTOR PLAN 2
Air borne precautions.  Currently not in acute hypoxic respiratory distress with R = 19b/ min, SPO2= 95% ra   F/U inflammatory markers.  If D Dimer> 500 give full A/C.  Give O2 via NC and titrate as per guidelines.  Give Tylenol CO   Pro Air inhaler PRN.

## 2022-02-02 NOTE — PROGRESS NOTE ADULT - PROBLEM SELECTOR PLAN 5
Seen on CTA neck  ENT: Recommend MRI for further evaluation, lower suspicion for infection   [ ] MRI neck- pt currently refusing   Patient refusing any further imaging

## 2022-02-02 NOTE — SWALLOW BEDSIDE ASSESSMENT ADULT - ASR SWALLOW LINGUAL MOBILITY
impaired left lateral movement/impaired right lateral movement
unable to assess due to poor participation/comprehension

## 2022-02-02 NOTE — DIETITIAN INITIAL EVALUATION ADULT. - OTHER INFO
66M, admitted with generalized headache, blurry vision and generalized weakness.  h/o CVA with aphasia and LUE weakness, HTN, Hyperlipidemia, CAYDEN on CPAP, CAD.    Pt with poor po intake of <50% meals as per RN.  Pt refused to answer any interview questions at present.  As per SLP recommendation on (1/28/22), pt receiving puree with mildly thick liquids.  Suggest provide supplement Magic cupx3/day till appetite improves.  Skin with ecchymosis, no edema per nursing flow sheet.

## 2022-02-02 NOTE — SWALLOW BEDSIDE ASSESSMENT ADULT - SWALLOW EVAL: DIAGNOSIS
1. The patient demonstrated a mild-moderate oral dysphagia for puree and mildly thick liquid textures marked by reduced oral grading 2/2 decreased labial ROM, lateral loss of bolus, prolonged manipulation resulting in delayed bolus collection, transfer and posterior transport. 2. Moderate oral dysphagia for thin liquids marked by reduced oral grading, lateral loss of bolus, delayed bolus collection, transfer and posterior transport and suspected posterior loss of bolus over base of tongue. 3. Moderate oral dysphagia for minced and moist consistency marked by reduced stripping of utensil from bolus, increased mastication time, delayed bolus collection, transfer and posterior transport. Lingual residue noted subsequent to deglutition which cleared with a liquid wash. 4. Pharyngeal skills for puree and mildly thick liquid textures characterized by initiation of pharyngeal swallow trigger with + hyolaryngeal elevation noted upon digital palpation with no evidence of laryngeal penetration.
1. Patient demonstrated a mild-moderate oral dysphagia for mildly thick liquids and thin liquids marked by lateral spillage likely due to patient feeding modality with big gulps. 2. Patient demonstrated a mild oral dysphagia for regular solids marked by adequate oral acceptance with mildly slow/delayed collection with mild anterior loss from oral cavity and eventual transport. 3. Patient demonstrated a functional pharyngeal phase of swallow for regular solids and mildly thick liquids marked by a present pharyngeal swallow trigger with hyolaryngeal elevation noted upon digital palpation without evidence of airway penetration/aspiration. 4. Patient demonstrated a severe pharyngeal dysphagia for thin liquids marked by a delayed pharyngeal swallow trigger with hyolaryngeal elevation noted upon digital palpation with immediate coughing suggestive of airway penetration/aspiration. Of note, patient took limited PO trials of regular solids, shaking his head no despite maximum encouragement.
1. Patient demonstrated a mild oral dysphagia for thin liquids marked by mild lateral loss from cup with adequate containment, collection and transfer 2. Patient demonstrated a moderate oral dysphagia for regular solids marked by moderately delayed collection/slow chewing with eventual transport. Patient exhibits piecemeal transport and utilizes liquid wash to clear bolus from oral cavity. 3. Patient demonstrated a functional pharyngeal phase of swallow for regular solids and thin liquids marked by a present pharyngeal swallow trigger with hyolaryngeal elevation noted upon digital palpation without evidence of airway penetration/aspiration.

## 2022-02-02 NOTE — SWALLOW BEDSIDE ASSESSMENT ADULT - ASR SWALLOW REFERRAL
RD consult to ensure adequate caloric/hydration intake RD consult to provide patient with adequate food choices to increase caloric intake

## 2022-02-02 NOTE — DIETITIAN INITIAL EVALUATION ADULT. - PERTINENT MEDS FT
MEDICATIONS  (STANDING):  amLODIPine   Tablet 10 milliGRAM(s) Oral daily  aspirin enteric coated 81 milliGRAM(s) Oral daily  atorvastatin 80 milliGRAM(s) Oral at bedtime  chlorhexidine 2% Cloths 1 Application(s) Topical daily  enoxaparin Injectable 40 milliGRAM(s) SubCutaneous every 12 hours  FLUoxetine 10 milliGRAM(s) Oral daily  influenza   Vaccine 0.5 milliLiter(s) IntraMuscular once  simethicone 80 milliGRAM(s) Chew once  sucralfate 1 Gram(s) Oral once

## 2022-02-02 NOTE — DIETITIAN INITIAL EVALUATION ADULT. - PROBLEM SELECTOR PLAN 1
Less likely acute, had CVA in the past   Seen by neuro for stroke eval.   NIHSS= 6  Cardiac monitor.  F/U #2 CE, lipid panel, TSH, A1C.  Failed bedside dysphagia screen.  NPO including meds.  Give ASA CO  Aspiration precautions.   F/U S & S  Gentle IVF with LR @ 75ml/ hour   Neuro checks Q4*. If acute change get CT head.  Fall, Aspiration, safety, seizure precautions.  MRI Brain w/wo   TTE & Bubble study  Permissive HTN to 220/110 x 24 hours from onset of symptoms
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
## 2022-02-02 NOTE — SWALLOW BEDSIDE ASSESSMENT ADULT - SWALLOW EVAL: CURRENT DIET
Puree with mildly thick liquids, per MD order
NPO as per MD order
NPO, per MD order
Puree with mildly thick liquids, per MD order
Soft and Bite Sized/Mildly Thick

## 2022-02-02 NOTE — PROGRESS NOTE ADULT - SUBJECTIVE AND OBJECTIVE BOX
Inocencia Fam  Hospitalist  Pager- [phone]      PROGRESS NOTE:     Patient is a 66y old  Male who presents with a chief complaint of HA dizziness and blurry vision (29 Jan 2022 14:50)      SUBJECTIVE / OVERNIGHT EVENTS:Pt seen and examined by me   Non verbal, communicates via  writing and [id number] software on his phone  DW RN- pt requesting to upgrade diet. FU SS    ADDITIONAL REVIEW OF SYSTEMS:      MEDICATIONS  (STANDING):  amLODIPine   Tablet 10 milliGRAM(s) Oral daily  aspirin enteric coated 81 milliGRAM(s) Oral daily  atorvastatin 80 milliGRAM(s) Oral at bedtime  chlorhexidine 2% Cloths 1 Application(s) Topical daily  enoxaparin Injectable 40 milliGRAM(s) SubCutaneous every 12 hours  FLUoxetine 10 milliGRAM(s) Oral daily  influenza   Vaccine 0.5 milliLiter(s) IntraMuscular once  simethicone 80 milliGRAM(s) Chew once  sucralfate 1 Gram(s) Oral once    MEDICATIONS  (PRN):  acetaminophen     Tablet .. 650 milliGRAM(s) Oral every 6 hours PRN Temp greater or equal to 38C (100.4F), Mild Pain (1 - 3), Moderate Pain (4 - 6)  acetaminophen    Suspension .. 975 milliGRAM(s) Oral once PRN Mild Pain (1 - 3), Moderate Pain (4 - 6), Severe Pain (7 - 10)  ALBUTerol    90 MICROgram(s) HFA Inhaler 2 Puff(s) Inhalation every 6 hours PRN Shortness of Breath and/or Wheezing  aluminum hydroxide/magnesium hydroxide/simethicone Suspension 30 milliLiter(s) Oral every 4 hours PRN Dyspepsia  cyclobenzaprine 10 milliGRAM(s) Oral three times a day PRN Muscle Spasm  simethicone 80 milliGRAM(s) Chew three times a day PRN Gas  sodium chloride 0.65% Nasal 1 Spray(s) Both Nostrils two times a day PRN Nasal Congestion    CAPILLARY BLOOD GLUCOSE        I&O's Summary    30 Jan 2022 07:01  -  30 Jan 2022 16:34  --------------------------------------------------------  IN: 0 mL / OUT: 400 mL / NET: -400 mL      PHYSICAL EXAM:    Vital Signs Last 24 Hrs  T(C): --  T(F): --  HR: 74 (02 Feb 2022 07:51) (72 - 87)  BP: --  BP(mean): --  RR: 18 (02 Feb 2022 07:51) (18 - 18)  SpO2: 96% (02 Feb 2022 07:51) (95% - 96%)    CONSTITUTIONAL: NAD, well-developed obese male   RESPIRATORY: Normal respiratory effort; lungs are clear to auscultation bilaterally on nasal CPAP.   CARDIOVASCULAR: Regular rate and rhythm, normal S1 and S2, no murmur/rub/gallop; No lower extremity edema; Peripheral pulses are 2+ bilaterally  ABDOMEN: +RLQ TTP otherwise, Nontender to palpation, normoactive bowel sounds, no rebound/guarding; No hepatosplenomegaly  MUSCULOSKELETAL no clubbing or cyanosis of digits; no joint swelling or tenderness to palpation  PSYCH: Alert, communicating through text to speech fareed on device, able to move RUE, lower ext,     LABS:                      RADIOLOGY & ADDITIONAL TESTS:  Results Reviewed:   Imaging Personally Reviewed:  Electrocardiogram Personally Reviewed:    COORDINATION OF CARE:  Care Discussed with Consultants/Other Providers [Y/N]:  Prior or Outpatient Records Reviewed [Y/N]:

## 2022-02-02 NOTE — PROGRESS NOTE ADULT - PROBLEM SELECTOR PLAN 3
Hx of stroke in the past p/w LE weakness, dizziness.  Per neuro- less concerned for acute CVA. NIHSS 6 on presentation   Admission  CTH w/o demonstrates chronic R thalamocapsular infarct.   CTA head/neck limited in eval but do not report flow limiting or occlusion.   Patient currently refusing MRI or repeat CT head,  understands need for repeat imaging and continues to refuse.   On  ASA, statin- pt refusing meds   TSH, A1C, TTE, U/A- pt refusing labs   Passed SLP evaluation   PT: rehab    # Dysphagia   - Soft and bite sized food + mildly thick liquids   - Cont same diet per SLP evaluation   - Pt notified of recommendations, encouraged to take PO

## 2022-02-02 NOTE — PROGRESS NOTE ADULT - PROBLEM SELECTOR PLAN 2
Patient reporting he wants to die and refusing all inpatient evaluation as he would prefer to just die. Patient reporting he is not suicidal, is Caodaism and cannot take his life by his own hand, God can only take his life.   Patient reporting family is no longer involved  SI on 1/25, no longer has SI therefore switched from 1:1 to enhanced supervision per psych until discharge, cleared by psych for DC.   As per psych pt has capacity to refuse various medications/imaging tests at this time.  On home Fluoxetine which pt is refusing

## 2022-02-02 NOTE — SWALLOW BEDSIDE ASSESSMENT ADULT - SWALLOW EVAL: RECOMMENDED DIET
Puree with Mildly Thick Liquids
Soft and bite sized solids with thin liquids, as tolerated
Soft and bite sized with mildly thick liquids, per MD order

## 2022-02-02 NOTE — SWALLOW BEDSIDE ASSESSMENT ADULT - ADDITIONAL RECOMMENDATIONS
1. Reconsult this department as patient medically optimizes for PO tolerance/possible diet advancement 2. This service to follow up as schedule permits.
Monitor for any changes in neurological status that may impact oral intake. Reconsult this department for PO tolerance as needed.
Reconsult this service as patient becomes medically optimized to reassess. This service will follow as schedule permits.

## 2022-02-02 NOTE — PROGRESS NOTE ADULT - PROBLEM SELECTOR PLAN 1
Abdominal pain- not complaining of further pain   Non-toxic appearing and appears comfortable at rest ,having bowel movements  Patient refusing vitals, labs or abdominal Xray. Just requesting medication for gas and change in diet   Encouraged patient to re-consider labs and imaging daily   Simethicone PRN   Dietary preferences updated

## 2022-02-03 ENCOUNTER — TRANSCRIPTION ENCOUNTER (OUTPATIENT)
Age: 67
End: 2022-02-03

## 2022-02-03 LAB — SARS-COV-2 RNA SPEC QL NAA+PROBE: DETECTED

## 2022-02-03 PROCEDURE — 99232 SBSQ HOSP IP/OBS MODERATE 35: CPT

## 2022-02-03 NOTE — PROGRESS NOTE ADULT - SUBJECTIVE AND OBJECTIVE BOX
Inocencia Fam  Hospitalist  Pager- [phone]      PROGRESS NOTE:     Patient is a 66y old  Male who presents with a chief complaint of HA dizziness and blurry vision (29 Jan 2022 14:50)      SUBJECTIVE / OVERNIGHT EVENTS:Pt seen and examined by me   Non verbal, communicates via  writing and [id number] software on his phone      ADDITIONAL REVIEW OF SYSTEMS:      MEDICATIONS  (STANDING):  amLODIPine   Tablet 10 milliGRAM(s) Oral daily  aspirin enteric coated 81 milliGRAM(s) Oral daily  atorvastatin 80 milliGRAM(s) Oral at bedtime  chlorhexidine 2% Cloths 1 Application(s) Topical daily  enoxaparin Injectable 40 milliGRAM(s) SubCutaneous every 12 hours  FLUoxetine 10 milliGRAM(s) Oral daily  influenza   Vaccine 0.5 milliLiter(s) IntraMuscular once  simethicone 80 milliGRAM(s) Chew once  sucralfate 1 Gram(s) Oral once    MEDICATIONS  (PRN):  acetaminophen     Tablet .. 650 milliGRAM(s) Oral every 6 hours PRN Temp greater or equal to 38C (100.4F), Mild Pain (1 - 3), Moderate Pain (4 - 6)  acetaminophen    Suspension .. 975 milliGRAM(s) Oral once PRN Mild Pain (1 - 3), Moderate Pain (4 - 6), Severe Pain (7 - 10)  ALBUTerol    90 MICROgram(s) HFA Inhaler 2 Puff(s) Inhalation every 6 hours PRN Shortness of Breath and/or Wheezing  aluminum hydroxide/magnesium hydroxide/simethicone Suspension 30 milliLiter(s) Oral every 4 hours PRN Dyspepsia  cyclobenzaprine 10 milliGRAM(s) Oral three times a day PRN Muscle Spasm  simethicone 80 milliGRAM(s) Chew three times a day PRN Gas  sodium chloride 0.65% Nasal 1 Spray(s) Both Nostrils two times a day PRN Nasal Congestion    CAPILLARY BLOOD GLUCOSE        PHYSICAL EXAM:    Vital Signs Last 24 Hrs  T(C): 36.7 (02 Feb 2022 14:14), Max: 36.8 (02 Feb 2022 12:00)  T(F): 98 (02 Feb 2022 14:14), Max: 98.3 (02 Feb 2022 12:00)  HR: 74 (03 Feb 2022 07:24) (73 - 78)  BP: 134/88 (02 Feb 2022 12:00) (134/88 - 134/88)  BP(mean): --  RR: 19 (02 Feb 2022 14:14) (18 - 19)  SpO2: 98% (03 Feb 2022 07:24) (96% - 99%)    CONSTITUTIONAL: NAD, well-developed obese male   RESPIRATORY: Normal respiratory effort; lungs are clear to auscultation bilaterally on nasal CPAP.   CARDIOVASCULAR: Regular rate and rhythm, normal S1 and S2, no murmur/rub/gallop; No lower extremity edema; Peripheral pulses are 2+ bilaterally  ABDOMEN: +RLQ TTP otherwise, Nontender to palpation, normoactive bowel sounds, no rebound/guarding; No hepatosplenomegaly  MUSCULOSKELETAL no clubbing or cyanosis of digits; no joint swelling or tenderness to palpation  PSYCH: Alert, communicating through text to speech fareed on device, able to move RUE, lower ext,     LABS:          Refusing labs             RADIOLOGY & ADDITIONAL TESTS:  Results Reviewed:   Imaging Personally Reviewed:  Electrocardiogram Personally Reviewed:    COORDINATION OF CARE:  Care Discussed with Consultants/Other Providers [Y/N]:  Prior or Outpatient Records Reviewed [Y/N]:   Inocencia Fam  Hospitalist  Pager- [phone]      PROGRESS NOTE:     Patient is a 66y old  Male who presents with a chief complaint of HA dizziness and blurry vision (29 Jan 2022 14:50)      SUBJECTIVE / OVERNIGHT EVENTS:Pt seen and examined by me   Non verbal, communicates via  writing and [id number] software on his phone  pt upset this AM as he reports he was held down by security to obtain blood pressure readings when he said no   ( pt has been refusing all vitals/meds) '  On asking why he is refusing, he says it is my prerogative        ADDITIONAL REVIEW OF SYSTEMS:    MEDICATIONS  (STANDING):  amLODIPine   Tablet 10 milliGRAM(s) Oral daily  aspirin enteric coated 81 milliGRAM(s) Oral daily  atorvastatin 80 milliGRAM(s) Oral at bedtime  chlorhexidine 2% Cloths 1 Application(s) Topical daily  enoxaparin Injectable 40 milliGRAM(s) SubCutaneous every 12 hours  FLUoxetine 10 milliGRAM(s) Oral daily  influenza   Vaccine 0.5 milliLiter(s) IntraMuscular once  simethicone 80 milliGRAM(s) Chew once  sucralfate 1 Gram(s) Oral once    MEDICATIONS  (PRN):  acetaminophen     Tablet .. 650 milliGRAM(s) Oral every 6 hours PRN Temp greater or equal to 38C (100.4F), Mild Pain (1 - 3), Moderate Pain (4 - 6)  acetaminophen    Suspension .. 975 milliGRAM(s) Oral once PRN Mild Pain (1 - 3), Moderate Pain (4 - 6), Severe Pain (7 - 10)  ALBUTerol    90 MICROgram(s) HFA Inhaler 2 Puff(s) Inhalation every 6 hours PRN Shortness of Breath and/or Wheezing  aluminum hydroxide/magnesium hydroxide/simethicone Suspension 30 milliLiter(s) Oral every 4 hours PRN Dyspepsia  cyclobenzaprine 10 milliGRAM(s) Oral three times a day PRN Muscle Spasm  simethicone 80 milliGRAM(s) Chew three times a day PRN Gas  sodium chloride 0.65% Nasal 1 Spray(s) Both Nostrils two times a day PRN Nasal Congestion    CAPILLARY BLOOD GLUCOSE        PHYSICAL EXAM:    Vital Signs Last 24 Hrs  T(C): 36.7 (02 Feb 2022 14:14), Max: 36.8 (02 Feb 2022 12:00)  T(F): 98 (02 Feb 2022 14:14), Max: 98.3 (02 Feb 2022 12:00)  HR: 74 (03 Feb 2022 07:24) (73 - 78)  BP: 134/88 (02 Feb 2022 12:00) (134/88 - 134/88)  BP(mean): --  RR: 19 (02 Feb 2022 14:14) (18 - 19)  SpO2: 98% (03 Feb 2022 07:24) (96% - 99%)    CONSTITUTIONAL: NAD, well-developed obese male   RESPIRATORY: Normal respiratory effort; lungs are clear to auscultation bilaterally   CARDIOVASCULAR: Regular rate and rhythm, normal S1 and S2, no murmur/rub/gallop; No lower extremity edema; Peripheral pulses are 2+ bilaterally  ABDOMEN:  Nontender to palpation, normoactive bowel sounds, no rebound/guarding  MUSCULOSKELETAL no clubbing or cyanosis of digits; no joint swelling or tenderness to palpation  PSYCH: Alert, communicating through text to speech fareed on device, able to move RUE, lower ext,     LABS:          Refusing labs             RADIOLOGY & ADDITIONAL TESTS:  Results Reviewed:   Imaging Personally Reviewed:  Electrocardiogram Personally Reviewed:    COORDINATION OF CARE:  Care Discussed with Consultants/Other Providers [Y/N]:  Prior or Outpatient Records Reviewed [Y/N]:   Inocencia Fam  Hospitalist  Pager- [phone]      PROGRESS NOTE:     Patient is a 66y old  Male who presents with a chief complaint of HA dizziness and blurry vision (29 Jan 2022 14:50)      SUBJECTIVE / OVERNIGHT EVENTS:Pt seen and examined by me   Discussed pt at IDR- No vitals in sunrise x 1 week. Advised RN to obtain vitals in anticipation for dc as transport would likely need stable  vitals prior to transportation   Non verbal, communicates via  writing and iyje-nf-uzqeqc software on his phone  pt upset  as he reports he was held down by security to obtain blood pressure readings when he said no   ( pt has been refusing all vitals/meds) '  On asking why he is refusing, he says it is my prerogative        ADDITIONAL REVIEW OF SYSTEMS:    MEDICATIONS  (STANDING):  amLODIPine   Tablet 10 milliGRAM(s) Oral daily  aspirin enteric coated 81 milliGRAM(s) Oral daily  atorvastatin 80 milliGRAM(s) Oral at bedtime  chlorhexidine 2% Cloths 1 Application(s) Topical daily  enoxaparin Injectable 40 milliGRAM(s) SubCutaneous every 12 hours  FLUoxetine 10 milliGRAM(s) Oral daily  influenza   Vaccine 0.5 milliLiter(s) IntraMuscular once  simethicone 80 milliGRAM(s) Chew once  sucralfate 1 Gram(s) Oral once    MEDICATIONS  (PRN):  acetaminophen     Tablet .. 650 milliGRAM(s) Oral every 6 hours PRN Temp greater or equal to 38C (100.4F), Mild Pain (1 - 3), Moderate Pain (4 - 6)  acetaminophen    Suspension .. 975 milliGRAM(s) Oral once PRN Mild Pain (1 - 3), Moderate Pain (4 - 6), Severe Pain (7 - 10)  ALBUTerol    90 MICROgram(s) HFA Inhaler 2 Puff(s) Inhalation every 6 hours PRN Shortness of Breath and/or Wheezing  aluminum hydroxide/magnesium hydroxide/simethicone Suspension 30 milliLiter(s) Oral every 4 hours PRN Dyspepsia  cyclobenzaprine 10 milliGRAM(s) Oral three times a day PRN Muscle Spasm  simethicone 80 milliGRAM(s) Chew three times a day PRN Gas  sodium chloride 0.65% Nasal 1 Spray(s) Both Nostrils two times a day PRN Nasal Congestion    CAPILLARY BLOOD GLUCOSE        PHYSICAL EXAM:    Vital Signs Last 24 Hrs  T(C): 36.7 (02 Feb 2022 14:14), Max: 36.8 (02 Feb 2022 12:00)  T(F): 98 (02 Feb 2022 14:14), Max: 98.3 (02 Feb 2022 12:00)  HR: 74 (03 Feb 2022 07:24) (73 - 78)  BP: 134/88 (02 Feb 2022 12:00) (134/88 - 134/88)  BP(mean): --  RR: 19 (02 Feb 2022 14:14) (18 - 19)  SpO2: 98% (03 Feb 2022 07:24) (96% - 99%)    CONSTITUTIONAL: NAD, well-developed obese male   RESPIRATORY: Normal respiratory effort; lungs are clear to auscultation bilaterally   CARDIOVASCULAR: Regular rate and rhythm, normal S1 and S2, no murmur/rub/gallop; No lower extremity edema; Peripheral pulses are 2+ bilaterally  ABDOMEN:  Nontender to palpation, normoactive bowel sounds, no rebound/guarding  MUSCULOSKELETAL no clubbing or cyanosis of digits; no joint swelling or tenderness to palpation  PSYCH: Alert, communicating through text to speech fareed on device, able to move RUE, lower ext,     LABS:          Refusing labs             RADIOLOGY & ADDITIONAL TESTS:  Results Reviewed:   Imaging Personally Reviewed:  Electrocardiogram Personally Reviewed:    COORDINATION OF CARE:  Care Discussed with Consultants/Other Providers [Y/N]:  Prior or Outpatient Records Reviewed [Y/N]:

## 2022-02-03 NOTE — DISCHARGE NOTE NURSING/CASE MANAGEMENT/SOCIAL WORK - PATIENT PORTAL LINK FT
You can access the FollowMyHealth Patient Portal offered by [facility] by registering at the following website: [email or web address]. By joining [facility]’s FollowMyHealth portal, you will also be able to view your health information using other applications (apps) compatible with our system.

## 2022-02-03 NOTE — DISCHARGE NOTE NURSING/CASE MANAGEMENT/SOCIAL WORK - NSDCPEFALRISK_GEN_ALL_CORE
For information on Fall & Injury Prevention, visit: [email or web address] OR  [email or web address] OR  https://www.cdc.gov/steadi/patient.html

## 2022-02-03 NOTE — PROGRESS NOTE ADULT - PROBLEM SELECTOR PLAN 2
Patient reporting he wants to die and refusing all inpatient evaluation as he would prefer to just die. Patient reporting he is not suicidal, is Sabianism and cannot take his life by his own hand, God can only take his life.   Patient reporting family is no longer involved  SI on 1/25, no longer has SI therefore switched from 1:1 to enhanced supervision per psych until discharge, cleared by psych for DC.   As per psych pt has capacity to refuse various medications/imaging tests at this time.  On home Fluoxetine which pt is refusing
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
## 2022-02-04 VITALS — OXYGEN SATURATION: 96 % | HEART RATE: 82 BPM

## 2022-02-04 PROCEDURE — 99239 HOSP IP/OBS DSCHRG MGMT >30: CPT

## 2022-02-04 RX ORDER — OXYCODONE HYDROCHLORIDE 5 MG/1
1 TABLET ORAL
Qty: 0 | Refills: 0 | DISCHARGE

## 2022-02-04 RX ORDER — SIMETHICONE 80 MG/1
1 TABLET, CHEWABLE ORAL
Qty: 0 | Refills: 0 | DISCHARGE
Start: 2022-02-04

## 2022-02-04 RX ORDER — ALBUTEROL 90 UG/1
2 AEROSOL, METERED ORAL
Qty: 0 | Refills: 0 | DISCHARGE
Start: 2022-02-04

## 2022-02-04 RX ORDER — ACETAMINOPHEN 500 MG
2 TABLET ORAL
Qty: 0 | Refills: 0 | DISCHARGE
Start: 2022-02-04

## 2022-02-04 NOTE — PROGRESS NOTE ADULT - PROBLEM SELECTOR PROBLEM 5
Lesion of parotid gland
Essential hypertension
Essential hypertension
Lesion of parotid gland
Lesion of parotid gland
Hyperlipidemia
Lesion of parotid gland
Lesion of parotid gland
Essential hypertension
Lesion of parotid gland

## 2022-02-04 NOTE — PROGRESS NOTE ADULT - PROBLEM SELECTOR PROBLEM 4
Lesion of parotid gland
2019 novel coronavirus disease (COVID-19)
Lesion of parotid gland
Essential hypertension
2019 novel coronavirus disease (COVID-19)
Lesion of parotid gland
2019 novel coronavirus disease (COVID-19)

## 2022-02-04 NOTE — PROVIDER CONTACT NOTE (OTHER) - ACTION/TREATMENT ORDERED:
No new orders made.
Notified provider will try again later
Provider made aware. No orders given at this time
acp made aware. pt refusing any treatment or meds. will continue to monitor and encourage.
continue to reinforce education
no actions/treatments ordered at this time
Continue to patient educate patient on importance of VS & medication. Continue to monitor patient.
Educated patient on importance of lovenox and morning medications. rescheduled daily amlodipine to retry with day shift.
YUAN DONAHUE notified
continue to reinforce education
provider notified.
Continue to patient educate patient on importance of VS & medication. Continue to monitor patient.
Continue to patient educate patient on importance of VS & medication. Continue to monitor patient.
Patient and ACP agreed to care being done around noon and letting the patient sleep for now   Respiratory came and set patient up on CPAP until patient wishes to be taken off; Pt agreed to care at noo
no actions/treatments ordered at this time
provider made aware. no new orders at this time
Notified provider
Continue to educate patient on importance of medication and vital signs.  Continue to monitor patient.
Continue to patient educate patient on importance of VS & medication. Continue to monitor patient.
Continue to patient educate patient on importance of VS & medication. Continue to monitor patient.
Educated patient on importance of lovenox and morning medications.
Notified provider
acp made aware. will continue to encourage.
Continue to educate patient on importance of medication and vital signs.  Continue to monitor patient.
Notified provider
Provider made aware. No orders given at this time
no actions/treatments ordered at this time
Notified provider
Provider made aware. No orders given at this time
continue to reinforce education
Notified provider

## 2022-02-04 NOTE — PROGRESS NOTE ADULT - PROBLEM SELECTOR PROBLEM 3
Cerebrovascular accident (CVA)
Cerebrovascular accident (CVA)
Lesion of parotid gland
2019 novel coronavirus disease (COVID-19)
Cerebrovascular accident (CVA)
2019 novel coronavirus disease (COVID-19)
Cerebrovascular accident (CVA)
Cerebrovascular accident (CVA)
2019 novel coronavirus disease (COVID-19)

## 2022-02-04 NOTE — PROGRESS NOTE ADULT - PROBLEM SELECTOR PLAN 8
Trop - 18  - ASA, statin   [ ] TTE
VTE with Lovenox 40 mg sub cut BID.  Fall, Aspiration, safety and seizure precaution.  Neuro, PT, OT, P M & R, S &S eval.
Continue CPAP QHS.
Trop - 18  - ASA, statin   [ ] TTE
Continue CPAP QHS.
Trop - 18  - ASA, statin   [ ] TTE
Continue CPAP QHS.

## 2022-02-04 NOTE — PROGRESS NOTE ADULT - PROBLEM SELECTOR PLAN 7
Cont Atorvastatin
Cont Atorvastatin
Trop - 18  - ASA, statin   F/U TTE
Cont Atorvastatin
On  Atorvastatin-Refusing
On  Atorvastatin-Refusing
Cont Atorvastatin
Continue CPAP QHS.
Cont Atorvastatin
Cont Atorvastatin

## 2022-02-04 NOTE — PROGRESS NOTE ADULT - PROBLEM SELECTOR PLAN 10
VTE with Lovenox 40 mg BID.  Fall, Aspiration, safety and seizure precaution. Diet changed on 2/2  Dispo: Rehab  On asking pt who I should contact for collateral info, he shrugged his shoulders and appeared upset  On asking why he refuses all meds, labs, pt states this is his choice.   Psych eval- pt has capacity to refuse meds tests  Attempted to call wife Maria G Jeffries - fax machine tone   1/31- Called sister Petrona who lives in [place]- As per sister, pt was with her in [place] and returned to [place] to get a rehab facility. As per Petrona, pt has a horrible relationship with his wife and feels the wife abandoned him.   Pt has 2 sons, one of whom lives with sister Petrona. Sister feels she should be his point of contact but will text pt and confirm with him. Sister aware pt is refusing tests, vitals, labs, medications. She stated pt can be very frustrating  2/1- Updated Petrona. Not able to reach wife  2/3- pt upset this AM as he reports he was held down by security to obtain blood pressure readings when he said no. DW NM.  Also on asking pt again about his family contact, he states he would want neither his sister or wife involved in his care  DC Planning 33mins to rehab

## 2022-02-04 NOTE — PROGRESS NOTE ADULT - PROBLEM SELECTOR PROBLEM 9
CAD (coronary artery disease)
Preventive measure
CAD (coronary artery disease)
CAD (coronary artery disease)
Preventive measure
CAD (coronary artery disease)
Preventive measure

## 2022-02-04 NOTE — PROVIDER CONTACT NOTE (OTHER) - DATE AND TIME:
03-Feb-2022 18:00
24-Jan-2022 03:45
24-Jan-2022 09:25
24-Jan-2022 17:42
25-Jan-2022 06:00
26-Jan-2022 21:05
27-Jan-2022 17:05
01-Feb-2022 13:11
01-Feb-2022 21:40
03-Feb-2022 22:38
24-Jan-2022 08:00
26-Jan-2022 06:31
27-Jan-2022 05:08
27-Jan-2022 06:30
28-Jan-2022 05:50
28-Jan-2022 22:15
03-Feb-2022 06:00
27-Jan-2022 22:28
28-Jan-2022 18:07
31-Jan-2022 16:23
31-Jan-2022 22:59
02-Feb-2022 05:54
23-Jan-2022 13:12
02-Feb-2022 21:13
23-Jan-2022 04:00
23-Jan-2022 23:37
24-Jan-2022 08:33
29-Jan-2022 14:53
30-Jan-2022 01:34
31-Jan-2022 05:46
04-Feb-2022 05:39
24-Jan-2022 16:47
25-Jan-2022 22:00
30-Jan-2022 05:03

## 2022-02-04 NOTE — PROGRESS NOTE ADULT - PROBLEM SELECTOR PROBLEM 6
Essential hypertension
Essential hypertension
Hyperlipidemia
Essential hypertension
Hyperlipidemia
Hyperlipidemia
Essential hypertension
CAYDEN on CPAP
Essential hypertension

## 2022-02-04 NOTE — PROVIDER CONTACT NOTE (OTHER) - RECOMMENDATIONS
Educated patient on importance of medications, telemetry monitoring and vitals patient still refusing
Educated patient on importance of vitals
Educated patient on importance
Provider made aware
Continue to educate patient on importance of medication and vital signs.
Continue to patient educate patient on importance of VS & medication.
Provider made aware
acp made aware. will continue to encourage.
continue to reinforce education
Continue to patient educate patient on importance of VS & medication.
Provider made aware.
notify provider, patient educated on importance of medication.
psych evaluation
reattempt to administer medications
Educated patient on importance of neuro checks
Will continue to offer medications and await for opportunities to take vitals
inform provider
Continue to educate patient on importance of medication and vital signs.
Continue to patient educate patient on importance of VS & medication.
Educated patient on importance of lovenox and morning medications.
continue to reinforce education
reattempt to administer medications and vital signs
Continue to patient educate patient on importance of VS & medication.
Educated patient on importance of drawing labs, medications, and vitals patient still refusing
Provider beckie made aware
acp made aware. pt refusing any treatment or meds. will continue to monitor and encourage.
reattempt to administer medications and vital signs
Continue to patient educate patient on importance of VS & medication.
Educated patient on importance of lovenox and morning medications. rescheduled daily amlodipine to retry with day shift.
Provider made aware
continue to reinforce education

## 2022-02-04 NOTE — PROGRESS NOTE ADULT - PROBLEM SELECTOR PLAN 2
Patient reporting he wants to die and refusing all inpatient evaluation as he would prefer to just die. Patient reporting he is not suicidal, is [facility] and cannot take his life by his own hand, God can only take his life.   Patient reporting family is no longer involved  SI on 1/25, no longer has SI therefore switched from 1:1 to enhanced supervision per psych until discharge, cleared by psych for DC.   As per psych pt has capacity to refuse various medications/imaging tests at this time.  On home Fluoxetine which pt is refusing

## 2022-02-04 NOTE — PROGRESS NOTE ADULT - PROVIDER SPECIALTY LIST ADULT
Hospitalist
Hospitalist
Rehab Medicine
Hospitalist

## 2022-02-04 NOTE — PROGRESS NOTE ADULT - PROBLEM SELECTOR PROBLEM 8
CAYDEN on CPAP
Preventive measure
CAYDEN on CPAP
CAYDEN on CPAP
CAD (coronary artery disease)
CAD (coronary artery disease)
CAYDEN on CPAP
CAD (coronary artery disease)
CAYDEN on CPAP

## 2022-02-04 NOTE — PROGRESS NOTE ADULT - SUBJECTIVE AND OBJECTIVE BOX
Inocencia Fam  Hospitalist  Pager- [phone]      PROGRESS NOTE:     Patient is a 66y old  Male who presents with a chief complaint of HA dizziness and blurry vision (29 Jan 2022 14:50)      SUBJECTIVE / OVERNIGHT EVENTS:Pt seen and examined by me   Did not want to be bothered today   Sheets over the head, not waking up   Reviewed events from yesterday- pt was upset as he was forced  down by staff/security to get vitals . PA examined him later the day to ensure no overt trauma      ADDITIONAL REVIEW OF SYSTEMS:    MEDICATIONS  (STANDING):  amLODIPine   Tablet 10 milliGRAM(s) Oral daily  aspirin enteric coated 81 milliGRAM(s) Oral daily  atorvastatin 80 milliGRAM(s) Oral at bedtime  chlorhexidine 2% Cloths 1 Application(s) Topical daily  enoxaparin Injectable 40 milliGRAM(s) SubCutaneous every 12 hours  FLUoxetine 10 milliGRAM(s) Oral daily  influenza   Vaccine 0.5 milliLiter(s) IntraMuscular once  simethicone 80 milliGRAM(s) Chew once  sucralfate 1 Gram(s) Oral once    MEDICATIONS  (PRN):  acetaminophen     Tablet .. 650 milliGRAM(s) Oral every 6 hours PRN Temp greater or equal to 38C (100.4F), Mild Pain (1 - 3), Moderate Pain (4 - 6)  acetaminophen    Suspension .. 975 milliGRAM(s) Oral once PRN Mild Pain (1 - 3), Moderate Pain (4 - 6), Severe Pain (7 - 10)  ALBUTerol    90 MICROgram(s) HFA Inhaler 2 Puff(s) Inhalation every 6 hours PRN Shortness of Breath and/or Wheezing  aluminum hydroxide/magnesium hydroxide/simethicone Suspension 30 milliLiter(s) Oral every 4 hours PRN Dyspepsia  cyclobenzaprine 10 milliGRAM(s) Oral three times a day PRN Muscle Spasm  simethicone 80 milliGRAM(s) Chew three times a day PRN Gas  sodium chloride 0.65% Nasal 1 Spray(s) Both Nostrils two times a day PRN Nasal Congestion    CAPILLARY BLOOD GLUCOSE        PHYSICAL EXAM:    Vital Signs Last 24 Hrs  T(C): 36.7 (02 Feb 2022 14:14), Max: 36.8 (02 Feb 2022 12:00)  T(F): 98 (02 Feb 2022 14:14), Max: 98.3 (02 Feb 2022 12:00)  HR: 74 (03 Feb 2022 07:24) (73 - 78)  BP: 134/88 (02 Feb 2022 12:00) (134/88 - 134/88)  BP(mean): --  RR: 19 (02 Feb 2022 14:14) (18 - 19)  SpO2: 98% (03 Feb 2022 07:24) (96% - 99%)    CONSTITUTIONAL: NAD, sheets over head ( limited exam)   RESPIRATORY: clear  CARDIOVASCULAR:  normal S1 and S2, no murmur/rub/gallop; No lower extremity edema; Peripheral pulses are 2+ bilaterally  ABDOMEN:  Nontender to palpation, normoactive bowel sounds, no rebound/guarding  MUSCULOSKELETAL no clubbing or cyanosis of digits; no joint swelling or tenderness to palpation  NEURO: Aphasia,  communicating through text to speech fareed on device, does not co-operate with exam   LABS:          Refusing labs             RADIOLOGY & ADDITIONAL TESTS:  Results Reviewed:   Imaging Personally Reviewed:  Electrocardiogram Personally Reviewed:    COORDINATION OF CARE:  Care Discussed with Consultants/Other Providers [Y/N]:  Prior or Outpatient Records Reviewed [Y/N]:

## 2022-02-04 NOTE — PROVIDER CONTACT NOTE (OTHER) - BACKGROUND
66 year old male with PMH of mood disorder, CVA with aphasia, HTN, CAYDEN on CPAP presented with generalized weakness.
Pt refused vital signs & atorvastatin
Pt refusing vital signs & atorvastatin
Pt refusing vital signs, morning lab work, amlodipine & lovenox
Pt refusing vital signs, morning lab work, amlodipine & lovenox
patient admitted for dizziness and giddiness. phmx SI, mood disorder, CVA with aphasia
66 year old male past medical history of SI , mood disorder , hld , and CVA . Admitted for dizziness and giddiness
Admitted for stroke and covid
Patient admitted for dizziness and giddiness. phmx SI, mood disorder, CVA with aphasia
Pt refused vital signs, AM labs, & morning medication
pt admitted for dizziness and giddiness
66 year old male with PMH of mood disorder, CVA with aphasia, HTN, CAYDEN on CPAP presented with generalized weakness.
pt admitted for dizziness
Admitted for covid
patient admitted for dizziness and giddiness. phmx SI, mood disorder, CVA with aphasia
patient refusing most care
Pt admitted for CVA,
Refusing Aspirin and prozac PO afternoon meds. Hx of noncomplaince.
patient admitted for dizziness and giddiness. phmx SI, mood disorder, CVA with aphasia
patient admitted for dizziness.
pt admitted for dizziness
Admitted for covid
pt admitted for dizziness
Admitted for covid
Pt admitted for dizziness
Pt admitted to N for dizziness and giddiness; also positive for COVID  Pt suffered from a stroke in 2019 that has left him non-verbal, lower extremity weakness, and left sided weakness
Pt refusing vital signs & atorvastatin
66 year old male with PMH of mood disorder, CVA with aphasia, HTN, CAYDEN on CPAP presented with generalized weakness.
Pt admitted for CVA,
pt admitted for dizziness and giddiness
pt admitted for dizziness and giddiness

## 2022-02-04 NOTE — PROGRESS NOTE ADULT - PROBLEM SELECTOR PROBLEM 2
Major depression
Cerebrovascular accident (CVA)
Major depression
Cerebrovascular accident (CVA)
2019 novel coronavirus disease (COVID-19)
Major depression
Major depression
Cerebrovascular accident (CVA)
Major depression

## 2022-02-04 NOTE — PROGRESS NOTE ADULT - REASON FOR ADMISSION
HA dizziness and blurry vision

## 2022-02-04 NOTE — PROGRESS NOTE ADULT - PROBLEM SELECTOR PROBLEM 1
Abdominal pain
Abdominal pain
Cerebrovascular accident (CVA)
Abdominal pain
Abdominal pain
Major depression
Abdominal pain
Abdominal pain
Major depression
Major depression
Abdominal pain
Abdominal pain

## 2022-02-04 NOTE — PROVIDER CONTACT NOTE (OTHER) - NAME OF MD/NP/PA/DO NOTIFIED:
Tele SHARDA Moe
Bill Darby, [id number]
Darshan Hayward
Darshan Hayward (- [facility]), [phone]
Maddie Aguirre
Stacie Gonzalez ACP
Fariha Jackson
Sandy ACP
Sandy Junior
Darshan Hayward
Maddie Aguirre
Maddie Aguirre
tele SHARDA Johnson
walker sadler
Darline Lindsay
YUAN DONAHUE
Bill Darby, [phone]
Cain Pager
Darshan Hayward
Marzena Brooks
Sandy Junior
Silvino Crum
Tele SHARDA Madrigal
YUAN Khan
Bill Darby, [id number]
Darshan Hayward
Roma ku
Sandy ACP
Sandy Junior
Silvino Crum
walker sadler
Bill Darby, [id number]
Elizabeth Lawson, [id number]
Maddie Aguirre

## 2022-02-04 NOTE — PROVIDER CONTACT NOTE (OTHER) - ASSESSMENT
A&OX4. Refusing vitals. Resp:18
Has been refusing medications, vitals and bloods
Patient shows no signs of distress . Refusing medication and vitals at this time
Pt refused vital signs, AM labs, & morning medication
Pt refusing vital signs & atorvastatin
When patient arrived to the unit he was educated about orders that were placed; Pt is refusing all care until noon because he "needs to sleep now"; Educated patient that he is NPO due to failing dysphagia screen in emergency room; pt refusing lactated ringers and said he will redo dysphagia screen at noon with speech and swallow; Pt is refusing patient profile at this time as well because he "needs sleep"   ASHA Coello aware; ACP notified and came to bedside to talk to patient regarding care
pt is resting comfortably in bed. pt does not appear to be in any acute distress at this time. pt is refusing vitals, meds and parts of neuro assessment however pt does not appear to have any neurological changes since last assessment. pt has also continued to refuse telemetry monitoring
Pt denies pain at this time. Pt denies shortness of breath, dizziness, numbness.
patient A&Ox4. no signs of acute distress noted. patient verbalizes understanding of risks associated with refusing medication management
patient refusing tele monitoring, vital signs, labs, IV change, and medications. no acute distress noted
pt asymptomatic resting in bed comfortably. denies any pain, sob or discomfort at this time. pt refusing any BP meds or treatment.
patient refusing tele monitoring, vital signs, labs, IV change, and medications. no acute distress noted
Patient stated he was dehydrated and just peed but urine was poured out of urinal
Pt refused vital signs & atorvastatin
Pt refusing Ancef, Lovenox, & amlodipine. Pt also refusing vital signs.
RN explained to Patient risks of not taking medications and vitals signs. Patient responded with "Its my choice"
pt asymptomatic resting in bed comfortably. denies any pain, sob or discomfort .
Has been refusing vitals
Pt denies pain at this time. Pt denies shortness of breath, dizziness, numbness.
Pt denies pain at this time. Pt denies shortness of breath, dizziness, numbness.
Pt refusing vital signs, morning lab work, amlodipine & lovenox
patient is refusing for vitals to be taken, refusing 10pm medication and refusing tele monitoring, and also refusing neuro checks. Patient does not appear to be in any signs of distress
Has been refusing medications, vitals and telemetry
Pt is refusing all care and medication
Pt refusing vital signs & atorvastatin
Pt refusing vital signs, morning lab work, amlodipine & lovenox
Patient has been refusing certain things
Patient in bed, A&O 4, non-verbal. Refusing scheduled medications, telemetry monitoring, and VS to be assessed
Pt refusing Ancef, Lovenox, & amlodipine. Pt also refusing vital signs.
patient refusing tele monitoring, vital signs, labs, IV change, and medications. no acute distress noted
patient AOx4, in no acute distress.
patient is refusing for vitals to be taken, refusing 10pm medication and refusing tele monitoring, and also refusing neuro checks. Patient does not appear to be in any signs of distress
patient is refusing for vitals to be taken, refusing 6am medication  Patient does not appear to be in any signs of distress

## 2022-02-04 NOTE — PROGRESS NOTE ADULT - PROBLEM SELECTOR PROBLEM 7
CAYDEN on CPAP
Hyperlipidemia
Hyperlipidemia
CAD (coronary artery disease)
Hyperlipidemia
CAYDEN on CPAP
Hyperlipidemia
CAYDEN on CPAP

## 2022-03-25 ENCOUNTER — NON-APPOINTMENT (OUTPATIENT)
Age: 67
End: 2022-03-25

## 2022-04-25 NOTE — DISCHARGE NOTE ADULT - PATIENT PORTAL LINK FT
You can access the [facility] Patient Portal, offered by [facility], by registering with the following website: [email or web address]
no

## 2022-08-13 ENCOUNTER — TRANSCRIPTION ENCOUNTER (OUTPATIENT)
Age: 67
End: 2022-08-13

## 2022-08-15 ENCOUNTER — TRANSCRIPTION ENCOUNTER (OUTPATIENT)
Age: 67
End: 2022-08-15

## 2022-10-03 NOTE — ED PROVIDER NOTE - NS ED MD DISPO SPECIAL CONSIDERATION1
4 y/o with h/o adopted mother currently in care of grandmother who comes in with increased work of breathing this evening in the setting of " feeling warm" and congestion since yesterday. Clinical bronchiolitis. will not be treating with albuterol given clincal presentation and low concern for RAD. No focality to exam so low concern for PNA Fall Precaution/Low Suspicion of COVID-19

## 2022-10-13 NOTE — PATIENT PROFILE ADULT - FALL HARM RISK - FACTORS
[facility]   Progress note      Subjective: The patient is awake and alert. Lying in bed without complaints  No acute events overnight  Denies chest pain, SOB    Objective:    /76   Pulse 60   Temp 97.2 °F (36.2 °C)   Resp 27   Ht 6' (1.829 m)   Wt 173 lb (78.5 kg)   SpO2 96%   BMI 23.46 kg/m²     In: 120 [P.O.:120]  Out: 2025   In: 120   Out: 2025 [Urine:2025]    General appearance: NAD, conversant  HEENT: AT/NC, MMM  Neck: FROM, supple  Lungs: Clear to auscultation  CV: irregular, no MRGs  Vasc: Radial pulses 2+  Abdomen: Soft, non-tender; no masses or HSM  Extremities: No peripheral edema or digital cyanosis  Skin: no rash, lesions or ulcers  Psych: Alert and oriented to person, place and time  Neuro: Alert and interactive     Recent Labs     10/12/22  0610   WBC 5.9   HGB 10.0*   HCT 31.3*          Recent Labs     10/11/22  1432 10/12/22  0610 10/13/22  0559    140 138   K 4.6 4.5 4.7    107 105   CO2 20* 22 22   BUN 12 11 12   CREATININE 1.1 1.0 1.1   CALCIUM 8.6 8.7 9.4       Assessment:    Principal Problem:    Atrial fibrillation with RVR (HCC)  Active Problems:    H/O total cystectomy    AICD discharge  Resolved Problems:    * No resolved hospital problems. *      Plan:    61-year-old male with a history of bladder cancer status post recent cystoscopy prostatectomy and ileal conduit urinary diversion with urostomy placement last week,  and history of A. fib presented to the ED with complaints of chest pain and irregular heartbeat and his ICD had fired 5 times is admitted to telemetry unit.   Concern for UTI sepsis post procedure/instrumentation     10/8  Rate control-Cardizem drip with titration parameters  Consult cardiology/EP - await input  Chads - Vasc -  patient was receiving lovenox injection 40 mg daily to end on 10/11/2022  TSH   Rocephin 1 g daily with iv fluids   Check procal no urine culture      10/9  no further rigors or chills, afebrile  Continue IV Rocephin, blood cultures unremarkable to date-White count 7.7  Retroperitoneal ultrasound without hydronephrosis or pyelonephritis  Elevated rate this morning which has since been controlled by medication adjustment by EP-sotalol and Toprol-XL  Initiated on Lovenox 1 mg/kg  subcu twice daily, will need p.o. anticoagulation with Eliquis   supplement magnesium and potassium, 2 g and 40 mEq p.o. respectively  Heme-onc consultation for bladder cancer    10/10/2022  WBC 5.8  Stress test today -abnormal myocardial perfusion scan due to moderate global hypokinesis, no perfusion defect, EF 40  Echo completed ejection fraction 45 to 50%  QtC - 499 with increased dose of sotalol  Transition Lovenox to oral anticoagulation at discretion of cardiology for A. fib  Await oncology consult, possible discharge tomorrow    10/11/2022  Begin coumadin - pharmacy to dose PT/Inr daily - bridge with Lovenox. This can be monitored at facility  QtC - 480 with 5/6 increased doses of Sotolol will continue to monitor  Oncology - okay to follow with oncology as outpatient once final biopsy reports are back from [facility]-no immediate need for inpatient evaluation  Discharge plan tomorrow once final dose of sotalol is administered, if patient doing okay    10/12/2022  INR - 1.4  pharmacy dosing coumadin 5 mg today bridge with lovenox. Patient has to remain inpatient until INR is greater than 2.0.     Monitor labs    10/13/2022  INR very slow to improve-1.4 today, pharmacy dosing Coumadin  On Lovenox bridge  Also on aspirin  Given multiple agents, watch for GI bleed or hematuria and patient with recent urological surgery  Remains on sotalol and beta-blocker  If we can make arrangements for Lovenox shots to be taken twice daily until therapeutic INR is obtained, okay for discharge from medicine standpoint      Code Status: Full  Consultants: Cardiology, Oncology    DVT Prophylaxis - Coumadin  PT/OT  Discharge planning - LESLI Maury Schwarz MD  4:30 PM  10/13/2022 Weakness/Other

## 2023-03-30 NOTE — PROGRESS NOTE ADULT - GENERAL
details…
UROLOGY DAILY PROGRESS NOTE    Pt is a 82y Male admitted with septic shock, found to have mild right hydro secondary to a 5mm stone now s/p right PCN by IR yesterday. Patient seen and examined at bedside with Dr. Mcneal, son present. No complaints offered at this time.    MEDICATIONS  (STANDING):  cefTRIAXone   IVPB 2000 milliGRAM(s) IV Intermittent every 12 hours  chlorhexidine 2% Cloths 1 Application(s) Topical <User Schedule>  dextrose 5%. 1000 milliLiter(s) (100 mL/Hr) IV Continuous <Continuous>  dextrose 5%. 1000 milliLiter(s) (50 mL/Hr) IV Continuous <Continuous>  dextrose 50% Injectable 25 Gram(s) IV Push once  dextrose 50% Injectable 12.5 Gram(s) IV Push once  dextrose 50% Injectable 25 Gram(s) IV Push once  glucagon  Injectable 1 milliGRAM(s) IntraMuscular once  insulin lispro (ADMELOG) corrective regimen sliding scale   SubCutaneous three times a day before meals  lactated ringers. 1000 milliLiter(s) (125 mL/Hr) IV Continuous <Continuous>  pantoprazole  Injectable 40 milliGRAM(s) IV Push daily    MEDICATIONS  (PRN):  acetaminophen     Tablet .. 650 milliGRAM(s) Oral every 6 hours PRN Temp greater or equal to 38C (100.4F)  dextrose Oral Gel 15 Gram(s) Oral once PRN Blood Glucose LESS THAN 70 milliGRAM(s)/deciliter    REVIEW OF SYSTEMS   [x] A ten-point review of systems was otherwise negative except as noted.    Vital Signs Last 24 Hrs  T(C): 36.8 (30 Mar 2023 16:00), Max: 37.9 (30 Mar 2023 00:00)  T(F): 98.2 (30 Mar 2023 16:00), Max: 100.3 (30 Mar 2023 00:00)  HR: 99 (30 Mar 2023 16:00) (96 - 139)  BP: 136/76 (30 Mar 2023 16:00) (93/51 - 150/65)  BP(mean): 98 (30 Mar 2023 16:00) (67 - 98)  RR: 31 (30 Mar 2023 16:00) (18 - 33)  SpO2: 96% (30 Mar 2023 16:00) (92% - 96%)    Parameters below as of 30 Mar 2023 16:00  Patient On (Oxygen Delivery Method): nasal cannula  O2 Flow (L/min): 2    PHYSICAL EXAM:  GEN: Elderly male in NAD, awake and alert.  SKIN: Non diaphoretic.  RESP: Non-labored breathing. No use of accessory muscles.  CARDIO: +S1/S2  ABDO: Soft, NT/ND. Conduit stoma pink and viable, draining clear yellow urine into urostomy bag.  BACK: Right PCN draining clear yellow urine, dressing dry/intact.   : s/p penectomy.  EXT: CASON x 4    I&O's Summary  29 Mar 2023 07:01  -  30 Mar 2023 07:00  --------------------------------------------------------  IN: 975 mL / OUT: 3275 mL / NET: -2300 mL    30 Mar 2023 07:01  -  30 Mar 2023 17:48  --------------------------------------------------------  IN: 1045 mL / OUT: 1000 mL / NET: 45 mL    LABS:                      13.4   13.08 )-----------( 103      ( 30 Mar 2023 05:25 )             40.1     03-30  137  |  104  |  38<H>  ----------------------------<  161<H>  3.9   |  22  |  1.7<H>    Ca    8.4      30 Mar 2023 05:25  Mg     2.1         TPro  5.3<L>  /  Alb  3.0<L>  /  TBili  1.3<H>  /  DBili  0.3  /  AST  53<H>  /  ALT  15  /  AlkPhos  54  30    PT/INR - ( 29 Mar 2023 05:10 )   PT: 17.00 sec;   INR: 1.47 ratio    PTT - ( 29 Mar 2023 05:10 )  PTT:30.1 sec    Urinalysis Basic - ( 28 Mar 2023 18:10 )  Color: Yellow / Appearance: Slightly Cloudy / S.010 / pH: x  Gluc: x / Ketone: Trace  / Bili: Negative / Urobili: 0.2 mg/dL   Blood: x / Protein: 100 mg/dL / Nitrite: Negative   Leuk Esterase: Small / RBC: 6-10 /HPF / WBC 10-25 /HPF   Sq Epi: x / Non Sq Epi: Few /HPF / Bacteria: Many
details…

## 2023-07-07 NOTE — PROGRESS NOTE ADULT - PROBLEM SELECTOR PLAN 5
Seen on CTA neck  ENT: Recommend MRI for further evaluation, lower suspicion for infection   [ ] MRI neck- pt currently refusing   Patient refusing any further imaging 10

## 2023-07-11 NOTE — PROGRESS NOTE ADULT - REASON FOR ADMISSION
Hi. It was a pleasure to meet you.    ISSUES:    1) LOW WHITE CELLS, TYPE- NEUTROPHILS     Based on history- has happened prior, but now lower.     FINDINGS    Component      Latest Ref Rng & Units 6/26/2020 3/29/2021 8/25/2021 11/10/2022   Absolute Neutrophil      1.8 - 7.7 K/mcL 1.1 (L) 3.2 3.6 1.1 (L)     Component      Latest Ref Rng & Units 6/14/2023 6/20/2023 7/11/2023   Absolute Neutrophil      1.8 - 7.7 K/mcL  2.2 0.7 (L)     Possible causes:   A) immune  system   B) low vitamins   C) medications (psych)    D) bone marrow process.   E) or is this \"you\" cyclical pattern)    To do:   1) monitor for recurrent infections, wash hands well   2) repeat labs in 2 weeks and follow up.    3) if any fevers, mouth sores, recurrent infections (bladder, lung, skin, gyne) call asap      PLEASE CALL OFFICE IF ANY CONCERNS [phone]     Jenn Hilton is to return to the clinic to see in Kingsley Alexandra DO  2 weeks for a 20 min OFV for follow-up     Referral:  None    Orders  Orders Placed This Encounter    Vitamin B12 And Folate    Iron And total Iron Binding Capacity    Ferritin    BRUNO Screen,IFA,with Reflex to Titer and Pattern/Rheumataoid Arthritis Panel1 ([facility])    CBC with Automated Differential    Pathology Smear Review, Blood         Misc Orders:  None    Diagnosis    1. Other decreased white blood cell count    2. Abnormal finding of blood chemistry, unspecified        Kingsley Alexandra DO       stroke

## 2023-11-08 NOTE — DISCHARGE NOTE ADULT - LAUNCH MEDICATION RECONCILIATION
Chief Complaint   Patient presents with    Study Results       Jermaine Hoyos is a 63 year old male who returns to [facility] for review of sleep testing results. He presented with symptoms suggestive of obstructive sleep apnea.    Estimated body mass index is 25.82 kg/m  as calculated from the following:    Height as of this encounter: 1.829 m (6').    Weight as of this encounter: 86.4 kg (190 lb 6.4 oz).  Total score - Champion: 5 (11/8/2023 10:19 AM)  Total Score: 6 (10/23/2023  5:45 PM)    Home Sleep Apnea Testing -we will 10/24/2023: 190 lbs 6.4 oz: AHI 1.6/hr. Supine AHI 3.8/hr.   Oxygen Leticia of 87%.  Baseline 96%.  Sp02 =< 88% for 0.1 minutes  He slept on his back (18.5%), prone (0%), left (32.9) and right (48.1%) sides.   Analysis time: 424.8 minutes.           Jermaine Hoyos reports that he slept Good .     Results were reviewed in detail today with Jermaine and a copy given to him for his records.    Past Sleep Evaluations:  Patient underwent a split-night polysomnographic study on the evening of 7/28/2009 at [facility] sleep center in [facility], for clinical symptoms of snoring, significant daytime fatigue, excessive daytime sleepiness (mild) with an ESS of 6, and sleep maintenance insomnia.  He had a known obstructive sleep apnea history of an AHI of 20 events/hour obtained by [employer] assessment in 2006.     AHI, overall: 8.1 events/hour  AHI, supine: 19.2 events/hour  RDI, overall: 27.3 events/hour     SaO2, mean: 95%  SaO2, leticia: 90%     Notes: Decreased REM sleep was noted.  Patient slept in both the lateral and supine positions.  Snoring was intermittent and variably intense.    SLEEP-WAKE SCHEDULE:      Work/School Days: Patient goes to school/work: No            Usually gets into bed at 2200  Takes patient about 1 minute to fall asleep  Has trouble falling asleep never almost nights per week  Wakes up in the middle of the night several 1-3  Wakes up due to  Pain;Use the bathroom  He has trouble falling back asleep 2-3  times a week.   It usually takes 60+ if at all 50% chance he may not return to sleep to get back to sleep  Patient is usually up at 0530 to 0630  Uses alarm: No    Weekends/Non-work Days/All Other Days:  Usually gets into bed at 2200   Takes patient about 1 minute to fall asleep  Patient is usually up at 0530 to 0630  Uses alarm: No     Sleep Need  Patient gets  5 to 7 hours sleep on average     Patient thinks he needs about at least 6 but 7good hours would be better sleep     Jermaine Hoyos prefers to sleep in this position(s): Back;Side;Recliner   Patient states they do the following activities in bed: Use phone, computer, or tablet     Naps  Patient takes a purposeful nap 4 to 7 times a week and naps are usually 20 min usually. 90 if i have slept poorly in duration  He feels better after a nap:    He dozes off unintentionally 4 to 7 days per week  Patient has had a driving accident or near-miss due to sleepiness/drowsiness: No    Have been time prior to DST when he awoke at 0230 and did not return to sleep until 1430. Only slept 2-3 hours. Unusual for him, but not unheard of. May be an outlier. Not unusual  to awaken at 0230 and remain awake for few hours.      This has been a pattern of sleep/wake patient has had since his 20's, but has been worsening with age.     Reviewed by team:  Tobacco  Allergies  Meds  Problems           Reviewed by provider:   Allergies  Meds  Problems               Problem List:  Patient Active Problem List    Diagnosis Date Noted    Benign neoplasm of sigmoid colon 03/24/2023     Priority: Medium     Created by [facility] Annotation: Feb 19 2013 11:38AM -  ,  : colonoscipy done   2/14/13      Diverticular disease of large intestine 03/22/2023     Priority: Medium    Statin intolerance 06/23/2021     Priority: Medium    Coronary artery calcification seen on CT scan 11/30/2020     Priority: Medium     Altitude sickness preventative measures 09/09/2020     Priority: Medium    History of Lyme disease 09/09/2020     Priority: Medium    Allergic rhinitis 09/09/2020     Priority: Medium    Obstructive sleep apnea syndrome 12/17/2019     Priority: Medium     Not on CPAP  Sleep on side      BPH (benign prostatic hyperplasia) 02/05/2019     Priority: Medium     Symptoms in 50's  Nocturia, frequency, urgency        Low back pain 02/05/2019     Priority: Medium     Longstanding  With R sciatica  streching        Hyperlipidemia      Priority: Medium     Dx 2013  Did not tolerate Crestor or atorvastatin  LDL to 192; Trig to 312; HDL to 38  Simvastatin-not taking        History of colonic polyps 02/13/2018     Priority: Medium    Elevated PSA 09/19/2016     Priority: Medium     Dx late 40's  Level 5.8        Squamous Cell Carcinoma Of The Skin Of The Face      Priority: Medium     Dx around 2000  Left nasal area  Dermatology-            /80   Pulse 82   Ht 1.829 m (6')   Wt 86.4 kg (190 lb 6.4 oz)   SpO2 96%   BMI 25.82 kg/m      Impression/Plan:    Circadian Rhythm Disorder, Advanced Sleep-Wake Phase Type  1.  Provided hardcopy of data regarding diagnoses as well as treatments.  Discussed possibility of actigraphy in the future.  2.  Provided a referral to see Clinical Sleep Psychologist for assistance with his circadian rhythm disorder.  3.  Discussed bright light therapy.  4.  Recommend patient optimize his sleep schedule as well as his sleep hygiene practices to mitigate any further sleep disturbance  5.  Recommend patient employ safe driving skills including not driving a motor vehicle should he become drowsy  6.  Recommend patient return to me on a an as-needed basis.  I explained to him that he may reach out to [facility] at any time [facility] if I could be of assistance to him in obtaining any additional information.      Minutes spent with patient, all of which were spent face-to-face counseling, consulting,  coordinating plan of care.      Brunilda Marcos, INDIGO CNP    CC:  [place] Alexis,     <<-----Click here for Discharge Medication Review

## 2024-02-07 NOTE — CONSULT LETTER
[Dear  ___] : Dear  [unfilled], [Consult Closing:] : Thank you very much for allowing me to participate in the care of this patient.  If you have any questions, please do not hesitate to contact me. [Consult Letter:] : I had the pleasure of evaluating your patient, [unfilled]. [Sincerely,] : Sincerely, [FreeTextEntry2] : Dipak Beltran M.D. oral Number Of Freeze-Thaw Cycles: 1 freeze-thaw cycle Render Post-Care Instructions In Note?: no Consent: The patient's consent was obtained including but not limited to risks of crusting, scabbing, blistering, scarring, darker or lighter pigmentary change, recurrence, incomplete removal and infection. Show Aperture Variable?: Yes Application Tool (Optional): Liquid Nitrogen Sprayer Post-Care Instructions: I reviewed with the patient in detail post-care instructions. Patient is to wear sunprotection, and avoid picking at any of the treated lesions. Pt may apply Vaseline to crusted or scabbing areas. Duration Of Freeze Thaw-Cycle (Seconds): 5 Detail Level: Detailed

## 2024-05-06 NOTE — DISCUSSION/SUMMARY
[FreeTextEntry1] : Summary from Dr. Linn's note dated 7/9/19-with modification\par He presented to Hospital on 1/26/19 with acute left hemiparesis.\par Impression:\par 1.Left hemiparesis w/ dysarthria likely 2/2 right basal ganglia/corona radiata infarct 2/2 small vessel disease;\par 2.  Vertebrobasilar high grade stenosis (intracranial large artery stenosis) is asymptomatic, and unrelated to his acute stroke. \par \par - Continue ASA 81 mg once daily for secondary stroke prevention\par - Continue Speech therapy/Physical therapy/Occupational therapy as directed.\par - Discussed with patient marijuana cessation but patient not willing to discuss at this time. \par \par 9/27/19. His Neurologic Exam shows moderate spastic dysarthria, and mild left hemiparesis, i.e. overall stable left pure motor hemiparesis.\par \par He Should Benefit from Ongoing aggressive management of his vascular risk factors. He is fatigued which he attributes to his medications, and I asked him to followup with you on this point.\par \par He feel sad and possibly depressed, and I have referred him for evaluation with a psychologist.\par \par I strongly encouraged him to lose weight. Please consider referring him for nutritional counseling.\par \par He can follow up with me in about 6 months. I hope that he remains free of further serious trouble.\par  n/a

## 2024-05-30 NOTE — BH CONSULTATION LIAISON ASSESSMENT NOTE - NSBHMSEGROOM_PSY_A_CORE
[employer]: call    Comments:  
Fair

## 2024-11-22 NOTE — ED ADULT TRIAGE NOTE - BEFAST SPEECH PHRASE
No Length Of Therapy: 4 months Add High Risk Medication Management Associated Diagnosis?: No Detail Level: Generalized